# Patient Record
Sex: FEMALE | Race: WHITE | Employment: FULL TIME | URBAN - METROPOLITAN AREA
[De-identification: names, ages, dates, MRNs, and addresses within clinical notes are randomized per-mention and may not be internally consistent; named-entity substitution may affect disease eponyms.]

---

## 2019-01-16 PROBLEM — E11.21 TYPE 2 DIABETES WITH NEPHROPATHY (HCC): Status: ACTIVE | Noted: 2019-01-16

## 2019-01-23 PROBLEM — D69.6 THROMBOCYTOPENIA (HCC): Status: ACTIVE | Noted: 2019-01-23

## 2019-02-27 ENCOUNTER — APPOINTMENT (OUTPATIENT)
Dept: GENERAL RADIOLOGY | Age: 64
DRG: 812 | End: 2019-02-27
Attending: INTERNAL MEDICINE
Payer: COMMERCIAL

## 2019-02-27 ENCOUNTER — HOSPITAL ENCOUNTER (INPATIENT)
Age: 64
LOS: 2 days | Discharge: HOME | DRG: 812 | End: 2019-03-04
Attending: EMERGENCY MEDICINE | Admitting: INTERNAL MEDICINE
Payer: COMMERCIAL

## 2019-02-27 ENCOUNTER — APPOINTMENT (OUTPATIENT)
Dept: CT IMAGING | Age: 64
DRG: 812 | End: 2019-02-27
Attending: INTERNAL MEDICINE
Payer: COMMERCIAL

## 2019-02-27 DIAGNOSIS — E11.21 TYPE 2 DIABETES WITH NEPHROPATHY (HCC): ICD-10-CM

## 2019-02-27 DIAGNOSIS — R63.4 WEIGHT LOSS: ICD-10-CM

## 2019-02-27 DIAGNOSIS — D64.9 ANEMIA, UNSPECIFIED TYPE: Primary | ICD-10-CM

## 2019-02-27 LAB
ALBUMIN SERPL-MCNC: 3.2 G/DL (ref 3.2–4.6)
ALBUMIN/GLOB SERPL: 0.8 {RATIO} (ref 1.2–3.5)
ALP SERPL-CCNC: 246 U/L (ref 50–136)
ALT SERPL-CCNC: 41 U/L (ref 12–65)
ANION GAP SERPL CALC-SCNC: 9 MMOL/L (ref 7–16)
AST SERPL-CCNC: 45 U/L (ref 15–37)
BACTERIA URNS QL MICRO: ABNORMAL /HPF
BASOPHILS # BLD: 0 K/UL (ref 0–0.2)
BASOPHILS NFR BLD: 1 % (ref 0–2)
BILIRUB SERPL-MCNC: 1.2 MG/DL (ref 0.2–1.1)
BUN SERPL-MCNC: 23 MG/DL (ref 8–23)
CALCIUM SERPL-MCNC: 9.5 MG/DL (ref 8.3–10.4)
CASTS URNS QL MICRO: ABNORMAL /LPF
CHLORIDE SERPL-SCNC: 102 MMOL/L (ref 98–107)
CO2 SERPL-SCNC: 25 MMOL/L (ref 21–32)
CREAT SERPL-MCNC: 0.86 MG/DL (ref 0.6–1)
DIFFERENTIAL METHOD BLD: ABNORMAL
EOSINOPHIL # BLD: 0.2 K/UL (ref 0–0.8)
EOSINOPHIL NFR BLD: 5 % (ref 0.5–7.8)
EPI CELLS #/AREA URNS HPF: ABNORMAL /HPF
ERYTHROCYTE [DISTWIDTH] IN BLOOD BY AUTOMATED COUNT: 16.5 % (ref 11.9–14.6)
FERRITIN SERPL-MCNC: 9 NG/ML (ref 8–388)
FOLATE SERPL-MCNC: 19.5 NG/ML (ref 3.1–17.5)
GLOBULIN SER CALC-MCNC: 4.2 G/DL (ref 2.3–3.5)
GLUCOSE BLD STRIP.AUTO-MCNC: 156 MG/DL (ref 65–100)
GLUCOSE BLD STRIP.AUTO-MCNC: 249 MG/DL (ref 65–100)
GLUCOSE SERPL-MCNC: 167 MG/DL (ref 65–100)
HCT VFR BLD AUTO: 25.9 % (ref 35.8–46.3)
HGB BLD-MCNC: 7.9 G/DL (ref 11.7–15.4)
IMM GRANULOCYTES # BLD AUTO: 0 K/UL (ref 0–0.5)
IMM GRANULOCYTES NFR BLD AUTO: 0 % (ref 0–5)
INR PPP: 1.2
IRON SERPL-MCNC: 39 UG/DL (ref 35–150)
LIPASE SERPL-CCNC: 187 U/L (ref 73–393)
LYMPHOCYTES # BLD: 1.2 K/UL (ref 0.5–4.6)
LYMPHOCYTES NFR BLD: 26 % (ref 13–44)
MCH RBC QN AUTO: 22.1 PG (ref 26.1–32.9)
MCHC RBC AUTO-ENTMCNC: 30.5 G/DL (ref 31.4–35)
MCV RBC AUTO: 72.3 FL (ref 79.6–97.8)
MONOCYTES # BLD: 0.4 K/UL (ref 0.1–1.3)
MONOCYTES NFR BLD: 8 % (ref 4–12)
NEUTS SEG # BLD: 2.7 K/UL (ref 1.7–8.2)
NEUTS SEG NFR BLD: 59 % (ref 43–78)
NRBC # BLD: 0 K/UL (ref 0–0.2)
PLATELET # BLD AUTO: 121 K/UL (ref 150–450)
PMV BLD AUTO: 9.8 FL (ref 9.4–12.3)
POTASSIUM SERPL-SCNC: 4.2 MMOL/L (ref 3.5–5.1)
PROT SERPL-MCNC: 7.4 G/DL (ref 6.3–8.2)
PROTHROMBIN TIME: 14.6 SEC (ref 11.7–14.5)
RBC # BLD AUTO: 3.58 M/UL (ref 4.05–5.2)
RBC #/AREA URNS HPF: ABNORMAL /HPF
SODIUM SERPL-SCNC: 136 MMOL/L (ref 136–145)
TRANSFERRIN SERPL-MCNC: 316 MG/DL (ref 202–364)
TSH SERPL DL<=0.005 MIU/L-ACNC: 1.95 UIU/ML (ref 0.36–3.74)
VIT B12 SERPL-MCNC: 1314 PG/ML (ref 193–986)
WBC # BLD AUTO: 4.6 K/UL (ref 4.3–11.1)
WBC URNS QL MICRO: ABNORMAL /HPF

## 2019-02-27 PROCEDURE — 82728 ASSAY OF FERRITIN: CPT

## 2019-02-27 PROCEDURE — 81003 URINALYSIS AUTO W/O SCOPE: CPT | Performed by: EMERGENCY MEDICINE

## 2019-02-27 PROCEDURE — 84466 ASSAY OF TRANSFERRIN: CPT

## 2019-02-27 PROCEDURE — 84443 ASSAY THYROID STIM HORMONE: CPT

## 2019-02-27 PROCEDURE — 83690 ASSAY OF LIPASE: CPT

## 2019-02-27 PROCEDURE — 93005 ELECTROCARDIOGRAM TRACING: CPT | Performed by: EMERGENCY MEDICINE

## 2019-02-27 PROCEDURE — 87086 URINE CULTURE/COLONY COUNT: CPT

## 2019-02-27 PROCEDURE — 74011636320 HC RX REV CODE- 636/320: Performed by: INTERNAL MEDICINE

## 2019-02-27 PROCEDURE — 74177 CT ABD & PELVIS W/CONTRAST: CPT

## 2019-02-27 PROCEDURE — 85025 COMPLETE CBC W/AUTO DIFF WBC: CPT

## 2019-02-27 PROCEDURE — 83540 ASSAY OF IRON: CPT

## 2019-02-27 PROCEDURE — 74011250636 HC RX REV CODE- 250/636: Performed by: INTERNAL MEDICINE

## 2019-02-27 PROCEDURE — 85610 PROTHROMBIN TIME: CPT

## 2019-02-27 PROCEDURE — 86923 COMPATIBILITY TEST ELECTRIC: CPT

## 2019-02-27 PROCEDURE — 93005 ELECTROCARDIOGRAM TRACING: CPT | Performed by: INTERNAL MEDICINE

## 2019-02-27 PROCEDURE — 99218 HC RM OBSERVATION: CPT

## 2019-02-27 PROCEDURE — 82962 GLUCOSE BLOOD TEST: CPT

## 2019-02-27 PROCEDURE — 96361 HYDRATE IV INFUSION ADD-ON: CPT | Performed by: EMERGENCY MEDICINE

## 2019-02-27 PROCEDURE — 96360 HYDRATION IV INFUSION INIT: CPT | Performed by: EMERGENCY MEDICINE

## 2019-02-27 PROCEDURE — 81015 MICROSCOPIC EXAM OF URINE: CPT

## 2019-02-27 PROCEDURE — 74011000258 HC RX REV CODE- 258: Performed by: EMERGENCY MEDICINE

## 2019-02-27 PROCEDURE — 99285 EMERGENCY DEPT VISIT HI MDM: CPT | Performed by: EMERGENCY MEDICINE

## 2019-02-27 PROCEDURE — 36415 COLL VENOUS BLD VENIPUNCTURE: CPT

## 2019-02-27 PROCEDURE — 86900 BLOOD TYPING SEROLOGIC ABO: CPT

## 2019-02-27 PROCEDURE — 71045 X-RAY EXAM CHEST 1 VIEW: CPT

## 2019-02-27 PROCEDURE — 74011636320 HC RX REV CODE- 636/320: Performed by: EMERGENCY MEDICINE

## 2019-02-27 PROCEDURE — 80053 COMPREHEN METABOLIC PANEL: CPT

## 2019-02-27 PROCEDURE — 82746 ASSAY OF FOLIC ACID SERUM: CPT

## 2019-02-27 PROCEDURE — 82607 VITAMIN B-12: CPT

## 2019-02-27 PROCEDURE — 74011250637 HC RX REV CODE- 250/637: Performed by: INTERNAL MEDICINE

## 2019-02-27 RX ORDER — SODIUM CHLORIDE 9 MG/ML
100 INJECTION, SOLUTION INTRAVENOUS CONTINUOUS
Status: DISCONTINUED | OUTPATIENT
Start: 2019-02-27 | End: 2019-03-03

## 2019-02-27 RX ORDER — INSULIN LISPRO 100 [IU]/ML
0-10 INJECTION, SOLUTION INTRAVENOUS; SUBCUTANEOUS
Status: DISCONTINUED | OUTPATIENT
Start: 2019-02-27 | End: 2019-03-04 | Stop reason: HOSPADM

## 2019-02-27 RX ORDER — ONDANSETRON 2 MG/ML
4 INJECTION INTRAMUSCULAR; INTRAVENOUS
Status: DISCONTINUED | OUTPATIENT
Start: 2019-02-27 | End: 2019-03-04 | Stop reason: HOSPADM

## 2019-02-27 RX ORDER — SERTRALINE HYDROCHLORIDE 50 MG/1
50 TABLET, FILM COATED ORAL DAILY
Status: DISCONTINUED | OUTPATIENT
Start: 2019-02-28 | End: 2019-03-04 | Stop reason: HOSPADM

## 2019-02-27 RX ORDER — ACETAMINOPHEN 325 MG/1
650 TABLET ORAL
Status: DISCONTINUED | OUTPATIENT
Start: 2019-02-27 | End: 2019-03-04 | Stop reason: HOSPADM

## 2019-02-27 RX ORDER — GABAPENTIN 300 MG/1
300 CAPSULE ORAL
Status: DISCONTINUED | OUTPATIENT
Start: 2019-02-27 | End: 2019-03-04 | Stop reason: HOSPADM

## 2019-02-27 RX ORDER — SODIUM CHLORIDE 0.9 % (FLUSH) 0.9 %
10 SYRINGE (ML) INJECTION
Status: COMPLETED | OUTPATIENT
Start: 2019-02-27 | End: 2019-02-27

## 2019-02-27 RX ORDER — TRAMADOL HYDROCHLORIDE 50 MG/1
50 TABLET ORAL
Status: DISCONTINUED | OUTPATIENT
Start: 2019-02-27 | End: 2019-03-04 | Stop reason: HOSPADM

## 2019-02-27 RX ORDER — ATORVASTATIN CALCIUM 10 MG/1
10 TABLET, FILM COATED ORAL DAILY
Status: DISCONTINUED | OUTPATIENT
Start: 2019-02-28 | End: 2019-03-04 | Stop reason: HOSPADM

## 2019-02-27 RX ADMIN — GABAPENTIN 300 MG: 300 CAPSULE ORAL at 22:12

## 2019-02-27 RX ADMIN — SODIUM CHLORIDE 100 ML/HR: 900 INJECTION, SOLUTION INTRAVENOUS at 18:09

## 2019-02-27 RX ADMIN — IOPAMIDOL 100 ML: 755 INJECTION, SOLUTION INTRAVENOUS at 19:30

## 2019-02-27 RX ADMIN — DIATRIZOATE MEGLUMINE AND DIATRIZOATE SODIUM 15 ML: 660; 100 LIQUID ORAL; RECTAL at 18:07

## 2019-02-27 RX ADMIN — Medication 10 ML: at 19:30

## 2019-02-27 RX ADMIN — SODIUM CHLORIDE 100 ML: 900 INJECTION, SOLUTION INTRAVENOUS at 19:30

## 2019-02-27 NOTE — ED TRIAGE NOTES
PT TO ED WITH C/O LOW HGB  - PT REPORTS THAT SHE WAS TOLD HER HGB WAS 7 - PT REPORTS THAT THEY ARE UNSURE WHERE SHE IS THE LOSING THE BLOOD FROM AND WAS TOLD THAT SHE NEEDED TO BE ADMITTED BUT WAS NOT ABLE TO TAKE DIRECT ADMITS - PT PALE

## 2019-02-27 NOTE — ED PROVIDER NOTES
Presents with complaint of needing admission for dropping her hemoglobin. Patient states causes unknown and she went to her primary care doctor and had a negative rectal exam.  She denies any black stools or blood in her stools. She denies any hematuria. She states her doctor wanted her directly admitted but there are no beds. She reports fatigueand difficulty walking because of this fatigue but denies any chest pain or shortness of breath. She denies any abdominal pain nausea or vomiting. The history is provided by the patient. Abnormal Lab Results This is a new problem. The current episode started more than 1 week ago. The problem occurs constantly. The problem has not changed since onset. Pertinent negatives include no chest pain, no abdominal pain and no shortness of breath. She has tried nothing for the symptoms. Past Medical History:  
Diagnosis Date  Diabetes ([facility] [address]  Hypertension History reviewed. No pertinent surgical history. Family History:  
Problem Relation Age of Onset  Hypertension Mother [address] [facility] Other Mother PVD  Diabetes Mother  Parkinson's Disease Mother  Diabetes Father Social History Socioeconomic History  Marital status:  Spouse name: Not on file  Number of children: Not on file  Years of education: Not on file  Highest education level: Not on file Social Needs  Financial resource strain: Not on file  Food insecurity - worry: Not on file  Food insecurity - inability: Not on file  Transportation needs - medical: Not on file  Transportation needs - non-medical: Not on file Occupational History  Not on file Tobacco Use  Smoking status: Never Smoker  Smokeless tobacco: Never Used Substance and Sexual Activity  Alcohol use: No  
 Drug use: Not on file  Sexual activity: Not on file Other Topics Concern  Not on file Social History Narrative  Not on file ALLERGIES: Pcn [penicillins] Review of Systems Constitutional: Negative for chills and fever. Respiratory: Negative for shortness of breath. Cardiovascular: Negative for chest pain. Gastrointestinal: Negative for abdominal pain. All other systems reviewed and are negative. Vitals:  
 02/27/19 1325 02/27/19 1502 02/27/19 1523 BP: 110/69 108/56 Pulse: 88 87 Resp: 20 13 Temp: 98.6 °F (37 °C) SpO2: 98%  98% Weight: 75.3 kg (166 lb) Height: 5' 7\" (1.702 m) Physical Exam  
Constitutional: She is oriented to person, place, and time. She appears well-developed and well-nourished. No distress. HENT:  
Head: Normocephalic and atraumatic. Eyes: Conjunctivae are normal. Right eye exhibits no discharge. Left eye exhibits no discharge. Neck: Normal range of motion. Neck supple. Cardiovascular: Normal rate and regular rhythm. Murmur heard. Pulmonary/Chest: Effort normal and breath sounds normal. No respiratory distress. Abdominal: Soft. She exhibits no distension. There is no tenderness. Musculoskeletal: Normal range of motion. She exhibits no edema. Neurological: She is alert and oriented to person, place, and time. No cranial nerve deficit. Skin: Skin is warm and dry. Capillary refill takes less than 2 seconds. She is not diaphoretic. Psychiatric: She has a normal mood and affect. Her behavior is normal.  
Nursing note and vitals reviewed. MDM Number of Diagnoses or Management Options Anemia, unspecified type:  
Weight loss:  
Diagnosis management comments: D/w hospitalist  Admission for workup of weight loss versus GI bleed per the PCP request as I reviewed in the note. Patient has had some recent weight loss and there is concern for malignancy with her anemia and low platelet count. Her rectal exam was negative for blood in her PCPs office. Patient does not meet criteria for transfusion at this point. Amount and/or Complexity of Data Reviewed Clinical lab tests: ordered and reviewed Discuss the patient with other providers: yes Independent visualization of images, tracings, or specimens: yes (Normal sinus rhythm no ST elevation normal QRS) Risk of Complications, Morbidity, and/or Mortality Presenting problems: high Diagnostic procedures: moderate Management options: high Patient Progress Patient progress: improved Procedures

## 2019-02-28 LAB
ALBUMIN SERPL-MCNC: 2.7 G/DL (ref 3.2–4.6)
ALBUMIN/GLOB SERPL: 0.8 {RATIO} (ref 1.2–3.5)
ALP SERPL-CCNC: 202 U/L (ref 50–136)
ALT SERPL-CCNC: 33 U/L (ref 12–65)
ANION GAP SERPL CALC-SCNC: 6 MMOL/L (ref 7–16)
AST SERPL-CCNC: 38 U/L (ref 15–37)
ATRIAL RATE: 86 BPM
BILIRUB SERPL-MCNC: 0.6 MG/DL (ref 0.2–1.1)
BUN SERPL-MCNC: 21 MG/DL (ref 8–23)
CALCIUM SERPL-MCNC: 8.7 MG/DL (ref 8.3–10.4)
CALCULATED P AXIS, ECG09: 65 DEGREES
CALCULATED R AXIS, ECG10: 40 DEGREES
CALCULATED T AXIS, ECG11: 61 DEGREES
CHLORIDE SERPL-SCNC: 106 MMOL/L (ref 98–107)
CO2 SERPL-SCNC: 25 MMOL/L (ref 21–32)
CREAT SERPL-MCNC: 0.78 MG/DL (ref 0.6–1)
DIAGNOSIS, 93000: NORMAL
ERYTHROCYTE [DISTWIDTH] IN BLOOD BY AUTOMATED COUNT: 16.8 % (ref 11.9–14.6)
GLOBULIN SER CALC-MCNC: 3.6 G/DL (ref 2.3–3.5)
GLUCOSE BLD STRIP.AUTO-MCNC: 232 MG/DL (ref 65–100)
GLUCOSE BLD STRIP.AUTO-MCNC: 239 MG/DL (ref 65–100)
GLUCOSE BLD STRIP.AUTO-MCNC: 249 MG/DL (ref 65–100)
GLUCOSE BLD STRIP.AUTO-MCNC: 282 MG/DL (ref 65–100)
GLUCOSE SERPL-MCNC: 224 MG/DL (ref 65–100)
HCT VFR BLD AUTO: 21.9 % (ref 35.8–46.3)
HCT VFR BLD AUTO: 22.2 % (ref 35.8–46.3)
HCT VFR BLD AUTO: 24.1 % (ref 35.8–46.3)
HEMOCCULT STL QL: NEGATIVE
HGB BLD-MCNC: 6.7 G/DL (ref 11.7–15.4)
HGB BLD-MCNC: 7 G/DL (ref 11.7–15.4)
HGB BLD-MCNC: 7.4 G/DL (ref 11.7–15.4)
MAGNESIUM SERPL-MCNC: 1.5 MG/DL (ref 1.8–2.4)
MCH RBC QN AUTO: 22.7 PG (ref 26.1–32.9)
MCHC RBC AUTO-ENTMCNC: 31.5 G/DL (ref 31.4–35)
MCV RBC AUTO: 72 FL (ref 79.6–97.8)
NRBC # BLD: 0 K/UL (ref 0–0.2)
P-R INTERVAL, ECG05: 160 MS
PLATELET # BLD AUTO: 75 K/UL (ref 150–450)
PMV BLD AUTO: 10 FL (ref 9.4–12.3)
POTASSIUM SERPL-SCNC: 4 MMOL/L (ref 3.5–5.1)
PROT SERPL-MCNC: 6.3 G/DL (ref 6.3–8.2)
Q-T INTERVAL, ECG07: 372 MS
QRS DURATION, ECG06: 80 MS
QTC CALCULATION (BEZET), ECG08: 445 MS
RBC # BLD AUTO: 3.04 M/UL (ref 4.05–5.2)
SODIUM SERPL-SCNC: 137 MMOL/L (ref 136–145)
VENTRICULAR RATE, ECG03: 86 BPM
WBC # BLD AUTO: 2.2 K/UL (ref 4.3–11.1)

## 2019-02-28 PROCEDURE — 36430 TRANSFUSION BLD/BLD COMPNT: CPT

## 2019-02-28 PROCEDURE — C9113 INJ PANTOPRAZOLE SODIUM, VIA: HCPCS | Performed by: INTERNAL MEDICINE

## 2019-02-28 PROCEDURE — 82272 OCCULT BLD FECES 1-3 TESTS: CPT

## 2019-02-28 PROCEDURE — 85018 HEMOGLOBIN: CPT

## 2019-02-28 PROCEDURE — 83516 IMMUNOASSAY NONANTIBODY: CPT

## 2019-02-28 PROCEDURE — 74011636637 HC RX REV CODE- 636/637: Performed by: FAMILY MEDICINE

## 2019-02-28 PROCEDURE — 77030020263 HC SOL INJ SOD CL0.9% LFCR 1000ML

## 2019-02-28 PROCEDURE — 97165 OT EVAL LOW COMPLEX 30 MIN: CPT

## 2019-02-28 PROCEDURE — 77030039270 HC TU BLD FLTR CARD -A

## 2019-02-28 PROCEDURE — 30233N1 TRANSFUSION OF NONAUTOLOGOUS RED BLOOD CELLS INTO PERIPHERAL VEIN, PERCUTANEOUS APPROACH: ICD-10-PCS | Performed by: INTERNAL MEDICINE

## 2019-02-28 PROCEDURE — 74011250637 HC RX REV CODE- 250/637: Performed by: INTERNAL MEDICINE

## 2019-02-28 PROCEDURE — 80074 ACUTE HEPATITIS PANEL: CPT

## 2019-02-28 PROCEDURE — 74011250636 HC RX REV CODE- 250/636: Performed by: FAMILY MEDICINE

## 2019-02-28 PROCEDURE — 36415 COLL VENOUS BLD VENIPUNCTURE: CPT

## 2019-02-28 PROCEDURE — 74011636637 HC RX REV CODE- 636/637: Performed by: INTERNAL MEDICINE

## 2019-02-28 PROCEDURE — 80053 COMPREHEN METABOLIC PANEL: CPT

## 2019-02-28 PROCEDURE — 99218 HC RM OBSERVATION: CPT

## 2019-02-28 PROCEDURE — 74011250636 HC RX REV CODE- 250/636: Performed by: INTERNAL MEDICINE

## 2019-02-28 PROCEDURE — 85027 COMPLETE CBC AUTOMATED: CPT

## 2019-02-28 PROCEDURE — 74011250637 HC RX REV CODE- 250/637: Performed by: PHYSICIAN ASSISTANT

## 2019-02-28 PROCEDURE — 83735 ASSAY OF MAGNESIUM: CPT

## 2019-02-28 PROCEDURE — 74011000250 HC RX REV CODE- 250: Performed by: INTERNAL MEDICINE

## 2019-02-28 PROCEDURE — 82962 GLUCOSE BLOOD TEST: CPT

## 2019-02-28 PROCEDURE — P9016 RBC LEUKOCYTES REDUCED: HCPCS

## 2019-02-28 RX ORDER — SODIUM CHLORIDE 9 MG/ML
250 INJECTION, SOLUTION INTRAVENOUS AS NEEDED
Status: DISCONTINUED | OUTPATIENT
Start: 2019-02-28 | End: 2019-03-04 | Stop reason: HOSPADM

## 2019-02-28 RX ORDER — MAGNESIUM SULFATE HEPTAHYDRATE 40 MG/ML
2 INJECTION, SOLUTION INTRAVENOUS ONCE
Status: COMPLETED | OUTPATIENT
Start: 2019-02-28 | End: 2019-02-28

## 2019-02-28 RX ORDER — DEXTROSE 40 %
15 GEL (GRAM) ORAL AS NEEDED
Status: DISCONTINUED | OUTPATIENT
Start: 2019-02-28 | End: 2019-03-04 | Stop reason: HOSPADM

## 2019-02-28 RX ORDER — POLYETHYLENE GLYCOL 3350 17 G/17G
238 POWDER, FOR SOLUTION ORAL ONCE
Status: COMPLETED | OUTPATIENT
Start: 2019-03-01 | End: 2019-03-01

## 2019-02-28 RX ORDER — BISACODYL 5 MG
10 TABLET, DELAYED RELEASE (ENTERIC COATED) ORAL
Status: COMPLETED | OUTPATIENT
Start: 2019-02-28 | End: 2019-02-28

## 2019-02-28 RX ORDER — BISACODYL 5 MG
10 TABLET, DELAYED RELEASE (ENTERIC COATED) ORAL ONCE
Status: COMPLETED | OUTPATIENT
Start: 2019-03-01 | End: 2019-03-01

## 2019-02-28 RX ORDER — DEXTROSE 50 % IN WATER (D50W) INTRAVENOUS SYRINGE
25-50 AS NEEDED
Status: DISCONTINUED | OUTPATIENT
Start: 2019-02-28 | End: 2019-03-04 | Stop reason: HOSPADM

## 2019-02-28 RX ORDER — DIPHENHYDRAMINE HCL 25 MG
25 CAPSULE ORAL
Status: DISCONTINUED | OUTPATIENT
Start: 2019-02-28 | End: 2019-03-01

## 2019-02-28 RX ORDER — POLYETHYLENE GLYCOL 3350 17 G/17G
238 POWDER, FOR SOLUTION ORAL ONCE
Status: COMPLETED | OUTPATIENT
Start: 2019-02-28 | End: 2019-02-28

## 2019-02-28 RX ORDER — INSULIN GLARGINE 100 [IU]/ML
50 INJECTION, SOLUTION SUBCUTANEOUS DAILY
Status: DISCONTINUED | OUTPATIENT
Start: 2019-02-28 | End: 2019-03-01

## 2019-02-28 RX ADMIN — SERTRALINE HYDROCHLORIDE 50 MG: 50 TABLET ORAL at 11:23

## 2019-02-28 RX ADMIN — LEVOTHYROXINE SODIUM 125 MCG: 75 TABLET ORAL at 05:03

## 2019-02-28 RX ADMIN — INSULIN LISPRO 4 UNITS: 100 INJECTION, SOLUTION INTRAVENOUS; SUBCUTANEOUS at 18:23

## 2019-02-28 RX ADMIN — GABAPENTIN 300 MG: 300 CAPSULE ORAL at 21:54

## 2019-02-28 RX ADMIN — ATORVASTATIN CALCIUM 10 MG: 10 TABLET, FILM COATED ORAL at 11:23

## 2019-02-28 RX ADMIN — INSULIN LISPRO 4 UNITS: 100 INJECTION, SOLUTION INTRAVENOUS; SUBCUTANEOUS at 11:24

## 2019-02-28 RX ADMIN — MAGNESIUM SULFATE HEPTAHYDRATE 2 G: 40 INJECTION, SOLUTION INTRAVENOUS at 19:06

## 2019-02-28 RX ADMIN — INSULIN GLARGINE 50 UNITS: 100 INJECTION, SOLUTION SUBCUTANEOUS at 18:23

## 2019-02-28 RX ADMIN — POLYETHYLENE GLYCOL 3350 238 G: 17 POWDER, FOR SOLUTION ORAL at 18:24

## 2019-02-28 RX ADMIN — SODIUM CHLORIDE 40 MG: 9 INJECTION, SOLUTION INTRAMUSCULAR; INTRAVENOUS; SUBCUTANEOUS at 09:17

## 2019-02-28 RX ADMIN — BISACODYL 10 MG: 5 TABLET, COATED ORAL at 18:23

## 2019-02-28 NOTE — PROGRESS NOTES
Progress Note Patient: Tutu Burgess MRN: [id number]  SSN: [id number] YOB: 1955  Age: 66 y.o. Sex: female Admit Date: 2/27/2019 LOS: 0 days Subjective: F/U microcytic anemia, weight loss. Hx significant for DM type 2, diabetic neuropathy, depression, hypothyroidism, chronic anemia and thrombocytopenia. Patient newly diagnosed with cirrhosis. Patient admitted for worsening anemia of unknown origin. Glucose levels elevated. Hg 7 this AM. Platelets worsening at 75. Magnesium 1.5. Current Facility-Administered Medications Medication Dose Route Frequency  dextrose 40% (GLUTOSE) oral gel 1 Tube  15 g Oral PRN  
 glucagon (GLUCAGEN) injection 1 mg  1 mg IntraMUSCular PRN  
 dextrose (D50W) injection syrg 12.5-25 g  25-50 mL IntraVENous PRN  
 0.9% sodium chloride infusion 250 mL  250 mL IntraVENous PRN  
 diphenhydrAMINE (BENADRYL) capsule 25 mg  25 mg Oral Q6H PRN  
 [START ON 3/1/2019] bisacodyl (DULCOLAX) tablet 10 mg  10 mg Oral ONCE  polyethylene glycol (MIRALAX) powder 238 g  238 g Oral ONCE  
 bisacodyl (DULCOLAX) tablet 10 mg  10 mg Oral NOW  
 [START ON 3/1/2019] polyethylene glycol (MIRALAX) powder 238 g  238 g Oral ONCE  
 0.9% sodium chloride infusion  100 mL/hr IntraVENous CONTINUOUS  
 atorvastatin (LIPITOR) tablet 10 mg  10 mg Oral DAILY  gabapentin (NEURONTIN) capsule 300 mg  300 mg Oral QHS  levothyroxine (SYNTHROID) tablet 125 mcg  125 mcg Oral ACB  sertraline (ZOLOFT) tablet 50 mg  50 mg Oral DAILY  insulin lispro (HUMALOG) injection 0-10 Units  0-10 Units SubCUTAneous TIDAC  pantoprazole (PROTONIX) 40 mg in sodium chloride 0.9% 10 mL injection  40 mg IntraVENous DAILY  ondansetron (ZOFRAN) injection 4 mg  4 mg IntraVENous Q6H PRN  
 traMADol (ULTRAM) tablet 50 mg  50 mg Oral Q6H PRN  
 acetaminophen (TYLENOL) tablet 650 mg  650 mg Oral Q6H PRN Objective:  
 
Vitals: 02/28/19 0744 02/28/19 1142 02/28/19 1513 02/28/19 1528 BP: 115/50 135/55 102/56 98/51 Pulse: 78 80 80 78 Resp: 16 16 16 16 Temp: 98.4 °F (36.9 °C) 98.4 °F (36.9 °C) 98.4 °F (36.9 °C) 98 °F (36.7 °C) SpO2: 100% 100% 98% 100% Weight:      
Height:      
  
  
Intake and Output: 
Current Shift: No intake/output data recorded. Last three shifts: 02/26 1901 - 02/28 0700 In: 100 [I.V.:100] Out: - Physical Exam:  
General:  Alert, cooperative, no distress, appears stated age. Eyes:  Conjunctivae/corneas clear. PERRL, EOMs intact. Fundi benign Ears:  Normal TMs and external ear canals both ears. Nose: Nares normal. Septum midline. Mucosa normal. No drainage or sinus tenderness. Mouth/Throat: Lips, mucosa, and tongue normal. Teeth and gums normal.  
Neck: Supple, symmetrical, trachea midline, no adenopathy, thyroid: no enlargment/tenderness/nodules, no carotid bruit and no JVD. Back:   Symmetric, no curvature. ROM normal. No CVA tenderness. Lungs:   Clear to auscultation bilaterally. Heart:  Regular rate and rhythm, S1, S2 normal, no murmur, click, rub or gallop. Abdomen:   Soft, non-tender. Bowel sounds normal. No masses,  No organomegaly. Extremities: Extremities normal, atraumatic, no cyanosis or edema. Pulses: 2+ and symmetric all extremities. Skin: Skin color, texture, turgor normal. No rashes or lesions Lymph nodes: Cervical, supraclavicular, and axillary nodes normal.  
Neurologic: CNII-XII intact. Normal strength, sensation and reflexes throughout. Lab/Data Review: 
 
Recent Results (from the past 24 hour(s)) GLUCOSE, POC Collection Time: 02/27/19 10:07 PM  
Result Value Ref Range Glucose (POC) 249 (H) 65 - 100 mg/dL PROTHROMBIN TIME + INR Collection Time: 02/27/19 10:59 PM  
Result Value Ref Range Prothrombin time 14.6 (H) 11.7 - 14.5 sec INR 1.2    
CBC W/O DIFF Collection Time: 02/28/19  5:37 AM  
Result Value Ref Range WBC 2.2 (L) 4.3 - 11.1 K/uL  
 RBC 3.04 (L) 4.05 - 5.2 M/uL HGB 7.0 (L) 11.7 - 15.4 g/dL HCT 21.9 (L) 35.8 - 46.3 % MCV 72.0 (L) 79.6 - 97.8 FL  
 MCH 22.7 (L) 26.1 - 32.9 PG  
 MCHC 31.5 31.4 - 35.0 g/dL  
 RDW 16.8 (H) 11.9 - 14.6 % PLATELET 75 (L) 604 - 450 K/uL MPV 10.0 9.4 - 12.3 FL ABSOLUTE NRBC 0.00 0.0 - 0.2 K/uL METABOLIC PANEL, COMPREHENSIVE Collection Time: 02/28/19  5:37 AM  
Result Value Ref Range Sodium 137 136 - 145 mmol/L Potassium 4.0 3.5 - 5.1 mmol/L Chloride 106 98 - 107 mmol/L  
 CO2 25 21 - 32 mmol/L Anion gap 6 (L) 7 - 16 mmol/L Glucose 224 (H) 65 - 100 mg/dL BUN 21 8 - 23 MG/DL Creatinine 0.78 0.6 - 1.0 MG/DL  
 GFR est AA >60 >60 ml/min/1.73m2 GFR est non-AA >60 >60 ml/min/1.73m2 Calcium 8.7 8.3 - 10.4 MG/DL Bilirubin, total 0.6 0.2 - 1.1 MG/DL  
 ALT (SGPT) 33 12 - 65 U/L  
 AST (SGOT) 38 (H) 15 - 37 U/L Alk. phosphatase 202 (H) 50 - 136 U/L Protein, total 6.3 6.3 - 8.2 g/dL Albumin 2.7 (L) 3.2 - 4.6 g/dL Globulin 3.6 (H) 2.3 - 3.5 g/dL A-G Ratio 0.8 (L) 1.2 - 3.5 MAGNESIUM Collection Time: 02/28/19  5:37 AM  
Result Value Ref Range Magnesium 1.5 (L) 1.8 - 2.4 mg/dL GLUCOSE, POC Collection Time: 02/28/19  7:41 AM  
Result Value Ref Range Glucose (POC) 239 (H) 65 - 100 mg/dL GLUCOSE, POC Collection Time: 02/28/19 11:13 AM  
Result Value Ref Range Glucose (POC) 232 (H) 65 - 100 mg/dL HGB & HCT Collection Time: 02/28/19  1:22 PM  
Result Value Ref Range HGB 6.7 (LL) 11.7 - 15.4 g/dL HCT 22.2 (L) 35.8 - 46.3 % Assessment/ Plan: Active Problems: Hyperlipidemia (1/12/2016) Acquired hypothyroidism (1/12/2016) Type 2 diabetes with nephropathy ([facility] [address] (1/16/2019) Thrombocytopenia ([facility] [address] (1/23/2019) Severe anemia (2/27/2019) Worsening anemia - Give 1 unit PRBC today.  CT abdomen pelvis showed no findings suggestive of malignancy. Cirrhosis and splenomegaly seen. Give more units if indicated. H&H q 6 hours. Weight loss - As above. Plan for EGD/Colonoscopy 3/2/19. Cirrhosis - Likely from fatty liver disease. Hepatitis panel, KRISTEN, ceruloplasmin, alpha 1 antitrypsin. GI working up for etiology. DM- SS. A1C 14 1/16/19. Patient states she takes Lantus 70 units at home and has not been getting. Will order Lantus 50 units and see how glucose levels react to regimen since eating differently here. Supplement magesium DVT prophylaxis - SCDs Signed By: Nury Hannon DO February 28, 2019

## 2019-02-28 NOTE — H&P
[address] [place] [address] 
HISTORY AND PHYSICAL Name:  Naseem Razo 
MR#:  [id number] :  1955 ACCOUNT #:  [de-identified] ADMIT DATE:  2019 TIME OF ADMISSION:  5:30 p.m. CHIEF COMPLAINT:  Generalized weakness. HISTORY OF PRESENT ILLNESS:  This is a 72-year-old female patient who has a past medical history of diabetes type 2, diabetic neuropathy, depression, chronic anemia who came into the emergency room with a chief complaint of worsening anemia and generalized weakness. According to the patient for the last 2to 3 months, she has been having progressive unintended weight loss and she also has suffered progressive weakness, lack of energy and poor stamina. She is a diabetic and for a while, she stopped using her regular medications. Apparently, she was depressed. Her hemoglobin A1c was checked on 2019 an it was up to 14. She has seen her primary care doctor and she has recently resumed her insulin. The patient was found to have worsening anemia with a hemoglobin of 8.6 on 2019 down to 7.9 today. She denies melena, hematemesis, bright red blood per rectum, vaginal bleeding, hemoptysis, large bruises, trauma or any other problems. She has reported gum disease with frequent minor bleeding but apart from that, she denies any other evidence of external hemorrhage. She had a rectal exam done by her primary care doctor which was reported negative for occult blood in the stool. Today, she was sent into the emergency room by her primary care doctor to be worked up for anemia to rule out malignancy. When she arrived into the emergency room, her vital signs were blood pressure of 110/69, heart rate of 88, respiratory rate of 20, O2 saturation of 98% on room air. She was awake and alert. Her lungs were clear to auscultation. Her heart had a regular rate and rhythm with no murmurs. Her abdomen was soft and nontender and she had no leg edema. Her initial blood workup reported a white blood cell count of 4.6, hemoglobin of 7.9, platelet count of 696. Her kidney function was within normal limits. Her bilirubin was slightly elevated to 1.2. A urinalysis was done and it was not suggestive of active urinary tract infection with a low white blood cell count of 3 to 5; however, she had 1+ bacteria. The patient was presented to the Hospitalist Team to be admitted. REVIEW OF SYSTEMS:  Review of 14 systems was performed and it was negative except for the findings that are reported in the HPI. PAST MEDICAL HISTORY: 
1.  Diabetes type 2. 
2.  Hypothyroidism. 3.  Thrombocytopenia. 4.  Chronic anemia. 5.  History of hypertension. PAST SURGICAL HISTORY:  None. SOCIAL HISTORY:  Denies smoking, alcohol use, or illicit drug use. FAMILY HISTORY:  Positive for diabetes, hypertension, Parkinson's disease. ALLERGIES:  PENICILLIN. PHYSICAL EXAMINATION: 
VITAL SIGNS:  Blood pressure 108/56, heart rate 87, respiratory rate of 13, O2 saturation of 95%. HEENT:  Eyes:  Right eye PERRLA, left eye with deformed pupil. Ears, nose, mouth and throat:  The patient has gum disease which is moderate to severe. NECK:  No evidence of lymphadenopathies. CHEST:  Clear breath sounds bilaterally. CARDIOVASCULAR:  Regular rate and rhythm with no murmurs. No bilateral leg edema. GASTROINTESTINAL:  Abdomen is soft and nontender with positive bowel sounds. :  Unremarkable. MUSCULOSKELETAL:  No evidence of trauma. SKIN:  No evidence of active skin lesions. NEUROLOGIC:  The patient is alert and oriented with no areas of focal weakness. PSYCHIATRIC:  Normal mood. HEMATOLOGIC/LYMPHATIC/IMMUNOLOGIC:  No evidence of active bleeding. No abnormal lymphadenopathies. LABORATORY AND IMAGE RESULTS:  White blood cell count 4.6, hemoglobin 7.9, platelet count 438.   Urinalysis white blood cell count 3 to 5, bacteria 1+.  Sodium 136, potassium 4.2, anion gap 9, glucose 167, BUN 23, creatinine 0.86, calcium 9.5. Albumin 3.2. ALT 41, AST 45, lipase 187. Urine culture from 01/16/2019 positive for E. coli. Chest x-ray seen and analyzed by me, no evidence of lung infiltrates, no cardiomegaly. EKG pending to be done. ASSESSMENT:  This is a 62-year-old female patient who came into the emergency room complaining of severe fatigue and progressive anemia. She will be placed under observation and her initial length of stay is calculated to be less than two midnights. PLAN: 
1. Progressive anemia of unclear etiology. The patient denies any evidence of external bleeding. She has reported unintended weight loss and she was found to have progressive anemia. An entire anemia workup has been ordered. Her clinical presentation is somehow suggestive of early cirrhosis too. She has a risk factor for this due to her uncontrolled diabetes. On top of that, she has thrombocytopenia, chronic anemia, slightly elevated bilirubin and the fatigue which is very typical of chronic liver disease. Her INR is going to be checked. I have ordered a CT scan of the abdomen and pelvis with the intention of ruling out intraabdominal malignancy and to assess her liver morphology. The patient had a rectal exam done by her primary care doctor which was negative. If her abdominal CT scan is not showing any possible etiology for her anemia, or if her Hgb level keeps dropping we will consider consulting GI in the morning. The patient would be kept n.p.o. at midnight. 1 unit of packed RBCs has been ordered in the emergency room. We will recheck her hemoglobin level in the morning and she might need one more unit of blood to increase her hemoglobin as close as possible to 10 2. Possible urinary tract infection. Her urinalysis is showing 1+ bacteria but the finding is not necessarily suggestive of UTI.   She had a urine culture showing  E. coli in 01/2019 and apparently, this was treated. A new urine culture has been ordered. 3.  Diabetes type 2. Uncontrolled. The patient has been using long-acting insulin 70 units per day and short acting insulin on sliding scale. Her blood sugar is low in the emergency room. For now, I will order only Humalog sliding scale and the long-acting insulin will be added as necessary. 4.  History of hypertension. The patient has a borderline low blood pressure in the emergency room. No blood pressure medication will be used at this time and we will monitor her vital signs. 5.  History of hypothyroidism. Uncontrolled hypothyroidism could also explain her fatigue, anemia, and generalized symptoms. Her TSH level will be checked. 6.  Depression. Continue using Zoloft 50 mg per day. 7.  DVT prophylaxis. Bilateral compression devices. MD LEONELA [id number] 
D:  02/27/2019 18:06 
T:  02/27/2019 21:35 
JOB #:  [id number] CC:

## 2019-02-28 NOTE — CONSULTS
[facility] Consult Note       Consulting GI Physician: Dr. Nikki Doan    Referring Provider:  Dr. Lupe Arrington Date:  2/28/2019    Admit Date:  2/27/2019    Chief Complaint:  New diagnosis of cirrhosis/ persistent anemia    Subjective:     History of Present Illness:  Patient is a 66 y.o. female with PMH of diabetes type 2, diabetic neuropathy, depression, hypothyroidism, chronic anemia, thrombocytopenia being seen in GI consultation at the request of Dr. Promise Nelson for new diagnosis of Cirrhosis, microcytic anemia. She presented to [facility] ER 2/27 with c/o generalized weakness, worsening anemia after being seen by her PCP earlier that day and was found to have Hgb 7.5, decreased from Hgb 8.6 on 1/16/19. Iron studies show iron 39, ferritin 9, Transferrin 316. Vit B12 1314, Folate 19.5.  BUN/Cr WNL. INR ok at 1.2. No overt GI bleeding and reportedly with neg hemoccult stool study with PCP. She does have gum disease and has had some mild gum bleeding as well as mild nose bleeds. She recalls hx anemia in her teens likely related to menstruation though then recalls being told she had chronic anemia with Hgb around 10-11. Prior to this year last Hgb on file is Hgb 11.0 from 2/2018. No prior EGD or Colonoscopy. She has been noted to have elevated LFTs. Admission labs revealed Tbili 0.8, Alk phos 237, AST 42, ALT 31. She recalls being told that her liver enzymes had been elevated in the past and that she likely had fatty liver. She has had thrombocytopenia with platelets as low as 75. She had a CT abd/pelvis that reported hepatic cirrhosis and splenomegaly (spleen 16.8cm). She denies ETOH. She denies hx tattoos/piercings. 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 She denies abdominal pain, N/V, reflux/heartburn, dyshagia. She has normal BMs. Her appetite has been decreased (she blames this on depressed mood) and since 2016 her weight has dropped from about 230lbs to 160lbs.   This was initially intentional in hopes to get her blood sugars down however she then continued to lose without increased effort. She was uninsured for a period of time and during this time did not see a physician regularly. She is a diabetic and for a while, she stopped using her regular medications. Her HgbA1C 1/2019 was elevated at 14.0. She has seen her primary care doctor and she has recently resumed her insulin.  She was taking Ibuprofen 200mg 4-6x daily though stopped doing so given concern for decreasing anemia. She will take an ASA every once in a while. No regular blood thinners. No tobacco or ETOH use.          PMH:  Past Medical History:   Diagnosis Date    Diabetes ([facility] [address]     Hypertension      HLD  Diabetic neuropathy  Depression      PSH:  History reviewed. No pertinent surgical history. Allergies: Allergies   Allergen Reactions    Pcn [Penicillins] Hives and Rash       Home Medications:  Prior to Admission medications    Medication Sig Start Date End Date Taking? Authorizing Provider   atorvastatin (LIPITOR) 10 mg tablet Take 1 Tab by mouth daily. 1/23/19  Yes Rupal Whitten MD   metFORMIN ER (GLUCOPHAGE XR) 500 mg tablet Take 1 Tab by mouth daily (with dinner). 2/27/19   Rupal Whitten MD   gabapentin (NEURONTIN) 300 mg capsule Take 1 Cap by mouth nightly. 2/27/19   Rupal Whitten MD   sertraline (ZOLOFT) 50 mg tablet Take 1 Tab by mouth daily. 2/27/19   Rupal Whitten MD   insulin aspart U-100 (NOVOLOG) 100 unit/mL inpn Inject 5-10 units SQ with meals and prn per sliding scale. 1/25/19   Rupal Whitten MD   hydroCHLOROthiazide (HYDRODIURIL) 25 mg tablet Take 1 Tab by mouth daily. 1/23/19   Rupal Whitten MD   insulin glargine (BASAGLAR KWIKPEN U-100 INSULIN) 100 unit/mL (3 mL) inpn Inject 40 units sq every day.  Titrate up by 2 units every 2 days until glucose 120-150. 1/23/19   Rupal Whitten MD   Insulin Needles, Disposable, (ARIE PEN NEEDLE) 32 gauge x 5/32\" ndle For use with insulin pens   E11.65 Uncontrolled type 2 DM 1/23/19   Georgette Caceres MD   lisinopril (PRINIVIL, ZESTRIL) 20 mg tablet Take 1 Tab by mouth daily. 1/17/19   Georgette Caceres MD   levothyroxine (SYNTHROID) 125 mcg tablet Take 1 Tab by mouth Daily (before breakfast). 1/17/19   Georgette Caceres MD   Blood-Glucose Meter monitoring kit Test FSBS 3x daily before meals. E11.65 1/16/19   Georgette Caceres MD   lancets misc Test FSBS 3x daily before meals. E11.65 1/16/19   Georgette Caceres MD   glucose blood VI test strips (BLOOD GLUCOSE TEST) strip Test FSBS 3x daily before meals. E11.65 1/16/19   Georgette Caceres MD   Insulin Needles, Disposable, 31 gauge x 5/16\" ndle by SubCUTAneous route daily.  E11.65 1/12/16   Georgette Caceres MD       Hospital Medications:  Current Facility-Administered Medications   Medication Dose Route Frequency    dextrose 40% (GLUTOSE) oral gel 1 Tube  15 g Oral PRN    glucagon (GLUCAGEN) injection 1 mg  1 mg IntraMUSCular PRN    dextrose (D50W) injection syrg 12.5-25 g  25-50 mL IntraVENous PRN    0.9% sodium chloride infusion  100 mL/hr IntraVENous CONTINUOUS    atorvastatin (LIPITOR) tablet 10 mg  10 mg Oral DAILY    gabapentin (NEURONTIN) capsule 300 mg  300 mg Oral QHS    levothyroxine (SYNTHROID) tablet 125 mcg  125 mcg Oral ACB    sertraline (ZOLOFT) tablet 50 mg  50 mg Oral DAILY    insulin lispro (HUMALOG) injection 0-10 Units  0-10 Units SubCUTAneous TIDAC    pantoprazole (PROTONIX) 40 mg in sodium chloride 0.9% 10 mL injection  40 mg IntraVENous DAILY    ondansetron (ZOFRAN) injection 4 mg  4 mg IntraVENous Q6H PRN    traMADol (ULTRAM) tablet 50 mg  50 mg Oral Q6H PRN    acetaminophen (TYLENOL) tablet 650 mg  650 mg Oral Q6H PRN       Social History:  Social History     Tobacco Use    Smoking status: Never Smoker    Smokeless tobacco: Never Used   Substance Use Topics    Alcohol use: No     No reported history of IV drug use, tattoos/piercings. No ETOH. 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 Family History:  Family History   Problem Relation Age of Onset    Hypertension Mother     Other Mother         PVD    Diabetes Mother     Parkinson's Disease Mother     Diabetes Father        Review of Systems:  A detailed 10 system ROS is obtained, with pertinent positives as listed above. All others are negative. Diet:  NPO    Objective:     Physical Exam:  Vitals:  Visit Vitals  /50 (BP 1 Location: Right arm, BP Patient Position: Lying left side; At rest)   Pulse 78   Temp 98.4 °F (36.9 °C)   Resp 16   Ht 5' 7\" (1.702 m)   Wt 75.3 kg (166 lb)   SpO2 100%   Breastfeeding? Yes   BMI 26.00 kg/m²     Gen:  Alert, cooperative, no acute distress  Skin:  Face reveal no rashes. HEENT: Sclerae anicteric. Cardiovascular: Regular rate and rhythm. +Murmur. Respiratory:  Comfortable breathing with no accessory muscle use. Clear breath sounds anteriorly with no wheezes, rales, or rhonchi. GI:  Abdomen nondistended, soft, and Nontender. Normal active bowel sounds. Rectal:  Deferred  Musculoskeletal:  No pitting edema of the lower legs. Neurological:  Gross memory appears intact. Patient is alert and oriented. Psychiatric:  Mood appears appropriate with judgement intact.     Laboratory:    Recent Labs     02/28/19  0537 02/27/19  2259 02/27/19  1328 02/27/19  1128   WBC 2.2*  --  4.6 3.9   HGB 7.0*  --  7.9* 7.5*   HCT 21.9*  --  25.9* 23.9*   PLT 75*  --  121* 105*   MCV 72.0*  --  72.3* 71*     --  136 135   K 4.0  --  4.2 4.5     --  102 99   CO2 25  --  25 21   BUN 21  --  23 22   CREA 0.78  --  0.86 0.84   CA 8.7  --  9.5 9.2   MG 1.5*  --   --   --    *  --  167* 179*   *  --  246* 237*   SGOT 38*  --  45* 42*   ALT 33  --  41 31   TBILI 0.6  --  1.2* 0.8   ALB 2.7*  --  3.2 3.3*   TP 6.3  --  7.4 6.6   LPSE  --   --  187  --    PTP  --  14.6*  --   --    INR  --  1.2  --   --       CT ABDOMEN AND PELVIS WITH CONTRAST 2/27/19  HISTORY: Anemia, weight loss and generalized weakness. COMPARISON: None. TECHNIQUE: 5 mm axial scans from above the diaphragms to the pubic symphysis  following oral and 100 cc intravenous contrast without acute complication. Intravenous contrast was given to increase the sensitivity to acute  inflammation. Radiation dose reduction techniques were used for this study. Our CT scanners use one or more of the following: Automated exposure control,  adjustment of the mA and or kV according to patient size, iterative  reconstruction. FINDINGS:   Abdomen: The lung bases are clear. The spleen is clearly enlarged, 16.8 cm  (normal usually less than 14). The liver appears homogeneous and has a  macronodular contour suggesting cirrhosis. No focal liver lesion. No calcified  gallstones. The biliary tree is not dilated. The pancreas is unremarkable. No  free fluid, acute inflammatory changes or adenopathy. Bowel loops are not  dilated. The kidneys enhance uniformly. No radiopaque renal calculi. No  hydronephrosis. The adrenal glands are normal size. Aorta is normal caliber.     Pelvis: No free fluid or acute inflammatory changes. The urinary bladder  unremarkable. No gross bony lesions. IMPRESSION:  Hepatic cirrhosis and splenomegaly. Assessment:     Active Problems:    Hyperlipidemia (1/12/2016)      Acquired hypothyroidism (1/12/2016)      Type 2 diabetes with nephropathy ([facility] [address] (1/16/2019)      Thrombocytopenia ([address] (1/23/2019)      Severe anemia (2/27/2019)       66 y.o. female with PMH of diabetes type 2, diabetic neuropathy, depression, hypothyroidism, chronic anemia, thrombocytopenia being seen in GI consultation at the request of Dr. Milan Wright for new diagnosis of Cirrhosis, microcytic anemia. She presented to [facility] ER 2/27 with c/o generalized weakness, worsening anemia with Hgb 7.5 on PCP eval earlier that day, decreased from Hgb 8.6 on 1/16/19.   Iron studies show iron 39, ferritin 9, Transferrin 316. Vit B12 1314, Folate 19.5.  BUN/Cr WNL. INR ok at 1.2. No overt GI bleeding and reportedly with neg hemoccult stool study with PCP. She does have gum disease and has had some mild gum bleeding as well as mild nose bleeds. She recalls hx anemia in her teens likely related to menstruation though then recalls being told she had chronic anemia with Hgb around 10-11. Prior to this year last Hgb on file is Hgb 11.0 from 2/2018. No prior EGD or Colonoscopy. She has been noted to have elevated LFTs. Admission labs revealed Tbili 0.8, Alk phos 237, AST 42, ALT 31. She recalls being told that her liver enzymes had been elevated in the past and that she likely had fatty liver. She has had thrombocytopenia with platelets as low as 75. She had a CT abd/pelvis that reported hepatic cirrhosis and splenomegaly (spleen 16.8cm). She denies ETOH. She denies hx tattoos/piercings. 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 Additionally c/o unintentional weight loss, recent NSAID use. Plan:   .  -Discussed findings of Cirrhosis with patient. I suspect this is most likely explained by Fatty liver in setting of DM II, HLD, obesity. No ETOH history. Will obtain basic liver work up to r/o other potential contributing etiologies to include at least viral hepatitis, autoimmune disease, A1AT, Ceruloplasmin.    -Agree with plan for transfusion 1-2u pRBCs. Follow trend of H/H and consider additional transfusion pRBC if needed.  -Continue daily PPI along with supportive care  -Based upon new diagnosis of Cirrhosis though also worsening microcytic anemia, anorexia, weight loss recommend further evaluation through EGD and Colonoscopy to evaluate for PUD, varices, polyps, malignancy. Will start clear liquid diet with plan for 2day colon bowel prep and EGD/Colon for Saturday 3/2/19. Further recommendations will be based upon pt clinical course.     ATTENDING NOTE:  I have seen and examined the patient along with my NP/PA. The assessment and plan above is my own. Liver diagnostic studies sent; likely PATHAK  Microcytic anemia - no prior colo; plan transfusion, 2 day prep for constipation and EGD/[place] on Saturday morning.   MD Ced Arriola PA-C  [facility]

## 2019-02-28 NOTE — PROGRESS NOTES
TRANSFER - IN REPORT: 
 
Verbal report received from Dejah Garcia RN(name) on [facility]  being received from ED(unit) for routine progression of care Report consisted of patients Situation, Background, Assessment and  
Recommendations(SBAR). Information from the following report(s) SBAR was reviewed with the receiving nurse. Opportunity for questions and clarification was provided. Assessment completed upon patients arrival to unit and care assumed.

## 2019-02-28 NOTE — ROUTINE PROCESS
TRANSFER - OUT REPORT: 
 
Verbal report given to Gabe Brambila RN on [facility]  being transferred to [facility] for routine progression of care Report consisted of patients Situation, Background, Assessment and  
Recommendations(SBAR). Information from the following report(s) SBAR, Kardex, ED Summary, MAR, Accordion and Recent Results was reviewed with the receiving nurse. Lines:  
Peripheral IV 02/27/19 Left Antecubital (Active) Site Assessment Clean, dry, & intact 2/27/2019  1:28 PM  
Phlebitis Assessment 0 2/27/2019  1:28 PM  
Infiltration Assessment 0 2/27/2019  1:28 PM  
Dressing Status Clean, dry, & intact 2/27/2019  1:28 PM  
  
 
Opportunity for questions and clarification was provided. Patient transported with: 
 [employer]

## 2019-02-28 NOTE — PROGRESS NOTES
Betha Lute Betha Lute Betha Lute Betha Lute Betha Lute END OF SHIFT NOTE: 
 
INTAKE/OUTPUT 
02/27 0701 - 02/28 0700 In: 100 [I.V.:100] Out: -  
Voiding: YES Catheter: NO 
Color: clear Drain: DIET 
npo Flatus: Patient does have flatus present. Stool:  0 occurrences. Characteristics: 
  
 
Ambulating Yes Emesis: 0 occurrences. Characteristics: VITAL SIGNS Patient Vitals for the past 12 hrs: 
 Temp Pulse Resp BP SpO2  
02/27/19 2320 98.7 °F (37.1 °C) 82 20 127/71 97 % 02/27/19 2203 98.9 °F (37.2 °C) 84 20 131/73 97 % 02/27/19 2103 99 °F (37.2 °C) 91 20 128/54 96 % 02/27/19 2022  90 16 134/85 97 % 02/27/19 1944  85  123/59 100 % 02/27/19 1917  82 17  100 % 02/27/19 1807  86 18  100 % Pain Assessment Pain Intensity 1: 0 (02/28/19 0134) Patient Stated Pain Goal: 0 Cherylene Freeze

## 2019-02-28 NOTE — ACP (ADVANCE CARE PLANNING)
Received a consult from nursing staff indicating that pt would like information about the [address] Reviewed pt's chart and conferred with RN to confirm that pt is capable of making health care decisions. Pt wanted a copy of the form to review. Provided some basic information about completing the form. 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 Pt will contact chaplains to assist with witnessing HCPOA and chaplains to follow-up, as needed.     Karly Tomas MDiv, [address] [place] [address]

## 2019-02-28 NOTE — ED NOTES
Bedside report received from Terrence Negron for continuation of patient care upon shift change. Patient care transferred at this time.

## 2019-02-28 NOTE — PROGRESS NOTES
02/27/19 2228 Dual Skin Pressure Injury Assessment Dual Skin Pressure Injury Assessment WDL Second Care Provider (Based on [address] [place] [address]) Brendan Clay RN  
Primary Nurse Cecille Hunt and Dwayne Liang RN performed a dual skin assessment on this patient No impairment noted Shane score is 22

## 2019-02-28 NOTE — PROGRESS NOTES
Received a consult from nursing staff indicating that pt would like information about the [address] Reviewed pt's chart and conferred with RN to confirm that pt is capable of making health care decisions. Pt wanted a copy of the form to review. Provided some basic information about completing the form. 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 Listened, as patient shared her emotional/spiritual about being sick. Pt is  and stated that she has not time off; she is concerned about her finances. Pt stated that her employer is very understanding. Pt receives support from friends. Her brother and mother live in [facility], but her mother is in a nursing home. Pt stated that her [place] gris is a source of comfort, and she wanted a Bible since she does not have her's at the hospital.  (Provided a Pervis Ng for pt in addition to the Bible that remains in pt's room.) Prayer offered at bedside in affirmation of pt's [id number] [facility]. Pt will contact chaplains to assist with witnessing HCPOA and chaplains to follow-up, as needed. Lonny Fenrandez MDiv, [address]

## 2019-02-28 NOTE — PROGRESS NOTES
OCCUPATIONAL THERAPY: Initial Assessment and Discharge 2/28/2019OBSERVATION:   
Payor: Adriana Magallanes / Plan: [place] [employer] [facility] / Product Type: PPO /  
  
NAME/AGE/GENDER: Mi Eastman is a 66 y.o. female PRIMARY DIAGNOSIS:  Severe anemia [D64.9] <principal problem not specified> <principal problem not specified> 
 
  
ICD-10: Treatment Diagnosis:  
 · Generalized Muscle Weakness (M62.81) Precautions/Allergies: 
   Pcn [penicillins] ASSESSMENT:  
Ms. Dennise Lane presents for anemia. Upon arrival, pt supine in bed and agreeable to OT evaluation. Pt is alert and oriented x 4. Pt reports living in a one-story home with 4 steps to enter. Pt reports independence with ADLs, IADLs, driving, and functional mobility. 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 Pt denies any use of DME or any hx of falls. Today, pt complete supine to sit transfer to edge of bed with supervision. Pt's static and dynamic sitting balance is intact. Pt's BUE AROM and strength are WFL. Pt notes her biggest concerns to be occasional balance/unsteadiness. Pt returned back to supine in bed with needs met and call light within reach. At this time, pt is functioning at baseline for ADLs; will defer to PT for functional mobility. Pt to be discharged from OT services. This section established at most recent assessment PROBLEM LIST (Impairments causing functional limitations): 1. Decreased Balance INTERVENTIONS PLANNED: (Benefits and precautions of occupational therapy have been discussed with the patient.) 1. none TREATMENT PLAN: Frequency/Duration:Rehabilitation Potential For Stated Goals:   
 
RECOMMENDED REHABILITATION/EQUIPMENT: (at time of discharge pending progress): Due to the probability of continued deficits (see above) this patient will not likely need continued skilled occupational therapy after discharge. Equipment:  
? None at this time OCCUPATIONAL PROFILE AND HISTORY:  
 History of Present Injury/Illness (Reason for Referral): 
See H&P Past Medical History/Comorbidities: Ms. Mona Herrera  has a past medical history of Diabetes ([address] and Hypertension. Ms. Mona Herrera  has no past surgical history on file. Social History/Living Environment:  
Home Environment: Private residence # Steps to Enter: 3 One/Two Story Residence: One story Living Alone: Yes Support Systems: Family member(s) Patient Expects to be Discharged to[de-identified] Private residence Current DME Used/Available at Home: None Prior Level of Function/Work/Activity: 
Independent with ADLs, IADLs, driving, and functional mobility. Also works as RN. Dominant Side:  
      RIGHT Personal Factors:   
      Sex:  female Age:  66 y.o. Other factors that influence how disability is experienced by the patient:  Multiple co-morbidities Number of Personal Factors/Comorbidities that affect the Plan of Care: Brief history (0):  LOW COMPLEXITY ASSESSMENT OF OCCUPATIONAL PERFORMANCE[de-identified]  
Activities of Daily Living:  
Basic ADLs (From Assessment) Complex ADLs (From Assessment) Feeding: Independent Oral Facial Hygiene/Grooming: Independent Bathing: Minimum assistance Upper Body Dressing: Independent Lower Body Dressing: Independent Toileting: Independent Instrumental ADL Meal Preparation: Minimum assistance Homemaking: Minimum assistance Grooming/Bathing/Dressing Activities of Daily Living Cognitive Retraining Safety/Judgement: Fall prevention Bed/Mat Mobility Rolling: Supervision Supine to Sit: Supervision Sit to Supine: Supervision Sit to Stand: Stand-by assistance Scooting: Supervision Most Recent Physical Functioning:  
Gross Assessment: 
AROM: Generally decreased, functional 
Strength: Generally decreased, functional 
Coordination: Generally decreased, functional 
         
  
Posture: 
  
Balance: 
Sitting: Intact Standing: Impaired Standing - Static: Good Standing - Dynamic : Fair Bed Mobility: 
Rolling: Supervision Supine to Sit: Supervision Sit to Supine: Supervision Scooting: Supervision Wheelchair Mobility: 
  
Transfers: 
Sit to Stand: Stand-by assistance Stand to Sit: Stand-by assistance Patient Vitals for the past 6 hrs: 
 BP BP Patient Position SpO2 Pulse 19 1142 135/55 At rest;Lying left side 100 % 80  
19 1513 102/56 At rest 98 % 80  
19 1528 98/51 Sitting 100 % 78 Mental Status Neurologic State: Alert Orientation Level: Oriented X4 Cognition: Appropriate decision making Perception: Appears intact Perseveration: No perseveration noted Safety/Judgement: Fall prevention Physical Skills Involved: 
1. Balance Cognitive Skills Affected (resulting in the inability to perform in a timely and safe manner): 1. none Psychosocial Skills Affected: 1. Habits/Routines Number of elements that affect the Plan of Care: 1-3:  LOW COMPLEXITY CLINICAL DECISION MAKIN[address] [place] [id number] AM-PAC 6 Clicks Daily Activity Inpatient Short Form How much help from another person does the patient currently need. .. Total A Lot A Little None 1. Putting on and taking off regular lower body clothing? [] 1   [] 2   [] 3   [x] 4  
2. Bathing (including washing, rinsing, drying)? [] 1   [] 2   [x] 3   [] 4  
3. Toileting, which includes using toilet, bedpan or urinal?   [] 1   [] 2   [] 3   [x] 4  
4. Putting on and taking off regular upper body clothing? [] 1   [] 2   [] 3   [x] 4  
5. Taking care of personal grooming such as brushing teeth? [] 1   [] 2   [] 3   [x] 4  
6. Eating meals? [] 1   [] 2   [] 3   [] 4  
© , Trustees of [address] [place] [address], under license to [employer]. All rights reserved Score:  Initial: 23 Most Recent: X (Date: -- ) Interpretation of Tool:  Represents activities that are increasingly more difficult (i.e. Bed mobility, Transfers, Gait). Medical Necessity:    
· Reason for Services/Other Comments: · Use of outcome tool(s) and clinical judgement create a POC that gives a: LOW COMPLEXITY  
 
 
 
TREATMENT:  
(In addition to Assessment/Re-Assessment sessions the following treatments were rendered) Pre-treatment Symptoms/Complaints:   
Pain: Initial:  
Pain Intensity 1: 0 /10 Post Session:  same Assessment/Reassessment only, no treatment provided today Braces/Orthotics/Lines/Etc:  
· O2 Device: Room air Treatment/Session Assessment:   
· Response to Treatment:  Tolerated well with no issues noted. · Interdisciplinary Collaboration:  
o Occupational Therapist 
o Registered Nurse · After treatment position/precautions:  
o Supine in bed 
o Bed in low position 
o Call light within reach · Compliance with Program/Exercises:  
· Recommendations/Intent for next treatment session: Total Treatment Duration: OT Patient Time In/Time Out Time In: 8883 Time Out: 1166 Morena Leigh OT

## 2019-02-28 NOTE — PROGRESS NOTES
Interdisciplinary Rounds completed 02/28/19. Nursing, Case Management, Physician and PT present. Plan of care reviewed and updated. Cobb/egd this weekend

## 2019-03-01 ENCOUNTER — ANESTHESIA EVENT (OUTPATIENT)
Dept: ENDOSCOPY | Age: 64
DRG: 812 | End: 2019-03-01
Payer: COMMERCIAL

## 2019-03-01 PROBLEM — E83.42 HYPOMAGNESEMIA: Status: ACTIVE | Noted: 2019-03-01

## 2019-03-01 LAB
ACTIN IGG SERPL-ACNC: 8 UNITS (ref 0–19)
ERYTHROCYTE [DISTWIDTH] IN BLOOD BY AUTOMATED COUNT: 16.8 % (ref 11.9–14.6)
GLUCOSE BLD STRIP.AUTO-MCNC: 100 MG/DL (ref 65–100)
GLUCOSE BLD STRIP.AUTO-MCNC: 108 MG/DL (ref 65–100)
GLUCOSE BLD STRIP.AUTO-MCNC: 142 MG/DL (ref 65–100)
GLUCOSE BLD STRIP.AUTO-MCNC: 161 MG/DL (ref 65–100)
HAV IGM SERPL QL IA: NEGATIVE
HBV CORE IGM SERPL QL IA: NEGATIVE
HBV SURFACE AG SERPL QL IA: NEGATIVE
HCT VFR BLD AUTO: 23.6 % (ref 35.8–46.3)
HCT VFR BLD AUTO: 23.9 % (ref 35.8–46.3)
HCV AB S/CO SERPL IA: <0.1 S/CO RATIO (ref 0–0.9)
HGB BLD-MCNC: 7.3 G/DL (ref 11.7–15.4)
HGB BLD-MCNC: 7.4 G/DL (ref 11.7–15.4)
MAGNESIUM SERPL-MCNC: 1.7 MG/DL (ref 1.8–2.4)
MCH RBC QN AUTO: 23.3 PG (ref 26.1–32.9)
MCHC RBC AUTO-ENTMCNC: 31.4 G/DL (ref 31.4–35)
MCV RBC AUTO: 74.2 FL (ref 79.6–97.8)
MITOCHONDRIA M2 IGG SER-ACNC: <20 UNITS (ref 0–20)
NRBC # BLD: 0 K/UL (ref 0–0.2)
PLATELET # BLD AUTO: 73 K/UL (ref 150–450)
PMV BLD AUTO: 10.6 FL (ref 9.4–12.3)
RBC # BLD AUTO: 3.18 M/UL (ref 4.05–5.2)
WBC # BLD AUTO: 2.2 K/UL (ref 4.3–11.1)

## 2019-03-01 PROCEDURE — 74011000250 HC RX REV CODE- 250: Performed by: INTERNAL MEDICINE

## 2019-03-01 PROCEDURE — 82103 ALPHA-1-ANTITRYPSIN TOTAL: CPT

## 2019-03-01 PROCEDURE — 36430 TRANSFUSION BLD/BLD COMPNT: CPT

## 2019-03-01 PROCEDURE — 82390 ASSAY OF CERULOPLASMIN: CPT

## 2019-03-01 PROCEDURE — 74011250637 HC RX REV CODE- 250/637: Performed by: PHYSICIAN ASSISTANT

## 2019-03-01 PROCEDURE — 99218 HC RM OBSERVATION: CPT

## 2019-03-01 PROCEDURE — 74011250636 HC RX REV CODE- 250/636: Performed by: FAMILY MEDICINE

## 2019-03-01 PROCEDURE — 74011636637 HC RX REV CODE- 636/637: Performed by: FAMILY MEDICINE

## 2019-03-01 PROCEDURE — 74011250637 HC RX REV CODE- 250/637: Performed by: FAMILY MEDICINE

## 2019-03-01 PROCEDURE — 86038 ANTINUCLEAR ANTIBODIES: CPT

## 2019-03-01 PROCEDURE — 74011636637 HC RX REV CODE- 636/637: Performed by: INTERNAL MEDICINE

## 2019-03-01 PROCEDURE — 77030020263 HC SOL INJ SOD CL0.9% LFCR 1000ML

## 2019-03-01 PROCEDURE — 97110 THERAPEUTIC EXERCISES: CPT

## 2019-03-01 PROCEDURE — 36415 COLL VENOUS BLD VENIPUNCTURE: CPT

## 2019-03-01 PROCEDURE — C9113 INJ PANTOPRAZOLE SODIUM, VIA: HCPCS | Performed by: INTERNAL MEDICINE

## 2019-03-01 PROCEDURE — P9016 RBC LEUKOCYTES REDUCED: HCPCS

## 2019-03-01 PROCEDURE — 85027 COMPLETE CBC AUTOMATED: CPT

## 2019-03-01 PROCEDURE — 83735 ASSAY OF MAGNESIUM: CPT

## 2019-03-01 PROCEDURE — 85018 HEMOGLOBIN: CPT

## 2019-03-01 PROCEDURE — 74011250637 HC RX REV CODE- 250/637: Performed by: INTERNAL MEDICINE

## 2019-03-01 PROCEDURE — 74011250636 HC RX REV CODE- 250/636: Performed by: INTERNAL MEDICINE

## 2019-03-01 PROCEDURE — 82962 GLUCOSE BLOOD TEST: CPT

## 2019-03-01 PROCEDURE — 97161 PT EVAL LOW COMPLEX 20 MIN: CPT

## 2019-03-01 RX ORDER — SODIUM CHLORIDE 9 MG/ML
250 INJECTION, SOLUTION INTRAVENOUS AS NEEDED
Status: DISCONTINUED | OUTPATIENT
Start: 2019-03-01 | End: 2019-03-04 | Stop reason: HOSPADM

## 2019-03-01 RX ORDER — SODIUM CHLORIDE 0.9 % (FLUSH) 0.9 %
5-40 SYRINGE (ML) INJECTION AS NEEDED
Status: CANCELLED | OUTPATIENT
Start: 2019-03-01

## 2019-03-01 RX ORDER — SODIUM CHLORIDE, SODIUM LACTATE, POTASSIUM CHLORIDE, CALCIUM CHLORIDE 600; 310; 30; 20 MG/100ML; MG/100ML; MG/100ML; MG/100ML
100 INJECTION, SOLUTION INTRAVENOUS CONTINUOUS
Status: CANCELLED | OUTPATIENT
Start: 2019-03-01

## 2019-03-01 RX ORDER — SODIUM CHLORIDE 0.9 % (FLUSH) 0.9 %
5-40 SYRINGE (ML) INJECTION EVERY 8 HOURS
Status: CANCELLED | OUTPATIENT
Start: 2019-03-01

## 2019-03-01 RX ORDER — INSULIN GLARGINE 100 [IU]/ML
45 INJECTION, SOLUTION SUBCUTANEOUS DAILY
Status: DISCONTINUED | OUTPATIENT
Start: 2019-03-02 | End: 2019-03-03

## 2019-03-01 RX ORDER — MAGNESIUM SULFATE HEPTAHYDRATE 40 MG/ML
2 INJECTION, SOLUTION INTRAVENOUS ONCE
Status: COMPLETED | OUTPATIENT
Start: 2019-03-01 | End: 2019-03-01

## 2019-03-01 RX ORDER — LISINOPRIL 5 MG/1
2.5 TABLET ORAL DAILY
Status: DISCONTINUED | OUTPATIENT
Start: 2019-03-01 | End: 2019-03-04 | Stop reason: HOSPADM

## 2019-03-01 RX ORDER — HYDROXYZINE HYDROCHLORIDE 10 MG/1
10 TABLET, FILM COATED ORAL
Status: DISCONTINUED | OUTPATIENT
Start: 2019-03-01 | End: 2019-03-04 | Stop reason: HOSPADM

## 2019-03-01 RX ADMIN — POLYETHYLENE GLYCOL 3350 238 G: 17 POWDER, FOR SOLUTION ORAL at 16:51

## 2019-03-01 RX ADMIN — SERTRALINE HYDROCHLORIDE 50 MG: 50 TABLET ORAL at 09:13

## 2019-03-01 RX ADMIN — SODIUM CHLORIDE 40 MG: 9 INJECTION, SOLUTION INTRAMUSCULAR; INTRAVENOUS; SUBCUTANEOUS at 11:18

## 2019-03-01 RX ADMIN — GABAPENTIN 300 MG: 300 CAPSULE ORAL at 21:25

## 2019-03-01 RX ADMIN — BISACODYL 10 MG: 5 TABLET, COATED ORAL at 16:52

## 2019-03-01 RX ADMIN — ATORVASTATIN CALCIUM 10 MG: 10 TABLET, FILM COATED ORAL at 09:13

## 2019-03-01 RX ADMIN — ONDANSETRON 4 MG: 2 INJECTION INTRAMUSCULAR; INTRAVENOUS at 11:50

## 2019-03-01 RX ADMIN — INSULIN GLARGINE 50 UNITS: 100 INJECTION, SOLUTION SUBCUTANEOUS at 09:14

## 2019-03-01 RX ADMIN — MAGNESIUM SULFATE HEPTAHYDRATE 2 G: 40 INJECTION, SOLUTION INTRAVENOUS at 16:56

## 2019-03-01 RX ADMIN — LISINOPRIL 2.5 MG: 5 TABLET ORAL at 16:52

## 2019-03-01 RX ADMIN — LEVOTHYROXINE SODIUM 125 MCG: 75 TABLET ORAL at 05:21

## 2019-03-01 RX ADMIN — INSULIN LISPRO 2 UNITS: 100 INJECTION, SOLUTION INTRAVENOUS; SUBCUTANEOUS at 12:04

## 2019-03-01 NOTE — PROGRESS NOTES
Problem: Nutrition Deficit Goal: *Optimize nutritional status Nutrition Reason for assessment: Referral received from nursing admission Malnutrition Screening Tool for recently lost >33# without trying Assessment:  
Diet order(s): 2-27: GI soft, 2-28: NPO, then clear liquid, 3-2: NPOFood/Nutrition Patient History:  PMH DM, diabetic neuropathy, depression, hypothyroidism, chronic anemia and thrombocytopenia. Patient admitted for worsening anemia of unknown origin, weight loss, and new diagnosis of cirrhosis, likely PATHAK. Plan for EGD and colonoscopy tomorrow. Pt reports she has struggled her whole life to lose weight with little success. She had the best success with [employer]ystem in [id number]. She was weighed 250# in 2016. In 2017 her coworkers started to comment that she was loosing weight but she did not notice a change in clothes fit until the summer of 2017. She has continued to lose weight up until the last 1 month she says he seems to have started to level off in the 160's. She has lost interest in eating and says she especially noticed it in the last 2-3 months as she wasn't even wanting to eat her favorite junk foods of Bugles and Darrel's cups. She lives alone and says she doesn't cook much and thus either skips breakfast or drinks a [employer]ystem protien shake, eats a full meal prepared at work and then a bowl of cereal or oatmeal for supper. Her nephew stays with her a couple days a week and she or he will usually cook a box meal on the nights he is there. Since she has been on just clear liquid here, she is hungry for some solid food and thinks she would be able to eat well. She is receptive to Ensure high protein once her diet is progressed post procedure. Anthropometrics:Height: 5' 7\" (170.2 cm),  Weight: 75.3 kg (166 lb)-unspecified source, Body mass index is 26 kg/m². BMI class of overweight.   
Weight hx per EMR ( Based on connect care functionality, RD cannot know if these weight are actual versus stated): WT / BMI WEIGHT  
2/27/2019 166 lb 6.4 oz  
1/23/2019 172 lb  
1/16/2019 164 lb 3.2 oz  
4/29/2016 245 lb 9.6 oz  
RD does not have a tool to assess for weight loss >1 year. Weight has been relatively stable for the past 1 month Macronutrient needs: EER:  9030-4538 kcal /day (20-25 kcal/kg listed BW) EPR:  61-74 grams protein/day (1-1.2 grams/kg IBW) Intake/Comparative Standards: Clear liquid diet does not meet kcal or protein needs Nutrition Diagnosis: Inadequate oral intake r/t decreased ability to consume sufficient oral intake as evidenced by decreased intake PTA and wt loss as above, current NPO/ CLQ status does not meet kcal or protein needs Intervention: 
Meals and snacks: Await diet advancement as GI status allows post procedure. Nutrition Supplement Therapy: Ensure High protien at all meals once diet progressed to at least full liquids. Discharge nutrition plan: Too soon to determine. Dolores Mcknight, [address], [address] [place] [address], [address] [place] [address]

## 2019-03-01 NOTE — PROGRESS NOTES
Hourly rounding completed. 6 loose BMs after given miralax and dulcolax. Patient is resting in bed, bed is locked and in lowest position, call light is within reach.  Will continue to monitor and give report to day shift RN

## 2019-03-01 NOTE — PHYSICIAN ADVISORY
Letter of Determination: Upgrade from Observation to Inpatient Status This patient was originally hospitalized as Outpatient Status with Observation Services on 2/27/2019 for anemia. This patient now meets for Inpatient Admission based on medical necessity. The patient's stay was medically necessary based on uncontrolled diabetes mellitus type 2, with hemoglobin A1c of 14, with cirrhosis, and anemia with hemoglobin to 6.7 mg/dl. It is our recommendation that this patient's hospitalization status should be upgraded from OBSERVATION to INPATIENT status.  
  
The final decision regarding the patient's hospitalization status depends on the attending physician's judgement. Nicola López MD, NARINDER, Physician Advisor [id number].

## 2019-03-01 NOTE — PROGRESS NOTES
GI DAILY PROGRESS NOTE Admit Date:  2/27/2019 Today's Date:  3/1/2019 CC:  New diagnosis of cirrhosis/ persistent anemia Subjective:  
 
Patient had 6 loose BMs after initial dose of MiraLax and Dulcolax 2/28. She received 1u pRBC 2/28 for Hgb 6.7 with improvement to Hgb 7.4. Had some abdominal cramping with prep and nausea. No vomiting. No overt GI bleeding. Medications:  
Current Facility-Administered Medications Medication Dose Route Frequency  dextrose 40% (GLUTOSE) oral gel 1 Tube  15 g Oral PRN  
 glucagon (GLUCAGEN) injection 1 mg  1 mg IntraMUSCular PRN  
 dextrose (D50W) injection syrg 12.5-25 g  25-50 mL IntraVENous PRN  
 0.9% sodium chloride infusion 250 mL  250 mL IntraVENous PRN  
 diphenhydrAMINE (BENADRYL) capsule 25 mg  25 mg Oral Q6H PRN  
 bisacodyl (DULCOLAX) tablet 10 mg  10 mg Oral ONCE  polyethylene glycol (MIRALAX) powder 238 g  238 g Oral ONCE  
 insulin glargine (LANTUS) injection 50 Units  50 Units SubCUTAneous DAILY  0.9% sodium chloride infusion  100 mL/hr IntraVENous CONTINUOUS  
 atorvastatin (LIPITOR) tablet 10 mg  10 mg Oral DAILY  gabapentin (NEURONTIN) capsule 300 mg  300 mg Oral QHS  levothyroxine (SYNTHROID) tablet 125 mcg  125 mcg Oral ACB  sertraline (ZOLOFT) tablet 50 mg  50 mg Oral DAILY  insulin lispro (HUMALOG) injection 0-10 Units  0-10 Units SubCUTAneous TIDAC  pantoprazole (PROTONIX) 40 mg in sodium chloride 0.9% 10 mL injection  40 mg IntraVENous DAILY  ondansetron (ZOFRAN) injection 4 mg  4 mg IntraVENous Q6H PRN  
 traMADol (ULTRAM) tablet 50 mg  50 mg Oral Q6H PRN  
 acetaminophen (TYLENOL) tablet 650 mg  650 mg Oral Q6H PRN Review of Systems: ROS was obtained, with pertinent positives as listed above. No chest pain or SOB. Diet:  Clear liquid Objective:  
Vitals: 
Visit Vitals /53 (BP 1 Location: Right arm, BP Patient Position: At rest) Pulse 76 Temp 98.5 °F (36.9 °C) Resp 16  
 Ht 5' 7\" (1.702 m) Wt 75.3 kg (166 lb) SpO2 98% Breastfeeding? Yes  
BMI 26.00 kg/m² Intake/Output: 
No intake/output data recorded. 02/27 1901 - 03/01 0700 In: 100 [I.V.:100] Out: - Exam: 
General appearance: alert, cooperative, no distress Lungs: clear to auscultation bilaterally anteriorly Heart: regular rate and rhythm Abdomen: soft, non-tender. Bowel sounds normal. No masses, no organomegaly Extremities: extremities normal, atraumatic, no cyanosis or edema Neuro:  alert and oriented Data Review (Labs):   
Recent Labs 03/01/19 
0503 03/01/19 
0000 02/28/19 
2049 02/28/19 
1322 02/28/19 
0537 02/27/19 
2259 02/27/19 
1328 02/27/19 
1128 WBC 2.2*  --   --   --  2.2*  --  4.6 3.9 HGB 7.4* 7.3* 7.4* 6.7* 7.0*  --  7.9* 7.5* HCT 23.6* 23.9* 24.1* 22.2* 21.9*  --  25.9* 23.9*  
PLT 73*  --   --   --  75*  --  121* 105* MCV 74.2*  --   --   --  72.0*  --  72.3* 71* NA  --   --   --   --  137  --  136 135 K  --   --   --   --  4.0  --  4.2 4.5  
CL  --   --   --   --  106  --  102 99 CO2  --   --   --   --  25  --  25 21 BUN  --   --   --   --  21  --  23 22 CREA  --   --   --   --  0.78  --  0.86 0.84 CA  --   --   --   --  8.7  --  9.5 9.2 MG 1.7*  --   --   --  1.5*  --   --   --   
GLU  --   --   --   --  224*  --  167* 179* AP  --   --   --   --  202*  --  246* 237* SGOT  --   --   --   --  38*  --  45* 42* ALT  --   --   --   --  33  --  41 31 TBILI  --   --   --   --  0.6  --  1.2* 0.8 ALB  --   --   --   --  2.7*  --  3.2 3.3* TP  --   --   --   --  6.3  --  7.4 6.6 LPSE  --   --   --   --   --   --  187  --   
PTP  --   --   --   --   --  14.6*  --   --   
INR  --   --   --   --   --  1.2  --   --   
 
CT ABDOMEN AND PELVIS WITH CONTRAST 2/27/19 HISTORY: Anemia, weight loss and generalized weakness. COMPARISON: None. TECHNIQUE: 5 mm axial scans from above the diaphragms to the pubic symphysis following oral and 100 cc intravenous contrast without acute complication. Intravenous contrast was given to increase the sensitivity to acute 
inflammation.  Radiation dose reduction techniques were used for this study. Our CT scanners use one or more of the following: Automated exposure control, 
adjustment of the mA and or kV according to patient size, iterative 
reconstruction. FINDINGS:  
Abdomen: The lung bases are clear. The spleen is clearly enlarged, 16.8 cm 
(normal usually less than 14). The liver appears homogeneous and has a 
macronodular contour suggesting cirrhosis. No focal liver lesion. No calcified 
gallstones. The biliary tree is not dilated. The pancreas is unremarkable. No 
free fluid, acute inflammatory changes or adenopathy. Bowel loops are not 
dilated. The kidneys enhance uniformly. No radiopaque renal calculi. No 
hydronephrosis. The adrenal glands are normal size. Aorta is normal caliber. 
  
Pelvis: No free fluid or acute inflammatory changes. The urinary bladder 
unremarkable. No gross bony lesions.  
  
IMPRESSION:  Hepatic cirrhosis and splenomegaly. Assessment:  
 
Active Problems: Hyperlipidemia (1/12/2016) Acquired hypothyroidism (1/12/2016) Type 2 diabetes with nephropathy ([facility] (1/16/2019) Thrombocytopenia ([facility] (1/23/2019) Severe anemia (2/27/2019) 
 
 
  
 66 y.o. female with PMH of diabetes type 2, diabetic neuropathy, depression, hypothyroidism, chronic anemia, thrombocytopenia being seen in GI consultation at the request of Dr. Ashley Caal for new diagnosis of Cirrhosis, microcytic anemia. She presented to [facility] ER 2/27 with c/o generalized weakness, worsening anemia with Hgb 7.5 on PCP eval earlier that day, decreased from Hgb 8.6 on 1/16/19. Iron studies show iron 39, ferritin 9, Transferrin 316. Vit B12 1314, Folate 19.5.  BUN/Cr WNL. INR ok at 1.2.   No overt GI bleeding and reportedly with neg hemoccult stool study with PCP. She does have gum disease and has had some mild gum bleeding as well as mild nose bleeds. She recalls hx anemia in her teens likely related to menstruation though then recalls being told she had chronic anemia with Hgb around 10-11. Prior to this year last Hgb on file is Hgb 11.0 from 2/2018. No prior EGD or Colonoscopy. She has been noted to have elevated LFTs. Admission labs revealed Tbili 0.8, Alk phos 237, AST 42, ALT 31. She recalls being told that her liver enzymes had been elevated in the past and that she likely had fatty liver. She has had thrombocytopenia with platelets as low as 75. She had a CT abd/pelvis that reported hepatic cirrhosis and splenomegaly (spleen 16.8cm). She denies ETOH. She denies hx tattoos/piercings. 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 Additionally c/o unintentional weight loss, recent NSAID use.  
  
Plan:  
 
-Note response of Hgb to 7.4 from 6.7 after transfusion 1u pRBC. Will order transfusion of 1 additional unit pRBC. Continue to follow trend of H/H and consider additional transfusion pRBC if needed. 
-She is receiving second day of two day bowel prep today with MiraLax and Dulcolax. NPO after midnight for EGD and Colonoscopy tomorrow 3/2/19 with Dr. Jyoti Sandoval for microcytic anemia, anorexia, weight loss, new diagnosis of Cirrhosis to evaluate for PUD, varices, polyps, malignancy. -New diagnosis Cirrhosis- likely fatty liver/PATHAK in setting of DM II, HLD, obesity. No ETOH history. Hepatitis panel neg. Further liver diagnostic studies ordered and results pending (Autoimmune studies, A1AT, Ceruloplasmin). -Continue daily PPI along with supportive care Further recommendations will be based upon pt clinical course. Ronald Blankenship PA-C [facility]

## 2019-03-01 NOTE — PROGRESS NOTES
GI DAILY PROGRESS NOTE Admit Date:  2/27/2019 Today's Date:  3/1/2019 CC:  JENNY and cirrhosis Subjective:  
 
Patient had unit of blood and hg only 7.4. No active GIB. Results with prep yest. 
 
Medications:  
Current Facility-Administered Medications Medication Dose Route Frequency  dextrose 40% (GLUTOSE) oral gel 1 Tube  15 g Oral PRN  
 glucagon (GLUCAGEN) injection 1 mg  1 mg IntraMUSCular PRN  
 dextrose (D50W) injection syrg 12.5-25 g  25-50 mL IntraVENous PRN  
 0.9% sodium chloride infusion 250 mL  250 mL IntraVENous PRN  
 diphenhydrAMINE (BENADRYL) capsule 25 mg  25 mg Oral Q6H PRN  
 bisacodyl (DULCOLAX) tablet 10 mg  10 mg Oral ONCE  polyethylene glycol (MIRALAX) powder 238 g  238 g Oral ONCE  
 insulin glargine (LANTUS) injection 50 Units  50 Units SubCUTAneous DAILY  0.9% sodium chloride infusion  100 mL/hr IntraVENous CONTINUOUS  
 atorvastatin (LIPITOR) tablet 10 mg  10 mg Oral DAILY  gabapentin (NEURONTIN) capsule 300 mg  300 mg Oral QHS  levothyroxine (SYNTHROID) tablet 125 mcg  125 mcg Oral ACB  sertraline (ZOLOFT) tablet 50 mg  50 mg Oral DAILY  insulin lispro (HUMALOG) injection 0-10 Units  0-10 Units SubCUTAneous TIDAC  pantoprazole (PROTONIX) 40 mg in sodium chloride 0.9% 10 mL injection  40 mg IntraVENous DAILY  ondansetron (ZOFRAN) injection 4 mg  4 mg IntraVENous Q6H PRN  
 traMADol (ULTRAM) tablet 50 mg  50 mg Oral Q6H PRN  
 acetaminophen (TYLENOL) tablet 650 mg  650 mg Oral Q6H PRN Review of Systems: A review of system was obtained, with pertinent positives as listed above. All others are negative. Objective:  
Vitals: 
Visit Vitals /53 (BP 1 Location: Right arm, BP Patient Position: At rest) Pulse 76 Temp 98.5 °F (36.9 °C) Resp 16 Ht 5' 7\" (1.702 m) Wt 75.3 kg (166 lb) SpO2 98% Breastfeeding? Yes  
BMI 26.00 kg/m² Intake/Output: 
03/01 0701 - 03/01 1900 In: 6566 [I.V.:3259] Out: - 02/27 1901 - 03/01 0700 In: 100 [I.V.:100] Out: - Exam: 
General appearance: alert, cooperative, no distress Lungs: clear to auscultation bilaterally anteriorly Heart: regular rate and rhythm Abdomen: soft, non-tender. Bowel sounds normal. No masses,  no organomegaly Extremities: extremities normal, atraumatic, no cyanosis or edema Neuro:  Alert and oriented without obvious neurological deficits. Data Review (Labs):   
Recent Labs 03/01/19 
0503 03/01/19 
0000 02/28/19 
2049 02/28/19 
1322 02/28/19 
0537 02/27/19 
2259 02/27/19 
1328 02/27/19 
1128 WBC 2.2*  --   --   --  2.2*  --  4.6 3.9 HGB 7.4* 7.3* 7.4* 6.7* 7.0*  --  7.9* 7.5* HCT 23.6* 23.9* 24.1* 22.2* 21.9*  --  25.9* 23.9*  
PLT 73*  --   --   --  75*  --  121* 105* MCV 74.2*  --   --   --  72.0*  --  72.3* 71* NA  --   --   --   --  137  --  136 135 K  --   --   --   --  4.0  --  4.2 4.5  
CL  --   --   --   --  106  --  102 99 CO2  --   --   --   --  25  --  25 21 BUN  --   --   --   --  21  --  23 22 CREA  --   --   --   --  0.78  --  0.86 0.84 CA  --   --   --   --  8.7  --  9.5 9.2 GLU  --   --   --   --  224*  --  167* 179* AP  --   --   --   --  202*  --  246* 237* SGOT  --   --   --   --  38*  --  45* 42* ALT  --   --   --   --  33  --  41 31 TBILI  --   --   --   --  0.6  --  1.2* 0.8 LPSE  --   --   --   --   --   --  187  --   
PTP  --   --   --   --   --  14.6*  --   --   
INR  --   --   --   --   --  1.2  --   --   
 
 
Assessment:  
 
Active Problems: Hyperlipidemia (1/12/2016) Acquired hypothyroidism (1/12/2016) Type 2 diabetes with nephropathy ([facility] (1/16/2019) Thrombocytopenia ([facility] (1/23/2019) Severe anemia (2/27/2019) Cirrhosis Plan: W/u for cirrhosis etio ongoing, likely PATHAK JENNY w/u with egd colo tomorrow Another unit of blood today.  
Harriett Rangel MD

## 2019-03-01 NOTE — PROGRESS NOTES
Progress Note Patient: Julian Villatoro MRN: [id number]  SSN: [id number] YOB: 1955  Age: 66 y.o. Sex: female Admit Date: 2/27/2019 LOS: 0 days Subjective: F/U microcytic anemia, weight loss. Hx significant for DM type 2, diabetic neuropathy, depression, hypothyroidism, chronic anemia and thrombocytopenia. Patient newly diagnosed with cirrhosis. Patient admitted for worsening anemia of unknown origin, weight loss, and new diagnosis of cirrhosis. Glucose levels better controlled this AM.  
Hg 7.4 after 1 unit PRBC yesterday. Platelets worsening at 73. Magnesium 1.7. No chest pain or SOB. Feeling better. No obvious signs of bleeding. Admits to being anxious. Current Facility-Administered Medications Medication Dose Route Frequency  0.9% sodium chloride infusion 250 mL  250 mL IntraVENous PRN  
 [START ON 3/2/2019] insulin glargine (LANTUS) injection 45 Units  45 Units SubCUTAneous DAILY  magnesium sulfate 2 g/50 ml IVPB (premix or compounded)  2 g IntraVENous ONCE  hydrOXYzine HCl (ATARAX) tablet 10 mg  10 mg Oral TID PRN  
 dextrose 40% (GLUTOSE) oral gel 1 Tube  15 g Oral PRN  
 glucagon (GLUCAGEN) injection 1 mg  1 mg IntraMUSCular PRN  
 dextrose (D50W) injection syrg 12.5-25 g  25-50 mL IntraVENous PRN  
 0.9% sodium chloride infusion 250 mL  250 mL IntraVENous PRN  
 diphenhydrAMINE (BENADRYL) capsule 25 mg  25 mg Oral Q6H PRN  
 bisacodyl (DULCOLAX) tablet 10 mg  10 mg Oral ONCE  polyethylene glycol (MIRALAX) powder 238 g  238 g Oral ONCE  
 0.9% sodium chloride infusion  100 mL/hr IntraVENous CONTINUOUS  
 atorvastatin (LIPITOR) tablet 10 mg  10 mg Oral DAILY  gabapentin (NEURONTIN) capsule 300 mg  300 mg Oral QHS  levothyroxine (SYNTHROID) tablet 125 mcg  125 mcg Oral ACB  sertraline (ZOLOFT) tablet 50 mg  50 mg Oral DAILY  insulin lispro (HUMALOG) injection 0-10 Units  0-10 Units SubCUTAneous TIDAC  
  pantoprazole (PROTONIX) 40 mg in sodium chloride 0.9% 10 mL injection  40 mg IntraVENous DAILY  ondansetron (ZOFRAN) injection 4 mg  4 mg IntraVENous Q6H PRN  
 traMADol (ULTRAM) tablet 50 mg  50 mg Oral Q6H PRN  
 acetaminophen (TYLENOL) tablet 650 mg  650 mg Oral Q6H PRN Objective:  
 
Vitals:  
 03/01/19 0410 03/01/19 0715 03/01/19 1120 03/01/19 1135 BP: 117/62 104/53 125/65 124/63 Pulse: 75 76 76 78 Resp: 16 16 16 16 Temp: 99.2 °F (37.3 °C) 98.5 °F (36.9 °C) 98.9 °F (37.2 °C) 98.3 °F (36.8 °C) SpO2: 97% 98% 100% 100% Weight:      
Height:      
  
  
Intake and Output: 
Current Shift: 03/01 0701 - 03/01 1900 In: 6778 [P.O.:300; I.V.:3259] Out: - Last three shifts: 02/27 1901 - 03/01 0700 In: 100 [I.V.:100] Out: - Physical Exam:  
General:  Alert, cooperative, no distress, appears stated age. Eyes:  Conjunctivae/corneas clear. Ears:  Normal TMs and external ear canals both ears. Nose: Nares normal. Septum midline. Mouth/Throat: Lips, mucosa, and tongue normal. Teeth and gums normal.  
Neck:  no JVD. Back:   deferred Lungs:   Clear to auscultation bilaterally. Heart:  Regular rate and rhythm, S1, S2 normal  
Abdomen:   Soft, non-tender. Bowel sounds normal. No masses,  No organomegaly. Extremities: Extremities normal, atraumatic, no cyanosis or edema. Pulses: 2+ and symmetric all extremities. Skin: Pale Lymph nodes: Cervical, supraclavicular, and axillary nodes normal.  
Neurologic: CNII-XII intact. .  
 
 
Lab/Data Review: 
 
Recent Results (from the past 24 hour(s)) HGB & HCT Collection Time: 02/28/19  1:22 PM  
Result Value Ref Range HGB 6.7 (LL) 11.7 - 15.4 g/dL HCT 22.2 (L) 35.8 - 46.3 % HEPATITIS PANEL, ACUTE Collection Time: 02/28/19  1:22 PM  
Result Value Ref Range Hepatitis A Ab, IgM NEGATIVE  NEGATIVE Hep B surface Ag screen NEGATIVE  NEGATIVE  Hep B Core Ab, IgM NEGATIVE  NEGATIVE    
 Hep C Virus Ab <0.1 0.0 - 0.9 s/co ratio OCCULT BLOOD, STOOL Collection Time: 02/28/19  2:27 PM  
Result Value Ref Range Occult blood, stool NEGATIVE  NEG    
GLUCOSE, POC Collection Time: 02/28/19  4:33 PM  
Result Value Ref Range Glucose (POC) 249 (H) 65 - 100 mg/dL HGB & HCT Collection Time: 02/28/19  8:49 PM  
Result Value Ref Range HGB 7.4 (L) 11.7 - 15.4 g/dL HCT 24.1 (L) 35.8 - 46.3 % GLUCOSE, POC Collection Time: 02/28/19  9:00 PM  
Result Value Ref Range Glucose (POC) 282 (H) 65 - 100 mg/dL HGB & HCT Collection Time: 03/01/19 12:00 AM  
Result Value Ref Range HGB 7.3 (L) 11.7 - 15.4 g/dL HCT 23.9 (L) 35.8 - 46.3 % CBC W/O DIFF Collection Time: 03/01/19  5:03 AM  
Result Value Ref Range WBC 2.2 (L) 4.3 - 11.1 K/uL  
 RBC 3.18 (L) 4.05 - 5.2 M/uL HGB 7.4 (L) 11.7 - 15.4 g/dL HCT 23.6 (L) 35.8 - 46.3 % MCV 74.2 (L) 79.6 - 97.8 FL  
 MCH 23.3 (L) 26.1 - 32.9 PG  
 MCHC 31.4 31.4 - 35.0 g/dL  
 RDW 16.8 (H) 11.9 - 14.6 % PLATELET 73 (L) 008 - 450 K/uL MPV 10.6 9.4 - 12.3 FL ABSOLUTE NRBC 0.00 0.0 - 0.2 K/uL MAGNESIUM Collection Time: 03/01/19  5:03 AM  
Result Value Ref Range Magnesium 1.7 (L) 1.8 - 2.4 mg/dL GLUCOSE, POC Collection Time: 03/01/19  7:14 AM  
Result Value Ref Range Glucose (POC) 100 65 - 100 mg/dL GLUCOSE, POC Collection Time: 03/01/19 11:20 AM  
Result Value Ref Range Glucose (POC) 161 (H) 65 - 100 mg/dL Assessment/ Plan: Active Problems: Hyperlipidemia (1/12/2016) Acquired hypothyroidism (1/12/2016) Type 2 diabetes with nephropathy ([facility] (1/16/2019) [facility] (1/23/2019) Severe anemia (2/27/2019) Hypomagnesemia (3/1/2019) Worsening anemia, Microcytic anemia- Give 1 unit PRBC today. S/p 1 unit PRBC on 2/28. CT abdomen pelvis showed no findings suggestive of malignancy. Cirrhosis and splenomegaly seen. Give more units if indicated. H&H q 12 hours. Iron 39, transferrin 316, ferritin 9. Anemia could be secondary to chronic inflammation. Weight loss - As above. Plan for EGD/Colonoscopy 3/2/19 to see if any findings suggestive of malignancy. No malignancy noted on CT.  TSH normal.  
 
Cirrhosis - Likely from fatty liver disease. Hepatitis panel negative, KRISTEN, ceruloplasmin, alpha 1 antitrypsin pending. GI working up for etiology. Smooth muscle antibody pending. Mitochondrial antibody pending. DM- SS. A1C 14 1/16/19. Patient states she takes Lantus 70 units at home. Will order Lantus 45 units and see how glucose levels react to regimen since eating differently here. Add small dose ACE Supplement magesium Patient admits to increased anxiety. Denies benzos at home. Will order Hydroxyzine PRN. DVT prophylaxis - SCDs Signed By: Wanda Nieves DO March 1, 2019

## 2019-03-01 NOTE — PROGRESS NOTES
Interdisciplinary Rounds completed 03/01/19. Nursing, Case Management, Physician and PT present. Plan of care reviewed and updated.  
 
To have EGD/ Zebulon in am.

## 2019-03-01 NOTE — PROGRESS NOTES
Problem: Mobility Impaired (Adult and Pediatric) Goal: *Acute Goals and Plan of Care (Insert Text) LTG: 
(1.)Ms. Kauffman will move from supine to sit and sit to supine, scoot up and down and roll side to side INDEPENDENTLY with bed flat within 7 treatment day(s). (2.)Ms. Kauffman will transfer from bed to chair and chair to bed INDEPENDENTLY within 7 treatment day(s). (3.)Ms. Kauffman will ambulate INDEPENDENTLY for 600+ feet within 7 treatment day(s). (4.)Ms. Kauffman will ascend and descend 4 steps with modified independence using handrails within 7 days. ________________________________________________________________________________________________ PHYSICAL THERAPY: Initial Assessment and AM 3/1/2019OBSERVATION: PT Visit Days : 1 Payor: TYRESE BURLESON / Plan: [place] [employer] [place] / Product Type: PPO /   
  
NAME/AGE/GENDER: Kristopher Fong is a 66 y.o. female PRIMARY DIAGNOSIS: Severe anemia [D64.9] <principal problem not specified> <principal problem not specified> 
 
  
ICD-10: Treatment Diagnosis:  
 · Generalized Muscle Weakness (M62.81) · Other abnormalities of gait and mobility (R26.89) Precaution/Allergies: 
Pcn [penicillins] ASSESSMENT:  
Ms. Lula Damon is a very pleasant, independent 66year old female admitted with severe anemia and undergoing workup to identify cause. She lives alone and works, drives. No DME use. Presents sitting up in chair without complaints and is agreeable to therapy assessment. Transfers to standing independently. Ambulates 450 ft in hallway with SBA-supervision, no DME, no assist. Slow gait pace without major deficits noted. Returned to room and up to chair, reports feeling more fatigued than normal after walking this distance. Took a short break then performed below LE exercises with good participation and verbal/visual cues. Positioned comfortably with needs in reach.  Delmy Kauffman appear to be functioning only slightly below baseline with strength, balance, and activity tolerance and will benefit from continued therapy during hospital stay to maximize safety/independence for discharge as pt lives alone and is typically completely independent. No DC needs identified. This section established at most recent assessment PROBLEM LIST (Impairments causing functional limitations): 1. Decreased Strength 2. Decreased Ambulation Ability/Technique 3. Decreased Balance 4. Decreased Activity Tolerance INTERVENTIONS PLANNED: (Benefits and precautions of physical therapy have been discussed with the patient.) 1. Balance Exercise 2. Bed Mobility 3. Gait Training 4. Home Exercise Program (HEP) 5. Therapeutic Activites 6. Therapeutic Exercise/Strengthening TREATMENT PLAN: Frequency/Duration: 3 times a week for duration of hospital stay Rehabilitation Potential For Stated Goals: Excellent RECOMMENDED REHABILITATION/EQUIPMENT: (at time of discharge pending progress): Due to the probability of continued deficits (see above) this patient will not likely need continued skilled physical therapy after discharge. Equipment:  
? None at this time HISTORY:  
History of Present Injury/Illness (Reason for Referral): 
Per H&P, \"This is a 72-year-old female patient who has a past medical history of diabetes type 2, diabetic neuropathy, depression, chronic anemia who came into the emergency room with a chief complaint of worsening anemia and generalized weakness. 
  
According to the patient for the last 2to 3 months, she has been having progressive unintended weight loss and she also has suffered progressive weakness, lack of energy and poor stamina. She is a diabetic and for a while, she stopped using her regular medications. Apparently, she was depressed. Her hemoglobin A1c was checked on 01/16/2019 an it was up to 14.   She has seen her primary care doctor and she has recently resumed her insulin. The patient was found to have worsening anemia with a hemoglobin of 8.6 on 01/16/2019 down to 7.9 today. She denies melena, hematemesis, bright red blood per rectum, vaginal bleeding, hemoptysis, large bruises, trauma or any other problems. She has reported gum disease with frequent minor bleeding but apart from that, she denies any other evidence of external hemorrhage. She had a rectal exam done by her primary care doctor which was reported negative for occult blood in the stool. Today, she was sent into the emergency room by her primary care doctor to be worked up for anemia to rule out malignancy. 
  
When she arrived into the emergency room, her vital signs were blood pressure of 110/69, heart rate of 88, respiratory rate of 20, O2 saturation of 98% on room air. She was awake and alert. Her lungs were clear to auscultation. Her heart had a regular rate and rhythm with no murmurs. Her abdomen was soft and nontender and she had no leg edema. 
  
Her initial blood workup reported a white blood cell count of 4.6, hemoglobin of 7.9, platelet count of 517. Her kidney function was within normal limits. Her bilirubin was slightly elevated to 1.2. A urinalysis was done and it was not suggestive of active urinary tract infection with a low white blood cell count of 3 to 5; however, she had 1+ bacteria. The patient was presented to the Hospitalist Team to be admitted. \" 
 
Past Medical History/Comorbidities: Ms. Mona Herrera  has a past medical history of Diabetes ([address] and Hypertension. Ms. Mona Herrera  has no past surgical history on file. Social History/Living Environment:  
Home Environment: Private residence # Steps to Enter: 4 Rails to Enter: Yes Hand Rails : Bilateral 
Wheelchair Ramp: No 
One/Two Story Residence: One story Living Alone: Yes Support Systems: Family member(s), Friends \ neighbors Patient Expects to be Discharged to[de-identified] Private residence Current DME Used/Available at Home: None Prior Level of Function/Work/Activity: 
Lives alone. Independent, works, drives. No DME. Progressive weakness and fatigue. Number of Personal Factors/Comorbidities that affect the Plan of Care: 1-2: MODERATE COMPLEXITY EXAMINATION:  
Most Recent Physical Functioning:  
Gross Assessment: 
AROM: Within functional limits Strength: Generally decreased, functional 
Coordination: Within functional limits Sensation: Impaired(feet d/t neuropathy) Posture: 
Posture (WDL): Exceptions to [facility] Posture Assessment: Forward head, Rounded shoulders Balance: 
Sitting: Intact Standing: Impaired Standing - Static: Good Standing - Dynamic : Fair(+ to good) Bed Mobility: 
  
Wheelchair Mobility: 
  
Transfers: 
Sit to Stand: Independent Stand to Sit: Independent Bed to Chair: Independent Gait: 
  
Base of Support: Narrowed Speed/Tamela: Pace decreased (<100 feet/min) Step Length: Left shortened;Right shortened Gait Abnormalities: Trunk sway increased Distance (ft): 450 Feet (ft) Assistive Device: (none) Ambulation - Level of Assistance: Stand-by assistance;Supervision Interventions: Verbal cues; Safety awareness training Body Structures Involved: 1. Muscles Body Functions Affected: 1. Hematological 
2. Movement Related Activities and Participation Affected: 1. General Tasks and Demands 2. Mobility 3. Domestic Life 4. Community, Social and [place] Number of elements that affect the Plan of Care: 4+: HIGH COMPLEXITY CLINICAL PRESENTATION:  
Presentation: Stable and uncomplicated: LOW COMPLEXITY CLINICAL DECISION MAKING:  
[employer] 6 Clicks Basic Mobility Inpatient Short Form How much difficulty does the patient currently have. .. Unable A Lot A Little None 1. Turning over in bed (including adjusting bedclothes, sheets and blankets)? [] 1   [] 2   [] 3   [x] 4  
2.   Sitting down on and standing up from a chair with arms ( e.g., wheelchair, bedside commode, etc.)   [] 1   [] 2   [] 3   [x] 4  
3. Moving from lying on back to sitting on the side of the bed? [] 1   [] 2   [] 3   [x] 4 How much help from another person does the patient currently need. .. Total A Lot A Little None 4. Moving to and from a bed to a chair (including a wheelchair)? [] 1   [] 2   [] 3   [x] 4  
5. Need to walk in hospital room? [] 1   [] 2   [x] 3   [] 4  
6. Climbing 3-5 steps with a railing? [] 1   [] 2   [x] 3   [] 4  
© 2007, Trustees of [address] [place] [address] [address] [id number], under license to [email or web address]. All rights reserved Score:  Initial: 22 Most Recent: X (Date: -- ) Interpretation of Tool:  Represents activities that are increasingly more difficult (i.e. Bed mobility, Transfers, Gait). Medical Necessity:    
· Patient demonstrates excellent rehab potential due to higher previous functional level. Reason for Services/Other Comments: 
· Patient continues to demonstrate capacity to improve strength, mobility, balance, activity tolerance which will increase independence and increase safety. Use of outcome tool(s) and clinical judgement create a POC that gives a: Clear prediction of patient's progress: LOW COMPLEXITY  
  
 
 
 
TREATMENT:  
(In addition to Assessment/Re-Assessment sessions the following treatments were rendered) Pre-treatment Symptoms/Complaints:  \"I feel so depressed\" Pain: Initial:  
Pain Intensity 1: 0  Post Session:  0/10 Therapeutic Exercise: (9 Minutes):  Exercises per grid below to improve mobility, strength and balance. Required minimal visual and verbal cues to promote proper body mechanics and exercise form. Progressed range and repetitions as indicated. Date: 
3/1/19 Date: 
 Date: Activity/Exercise Parameters Parameters Parameters LAQ 20x AB Seated marching 20x AB Ankle pumps 20x AB Seated hip aBd 15x AB    
     
     
     
A=active, B=bilateral 
 
 
Braces/Orthotics/Lines/Etc:  
· IV 
 · O2 Device: Room air Treatment/Session Assessment:   
· Response to Treatment:  Pt performs mobility with SBA-supervision · Interdisciplinary Collaboration:  
o Physical Therapist 
o Registered Nurse · After treatment position/precautions:  
o Up in chair 
o Bed/Chair-wheels locked 
o Bed in low position 
o Call light within reach · Compliance with Program/Exercises: Compliant all of the time · Recommendations/Intent for next treatment session: \"Next visit will focus on advancements to more challenging activities and reduction in assistance provided\". Total Treatment Duration: PT Patient Time In/Time Out Time In: 0900 Time Out: 5370 Anna Rutherford DPT

## 2019-03-01 NOTE — PROGRESS NOTES
Pt eager to go home. Pt has not had BM since this morning. Stool this morning watery and yellow in color. No new complaints from pt. Hourly rounds completed this shift.

## 2019-03-02 ENCOUNTER — ANESTHESIA (OUTPATIENT)
Dept: ENDOSCOPY | Age: 64
DRG: 812 | End: 2019-03-02
Payer: COMMERCIAL

## 2019-03-02 LAB
A1AT SERPL-MCNC: 141 MG/DL (ref 90–200)
ABO + RH BLD: NORMAL
ANA SER QL: NEGATIVE
ANION GAP SERPL CALC-SCNC: 7 MMOL/L (ref 7–16)
BACTERIA SPEC CULT: NORMAL
BACTERIA SPEC CULT: NORMAL
BLD PROD TYP BPU: NORMAL
BLD PROD TYP BPU: NORMAL
BLOOD GROUP ANTIBODIES SERPL: NORMAL
BPU ID: NORMAL
BPU ID: NORMAL
BUN SERPL-MCNC: 10 MG/DL (ref 8–23)
CALCIUM SERPL-MCNC: 7.7 MG/DL (ref 8.3–10.4)
CERULOPLASMIN SERPL-MCNC: 30.6 MG/DL (ref 19–39)
CHLORIDE SERPL-SCNC: 112 MMOL/L (ref 98–107)
CO2 SERPL-SCNC: 22 MMOL/L (ref 21–32)
CREAT SERPL-MCNC: 0.54 MG/DL (ref 0.6–1)
CROSSMATCH RESULT,%XM: NORMAL
CROSSMATCH RESULT,%XM: NORMAL
ERYTHROCYTE [DISTWIDTH] IN BLOOD BY AUTOMATED COUNT: 16.7 % (ref 11.9–14.6)
GLUCOSE BLD STRIP.AUTO-MCNC: 101 MG/DL (ref 65–100)
GLUCOSE BLD STRIP.AUTO-MCNC: 151 MG/DL (ref 65–100)
GLUCOSE BLD STRIP.AUTO-MCNC: 194 MG/DL (ref 65–100)
GLUCOSE BLD STRIP.AUTO-MCNC: 243 MG/DL (ref 65–100)
GLUCOSE BLD STRIP.AUTO-MCNC: 78 MG/DL (ref 65–100)
GLUCOSE SERPL-MCNC: 70 MG/DL (ref 65–100)
HCT VFR BLD AUTO: 27.2 % (ref 35.8–46.3)
HCT VFR BLD AUTO: 27.6 % (ref 35.8–46.3)
HCT VFR BLD AUTO: 31.1 % (ref 35.8–46.3)
HGB BLD-MCNC: 8.5 G/DL (ref 11.7–15.4)
HGB BLD-MCNC: 8.6 G/DL (ref 11.7–15.4)
HGB BLD-MCNC: 9.2 G/DL (ref 11.7–15.4)
MAGNESIUM SERPL-MCNC: 1.8 MG/DL (ref 1.8–2.4)
MCH RBC QN AUTO: 23.7 PG (ref 26.1–32.9)
MCHC RBC AUTO-ENTMCNC: 31.3 G/DL (ref 31.4–35)
MCV RBC AUTO: 76 FL (ref 79.6–97.8)
NRBC # BLD: 0 K/UL (ref 0–0.2)
PLATELET # BLD AUTO: 70 K/UL (ref 150–450)
PMV BLD AUTO: 10.4 FL (ref 9.4–12.3)
POTASSIUM SERPL-SCNC: 3.4 MMOL/L (ref 3.5–5.1)
RBC # BLD AUTO: 3.58 M/UL (ref 4.05–5.2)
SERVICE CMNT-IMP: NORMAL
SODIUM SERPL-SCNC: 141 MMOL/L (ref 136–145)
SPECIMEN EXP DATE BLD: NORMAL
STATUS OF UNIT,%ST: NORMAL
STATUS OF UNIT,%ST: NORMAL
UNIT DIVISION, %UDIV: 0
UNIT DIVISION, %UDIV: 0
WBC # BLD AUTO: 2.4 K/UL (ref 4.3–11.1)

## 2019-03-02 PROCEDURE — 74011000250 HC RX REV CODE- 250: Performed by: INTERNAL MEDICINE

## 2019-03-02 PROCEDURE — 65270000029 HC RM PRIVATE

## 2019-03-02 PROCEDURE — 80048 BASIC METABOLIC PNL TOTAL CA: CPT

## 2019-03-02 PROCEDURE — 74011636637 HC RX REV CODE- 636/637: Performed by: INTERNAL MEDICINE

## 2019-03-02 PROCEDURE — 74011250637 HC RX REV CODE- 250/637: Performed by: FAMILY MEDICINE

## 2019-03-02 PROCEDURE — 82962 GLUCOSE BLOOD TEST: CPT

## 2019-03-02 PROCEDURE — 74011250637 HC RX REV CODE- 250/637: Performed by: INTERNAL MEDICINE

## 2019-03-02 PROCEDURE — 74011636637 HC RX REV CODE- 636/637: Performed by: FAMILY MEDICINE

## 2019-03-02 PROCEDURE — 83735 ASSAY OF MAGNESIUM: CPT

## 2019-03-02 PROCEDURE — 76040000026: Performed by: INTERNAL MEDICINE

## 2019-03-02 PROCEDURE — C9113 INJ PANTOPRAZOLE SODIUM, VIA: HCPCS | Performed by: INTERNAL MEDICINE

## 2019-03-02 PROCEDURE — 0DJ08ZZ INSPECTION OF UPPER INTESTINAL TRACT, VIA NATURAL OR ARTIFICIAL OPENING ENDOSCOPIC: ICD-10-PCS | Performed by: INTERNAL MEDICINE

## 2019-03-02 PROCEDURE — 77030020263 HC SOL INJ SOD CL0.9% LFCR 1000ML

## 2019-03-02 PROCEDURE — 85018 HEMOGLOBIN: CPT

## 2019-03-02 PROCEDURE — 77030022875 HC PRB AR PLSM COAG ERBE -C: Performed by: INTERNAL MEDICINE

## 2019-03-02 PROCEDURE — 76060000032 HC ANESTHESIA 0.5 TO 1 HR: Performed by: INTERNAL MEDICINE

## 2019-03-02 PROCEDURE — 74011000250 HC RX REV CODE- 250

## 2019-03-02 PROCEDURE — 74011250636 HC RX REV CODE- 250/636: Performed by: INTERNAL MEDICINE

## 2019-03-02 PROCEDURE — 74011250636 HC RX REV CODE- 250/636: Performed by: ANESTHESIOLOGY

## 2019-03-02 PROCEDURE — 0DJD8ZZ INSPECTION OF LOWER INTESTINAL TRACT, VIA NATURAL OR ARTIFICIAL OPENING ENDOSCOPIC: ICD-10-PCS | Performed by: INTERNAL MEDICINE

## 2019-03-02 PROCEDURE — 74011250636 HC RX REV CODE- 250/636

## 2019-03-02 PROCEDURE — 85027 COMPLETE CBC AUTOMATED: CPT

## 2019-03-02 PROCEDURE — 99218 HC RM OBSERVATION: CPT

## 2019-03-02 PROCEDURE — 36415 COLL VENOUS BLD VENIPUNCTURE: CPT

## 2019-03-02 RX ORDER — POTASSIUM CHLORIDE 1.5 G/1.77G
40 POWDER, FOR SOLUTION ORAL
Status: COMPLETED | OUTPATIENT
Start: 2019-03-02 | End: 2019-03-02

## 2019-03-02 RX ORDER — PROPOFOL 10 MG/ML
INJECTION, EMULSION INTRAVENOUS
Status: DISCONTINUED | OUTPATIENT
Start: 2019-03-02 | End: 2019-03-02 | Stop reason: HOSPADM

## 2019-03-02 RX ORDER — PROPOFOL 10 MG/ML
INJECTION, EMULSION INTRAVENOUS AS NEEDED
Status: DISCONTINUED | OUTPATIENT
Start: 2019-03-02 | End: 2019-03-02 | Stop reason: HOSPADM

## 2019-03-02 RX ORDER — LIDOCAINE HYDROCHLORIDE 20 MG/ML
INJECTION, SOLUTION EPIDURAL; INFILTRATION; INTRACAUDAL; PERINEURAL AS NEEDED
Status: DISCONTINUED | OUTPATIENT
Start: 2019-03-02 | End: 2019-03-02 | Stop reason: HOSPADM

## 2019-03-02 RX ORDER — SODIUM CHLORIDE, SODIUM LACTATE, POTASSIUM CHLORIDE, CALCIUM CHLORIDE 600; 310; 30; 20 MG/100ML; MG/100ML; MG/100ML; MG/100ML
100 INJECTION, SOLUTION INTRAVENOUS CONTINUOUS
Status: DISCONTINUED | OUTPATIENT
Start: 2019-03-02 | End: 2019-03-03

## 2019-03-02 RX ORDER — EPHEDRINE SULFATE 50 MG/ML
INJECTION, SOLUTION INTRAVENOUS AS NEEDED
Status: DISCONTINUED | OUTPATIENT
Start: 2019-03-02 | End: 2019-03-02 | Stop reason: HOSPADM

## 2019-03-02 RX ADMIN — LIDOCAINE HYDROCHLORIDE 40 MG: 20 INJECTION, SOLUTION EPIDURAL; INFILTRATION; INTRACAUDAL; PERINEURAL at 09:59

## 2019-03-02 RX ADMIN — GABAPENTIN 300 MG: 300 CAPSULE ORAL at 21:03

## 2019-03-02 RX ADMIN — EPHEDRINE SULFATE 5 MG: 50 INJECTION, SOLUTION INTRAVENOUS at 10:09

## 2019-03-02 RX ADMIN — SODIUM CHLORIDE, SODIUM LACTATE, POTASSIUM CHLORIDE, AND CALCIUM CHLORIDE 100 ML/HR: 600; 310; 30; 20 INJECTION, SOLUTION INTRAVENOUS at 09:53

## 2019-03-02 RX ADMIN — LISINOPRIL 2.5 MG: 5 TABLET ORAL at 08:43

## 2019-03-02 RX ADMIN — SODIUM CHLORIDE 40 MG: 9 INJECTION, SOLUTION INTRAMUSCULAR; INTRAVENOUS; SUBCUTANEOUS at 08:42

## 2019-03-02 RX ADMIN — PROPOFOL 140 MCG/KG/MIN: 10 INJECTION, EMULSION INTRAVENOUS at 10:03

## 2019-03-02 RX ADMIN — SODIUM CHLORIDE 100 ML/HR: 900 INJECTION, SOLUTION INTRAVENOUS at 08:50

## 2019-03-02 RX ADMIN — LEVOTHYROXINE SODIUM 125 MCG: 75 TABLET ORAL at 05:42

## 2019-03-02 RX ADMIN — Medication: at 21:03

## 2019-03-02 RX ADMIN — PROPOFOL 10 MG: 10 INJECTION, EMULSION INTRAVENOUS at 10:03

## 2019-03-02 RX ADMIN — PROPOFOL 40 MG: 10 INJECTION, EMULSION INTRAVENOUS at 09:59

## 2019-03-02 RX ADMIN — POTASSIUM CHLORIDE 40 MEQ: 1.5 POWDER, FOR SOLUTION ORAL at 16:03

## 2019-03-02 RX ADMIN — INSULIN LISPRO 4 UNITS: 100 INJECTION, SOLUTION INTRAVENOUS; SUBCUTANEOUS at 16:32

## 2019-03-02 RX ADMIN — SERTRALINE HYDROCHLORIDE 50 MG: 50 TABLET ORAL at 08:43

## 2019-03-02 RX ADMIN — INSULIN GLARGINE 45 UNITS: 100 INJECTION, SOLUTION SUBCUTANEOUS at 08:51

## 2019-03-02 RX ADMIN — DEXTROSE MONOHYDRATE 25 G: 25 INJECTION, SOLUTION INTRAVENOUS at 07:03

## 2019-03-02 RX ADMIN — PROPOFOL 10 MG: 10 INJECTION, EMULSION INTRAVENOUS at 10:01

## 2019-03-02 RX ADMIN — POTASSIUM CHLORIDE 40 MEQ: 1.5 POWDER, FOR SOLUTION ORAL at 19:46

## 2019-03-02 NOTE — PROGRESS NOTES
Problem: Falls - Risk of 
Goal: *Absence of Falls Document Love Browne Fall Risk and appropriate interventions in the flowsheet. Outcome: Progressing Towards Goal 
Fall Risk Interventions: 
  
 
  
 
Medication Interventions: Evaluate medications/consider consulting pharmacy, Patient to call before getting OOB, Teach patient to arise slowly History of Falls Interventions: Consult care management for discharge planning, Door open when patient unattended, Evaluate medications/consider consulting pharmacy

## 2019-03-02 NOTE — PROGRESS NOTES
Progress Note Patient: Jethro Sharpe MRN: [id number]  SSN: [id number] YOB: 1955  Age: 66 y.o. Sex: female Admit Date: 2/27/2019 LOS: 0 days Subjective: F/U microcytic anemia, weight loss. Hx significant for DM type 2, diabetic neuropathy, depression, hypothyroidism, chronic anemia and thrombocytopenia. Patient newly diagnosed with cirrhosis. Patient admitted for worsening anemia of unknown origin, weight loss, and new diagnosis of cirrhosis. Glucose levels low as 78 this AM but was asymptomatic from level. Hg 9.2 after 1 unit PRBC yesterday. Platelets worsening at 70. Magnesium 1.8. K 3.4. No chest pain or SOB. Feeling better. No obvious signs of bleeding. Current Facility-Administered Medications Medication Dose Route Frequency  lactated Ringers infusion  100 mL/hr IntraVENous CONTINUOUS  
 0.9% sodium chloride infusion 250 mL  250 mL IntraVENous PRN  
 insulin glargine (LANTUS) injection 45 Units  45 Units SubCUTAneous DAILY  hydrOXYzine HCl (ATARAX) tablet 10 mg  10 mg Oral TID PRN  
 lisinopril (PRINIVIL, ZESTRIL) tablet 2.5 mg  2.5 mg Oral DAILY  dextrose 40% (GLUTOSE) oral gel 1 Tube  15 g Oral PRN  
 glucagon (GLUCAGEN) injection 1 mg  1 mg IntraMUSCular PRN  
 dextrose (D50W) injection syrg 12.5-25 g  25-50 mL IntraVENous PRN  
 0.9% sodium chloride infusion 250 mL  250 mL IntraVENous PRN  
 0.9% sodium chloride infusion  100 mL/hr IntraVENous CONTINUOUS  
 atorvastatin (LIPITOR) tablet 10 mg  10 mg Oral DAILY  gabapentin (NEURONTIN) capsule 300 mg  300 mg Oral QHS  levothyroxine (SYNTHROID) tablet 125 mcg  125 mcg Oral ACB  sertraline (ZOLOFT) tablet 50 mg  50 mg Oral DAILY  insulin lispro (HUMALOG) injection 0-10 Units  0-10 Units SubCUTAneous TIDAC  pantoprazole (PROTONIX) 40 mg in sodium chloride 0.9% 10 mL injection  40 mg IntraVENous DAILY  ondansetron (ZOFRAN) injection 4 mg  4 mg IntraVENous Q6H PRN  
 traMADol (ULTRAM) tablet 50 mg  50 mg Oral Q6H PRN  
 acetaminophen (TYLENOL) tablet 650 mg  650 mg Oral Q6H PRN Objective:  
 
Vitals:  
 03/02/19 1058 03/02/19 1102 03/02/19 1121 03/02/19 1506 BP: 132/63 134/61 144/66 124/67 Pulse: 75 75 75 70 Resp: 14 16 15 15 Temp:   97.3 °F (36.3 °C) 99 °F (37.2 °C) SpO2: 100% 100% 99% 100% Weight:      
Height:      
  
  
Intake and Output: 
Current Shift: 03/02 0701 - 03/02 1900 In: 2929 [I.V.:2929] Out: 0 Last three shifts: 02/28 1901 - 03/02 0700 In: 2753 [P.O.:300; I.V.:3259] Out: - Physical Exam:  
General:  Alert, cooperative, no distress, appears stated age. Eyes:  Conjunctivae/corneas clear. Ears:  Normal TMs and external ear canals both ears. Nose: Nares normal. Septum midline. Mouth/Throat: Lips, mucosa, and tongue normal.  
Neck:  no JVD. Back:   deferred Lungs:   Clear to auscultation bilaterally. Heart:  Regular rate and rhythm, S1, S2 normal  
Abdomen:   Soft, non-tender. Bowel sounds normal. No masses,  No organomegaly. Extremities: Extremities normal, atraumatic, no cyanosis or edema. Pulses: 2+ and symmetric all extremities. Skin: Pale Lymph nodes: Cervical, supraclavicular, and axillary nodes normal.  
Neurologic: CNII-XII intact. .  
 
 
Lab/Data Review: 
 
Recent Results (from the past 24 hour(s)) GLUCOSE, POC Collection Time: 03/01/19  4:16 PM  
Result Value Ref Range Glucose (POC) 108 (H) 65 - 100 mg/dL GLUCOSE, POC Collection Time: 03/01/19  9:12 PM  
Result Value Ref Range Glucose (POC) 142 (H) 65 - 100 mg/dL HGB & HCT Collection Time: 03/02/19 12:13 AM  
Result Value Ref Range HGB 8.6 (L) 11.7 - 15.4 g/dL HCT 27.6 (L) 35.8 - 46.3 % CBC W/O DIFF Collection Time: 03/02/19  5:19 AM  
Result Value Ref Range WBC 2.4 (L) 4.3 - 11.1 K/uL  
 RBC 3.58 (L) 4.05 - 5.2 M/uL HGB 8.5 (L) 11.7 - 15.4 g/dL HCT 27.2 (L) 35.8 - 46.3 % MCV 76.0 (L) 79.6 - 97.8 FL  
 MCH 23.7 (L) 26.1 - 32.9 PG  
 MCHC 31.3 (L) 31.4 - 35.0 g/dL  
 RDW 16.7 (H) 11.9 - 14.6 % PLATELET 70 (L) 497 - 450 K/uL MPV 10.4 9.4 - 12.3 FL ABSOLUTE NRBC 0.00 0.0 - 0.2 K/uL METABOLIC PANEL, BASIC Collection Time: 03/02/19  5:19 AM  
Result Value Ref Range Sodium 141 136 - 145 mmol/L Potassium 3.4 (L) 3.5 - 5.1 mmol/L Chloride 112 (H) 98 - 107 mmol/L  
 CO2 22 21 - 32 mmol/L Anion gap 7 7 - 16 mmol/L Glucose 70 65 - 100 mg/dL BUN 10 8 - 23 MG/DL Creatinine 0.54 (L) 0.6 - 1.0 MG/DL  
 GFR est AA >60 >60 ml/min/1.73m2 GFR est non-AA >60 >60 ml/min/1.73m2 Calcium 7.7 (L) 8.3 - 10.4 MG/DL MAGNESIUM Collection Time: 03/02/19  5:19 AM  
Result Value Ref Range Magnesium 1.8 1.8 - 2.4 mg/dL GLUCOSE, POC Collection Time: 03/02/19  6:41 AM  
Result Value Ref Range Glucose (POC) 78 65 - 100 mg/dL GLUCOSE, POC Collection Time: 03/02/19  7:34 AM  
Result Value Ref Range Glucose (POC) 151 (H) 65 - 100 mg/dL GLUCOSE, POC Collection Time: 03/02/19 11:23 AM  
Result Value Ref Range Glucose (POC) 101 (H) 65 - 100 mg/dL HGB & HCT Collection Time: 03/02/19  1:28 PM  
Result Value Ref Range HGB 9.2 (L) 11.7 - 15.4 g/dL HCT 31.1 (L) 35.8 - 46.3 % Assessment/ Plan: Active Problems: Hyperlipidemia (1/12/2016) Acquired hypothyroidism (1/12/2016) Type 2 diabetes with nephropathy ([facility] (1/16/2019) Thrombocytopenia ([facility] (1/23/2019) Severe anemia (2/27/2019) Hypomagnesemia (3/1/2019) Worsening anemia, Microcytic anemia- S/p 1 unit PRBC on 2/28 and 3/1. CT abdomen pelvis showed no findings suggestive of malignancy. Cirrhosis and splenomegaly seen. Give more units if indicated. Iron 39, transferrin 316, ferritin 9. Anemia could be secondary to chronic inflammation. EGD and colonoscopy showed AVM during colonoscopy. Weight loss -   TSH normal. AVM found during colonoscopy. May benefit from pill camera if GI thinks would benefit Cirrhosis - Likely from fatty liver disease. Hepatitis panel negative, KRISTEN, ceruloplasmin, alpha 1 antitrypsin pending. GI working up for etiology. Smooth muscle antibody pending. Mitochondrial antibody pending. DM- SS. A1C 14 1/16/19. Patient states she takes Lantus 70 units at home. Will continue Lantus 45 units and see how glucose levels react to regimen since eating differently here. Small dose ACE Supplement K 
 
DVT prophylaxis - SCDs Signed By: Tomeka Hsu DO March 2, 2019

## 2019-03-02 NOTE — PROGRESS NOTES
TRANSFER - IN REPORT: 
 
Verbal report received from Shahram(name) on Delmy REECE Love  being received from 615(unit) for ordered procedure Report consisted of patients Situation, Background, Assessment and  
Recommendations(SBAR). Information from the following report(s) SBAR was reviewed with the receiving nurse. Opportunity for questions and clarification was provided. Assessment completed upon patients arrival to unit and care assumed.

## 2019-03-02 NOTE — PROGRESS NOTES
Problem: Falls - Risk of 
Goal: *Absence of Falls Document Love Browne Fall Risk and appropriate interventions in the flowsheet. Outcome: Progressing Towards Goal 
Fall Risk Interventions: 
  
 
  
 
Medication Interventions: Patient to call before getting OOB, Teach patient to arise slowly History of Falls Interventions: Door open when patient unattended, Investigate reason for fall

## 2019-03-02 NOTE — PROGRESS NOTES
TRANSFER - OUT REPORT: 
 
Verbal report given to Shahram(name) on Delmy Kauffman  being transferred to [facility](unit) for routine post - op Report consisted of patients Situation, Background, Assessment and  
Recommendations(SBAR). Information from the following report(s) SBAR was reviewed with the receiving nurse. Lines:  
Peripheral IV 03/01/19 Anterior;Distal;Right Forearm (Active) Site Assessment Clean, dry, & intact 3/2/2019  2:57 AM  
Phlebitis Assessment 0 3/2/2019  2:57 AM  
Infiltration Assessment 0 3/2/2019  2:57 AM  
Dressing Status Clean, dry, & intact 3/2/2019  2:57 AM  
Dressing Type Tape;Transparent 3/2/2019  2:57 AM  
Hub Color/Line Status Infusing 3/2/2019  2:57 AM  
  
 
Opportunity for questions and clarification was provided. Patient transported with: 
 Registered Nurse

## 2019-03-02 NOTE — PROGRESS NOTES
GI DAILY PROGRESS NOTE Admit Date:  2/27/2019 Today's Date:  3/2/2019 CC:  New diagnosis of cirrhosis/ persistent anemia Subjective:  
 
Patient completed 2nd day of MiraLax prep with DulcoLax. Reports liquid yellow stool. No BRBPR/melena. Hgb is stable after total of 2u pRBCs (1u pRBC 2/28 & 1u pRBC 3/1). Platelets remain low at 70 from 75 yesterday. She denies abdominal pain, N/V. She is hoping to be able to eat solid food after her procedures. Medications:  
Current Facility-Administered Medications Medication Dose Route Frequency  0.9% sodium chloride infusion 250 mL  250 mL IntraVENous PRN  
 insulin glargine (LANTUS) injection 45 Units  45 Units SubCUTAneous DAILY  hydrOXYzine HCl (ATARAX) tablet 10 mg  10 mg Oral TID PRN  
 lisinopril (PRINIVIL, ZESTRIL) tablet 2.5 mg  2.5 mg Oral DAILY  dextrose 40% (GLUTOSE) oral gel 1 Tube  15 g Oral PRN  
 glucagon (GLUCAGEN) injection 1 mg  1 mg IntraMUSCular PRN  
 dextrose (D50W) injection syrg 12.5-25 g  25-50 mL IntraVENous PRN  
 0.9% sodium chloride infusion 250 mL  250 mL IntraVENous PRN  
 0.9% sodium chloride infusion  100 mL/hr IntraVENous CONTINUOUS  
 atorvastatin (LIPITOR) tablet 10 mg  10 mg Oral DAILY  gabapentin (NEURONTIN) capsule 300 mg  300 mg Oral QHS  levothyroxine (SYNTHROID) tablet 125 mcg  125 mcg Oral ACB  sertraline (ZOLOFT) tablet 50 mg  50 mg Oral DAILY  insulin lispro (HUMALOG) injection 0-10 Units  0-10 Units SubCUTAneous TIDAC  pantoprazole (PROTONIX) 40 mg in sodium chloride 0.9% 10 mL injection  40 mg IntraVENous DAILY  ondansetron (ZOFRAN) injection 4 mg  4 mg IntraVENous Q6H PRN  
 traMADol (ULTRAM) tablet 50 mg  50 mg Oral Q6H PRN  
 acetaminophen (TYLENOL) tablet 650 mg  650 mg Oral Q6H PRN Review of Systems: ROS was obtained, with pertinent positives as listed above. No chest pain or SOB. Diet:  NPO Objective:  
Vitals: 
Visit Vitals /51 (BP 1 Location: Right arm, BP Patient Position: Sitting) Pulse 70 Temp 98.3 °F (36.8 °C) Resp 18 Ht 5' 7\" (1.702 m) Wt 75.3 kg (166 lb) SpO2 99% Breastfeeding? Yes  
BMI 26.00 kg/m² Intake/Output: 
No intake/output data recorded. 02/28 1901 - 03/02 0700 In: 6036 [P.O.:300; I.V.:3259] Out: - Exam: 
General appearance: alert, cooperative, no distress. Sitting up in chair. Lungs: clear to auscultation bilaterally anteriorly Heart: regular rate and rhythm Abdomen: soft, non-tender. Bowel sounds normal. No masses, no organomegaly Extremities: extremities normal, atraumatic, no cyanosis or edema Neuro:  alert and oriented Data Review (Labs):   
Recent Labs 03/02/19 
2706 03/02/19 
0013 03/01/19 
0503 03/01/19 
0000 02/28/19 
2049 02/28/19 
1322 02/28/19 
0537 02/27/19 
2259 02/27/19 
1328 02/27/19 
1128 WBC 2.4*  --  2.2*  --   --   --  2.2*  --  4.6 3.9 HGB 8.5* 8.6* 7.4* 7.3* 7.4* 6.7* 7.0*  --  7.9* 7.5* HCT 27.2* 27.6* 23.6* 23.9* 24.1* 22.2* 21.9*  --  25.9* 23.9*  
PLT 70*  --  73*  --   --   --  75*  --  121* 105* MCV 76.0*  --  74.2*  --   --   --  72.0*  --  72.3* 71*   --   --   --   --   --  137  --  136 135  
K 3.4*  --   --   --   --   --  4.0  --  4.2 4.5  
*  --   --   --   --   --  106  --  102 99 CO2 22  --   --   --   --   --  25  --  25 21 BUN 10  --   --   --   --   --  21  --  23 22 CREA 0.54*  --   --   --   --   --  0.78  --  0.86 0.84 CA 7.7*  --   --   --   --   --  8.7  --  9.5 9.2 MG 1.8  --  1.7*  --   --   --  1.5*  --   --   --   
GLU 70  --   --   --   --   --  224*  --  167* 179* AP  --   --   --   --   --   --  202*  --  246* 237* SGOT  --   --   --   --   --   --  38*  --  45* 42* ALT  --   --   --   --   --   --  33  --  41 31 TBILI  --   --   --   --   --   --  0.6  --  1.2* 0.8 ALB  --   --   --   --   --   --  2.7*  --  3.2 3.3* TP  --   --   --   --   --   --  6.3  --  7.4 6.6 LPSE  --   --   --   --   --   --   --   --  187  --   
PTP  --   --   --   --   --   --   --  14.6*  --   --   
INR  --   --   --   --   --   --   --  1.2  --   --   
 
CT ABDOMEN AND PELVIS WITH CONTRAST 2/27/19 HISTORY: Anemia, weight loss and generalized weakness. COMPARISON: None. TECHNIQUE: 5 mm axial scans from above the diaphragms to the pubic symphysis 
following oral and 100 cc intravenous contrast without acute complication. Intravenous contrast was given to increase the sensitivity to acute 
inflammation.  Radiation dose reduction techniques were used for this study. Our CT scanners use one or more of the following: Automated exposure control, 
adjustment of the mA and or kV according to patient size, iterative 
reconstruction. FINDINGS:  
Abdomen: The lung bases are clear. The spleen is clearly enlarged, 16.8 cm 
(normal usually less than 14). The liver appears homogeneous and has a 
macronodular contour suggesting cirrhosis. No focal liver lesion. No calcified 
gallstones. The biliary tree is not dilated. The pancreas is unremarkable. No 
free fluid, acute inflammatory changes or adenopathy. Bowel loops are not 
dilated. The kidneys enhance uniformly. No radiopaque renal calculi. No 
hydronephrosis. The adrenal glands are normal size. Aorta is normal caliber. 
  
Pelvis: No free fluid or acute inflammatory changes. The urinary bladder 
unremarkable. No gross bony lesions.  
  
IMPRESSION:  Hepatic cirrhosis and splenomegaly. Assessment:  
 
Active Problems: Hyperlipidemia (1/12/2016) Acquired hypothyroidism (1/12/2016) Type 2 diabetes with nephropathy ([facility] [address] (1/16/2019) Thrombocytopenia ([address] (1/23/2019) Severe anemia (2/27/2019)   Hypomagnesemia (3/1/2019) 
 
 
  
 66 y.o. female with PMH of diabetes type 2, diabetic neuropathy, depression, hypothyroidism, chronic anemia, thrombocytopenia being seen in GI consultation at the request of Dr. Kristi Roland for new diagnosis of Cirrhosis, microcytic anemia. She presented to [facility] ER 2/27 with c/o generalized weakness, worsening anemia with Hgb 7.5 on PCP eval earlier that day, decreased from Hgb 8.6 on 1/16/19. Iron studies show iron 39, ferritin 9, Transferrin 316. Vit B12 1314, Folate 19.5.  BUN/Cr WNL. INR ok at 1.2. No overt GI bleeding and reportedly with neg hemoccult stool study with PCP. She does have gum disease and has had some mild gum bleeding as well as mild nose bleeds. She recalls hx anemia in her teens likely related to menstruation though then recalls being told she had chronic anemia with Hgb around 10-11. Prior to this year last Hgb on file is Hgb 11.0 from 2/2018. No prior EGD or Colonoscopy. She has been noted to have elevated LFTs. Admission labs revealed Tbili 0.8, Alk phos 237, AST 42, ALT 31. She recalls being told that her liver enzymes had been elevated in the past and that she likely had fatty liver. She has had thrombocytopenia with platelets as low as 70. She had a CT abd/pelvis that reported hepatic cirrhosis and splenomegaly (spleen 16.8cm). She denies ETOH. She denies hx tattoos/piercings. 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 Additionally c/o unintentional weight loss, recent NSAID use.  
  
Plan:  
 
-Hgb stable after total transfusion of 2u pRBC since admission. Continue to follow trend of H/H and consider additional transfusion pRBC if needed. 
-She will remain NPO today for EGD and Colonoscopy this morning with Dr. Caty Anne for microcytic anemia, anorexia, weight loss, new diagnosis of Cirrhosis to evaluate for PUD, varices, polyps, malignancy. -New diagnosis Cirrhosis- likely fatty liver/PATHAK in setting of DM II, HLD, obesity. No ETOH history. Hepatitis panel neg. Further liver diagnostic studies ordered and results pending (Autoimmune studies, A1AT, Ceruloplasmin). -Continue daily PPI along with supportive care Further recommendations will be based upon findings of EGD/Nemacolin and pt clinical course. ATTENDING NOTE:  I have seen and examined the patient along with my NP/PA. The assessment and plan above is my own. EGD normal and colon remarkable for AVM's. Will watch after APC coag for signs of ongoing blood loss and consider capsule endo if so. Recommend outpt f/u Fe replacement and monitor hg outpt with primary MD 
F/u with us for cirrhosis - currently compensated. MD Aline Freedman, PA-C [facility]

## 2019-03-02 NOTE — PROCEDURES
Esophagogastroduodenoscopy (EGD) Procedure Note    Procedure: EGD    Pre-operative Diagnosis: JENNY    Cirrhosis    Post-operative Diagnosis: Normal EGD    Recommendations:  See inpt notes. Follow up:   [place] to follow. Anesthesia/Sedation:  MAC, (see separate report). Procedure Details:  Informed consent was obtained for the procedure, including sedation. Risks of perforation, hemorrhage, adverse drug reaction and aspiration were discussed. The patient was placed in the left lateral decubitus position. Based on the pre-procedure assessment, including review of the patient's medical history, medications, allergies, and review of systems, she had been deemed to be an appropriate candidate for anesthesia. The patient was monitored continuously with ECG tracing, pulse oximetry, blood pressure monitoring, and direct observations. Please refer to the anesthesia record for details and dosages of medications. The esophagus was intubated with direct visualization. The esophagus, stomach and duodenum were traversed to the full extent of the endoscope. Retroflexed views of the cardia and fundus were performed. The stomach was fully insufflated and deflated. Findings:   OROPHARYNX: The oropharynx was briefly viewed on entry and withdrawal with very brief evaluation of the cords, arytenoids and pyriform sinuses. No abnormalities found. ESOPHAGUS:  The esophagus was normal with an intact squamocolumnar z-line without erosions or evidence of Layton's intestinal metaplasia. The mucosa was normal.  There were no strictures, rings or noticeable narrowing of the lumen. The endoscope was easily passed into the gastric pouch and there was no apparent hiatal hernia. STOMACH: The gastric pouch inflated and deflated normally. The mucosal surface and gastric rugae were normal without erosions, ulcerations, raised lesions, vascular ectasias or other anomalies.   The pylorus was patent to passage of the endoscope without difficulty. DUODENUM:  The endoscope was easily passed into the third section of the duodenum. The villous mucosa was normal without blunting or scalloped folds. There were no mucosal erosions, ulcerations, raised lesions or vascular ectasias. EBL: 0 cc's. Pathology speciment:  None. Complications: No apparent complications and the patient tolerated sedation and the procedure well. Attending Attestation: I performed the procedure.     Bob Mai MD

## 2019-03-02 NOTE — PROGRESS NOTES
PRE-PROCEDURE ASSESSMENT: 
Chief complaint/HPI and PMH:  Please refer consultation note and progress notes. LUNGS:  Clear and equal. 
COR:  RRR without murmurs heard. NEURO:  A and Oriented fully. I have thoroughly explained the preparation, procedure and sedation process to the patient as well as the risks and alternatives. They will sign informed consent prior to the procedure.    
 
 
Karthik Wright MD

## 2019-03-02 NOTE — ANESTHESIA PREPROCEDURE EVALUATION
Anesthetic History No history of anesthetic complications Review of Systems / Medical History Patient summary reviewed and pertinent labs reviewed Pulmonary Within defined limits Neuro/Psych Within defined limits Cardiovascular Hypertension Exercise tolerance: >4 METS 
  
GI/Hepatic/Renal 
  
 
 
 
Liver disease Comments: Newly diagnosed cirrhosis Endo/Other Diabetes: type 2 Hypothyroidism Other Findings Physical Exam 
 
Airway Mallampati: II 
TM Distance: 4 - 6 cm Neck ROM: normal range of motion Mouth opening: Normal 
 
 Cardiovascular Rhythm: regular Rate: normal 
 
Murmur: Grade 4, Mitral area Dental 
 
 
  
Pulmonary Breath sounds clear to auscultation Abdominal 
 
 
 
 Other Findings Anesthetic Plan ASA: 3 Anesthesia type: total IV anesthesia Induction: Intravenous Anesthetic plan and risks discussed with: Patient Known murmur (h/o rheumatic fever). Asymptomatic

## 2019-03-02 NOTE — PROCEDURES
Colonoscopy Procedure Note    Procedure: Colonoscopy    Pre-operative Diagnosis: Screen for CRC, (initial colo)   JENNY     Post-operative Diagnosis: Colonic AVM's - APC ablation transverse colon. Recommendations:  See inpt notes. Surveillance interval: 3 years with 2 day prep    Anesthesia/Sedation:  MAC,, (see separate report). Procedure Details:  Informed consent was obtained for the procedure, including anesthesia. Risks of perforation, hemorrhage, adverse drug reaction and aspiration were discussed. The patient was placed in the left lateral decubitus position. Based on the pre-procedure assessment, including review of the patient's medical history, medications, allergies, and review of systems, she had been deemed to be an appropriate candidate for this procedure. A rectal examination was performed. The colonoscope was inserted into the rectum and advanced under direct vision to the terminal ileum. The quality of the colonic preparation was fair but with copious washing was mostly adequate. A careful inspection was made as the colonoscope was withdrawn; findings and interventions are described below. Findings:   TERMINAL ILEUM: The terminal ileum was entered and appeared normal with a normal villous mucosa. COLON:  The colonic mucosa from the cecum to the rectum was carefully examined. The mucosa appeared normal with normal vascularity except for a few AVM's in the mid transverse. There was no diverticulosis, inflammatory changes, mucosal polyps, raised lesions or abnormal pigmentation. ANUS/RECTUM: Anal exam reveals no masses or hemorrhoids, sphincter tone is normal. Rectal exam reveals no masses or hemorrhoids. Direct and retroflexed views of the rectum were normal except for 1+ IH's. EBL: 0cc's. PATH:  None. Complications: None; patient tolerated the procedure well. Attending Attestation: I performed the procedure.     Kira Padron MD

## 2019-03-02 NOTE — PROGRESS NOTES
Shift Summary: 
 
Patient rested well overnight. The patient is alert and oriented x3. The patient has active bowel sounds and is voiding well. Patient had 4 watery yellowish BMs overnight. VSS. Patient up with assist. No needs stated at this time. Patient resting peacefully in bed. Bed in low position. All personal items within reach. Will continue to monitor and give bedside shift report to oncoming day shift nurse.

## 2019-03-02 NOTE — ANESTHESIA POSTPROCEDURE EVALUATION
Procedure(s): ESOPHAGOGASTRODUODENOSCOPY (EGD) COLONOSCOPY ROOM 615 ENDOSCOPIC ARGON PLASMA COAGULATION. Anesthesia Post Evaluation Multimodal analgesia: multimodal analgesia not used between 6 hours prior to anesthesia start to PACU discharge Patient location during evaluation: PACU Patient participation: complete - patient participated Level of consciousness: awake and alert Pain management: adequate Airway patency: patent Anesthetic complications: no 
Cardiovascular status: acceptable and hemodynamically stable Respiratory status: acceptable Hydration status: acceptable Visit Vitals /83 Pulse 77 Temp 36.5 °C (97.7 °F) Resp 16 Ht 5' 7\" (1.702 m) Wt 75.3 kg (166 lb) SpO2 100% Breastfeeding? Yes  
BMI 26.00 kg/m²

## 2019-03-02 NOTE — PROGRESS NOTES
TRANSFER - IN REPORT: 
 
Verbal report received from [address], RN(name) on Delmy Kauffman  being received from GI Lab(unit) for routine progression of care Report consisted of patients Situation, Background, Assessment and  
Recommendations(SBAR). Information from the following report(s) SBAR was reviewed with the receiving nurse. Opportunity for questions and clarification was provided. [address], RN states patient's EGD  Normal and states \"cauterized AVM of colon during colonoscopy\" Assessment completed upon patients arrival to unit and care assumed.

## 2019-03-02 NOTE — PROGRESS NOTES
TRANSFER - OUT REPORT: 
 
Verbal report given to ANI Rivera(name) on Delmy Kauffman  being transferred to GI Lab(unit) for routine progression of care Report consisted of patients Situation, Background, Assessment and  
Recommendations(SBAR). Information from the following report(s) SBAR was reviewed with the receiving nurse. Lines:  
Peripheral IV 03/01/19 Anterior;Distal;Right Forearm (Active) Site Assessment Clean, dry, & intact 3/2/2019  2:57 AM  
Phlebitis Assessment 0 3/2/2019  2:57 AM  
Infiltration Assessment 0 3/2/2019  2:57 AM  
Dressing Status Clean, dry, & intact 3/2/2019  2:57 AM  
Dressing Type Tape;Transparent 3/2/2019  2:57 AM  
Hub Color/Line Status Infusing 3/2/2019  2:57 AM  
  
 
Opportunity for questions and clarification was provided. Patient transported with: 
 [facility]

## 2019-03-03 LAB
ANION GAP SERPL CALC-SCNC: 7 MMOL/L (ref 7–16)
BUN SERPL-MCNC: 8 MG/DL (ref 8–23)
CALCIUM SERPL-MCNC: 7.3 MG/DL (ref 8.3–10.4)
CHLORIDE SERPL-SCNC: 113 MMOL/L (ref 98–107)
CO2 SERPL-SCNC: 21 MMOL/L (ref 21–32)
CREAT SERPL-MCNC: 0.58 MG/DL (ref 0.6–1)
GLUCOSE BLD STRIP.AUTO-MCNC: 136 MG/DL (ref 65–100)
GLUCOSE BLD STRIP.AUTO-MCNC: 192 MG/DL (ref 65–100)
GLUCOSE BLD STRIP.AUTO-MCNC: 198 MG/DL (ref 65–100)
GLUCOSE BLD STRIP.AUTO-MCNC: 202 MG/DL (ref 65–100)
GLUCOSE BLD STRIP.AUTO-MCNC: 68 MG/DL (ref 65–100)
GLUCOSE SERPL-MCNC: 66 MG/DL (ref 65–100)
HCT VFR BLD AUTO: 27.5 % (ref 35.8–46.3)
HGB BLD-MCNC: 8.4 G/DL (ref 11.7–15.4)
POTASSIUM SERPL-SCNC: 4.3 MMOL/L (ref 3.5–5.1)
SODIUM SERPL-SCNC: 141 MMOL/L (ref 136–145)

## 2019-03-03 PROCEDURE — 74011250637 HC RX REV CODE- 250/637: Performed by: FAMILY MEDICINE

## 2019-03-03 PROCEDURE — 74011636637 HC RX REV CODE- 636/637: Performed by: FAMILY MEDICINE

## 2019-03-03 PROCEDURE — 74011250636 HC RX REV CODE- 250/636: Performed by: INTERNAL MEDICINE

## 2019-03-03 PROCEDURE — 74011636637 HC RX REV CODE- 636/637: Performed by: INTERNAL MEDICINE

## 2019-03-03 PROCEDURE — 85018 HEMOGLOBIN: CPT

## 2019-03-03 PROCEDURE — 80048 BASIC METABOLIC PNL TOTAL CA: CPT

## 2019-03-03 PROCEDURE — 36415 COLL VENOUS BLD VENIPUNCTURE: CPT

## 2019-03-03 PROCEDURE — 82962 GLUCOSE BLOOD TEST: CPT

## 2019-03-03 PROCEDURE — 74011250637 HC RX REV CODE- 250/637: Performed by: INTERNAL MEDICINE

## 2019-03-03 PROCEDURE — C9113 INJ PANTOPRAZOLE SODIUM, VIA: HCPCS | Performed by: INTERNAL MEDICINE

## 2019-03-03 PROCEDURE — 65270000029 HC RM PRIVATE

## 2019-03-03 PROCEDURE — 74011000250 HC RX REV CODE- 250: Performed by: INTERNAL MEDICINE

## 2019-03-03 RX ORDER — INSULIN GLARGINE 100 [IU]/ML
40 INJECTION, SOLUTION SUBCUTANEOUS DAILY
Status: DISCONTINUED | OUTPATIENT
Start: 2019-03-04 | End: 2019-03-04 | Stop reason: HOSPADM

## 2019-03-03 RX ADMIN — SERTRALINE HYDROCHLORIDE 50 MG: 50 TABLET ORAL at 08:22

## 2019-03-03 RX ADMIN — INSULIN LISPRO 4 UNITS: 100 INJECTION, SOLUTION INTRAVENOUS; SUBCUTANEOUS at 12:13

## 2019-03-03 RX ADMIN — INSULIN LISPRO 2 UNITS: 100 INJECTION, SOLUTION INTRAVENOUS; SUBCUTANEOUS at 22:23

## 2019-03-03 RX ADMIN — LEVOTHYROXINE SODIUM 125 MCG: 75 TABLET ORAL at 05:31

## 2019-03-03 RX ADMIN — INSULIN LISPRO 2 UNITS: 100 INJECTION, SOLUTION INTRAVENOUS; SUBCUTANEOUS at 16:47

## 2019-03-03 RX ADMIN — LISINOPRIL 2.5 MG: 5 TABLET ORAL at 08:22

## 2019-03-03 RX ADMIN — ATORVASTATIN CALCIUM 10 MG: 10 TABLET, FILM COATED ORAL at 08:22

## 2019-03-03 RX ADMIN — SODIUM CHLORIDE 40 MG: 9 INJECTION, SOLUTION INTRAMUSCULAR; INTRAVENOUS; SUBCUTANEOUS at 08:22

## 2019-03-03 RX ADMIN — GABAPENTIN 300 MG: 300 CAPSULE ORAL at 22:23

## 2019-03-03 RX ADMIN — INSULIN GLARGINE 45 UNITS: 100 INJECTION, SOLUTION SUBCUTANEOUS at 08:21

## 2019-03-03 NOTE — PROGRESS NOTES
Problem: Falls - Risk of 
Goal: *Absence of Falls Document Orvel Buffy Fall Risk and appropriate interventions in the flowsheet. Outcome: Progressing Towards Goal 
Fall Risk Interventions: 
  
 
  
 
Medication Interventions: Evaluate medications/consider consulting pharmacy, Patient to call before getting OOB, Teach patient to arise slowly History of Falls Interventions: Consult care management for discharge planning, Door open when patient unattended, Evaluate medications/consider consulting pharmacy

## 2019-03-03 NOTE — PROGRESS NOTES
Progress Note Patient: Varun Gillis MRN: [id number]  SSN: [id number] YOB: 1955  Age: 66 y.o. Sex: female Admit Date: 2/27/2019 LOS: 1 day Subjective: F/U microcytic anemia, weight loss. Hx significant for DM type 2, diabetic neuropathy, depression, hypothyroidism, chronic anemia and thrombocytopenia. Patient newly diagnosed with cirrhosis. Patient admitted for worsening anemia of unknown origin, weight loss, and new diagnosis of cirrhosis. Hg 8.4 from 9.2. Glucose levels low as 68 this AM and was symptomatic from level. Became weak and diaphoretic. No chest pain or SOB. Feeling better since eating this AM. No obvious signs of bleeding. Current Facility-Administered Medications Medication Dose Route Frequency  lactated Ringers infusion  100 mL/hr IntraVENous CONTINUOUS  
 lip protectant (BLISTEX) ointment   Topical PRN  
 0.9% sodium chloride infusion 250 mL  250 mL IntraVENous PRN  
 insulin glargine (LANTUS) injection 45 Units  45 Units SubCUTAneous DAILY  hydrOXYzine HCl (ATARAX) tablet 10 mg  10 mg Oral TID PRN  
 lisinopril (PRINIVIL, ZESTRIL) tablet 2.5 mg  2.5 mg Oral DAILY  dextrose 40% (GLUTOSE) oral gel 1 Tube  15 g Oral PRN  
 glucagon (GLUCAGEN) injection 1 mg  1 mg IntraMUSCular PRN  
 dextrose (D50W) injection syrg 12.5-25 g  25-50 mL IntraVENous PRN  
 0.9% sodium chloride infusion 250 mL  250 mL IntraVENous PRN  
 0.9% sodium chloride infusion  100 mL/hr IntraVENous CONTINUOUS  
 atorvastatin (LIPITOR) tablet 10 mg  10 mg Oral DAILY  gabapentin (NEURONTIN) capsule 300 mg  300 mg Oral QHS  levothyroxine (SYNTHROID) tablet 125 mcg  125 mcg Oral ACB  sertraline (ZOLOFT) tablet 50 mg  50 mg Oral DAILY  insulin lispro (HUMALOG) injection 0-10 Units  0-10 Units SubCUTAneous TIDAC  pantoprazole (PROTONIX) 40 mg in sodium chloride 0.9% 10 mL injection  40 mg IntraVENous DAILY  ondansetron (ZOFRAN) injection 4 mg  4 mg IntraVENous Q6H PRN  
 traMADol (ULTRAM) tablet 50 mg  50 mg Oral Q6H PRN  
 acetaminophen (TYLENOL) tablet 650 mg  650 mg Oral Q6H PRN Objective:  
 
Vitals:  
 03/02/19 1914 03/03/19 0327 03/03/19 0805 03/03/19 1054 BP: 150/70 103/56 131/54 125/60 Pulse: 73 76 81 77 Resp: 16 16 15 17 Temp: 97.7 °F (36.5 °C) 98.1 °F (36.7 °C) 98 °F (36.7 °C) 97.9 °F (36.6 °C) SpO2: 99% 97% 98% 99% Weight:  79.6 kg (175 lb 6.4 oz) Height:      
  
  
Intake and Output: 
Current Shift: 03/03 0701 - 03/03 1900 In: 360 [P.O.:360] Out: - Last three shifts: 03/01 1901 - 03/03 0700 In: 2929 [I.V.:2929] Out: 0 Physical Exam:  
General:  Alert, cooperative, no distress, appears stated age. Eyes:  Conjunctivae/corneas clear. Ears:  Normal TMs and external ear canals both ears. Nose: Nares normal. Septum midline. Mouth/Throat: Lips, mucosa, and tongue normal.  
Neck:  no JVD. Back:   deferred Lungs:   Clear to auscultation bilaterally. Heart:  Regular rate and rhythm, S1, S2 normal  
Abdomen:   Soft, non-tender. Bowel sounds normal. No masses,  No organomegaly. Extremities: Extremities normal, atraumatic, no cyanosis or edema. Pulses: 2+ and symmetric all extremities. Skin: Pale Lymph nodes: Cervical, supraclavicular, and axillary nodes normal.  
Neurologic: CNII-XII intact. .  
 
 
Lab/Data Review: 
 
Recent Results (from the past 24 hour(s)) HGB & HCT Collection Time: 03/02/19  1:28 PM  
Result Value Ref Range HGB 9.2 (L) 11.7 - 15.4 g/dL HCT 31.1 (L) 35.8 - 46.3 % GLUCOSE, POC Collection Time: 03/02/19  4:04 PM  
Result Value Ref Range Glucose (POC) 243 (H) 65 - 100 mg/dL GLUCOSE, POC Collection Time: 03/02/19  8:15 PM  
Result Value Ref Range Glucose (POC) 194 (H) 65 - 100 mg/dL HGB & HCT Collection Time: 03/03/19  5:44 AM  
Result Value Ref Range HGB 8.4 (L) 11.7 - 15.4 g/dL HCT 27.5 (L) 35.8 - 46.3 % METABOLIC PANEL, BASIC Collection Time: 03/03/19  5:44 AM  
Result Value Ref Range Sodium 141 136 - 145 mmol/L Potassium 4.3 3.5 - 5.1 mmol/L Chloride 113 (H) 98 - 107 mmol/L  
 CO2 21 21 - 32 mmol/L Anion gap 7 7 - 16 mmol/L Glucose 66 65 - 100 mg/dL BUN 8 8 - 23 MG/DL Creatinine 0.58 (L) 0.6 - 1.0 MG/DL  
 GFR est AA >60 >60 ml/min/1.73m2 GFR est non-AA >60 >60 ml/min/1.73m2 Calcium 7.3 (L) 8.3 - 10.4 MG/DL  
GLUCOSE, POC Collection Time: 03/03/19  8:00 AM  
Result Value Ref Range Glucose (POC) 68 65 - 100 mg/dL GLUCOSE, POC Collection Time: 03/03/19  8:29 AM  
Result Value Ref Range Glucose (POC) 136 (H) 65 - 100 mg/dL GLUCOSE, POC Collection Time: 03/03/19 10:52 AM  
Result Value Ref Range Glucose (POC) 202 (H) 65 - 100 mg/dL Assessment/ Plan: Active Problems: Hyperlipidemia (1/12/2016) Acquired hypothyroidism (1/12/2016) Type 2 diabetes with nephropathy ([facility] [address] (1/16/2019) Thrombocytopenia ([facility] [address] (1/23/2019) Severe anemia (2/27/2019) Hypomagnesemia (3/1/2019) Worsening anemia, Microcytic anemia- S/p 1 unit PRBC on 2/28 and 1 unit PRBC 3/1. CT abdomen pelvis showed no findings suggestive of malignancy. Cirrhosis and splenomegaly seen. Iron 39, transferrin 316, ferritin 9. Anemia could be secondary to chronic inflammation. EGD and colonoscopy showed AVM during colonoscopy. Weight loss -   TSH normal. AVM at transverse colon found during colonoscopy. Area of AVMs were ablated. May benefit from pill camera if GI thinks would benefit. GI has signed off so will need outpatient follow up. Cirrhosis - Likely from fatty liver disease. Hepatitis panel negative, KRISTEN, ceruloplasmin, alpha 1 antitrypsin negative. Smooth muscle antibody pending and Mitochondrial antibody negative. DM- SS. A1C 14 1/16/19. Patient states she takes Lantus 70 units at home. Was on Lantus 45 units but became symptomatic with glucose levels currently. Will reduce Lantus to 40 units qHS. Small dose ACE If glucose levels stable, Hg stable, plan for dc tomorrow. DVT prophylaxis - SCDs Signed By: Karen Christopher DO March 3, 2019

## 2019-03-03 NOTE — PROGRESS NOTES
Shift Summary: 
 
Patient rested well overnight. The patient is alert and oriented x3. The patient has active bowel sounds and is voiding well. VSS. Patient up with assist. No needs stated at this time. Patient resting peacefully in bed. Bed in low position. All personal items within reach. Will continue to monitor and give bedside shift report to oncoming day shift nurse.

## 2019-03-04 VITALS
SYSTOLIC BLOOD PRESSURE: 123 MMHG | HEART RATE: 79 BPM | DIASTOLIC BLOOD PRESSURE: 66 MMHG | OXYGEN SATURATION: 95 % | HEIGHT: 67 IN | TEMPERATURE: 98.1 F | RESPIRATION RATE: 16 BRPM | WEIGHT: 175.4 LBS | BODY MASS INDEX: 27.53 KG/M2

## 2019-03-04 PROBLEM — Q27.30 AVM (ARTERIOVENOUS MALFORMATION): Status: ACTIVE | Noted: 2019-03-04

## 2019-03-04 LAB
ERYTHROCYTE [DISTWIDTH] IN BLOOD BY AUTOMATED COUNT: 17.1 % (ref 11.9–14.6)
GLUCOSE BLD STRIP.AUTO-MCNC: 182 MG/DL (ref 65–100)
GLUCOSE BLD STRIP.AUTO-MCNC: 74 MG/DL (ref 65–100)
HCT VFR BLD AUTO: 25.4 % (ref 35.8–46.3)
HGB BLD-MCNC: 8 G/DL (ref 11.7–15.4)
MCH RBC QN AUTO: 23.6 PG (ref 26.1–32.9)
MCHC RBC AUTO-ENTMCNC: 31.5 G/DL (ref 31.4–35)
MCV RBC AUTO: 74.9 FL (ref 79.6–97.8)
NRBC # BLD: 0 K/UL (ref 0–0.2)
PLATELET # BLD AUTO: 70 K/UL (ref 150–450)
PMV BLD AUTO: 9.8 FL (ref 9.4–12.3)
RBC # BLD AUTO: 3.39 M/UL (ref 4.05–5.2)
WBC # BLD AUTO: 2.5 K/UL (ref 4.3–11.1)

## 2019-03-04 PROCEDURE — 74011000250 HC RX REV CODE- 250: Performed by: INTERNAL MEDICINE

## 2019-03-04 PROCEDURE — 82962 GLUCOSE BLOOD TEST: CPT

## 2019-03-04 PROCEDURE — 74011250637 HC RX REV CODE- 250/637: Performed by: INTERNAL MEDICINE

## 2019-03-04 PROCEDURE — C9113 INJ PANTOPRAZOLE SODIUM, VIA: HCPCS | Performed by: INTERNAL MEDICINE

## 2019-03-04 PROCEDURE — 85027 COMPLETE CBC AUTOMATED: CPT

## 2019-03-04 PROCEDURE — 74011636637 HC RX REV CODE- 636/637: Performed by: FAMILY MEDICINE

## 2019-03-04 PROCEDURE — 74011250637 HC RX REV CODE- 250/637: Performed by: FAMILY MEDICINE

## 2019-03-04 PROCEDURE — 74011636637 HC RX REV CODE- 636/637: Performed by: INTERNAL MEDICINE

## 2019-03-04 PROCEDURE — 36415 COLL VENOUS BLD VENIPUNCTURE: CPT

## 2019-03-04 PROCEDURE — 74011250636 HC RX REV CODE- 250/636: Performed by: INTERNAL MEDICINE

## 2019-03-04 RX ORDER — LISINOPRIL 2.5 MG/1
2.5 TABLET ORAL DAILY
Qty: 30 TAB | Refills: 0 | Status: SHIPPED | OUTPATIENT
Start: 2019-03-04 | End: 2019-05-02

## 2019-03-04 RX ORDER — PANTOPRAZOLE SODIUM 40 MG/1
40 TABLET, DELAYED RELEASE ORAL DAILY
Qty: 30 TAB | Refills: 0 | Status: SHIPPED | OUTPATIENT
Start: 2019-03-04 | End: 2019-05-07 | Stop reason: SDUPTHER

## 2019-03-04 RX ORDER — INSULIN GLARGINE 100 [IU]/ML
40 INJECTION, SOLUTION SUBCUTANEOUS
Qty: 1 VIAL | Refills: 0 | Status: SHIPPED | OUTPATIENT
Start: 2019-03-04 | End: 2019-05-23

## 2019-03-04 RX ORDER — INSULIN ASPART 100 [IU]/ML
INJECTION, SOLUTION INTRAVENOUS; SUBCUTANEOUS
Qty: 1 PEN | Refills: 0 | Status: SHIPPED | OUTPATIENT
Start: 2019-03-04 | End: 2019-05-23

## 2019-03-04 RX ADMIN — SODIUM CHLORIDE 40 MG: 9 INJECTION, SOLUTION INTRAMUSCULAR; INTRAVENOUS; SUBCUTANEOUS at 10:24

## 2019-03-04 RX ADMIN — LISINOPRIL 2.5 MG: 5 TABLET ORAL at 10:24

## 2019-03-04 RX ADMIN — INSULIN LISPRO 2 UNITS: 100 INJECTION, SOLUTION INTRAVENOUS; SUBCUTANEOUS at 12:00

## 2019-03-04 RX ADMIN — ATORVASTATIN CALCIUM 10 MG: 10 TABLET, FILM COATED ORAL at 10:24

## 2019-03-04 RX ADMIN — INSULIN GLARGINE 40 UNITS: 100 INJECTION, SOLUTION SUBCUTANEOUS at 10:23

## 2019-03-04 RX ADMIN — SERTRALINE HYDROCHLORIDE 50 MG: 50 TABLET ORAL at 10:24

## 2019-03-04 RX ADMIN — LEVOTHYROXINE SODIUM 125 MCG: 75 TABLET ORAL at 05:39

## 2019-03-04 NOTE — DISCHARGE INSTRUCTIONS
Patient Education        DISCHARGE SUMMARY from Nurse    PATIENT INSTRUCTIONS:    After general anesthesia or intravenous sedation, for 24 hours or while taking prescription Narcotics:  · Limit your activities  · Do not drive and operate hazardous machinery  · Do not make important personal or business decisions  · Do  not drink alcoholic beverages  · If you have not urinated within 8 hours after discharge, please contact your surgeon on call. Report the following to your surgeon:  · Excessive pain, swelling, redness or odor of or around the surgical area  · Temperature over 100.5  · Nausea and vomiting lasting longer than 4 hours or if unable to take medications  · Any signs of decreased circulation or nerve impairment to extremity: change in color, persistent  numbness, tingling, coldness or increase pain  · Any questions    What to do at Home:  Recommended activity: Activity as tolerated, monitor your blood sugar. If you experience any of the following symptoms fever, new or unrelieved pain, persistent nausea or vomiting, worrisome symptoms, or uncontrolled  Blood sugar., please follow up with PCP. *  Please give a list of your current medications to your Primary Care Provider. *  Please update this list whenever your medications are discontinued, doses are      changed, or new medications (including over-the-counter products) are added. *  Please carry medication information at all times in case of emergency situations. These are general instructions for a healthy lifestyle:    No smoking/ No tobacco products/ Avoid exposure to second hand smoke  Surgeon General's Warning:  Quitting smoking now greatly reduces serious risk to your health.     Obesity, smoking, and sedentary lifestyle greatly increases your risk for illness    A healthy diet, regular physical exercise & weight monitoring are important for maintaining a healthy lifestyle    You may be retaining fluid if you have a history of heart failure or if you experience any of the following symptoms:  Weight gain of 3 pounds or more overnight or 5 pounds in a week, increased swelling in our hands or feet or shortness of breath while lying flat in bed. Please call your doctor as soon as you notice any of these symptoms; do not wait until your next office visit. Recognize signs and symptoms of STROKE:    F-face looks uneven    A-arms unable to move or move unevenly    S-speech slurred or non-existent    T-time-call 911 as soon as signs and symptoms begin-DO NOT go       Back to bed or wait to see if you get better-TIME IS BRAIN. Warning Signs of HEART ATTACK     Call 911 if you have these symptoms:   Chest discomfort. Most heart attacks involve discomfort in the center of the chest that lasts more than a few minutes, or that goes away and comes back. It can feel like uncomfortable pressure, squeezing, fullness, or pain.  Discomfort in other areas of the upper body. Symptoms can include pain or discomfort in one or both arms, the back, neck, jaw, or stomach.  Shortness of breath with or without chest discomfort.  Other signs may include breaking out in a cold sweat, nausea, or lightheadedness. Don't wait more than five minutes to call 911 - MINUTES MATTER! Fast action can save your life. Calling 911 is almost always the fastest way to get lifesaving treatment. Emergency Medical Services staff can begin treatment when they arrive -- up to an hour sooner than if someone gets to the hospital by car. The discharge information has been reviewed with the patient. The patient verbalized understanding. Discharge medications reviewed with the patient and appropriate educational materials and side effects teaching were provided.   ___________________________________________________________________________________________________________________________________  Anemia: Care Instructions  Your Care Instructions    Anemia is a low level of red blood cells, which carry oxygen throughout your body. Many things can cause anemia. Lack of iron is one of the most common causes. Your body needs iron to make hemoglobin, a substance in red blood cells that carries oxygen from the lungs to your body's cells. Without enough iron, the body produces fewer and smaller red blood cells. As a result, your body's cells do not get enough oxygen, and you feel tired and weak. And you may have trouble concentrating. Bleeding is the most common cause of a lack of iron. You may have heavy menstrual bleeding or bleeding caused by conditions such as ulcers, hemorrhoids, or cancer. Regular use of aspirin or other anti-inflammatory medicines (such as ibuprofen) also can cause bleeding in some people. A lack of iron in your diet also can cause anemia, especially at times when the body needs more iron, such as during pregnancy, infancy, and the teen years. Your doctor may have prescribed iron pills. It may take several months of treatment for your iron levels to return to normal. Your doctor also may suggest that you eat foods that are rich in iron, such as meat and beans. There are many other causes of anemia. It is not always due to a lack of iron. Finding the specific cause of your anemia will help your doctor find the right treatment for you. Follow-up care is a key part of your treatment and safety. Be sure to make and go to all appointments, and call your doctor if you are having problems. It's also a good idea to know your test results and keep a list of the medicines you take. How can you care for yourself at home? · Take your medicines exactly as prescribed. Call your doctor if you think you are having a problem with your medicine. · If your doctor recommends iron pills, take them as directed:  ? Try to take the pills on an empty stomach about 1 hour before or 2 hours after meals. But you may need to take iron with food to avoid an upset stomach.   ? Do not take antacids or drink milk or caffeine drinks (such as coffee, tea, or cola) at the same time or within 2 hours of the time that you take your iron. They can make it hard for your body to absorb the iron. ? Vitamin C (from food or supplements) helps your body absorb iron. Try taking iron pills with a glass of orange juice or some other food that is high in vitamin C, such as citrus fruits. ? Iron pills may cause stomach problems, such as heartburn, nausea, diarrhea, constipation, and cramps. Be sure to drink plenty of fluids, and include fruits, vegetables, and fiber in your diet each day. Iron pills often make your bowel movements dark or green. ? If you forget to take an iron pill, do not take a double dose of iron the next time you take a pill. ? Keep iron pills out of the reach of small children. An overdose of iron can be very dangerous. · Follow your doctor's advice about eating iron-rich foods. These include red meat, shellfish, poultry, eggs, beans, raisins, whole-grain bread, and leafy green vegetables. · Steam vegetables to help them keep their iron content. When should you call for help? Call 911 anytime you think you may need emergency care. For example, call if:    · You have symptoms of a heart attack. These may include:  ? Chest pain or pressure, or a strange feeling in the chest.  ? Sweating. ? Shortness of breath. ? Nausea or vomiting. ? Pain, pressure, or a strange feeling in the back, neck, jaw, or upper belly or in one or both shoulders or arms. ? Lightheadedness or sudden weakness. ? A fast or irregular heartbeat. After you call 911, the  may tell you to chew 1 adult-strength or 2 to 4 low-dose aspirin. Wait for an ambulance.  Do not try to drive yourself.     · You passed out (lost consciousness).    Call your doctor now or seek immediate medical care if:    · You have new or increased shortness of breath.     · You are dizzy or lightheaded, or you feel like you may faint.     · Your fatigue and weakness continue or get worse.     · You have any abnormal bleeding, such as:  ? Nosebleeds. ? Vaginal bleeding that is different (heavier, more frequent, at a different time of the month) than what you are used to.  ? Bloody or black stools, or rectal bleeding. ? Bloody or pink urine.    Watch closely for changes in your health, and be sure to contact your doctor if:    · You do not get better as expected. Where can you learn more? Go to http://joel-luisa.info/. Enter R301 in the search box to learn more about \"Anemia: Care Instructions. \"  Current as of: May 6, 2018  Content Version: 11.9  © 0132-2060 [employer]. Care instructions adapted under license by [employer] (which disclaims liability or warranty for this information). If you have questions about a medical condition or this instruction, always ask your healthcare professional. [place] [address] any warranty or liability for your use of this information. Patient Education        Cirrhosis: Care Instructions  Your Care Instructions    Cirrhosis occurs when healthy tissue in your liver gets scarred. This keeps the liver from working well. It usually happens after a liver has been inflamed for years. Cirrhosis is most often caused by alcohol abuse or hepatitis infection. But there are other causes too. These include medicines and too much fat in the liver. Conditions passed down in families and other disorders can also cause it. In some cases, no cause can be found. Treatment can't completely fix liver damage. But you may be able to slow or prevent more damage if you don't drink alcohol or use drugs that harm your liver. Follow-up care is a key part of your treatment and safety. Be sure to make and go to all appointments, and call your doctor if you are having problems. It's also a good idea to know your test results and keep a list of the medicines you take.   How can you care for yourself at home? · Do not drink any alcohol. It can harm your liver. Talk to your doctor if you need help to stop drinking. · Be safe with medicines. Take your medicines exactly as prescribed. Call your doctor if you think you are having a problem with your medicine. · Talk to your doctor before you take any other medicines. These include over-the-counter medicines and herbal products. · Be careful taking acetaminophen (Tylenol), ibuprofen (Advil, Motrin), or naproxen (Aleve). These can sometimes cause more liver damage. Talk with your doctor if you're not sure which medicines are safe. · If your cirrhosis causes extra fluid to build up in your body, try not to eat a lot of salt. Use less salt when you cook and at the table. Don't eat fast foods or snack foods with a lot of salt. Extra fluid in your belly, legs, and chest can cause serious problems. · Work with your doctor or a dietitian to be sure you eat the right amount of carbohydrate, protein, fat, and sodium (salt). It's very important to choose the best foods for the health of your liver. · If your doctor recommends it, limit how much fluid you drink. · If your doctor recommends it, get more exercise. Walking is a good choice. Bit by bit, increase the amount you walk every day. Try for at least 30 minutes on most days of the week. You also may want to swim, bike, or do other activities. When should you call for help? Call 911 anytime you think you may need emergency care. For example, call if:    · You have trouble breathing.     · You vomit blood or what looks like coffee grounds.    Call your doctor now or seek immediate medical care if:    · You feel very sleepy or confused.     · You have new belly pain, or your pain gets worse.     · You have a fever.     · There is a new or increasing yellow tint to your skin or the whites of your eyes.     · You have any abnormal bleeding, such as:  ? Nosebleeds.   ? Vaginal bleeding that is different (heavier, more frequent, at a different time of the month) than what you are used to.  ? Bloody or black stools, or rectal bleeding. ? Bloody or pink urine.    Watch closely for changes in your health, and be sure to contact your doctor if:    · You have any problems.     · Your belly is getting bigger.     · You are gaining weight. Where can you learn more? Go to http://joel-luisa.info/. Enter M412 in the search box to learn more about \"Cirrhosis: Care Instructions. \"  Current as of: March 27, 2018  Content Version: 11.9  © 6940-8813 [employer]. Care instructions adapted under license by [employer] (which disclaims liability or warranty for this information). If you have questions about a medical condition or this instruction, always ask your healthcare professional. [address] any warranty or liability for your use of this information.

## 2019-03-04 NOTE — PROGRESS NOTES
Discharge instructions and prescriptions provided and explained to patient, patient voiced understanding. Medication side effect sheet reviewed with pt. No home meds or valuables to return. Opportunity for questions provided Margarita Diaz pt waiting on ride who will be here after lunch. Instructed to call once ready to leave the floor.

## 2019-03-04 NOTE — DISCHARGE SUMMARY
Hospitalist Discharge Summary     Patient ID:  Patience Taylor  [id number]  66 y.o.  1955  Admit date: 2/27/2019  2:33 PM  Discharge date and time: 3/4/2019  Attending: Alice Love,*  PCP:  Florecita Carr MD  Treatment Team: Attending Provider: Alice Love MD; Primary Nurse: Arlette Bonds; Utilization Review: Cyndee Bradford RN    Principal Diagnosis Severe anemia   Principal Problem:    Severe anemia (2/27/2019)    Active Problems:    Hyperlipidemia (1/12/2016)      Acquired hypothyroidism (1/12/2016)      Type 2 diabetes with nephropathy ([facility] [address] (1/16/2019)      Thrombocytopenia ([facility] [address] (1/23/2019)      Hypomagnesemia (3/1/2019)      AVM (arteriovenous malformation) (3/4/2019)     Hospital Course:Hx significant for DM type 2, diabetic neuropathy, depression, hypothyroidism, chronic anemia and thrombocytopenia. Patient admitted for weight loss, anemia, and newly diagnosed cirrhosis on CT with contrast on this admission. No evidence of malignancy on CT abdomen pelvis. Hg on arrival was 7.5 and decreased to 6.7. S/p 1 unit PRBC on 2/28 and 1 unit PRBC 3/1. On day of discharge Hg 8 with no obvious signs of bleeding. Iron 39, transferrin 316, ferritin 9. Anemia could be secondary to chronic inflammation. GI consulted who ordered Hepatitis panel that was negative, KRISTEN, ceruloplasmin, alpha 1 antitrypsin negative. Smooth muscle antibody pending and Mitochondrial antibody negative. EGD and colonoscopy on 3/2/19 showed benign findings on EGD and AVM malformations at the transverse colon s/p ablation during colonoscopy. No obvious signs bleeding throughout stay. No BM since colonoscopy but passing flatus. TSH normal. Patient will have close follow up with PCP and GI. Will need repeat H&H in two days. Tolerating GI soft diet. Patient did have episode of hypoglycemia during stay so Lantus was decreased to 40 units in the AM. Continue SS.  Keep a log of her glucose levels and speak with PCP if need for further insulin. BP controlled with small dose ACE alone. If obvious signs of bleeding, SOB, chest pain, or AMS, please come back to the ED. Please refer to the admission H&P for details of presentation. In summary, the patient is stable for discharge. Significant Diagnostic Studies:       Labs: Results:       Chemistry Recent Labs     03/03/19  0544 03/02/19  0519   GLU 66 70    141   K 4.3 3.4*   * 112*   CO2 21 22   BUN 8 10   CREA 0.58* 0.54*   CA 7.3* 7.7*   AGAP 7 7      CBC w/Diff Recent Labs     03/04/19  0615 03/03/19  0544 03/02/19  1328 03/02/19  0519   WBC 2.5*  --   --  2.4*   RBC 3.39*  --   --  3.58*   HGB 8.0* 8.4* 9.2* 8.5*   HCT 25.4* 27.5* 31.1* 27.2*   PLT 70*  --   --  70*      Cardiac Enzymes No results for input(s): CPK, CKND1, CAMMIE in the last 72 hours. No lab exists for component: CKRMB, TROIP   Coagulation No results for input(s): PTP, INR, APTT in the last 72 hours. No lab exists for component: INREXT, INREXT    Lipid Panel Lab Results   Component Value Date/Time    Cholesterol, total 158 02/27/2019 11:28 AM    HDL Cholesterol 49 02/27/2019 11:28 AM    LDL, calculated 96 02/27/2019 11:28 AM    VLDL, calculated 13 02/27/2019 11:28 AM    Triglyceride 65 02/27/2019 11:28 AM      BNP No results for input(s): BNPP in the last 72 hours. Liver Enzymes No results for input(s): TP, ALB, TBIL, AP, SGOT, GPT in the last 72 hours. No lab exists for component: DBIL   Thyroid Studies Lab Results   Component Value Date/Time    TSH 1.950 02/27/2019 01:28 PM            Discharge Exam:  Visit Vitals  /66 (BP 1 Location: Left arm, BP Patient Position: At rest)   Pulse 79   Temp 98.1 °F (36.7 °C)   Resp 16   Ht 5' 7\" (1.702 m)   Wt 79.6 kg (175 lb 6.4 oz)   SpO2 95%   Breastfeeding? Yes   BMI 27.47 kg/m²     General appearance: alert, cooperative, no distress, appears stated age.  Pale   Lungs: clear to auscultation bilaterally  Heart: regular rate and rhythm, S1, S2 normal, no murmur, click, rub or gallop  Abdomen: soft, non-tender. Bowel sounds normal. No masses,  no organomegaly  Extremities: no cyanosis or edema  Neurologic: Grossly normal    Disposition: Home   Discharge Condition: stable  Patient Instructions: as above   Current Discharge Medication List      START taking these medications    Details   insulin glargine (LANTUS) 100 unit/mL injection 40 Units by SubCUTAneous route every morning. Qty: 1 Vial, Refills: 0      pantoprazole (PROTONIX) 40 mg tablet Take 1 Tab by mouth daily. Qty: 30 Tab, Refills: 0         CONTINUE these medications which have CHANGED    Details   lisinopril (PRINIVIL, ZESTRIL) 2.5 mg tablet Take 1 Tab by mouth daily. Qty: 30 Tab, Refills: 0      insulin aspart U-100 (NOVOLOG) 100 unit/mL inpn Less than 150 =   0 units           150 -199 =   2 units  200 -249 =   4 units  250 -299 =   6 units  300 -349 =   8 units  Before meals and at night  Qty: 1 Pen, Refills: 0    Associated Diagnoses: Type 2 diabetes with nephropathy ([facility] [address]         CONTINUE these medications which have NOT CHANGED    Details   atorvastatin (LIPITOR) 10 mg tablet Take 1 Tab by mouth daily. Qty: 30 Tab, Refills: 6    Associated Diagnoses: Hyperlipidemia, unspecified hyperlipidemia type      gabapentin (NEURONTIN) 300 mg capsule Take 1 Cap by mouth nightly. Qty: 90 Cap, Refills: 3    Associated Diagnoses: Neuropathy      sertraline (ZOLOFT) 50 mg tablet Take 1 Tab by mouth daily. Qty: 90 Tab, Refills: 3    Associated Diagnoses: Depression, unspecified depression type      Insulin Needles, Disposable, (ARIE PEN NEEDLE) 32 gauge x 5/32\" ndle For use with insulin pens   E11.65 Uncontrolled type 2 DM  Qty: 100 Pen Needle, Refills: 11    Associated Diagnoses: Uncontrolled type 2 diabetes mellitus with hyperglycemia (HCC)      levothyroxine (SYNTHROID) 125 mcg tablet Take 1 Tab by mouth Daily (before breakfast).   Qty: 90 Tab, Refills: 1 Associated Diagnoses: Acquired hypothyroidism      Blood-Glucose Meter monitoring kit Test FSBS 3x daily before meals. E11.65  Qty: 1 Kit, Refills: 0    Associated Diagnoses: Uncontrolled type 2 diabetes mellitus with diabetic neuropathy (HCC)      lancets misc Test FSBS 3x daily before meals. E11.65  Qty: 100 Each, Refills: 12    Associated Diagnoses: Uncontrolled type 2 diabetes mellitus with diabetic neuropathy (HCC)      glucose blood VI test strips (BLOOD GLUCOSE TEST) strip Test FSBS 3x daily before meals. E11.65  Qty: 100 Strip, Refills: 12    Associated Diagnoses: Uncontrolled type 2 diabetes mellitus with diabetic neuropathy (HCC)      Insulin Needles, Disposable, 31 gauge x 5/16\" ndle by SubCUTAneous route daily. E11.65  Qty: 1 Package, Refills: 11    Associated Diagnoses: Uncontrolled type II diabetes mellitus ([facility] [address]         STOP taking these medications       metFORMIN ER (GLUCOPHAGE XR) 500 mg tablet Comments:   Reason for Stopping:         hydroCHLOROthiazide (HYDRODIURIL) 25 mg tablet Comments:   Reason for Stopping:         insulin glargine (BASAGLAR KWIKPEN U-100 INSULIN) 100 unit/mL (3 mL) inpn Comments:   Reason for Stopping:               Activity: Up and dominique   Diet: GI soft and titrate as can tolerate. Wound Care: None     Follow-up PCP in one week. GI in one week.    ·     Time spent to discharge patient 35 minutes  Signed:  Manpreet Decker DO  3/4/2019  10:19 AM

## 2019-03-04 NOTE — PROGRESS NOTES
Pt to DC today. .  No needs identified. Care Management Interventions PCP Verified by CM: Yes Mode of Transport at Discharge: Other (see comment) Transition of Care Consult (CM Consult): Discharge Planning Discharge Durable Medical Equipment: No 
Physical Therapy Consult: Yes Occupational Therapy Consult: Yes Current Support Network: Own Home Confirm Follow Up Transport: Family Plan discussed with Pt/Family/Caregiver: Yes Freedom of Choice Offered: Yes Discharge Location Discharge Placement: Home

## 2019-03-04 NOTE — PROGRESS NOTES
Hourly rounds complete this shift, no new complaints at this time, No BM this evening bed in low, locked position, call light and bedside table within reach,  all needs met. Will continue to monitor Report to day shift nurse. END OF SHIFT NOTE: 
 
INTAKE/OUTPUT 
03/03 0701 - 03/04 0700 In: 360 [P.O.:360] Out: 1140 [Urine:840] Voiding: YES Catheter: NO 
Color: clear Drain: DIET Diabetic Flatus: Patient does have flatus present. Stool:  0 occurrences. Characteristics: 
Stool Assessment Stool Appearance: Watery Ambulating Yes Emesis: 0 occurrences. Characteristics: VITAL SIGNS Patient Vitals for the past 12 hrs: 
 Temp Pulse Resp BP SpO2  
03/04/19 0423 98.3 °F (36.8 °C) 76 16 111/61 96 % 03/03/19 2312 98.1 °F (36.7 °C) 81 16 128/69 99 % 03/03/19 1854 98.2 °F (36.8 °C) 77 16 134/73 99 % Pain Assessment Pain Intensity 1: 0 (03/03/19 0644) Patient Stated Pain Goal: 0 Mirna Mcconnell RN

## 2019-03-04 NOTE — PROGRESS NOTES
[address] March 4, 2019 RE: Tutu Burgess To Whom It May Concern, This is to certify that Tutu Burgess was hospitalized from February 27, 2019 - March 3, 2019 and she may return to work on Monday March 11, 2019. Please feel free to contact my office if you have any questions or concerns. Thank you for your assistance in this matter. Sincerely, Chantal Lantigua RN

## 2019-03-04 NOTE — PROGRESS NOTES
Pt d/c'd home and picked up by family. Hourly rounds completed. Peripheral I.V removed with no bleeding. Pt denies needs at this time. All needs met at this time.

## 2019-03-07 PROBLEM — E11.65 UNCONTROLLED TYPE 2 DIABETES MELLITUS WITH HYPERGLYCEMIA (HCC): Status: ACTIVE | Noted: 2019-03-07

## 2019-03-07 PROBLEM — K55.20 AVM (ARTERIOVENOUS MALFORMATION) OF COLON: Status: ACTIVE | Noted: 2019-03-07

## 2019-03-07 PROBLEM — K74.60 CIRRHOSIS OF LIVER WITHOUT ASCITES (HCC): Status: ACTIVE | Noted: 2019-03-07

## 2019-04-24 ENCOUNTER — HOSPITAL ENCOUNTER (INPATIENT)
Age: 64
LOS: 8 days | Discharge: HOME HEALTH | DRG: 872 | End: 2019-05-02
Attending: EMERGENCY MEDICINE | Admitting: FAMILY MEDICINE
Payer: COMMERCIAL

## 2019-04-24 ENCOUNTER — APPOINTMENT (OUTPATIENT)
Dept: GENERAL RADIOLOGY | Age: 64
DRG: 872 | End: 2019-04-24
Attending: EMERGENCY MEDICINE
Payer: COMMERCIAL

## 2019-04-24 DIAGNOSIS — L03.119 CELLULITIS OF LOWER EXTREMITY, UNSPECIFIED LATERALITY: ICD-10-CM

## 2019-04-24 DIAGNOSIS — E11.628 DIABETIC FOOT INFECTION (HCC): ICD-10-CM

## 2019-04-24 DIAGNOSIS — L08.9 DIABETIC FOOT INFECTION (HCC): ICD-10-CM

## 2019-04-24 DIAGNOSIS — A41.9 SEVERE SEPSIS (HCC): Primary | ICD-10-CM

## 2019-04-24 DIAGNOSIS — R65.20 SEVERE SEPSIS (HCC): Primary | ICD-10-CM

## 2019-04-24 DIAGNOSIS — L03.115 CELLULITIS OF RIGHT FOOT: ICD-10-CM

## 2019-04-24 PROBLEM — E11.65 UNCONTROLLED TYPE 2 DIABETES MELLITUS WITH HYPERGLYCEMIA (HCC): Status: RESOLVED | Noted: 2019-03-07 | Resolved: 2019-04-24

## 2019-04-24 PROBLEM — Q27.30 AVM (ARTERIOVENOUS MALFORMATION): Status: RESOLVED | Noted: 2019-03-04 | Resolved: 2019-04-24

## 2019-04-24 PROBLEM — E11.40 TYPE 2 DIABETES MELLITUS WITH DIABETIC NEUROPATHY (HCC): Status: ACTIVE | Noted: 2019-04-24

## 2019-04-24 PROBLEM — D50.9 MICROCYTIC ANEMIA: Status: ACTIVE | Noted: 2019-02-27

## 2019-04-24 PROBLEM — L03.90 CELLULITIS: Status: ACTIVE | Noted: 2019-04-24

## 2019-04-24 PROBLEM — E83.42 HYPOMAGNESEMIA: Status: RESOLVED | Noted: 2019-03-01 | Resolved: 2019-04-24

## 2019-04-24 PROBLEM — D69.6 THROMBOCYTOPENIA (HCC): Status: RESOLVED | Noted: 2019-01-23 | Resolved: 2019-04-24

## 2019-04-24 PROBLEM — D64.9 SEVERE ANEMIA: Status: RESOLVED | Noted: 2019-02-27 | Resolved: 2019-04-24

## 2019-04-24 LAB
ALBUMIN SERPL-MCNC: 2.5 G/DL (ref 3.2–4.6)
ALBUMIN/GLOB SERPL: 0.7 {RATIO} (ref 1.2–3.5)
ALP SERPL-CCNC: 233 U/L (ref 50–136)
ALT SERPL-CCNC: 36 U/L (ref 12–65)
ANION GAP SERPL CALC-SCNC: 11 MMOL/L (ref 7–16)
AST SERPL-CCNC: 37 U/L (ref 15–37)
BASOPHILS # BLD: 0 K/UL (ref 0–0.2)
BASOPHILS NFR BLD: 0 % (ref 0–2)
BILIRUB SERPL-MCNC: 1.6 MG/DL (ref 0.2–1.1)
BUN SERPL-MCNC: 37 MG/DL (ref 8–23)
CALCIUM SERPL-MCNC: 7.8 MG/DL (ref 8.3–10.4)
CHLORIDE SERPL-SCNC: 104 MMOL/L (ref 98–107)
CO2 SERPL-SCNC: 22 MMOL/L (ref 21–32)
CREAT SERPL-MCNC: 1.05 MG/DL (ref 0.6–1)
DIFFERENTIAL METHOD BLD: ABNORMAL
EOSINOPHIL # BLD: 0 K/UL (ref 0–0.8)
EOSINOPHIL NFR BLD: 0 % (ref 0.5–7.8)
ERYTHROCYTE [DISTWIDTH] IN BLOOD BY AUTOMATED COUNT: 18.5 % (ref 11.9–14.6)
GLOBULIN SER CALC-MCNC: 3.8 G/DL (ref 2.3–3.5)
GLUCOSE SERPL-MCNC: 63 MG/DL (ref 65–100)
HCT VFR BLD AUTO: 24.7 % (ref 35.8–46.3)
HGB BLD-MCNC: 8.2 G/DL (ref 11.7–15.4)
IMM GRANULOCYTES # BLD AUTO: 0.4 K/UL (ref 0–0.5)
IMM GRANULOCYTES NFR BLD AUTO: 5 % (ref 0–5)
LACTATE BLD-SCNC: 1.32 MMOL/L (ref 0.5–1.9)
LYMPHOCYTES # BLD: 0.3 K/UL (ref 0.5–4.6)
LYMPHOCYTES NFR BLD: 4 % (ref 13–44)
MCH RBC QN AUTO: 26 PG (ref 26.1–32.9)
MCHC RBC AUTO-ENTMCNC: 33.2 G/DL (ref 31.4–35)
MCV RBC AUTO: 78.4 FL (ref 79.6–97.8)
MONOCYTES # BLD: 0.8 K/UL (ref 0.1–1.3)
MONOCYTES NFR BLD: 10 % (ref 4–12)
NEUTS SEG # BLD: 6.9 K/UL (ref 1.7–8.2)
NEUTS SEG NFR BLD: 81 % (ref 43–78)
NRBC # BLD: 0 K/UL (ref 0–0.2)
PLATELET # BLD AUTO: 70 K/UL (ref 150–450)
PLATELET COMMENTS,PCOM: ABNORMAL
PMV BLD AUTO: 10.4 FL (ref 9.4–12.3)
POTASSIUM SERPL-SCNC: 3.2 MMOL/L (ref 3.5–5.1)
PROCALCITONIN SERPL-MCNC: 10.6 NG/ML
PROT SERPL-MCNC: 6.3 G/DL (ref 6.3–8.2)
RBC # BLD AUTO: 3.15 M/UL (ref 4.05–5.2)
RBC MORPH BLD: ABNORMAL
RBC MORPH BLD: ABNORMAL
SODIUM SERPL-SCNC: 137 MMOL/L (ref 136–145)
WBC # BLD AUTO: 8.4 K/UL (ref 4.3–11.1)
WBC MORPH BLD: ABNORMAL

## 2019-04-24 PROCEDURE — 80053 COMPREHEN METABOLIC PANEL: CPT

## 2019-04-24 PROCEDURE — 87040 BLOOD CULTURE FOR BACTERIA: CPT

## 2019-04-24 PROCEDURE — 99285 EMERGENCY DEPT VISIT HI MDM: CPT | Performed by: EMERGENCY MEDICINE

## 2019-04-24 PROCEDURE — 65270000029 HC RM PRIVATE

## 2019-04-24 PROCEDURE — 85025 COMPLETE CBC W/AUTO DIFF WBC: CPT

## 2019-04-24 PROCEDURE — 74011250636 HC RX REV CODE- 250/636: Performed by: EMERGENCY MEDICINE

## 2019-04-24 PROCEDURE — 84145 PROCALCITONIN (PCT): CPT

## 2019-04-24 PROCEDURE — 93005 ELECTROCARDIOGRAM TRACING: CPT | Performed by: EMERGENCY MEDICINE

## 2019-04-24 PROCEDURE — 83605 ASSAY OF LACTIC ACID: CPT

## 2019-04-24 PROCEDURE — 73630 X-RAY EXAM OF FOOT: CPT

## 2019-04-24 PROCEDURE — 74011250637 HC RX REV CODE- 250/637: Performed by: EMERGENCY MEDICINE

## 2019-04-24 PROCEDURE — 74011250636 HC RX REV CODE- 250/636: Performed by: FAMILY MEDICINE

## 2019-04-24 PROCEDURE — 74011250637 HC RX REV CODE- 250/637: Performed by: FAMILY MEDICINE

## 2019-04-24 RX ORDER — ENOXAPARIN SODIUM 100 MG/ML
40 INJECTION SUBCUTANEOUS EVERY 24 HOURS
Status: DISCONTINUED | OUTPATIENT
Start: 2019-04-24 | End: 2019-04-24 | Stop reason: SDUPTHER

## 2019-04-24 RX ORDER — HEPARIN SODIUM 5000 [USP'U]/ML
5000 INJECTION, SOLUTION INTRAVENOUS; SUBCUTANEOUS EVERY 8 HOURS
Status: DISCONTINUED | OUTPATIENT
Start: 2019-04-25 | End: 2019-04-26

## 2019-04-24 RX ORDER — SODIUM CHLORIDE 0.9 % (FLUSH) 0.9 %
5-40 SYRINGE (ML) INJECTION AS NEEDED
Status: DISCONTINUED | OUTPATIENT
Start: 2019-04-24 | End: 2019-05-02 | Stop reason: HOSPADM

## 2019-04-24 RX ORDER — ATORVASTATIN CALCIUM 10 MG/1
10 TABLET, FILM COATED ORAL DAILY
Status: DISCONTINUED | OUTPATIENT
Start: 2019-04-25 | End: 2019-05-02 | Stop reason: HOSPADM

## 2019-04-24 RX ORDER — GABAPENTIN 300 MG/1
300 CAPSULE ORAL
Status: DISCONTINUED | OUTPATIENT
Start: 2019-04-24 | End: 2019-05-02 | Stop reason: HOSPADM

## 2019-04-24 RX ORDER — SODIUM CHLORIDE 0.9 % (FLUSH) 0.9 %
5-10 SYRINGE (ML) INJECTION AS NEEDED
Status: DISCONTINUED | OUTPATIENT
Start: 2019-04-24 | End: 2019-05-02 | Stop reason: HOSPADM

## 2019-04-24 RX ORDER — INSULIN GLARGINE 100 [IU]/ML
40 INJECTION, SOLUTION SUBCUTANEOUS
Status: DISCONTINUED | OUTPATIENT
Start: 2019-04-25 | End: 2019-05-02 | Stop reason: HOSPADM

## 2019-04-24 RX ORDER — ONDANSETRON 2 MG/ML
4 INJECTION INTRAMUSCULAR; INTRAVENOUS
Status: DISCONTINUED | OUTPATIENT
Start: 2019-04-24 | End: 2019-05-02 | Stop reason: HOSPADM

## 2019-04-24 RX ORDER — PANTOPRAZOLE SODIUM 40 MG/1
40 TABLET, DELAYED RELEASE ORAL DAILY
Status: DISCONTINUED | OUTPATIENT
Start: 2019-04-25 | End: 2019-05-02 | Stop reason: HOSPADM

## 2019-04-24 RX ORDER — VANCOMYCIN/0.9 % SOD CHLORIDE 1.5G/250ML
1500 PLASTIC BAG, INJECTION (ML) INTRAVENOUS ONCE
Status: COMPLETED | OUTPATIENT
Start: 2019-04-24 | End: 2019-04-24

## 2019-04-24 RX ORDER — SODIUM CHLORIDE 9 MG/ML
125 INJECTION, SOLUTION INTRAVENOUS CONTINUOUS
Status: DISCONTINUED | OUTPATIENT
Start: 2019-04-24 | End: 2019-04-30

## 2019-04-24 RX ORDER — LISINOPRIL 5 MG/1
2.5 TABLET ORAL DAILY
Status: DISCONTINUED | OUTPATIENT
Start: 2019-04-25 | End: 2019-04-25

## 2019-04-24 RX ORDER — SODIUM CHLORIDE 9 MG/ML
200 INJECTION, SOLUTION INTRAVENOUS CONTINUOUS
Status: DISCONTINUED | OUTPATIENT
Start: 2019-04-24 | End: 2019-04-25

## 2019-04-24 RX ORDER — SERTRALINE HYDROCHLORIDE 50 MG/1
50 TABLET, FILM COATED ORAL DAILY
Status: DISCONTINUED | OUTPATIENT
Start: 2019-04-25 | End: 2019-05-02 | Stop reason: HOSPADM

## 2019-04-24 RX ORDER — ACETAMINOPHEN 325 MG/1
650 TABLET ORAL
Status: DISCONTINUED | OUTPATIENT
Start: 2019-04-24 | End: 2019-05-02 | Stop reason: HOSPADM

## 2019-04-24 RX ORDER — SODIUM CHLORIDE 0.9 % (FLUSH) 0.9 %
5-40 SYRINGE (ML) INJECTION EVERY 8 HOURS
Status: DISCONTINUED | OUTPATIENT
Start: 2019-04-24 | End: 2019-05-02 | Stop reason: HOSPADM

## 2019-04-24 RX ORDER — VANCOMYCIN/0.9 % SOD CHLORIDE 1.5G/250ML
1500 PLASTIC BAG, INJECTION (ML) INTRAVENOUS
Status: DISCONTINUED | OUTPATIENT
Start: 2019-04-25 | End: 2019-04-25

## 2019-04-24 RX ORDER — METRONIDAZOLE 500 MG/100ML
500 INJECTION, SOLUTION INTRAVENOUS EVERY 8 HOURS
Status: DISCONTINUED | OUTPATIENT
Start: 2019-04-24 | End: 2019-04-25

## 2019-04-24 RX ORDER — ACETAMINOPHEN 500 MG
1000 TABLET ORAL
Status: COMPLETED | OUTPATIENT
Start: 2019-04-24 | End: 2019-04-24

## 2019-04-24 RX ORDER — ADHESIVE BANDAGE
30 BANDAGE TOPICAL DAILY PRN
Status: DISCONTINUED | OUTPATIENT
Start: 2019-04-24 | End: 2019-05-02 | Stop reason: HOSPADM

## 2019-04-24 RX ORDER — TRAMADOL HYDROCHLORIDE 50 MG/1
100 TABLET ORAL
Status: DISCONTINUED | OUTPATIENT
Start: 2019-04-24 | End: 2019-04-25

## 2019-04-24 RX ADMIN — ACETAMINOPHEN 1000 MG: 500 TABLET, FILM COATED ORAL at 18:31

## 2019-04-24 RX ADMIN — VANCOMYCIN HYDROCHLORIDE 1500 MG: 10 INJECTION, POWDER, LYOPHILIZED, FOR SOLUTION INTRAVENOUS at 21:21

## 2019-04-24 RX ADMIN — TRAMADOL HYDROCHLORIDE 100 MG: 50 TABLET, FILM COATED ORAL at 21:43

## 2019-04-24 RX ADMIN — GABAPENTIN 300 MG: 300 CAPSULE ORAL at 23:35

## 2019-04-24 RX ADMIN — SODIUM CHLORIDE 200 ML/HR: 900 INJECTION, SOLUTION INTRAVENOUS at 20:11

## 2019-04-24 RX ADMIN — SODIUM CHLORIDE 125 ML/HR: 900 INJECTION, SOLUTION INTRAVENOUS at 22:57

## 2019-04-24 RX ADMIN — Medication 10 ML: at 22:58

## 2019-04-24 RX ADMIN — METRONIDAZOLE 500 MG: 500 INJECTION, SOLUTION INTRAVENOUS at 20:07

## 2019-04-24 NOTE — ED PROVIDER NOTES
60-year-old female hasn't since then diabetes. Also history of anemia as well. She is had some pain on the dorsum of the right foot with some swelling over the last 2 days. She's had fever since last night. Saw her doctor today who noted fever and a blood pressure of 80 systolic and she was sent here by ambulance. [place] to have infection or foot. Fever chills. She denies any cough or dysuria. No abdominal pain or vomiting. She has had some diarrhea. Was recently hospitalized for anemia. Does not note any injuries. The history is provided by the patient. Foot Pain This is a new problem. The current episode started 2 days ago. The problem occurs constantly. The problem has been gradually worsening. The pain is present in the right foot. The pain is moderate. Associated symptoms include limited range of motion. Pertinent negatives include no numbness, no back pain and no neck pain. She has tried nothing for the symptoms. There has been no history of extremity trauma. Past Medical History:  
Diagnosis Date  Diabetes ([facility] [address]  Hypertension Past Surgical History:  
Procedure Laterality Date  COLONOSCOPY N/A 3/2/2019 COLONOSCOPY ROOM 615 performed by Anika Vicente MD at [facility] ENDOSCOPY Family History:  
Problem Relation Age of Onset  Hypertension Mother Torres Other Mother PVD  Diabetes Mother  Parkinson's Disease Mother  Diabetes Father Social History Socioeconomic History  Marital status:  Spouse name: Not on file  Number of children: Not on file  Years of education: Not on file  Highest education level: Not on file Occupational History  Not on file Social Needs  Financial resource strain: Not on file  Food insecurity:  
  Worry: Not on file Inability: Not on file  Transportation needs:  
  Medical: Not on file Non-medical: Not on file Tobacco Use  Smoking status: Never Smoker  Smokeless tobacco: Never Used Substance and Sexual Activity  Alcohol use: No  
 Drug use: Not on file  Sexual activity: Not on file Lifestyle  Physical activity:  
  Days per week: Not on file Minutes per session: Not on file  Stress: Not on file Relationships  Social connections:  
  Talks on phone: Not on file Gets together: Not on file Attends Alevism service: Not on file Active member of club or organization: Not on file Attends meetings of clubs or organizations: Not on file Relationship status: Not on file  Intimate partner violence:  
  Fear of current or ex partner: Not on file Emotionally abused: Not on file Physically abused: Not on file Forced sexual activity: Not on file Other Topics Concern  Not on file Social History Narrative  Not on file ALLERGIES: Pcn [penicillins] Review of Systems Constitutional: Positive for chills and fever. HENT: Negative for ear pain. Respiratory: Negative for cough and shortness of breath. Cardiovascular: Negative for chest pain and palpitations. Gastrointestinal: Positive for diarrhea. Negative for abdominal pain, nausea and vomiting. Genitourinary: Negative for dysuria and flank pain. Musculoskeletal: Negative for back pain and neck pain. Skin: Positive for color change and rash. Neurological: Positive for headaches. Negative for numbness. All other systems reviewed and are negative. Vitals:  
 04/24/19 1820 BP: 113/51 Pulse: (!) 102 Resp: 18 Temp: (!) 102.6 °F (39.2 °C) SpO2: 98% Physical Exam  
Constitutional: She is oriented to person, place, and time. She appears well-developed and well-nourished. No distress. HENT:  
Head: Normocephalic and atraumatic. Right Ear: External ear normal.  
Left Ear: External ear normal.  
Mouth/Throat: Oropharynx is clear and moist. No oropharyngeal exudate. Eyes: Pupils are equal, round, and reactive to light. Conjunctivae and EOM are normal.  
Neck: Normal range of motion. Neck supple. Cardiovascular: Normal rate, regular rhythm and intact distal pulses. No murmur heard. Pulmonary/Chest: Breath sounds normal. No respiratory distress. Abdominal: Soft. Bowel sounds are normal. She exhibits no mass. There is no tenderness. There is no rebound and no guarding. No hernia. Neurological: She is alert and oriented to person, place, and time. Gait normal.  
Nl speech Skin: Skin is warm and dry. Psychiatric: She has a normal mood and affect. Her speech is normal.  
Nursing note and vitals reviewed. MDM Number of Diagnoses or Management Options Diagnosis management comments: Diabetic foot with suspicion of sepsis. Cultures x-ray IV antibiotics and admission. Amount and/or Complexity of Data Reviewed Clinical lab tests: ordered and reviewed Tests in the radiology section of CPT®: ordered and reviewed Review and summarize past medical records: yes (Recent admission for anemia. 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 Patient had transfusion.) Discuss the patient with other providers: yes Risk of Complications, Morbidity, and/or Mortality Presenting problems: moderate Diagnostic procedures: minimal 
Management options: low Patient Progress Patient progress: stable Procedures Results Include: 
 
Recent Results (from the past 24 hour(s)) AMB POC HEMOGLOBIN (HGB) Collection Time: 04/24/19  4:11 PM  
Result Value Ref Range Hemoglobin (POC) 8.6 AMB POC GLUCOSE, QUANTITATIVE, BLOOD Collection Time: 04/24/19  4:35 PM  
Result Value Ref Range Glucose  mg/dL CBC WITH AUTOMATED DIFF Collection Time: 04/24/19  6:29 PM  
Result Value Ref Range WBC 8.4 4.3 - 11.1 K/uL  
 RBC 3.15 (L) 4.05 - 5.2 M/uL HGB 8.2 (L) 11.7 - 15.4 g/dL HCT 24.7 (L) 35.8 - 46.3 %  MCV 78.4 (L) 79.6 - 97.8 FL  
 MCH 26.0 (L) 26.1 - 32.9 PG  
 MCHC 33.2 31.4 - 35.0 g/dL  
 RDW 18.5 (H) 11.9 - 14.6 % PLATELET 70 (L) 843 - 450 K/uL MPV 10.4 9.4 - 12.3 FL ABSOLUTE NRBC 0.00 0.0 - 0.2 K/uL DF PENDING   
POC LACTIC ACID Collection Time: 04/24/19  6:32 PM  
Result Value Ref Range Lactic Acid (POC) 1.32 0.5 - 1.9 mmol/L Xr Foot Rt Min 3 V Result Date: 4/24/2019 Right foot series HISTORY: Pain and swelling along dorsum of foot x2 days. Redness. 3 views of the right foot were obtained. No prior studies are available for comparison. FINDINGS: There is diffuse soft tissue swelling along the dorsum of the foot. There is a moderate plantar calcaneal spur. There is no evidence of acute fracture or dislocation. There is no bony destruction. IMPRESSION: 1. Soft tissue swelling. 2. Calcaneal spur. Patient received fluids and blood pressures more toward her norm. Antibiotics ordered. We'll discuss with hospice regarding admission.

## 2019-04-24 NOTE — ED TRIAGE NOTES
Pt arrives per EMS from [facility] for complaints of wound to left foot x 2 days. Foot swollen on top and medial side. Temp 102.3 and . Given 1000ml NS. First set of cultures drawn. Given 1gm IM Rocephin at MD office.

## 2019-04-25 PROBLEM — A41.9 SEPSIS (HCC): Status: ACTIVE | Noted: 2019-04-25

## 2019-04-25 LAB
ANION GAP SERPL CALC-SCNC: 9 MMOL/L (ref 7–16)
APPEARANCE UR: ABNORMAL
ATRIAL RATE: 84 BPM
BACTERIA URNS QL MICRO: ABNORMAL /HPF
BASOPHILS # BLD: 0 K/UL (ref 0–0.2)
BASOPHILS NFR BLD: 0 % (ref 0–2)
BILIRUB UR QL: ABNORMAL
BUN SERPL-MCNC: 38 MG/DL (ref 8–23)
CALCIUM SERPL-MCNC: 7.6 MG/DL (ref 8.3–10.4)
CALCULATED P AXIS, ECG09: 70 DEGREES
CALCULATED R AXIS, ECG10: 36 DEGREES
CALCULATED T AXIS, ECG11: 70 DEGREES
CASTS URNS QL MICRO: ABNORMAL /LPF
CHLORIDE SERPL-SCNC: 107 MMOL/L (ref 98–107)
CO2 SERPL-SCNC: 21 MMOL/L (ref 21–32)
COLOR UR: ABNORMAL
CREAT SERPL-MCNC: 1 MG/DL (ref 0.6–1)
DIAGNOSIS, 93000: NORMAL
DIFFERENTIAL METHOD BLD: ABNORMAL
EOSINOPHIL # BLD: 0 K/UL (ref 0–0.8)
EOSINOPHIL NFR BLD: 1 % (ref 0.5–7.8)
EPI CELLS #/AREA URNS HPF: ABNORMAL /HPF
ERYTHROCYTE [DISTWIDTH] IN BLOOD BY AUTOMATED COUNT: 18.5 % (ref 11.9–14.6)
GLUCOSE BLD STRIP.AUTO-MCNC: 137 MG/DL (ref 65–100)
GLUCOSE BLD STRIP.AUTO-MCNC: 137 MG/DL (ref 65–100)
GLUCOSE BLD STRIP.AUTO-MCNC: 158 MG/DL (ref 65–100)
GLUCOSE BLD STRIP.AUTO-MCNC: 212 MG/DL (ref 65–100)
GLUCOSE SERPL-MCNC: 134 MG/DL (ref 65–100)
GLUCOSE UR STRIP.AUTO-MCNC: NEGATIVE MG/DL
HCT VFR BLD AUTO: 23.8 % (ref 35.8–46.3)
HGB BLD-MCNC: 7.8 G/DL (ref 11.7–15.4)
HGB UR QL STRIP: NEGATIVE
IMM GRANULOCYTES # BLD AUTO: 0.2 K/UL (ref 0–0.5)
IMM GRANULOCYTES NFR BLD AUTO: 3 % (ref 0–5)
KETONES UR QL STRIP.AUTO: NEGATIVE MG/DL
LEUKOCYTE ESTERASE UR QL STRIP.AUTO: ABNORMAL
LYMPHOCYTES # BLD: 0.4 K/UL (ref 0.5–4.6)
LYMPHOCYTES NFR BLD: 7 % (ref 13–44)
MAGNESIUM SERPL-MCNC: 1.6 MG/DL (ref 1.8–2.4)
MCH RBC QN AUTO: 26.2 PG (ref 26.1–32.9)
MCHC RBC AUTO-ENTMCNC: 32.8 G/DL (ref 31.4–35)
MCV RBC AUTO: 79.9 FL (ref 79.6–97.8)
MONOCYTES # BLD: 0.8 K/UL (ref 0.1–1.3)
MONOCYTES NFR BLD: 13 % (ref 4–12)
NEUTS SEG # BLD: 4.9 K/UL (ref 1.7–8.2)
NEUTS SEG NFR BLD: 77 % (ref 43–78)
NITRITE UR QL STRIP.AUTO: NEGATIVE
NRBC # BLD: 0 K/UL (ref 0–0.2)
P-R INTERVAL, ECG05: 158 MS
PH UR STRIP: 5.5 [PH] (ref 5–9)
PLATELET # BLD AUTO: 70 K/UL (ref 150–450)
PMV BLD AUTO: 11 FL (ref 9.4–12.3)
POTASSIUM SERPL-SCNC: 3 MMOL/L (ref 3.5–5.1)
PROT UR STRIP-MCNC: 30 MG/DL
Q-T INTERVAL, ECG07: 384 MS
QRS DURATION, ECG06: 86 MS
QTC CALCULATION (BEZET), ECG08: 453 MS
RBC # BLD AUTO: 2.98 M/UL (ref 4.05–5.2)
RBC #/AREA URNS HPF: ABNORMAL /HPF
SODIUM SERPL-SCNC: 137 MMOL/L (ref 136–145)
SP GR UR REFRACTOMETRY: 1.02 (ref 1–1.02)
UROBILINOGEN UR QL STRIP.AUTO: 0.2 EU/DL (ref 0.2–1)
VENTRICULAR RATE, ECG03: 84 BPM
WBC # BLD AUTO: 6.4 K/UL (ref 4.3–11.1)
WBC URNS QL MICRO: >100 /HPF

## 2019-04-25 PROCEDURE — 74011250636 HC RX REV CODE- 250/636: Performed by: FAMILY MEDICINE

## 2019-04-25 PROCEDURE — 83735 ASSAY OF MAGNESIUM: CPT

## 2019-04-25 PROCEDURE — 65270000029 HC RM PRIVATE

## 2019-04-25 PROCEDURE — 81001 URINALYSIS AUTO W/SCOPE: CPT

## 2019-04-25 PROCEDURE — 74011636637 HC RX REV CODE- 636/637: Performed by: FAMILY MEDICINE

## 2019-04-25 PROCEDURE — 80048 BASIC METABOLIC PNL TOTAL CA: CPT

## 2019-04-25 PROCEDURE — 87086 URINE CULTURE/COLONY COUNT: CPT

## 2019-04-25 PROCEDURE — 74011250637 HC RX REV CODE- 250/637: Performed by: FAMILY MEDICINE

## 2019-04-25 PROCEDURE — 74011250636 HC RX REV CODE- 250/636: Performed by: EMERGENCY MEDICINE

## 2019-04-25 PROCEDURE — 82962 GLUCOSE BLOOD TEST: CPT

## 2019-04-25 PROCEDURE — 77030020263 HC SOL INJ SOD CL0.9% LFCR 1000ML

## 2019-04-25 PROCEDURE — 85025 COMPLETE CBC W/AUTO DIFF WBC: CPT

## 2019-04-25 PROCEDURE — 36415 COLL VENOUS BLD VENIPUNCTURE: CPT

## 2019-04-25 RX ORDER — TRAMADOL HYDROCHLORIDE 50 MG/1
50 TABLET ORAL
Status: DISCONTINUED | OUTPATIENT
Start: 2019-04-25 | End: 2019-04-29

## 2019-04-25 RX ORDER — POTASSIUM CHLORIDE 20 MEQ/1
40 TABLET, EXTENDED RELEASE ORAL ONCE
Status: COMPLETED | OUTPATIENT
Start: 2019-04-25 | End: 2019-04-25

## 2019-04-25 RX ORDER — METRONIDAZOLE 500 MG/100ML
500 INJECTION, SOLUTION INTRAVENOUS EVERY 12 HOURS
Status: DISCONTINUED | OUTPATIENT
Start: 2019-04-25 | End: 2019-04-26

## 2019-04-25 RX ORDER — MAGNESIUM SULFATE HEPTAHYDRATE 40 MG/ML
2 INJECTION, SOLUTION INTRAVENOUS ONCE
Status: COMPLETED | OUTPATIENT
Start: 2019-04-25 | End: 2019-04-25

## 2019-04-25 RX ORDER — VANCOMYCIN/0.9 % SOD CHLORIDE 1.5G/250ML
1500 PLASTIC BAG, INJECTION (ML) INTRAVENOUS EVERY 24 HOURS
Status: DISCONTINUED | OUTPATIENT
Start: 2019-04-25 | End: 2019-04-26

## 2019-04-25 RX ADMIN — Medication 10 ML: at 16:47

## 2019-04-25 RX ADMIN — ATORVASTATIN CALCIUM 10 MG: 10 TABLET, FILM COATED ORAL at 07:58

## 2019-04-25 RX ADMIN — TRAMADOL HYDROCHLORIDE 100 MG: 50 TABLET, FILM COATED ORAL at 04:58

## 2019-04-25 RX ADMIN — Medication 10 ML: at 05:18

## 2019-04-25 RX ADMIN — MAGNESIUM SULFATE HEPTAHYDRATE 2 G: 40 INJECTION, SOLUTION INTRAVENOUS at 11:48

## 2019-04-25 RX ADMIN — SODIUM CHLORIDE 1000 ML: 900 INJECTION, SOLUTION INTRAVENOUS at 06:00

## 2019-04-25 RX ADMIN — HUMAN INSULIN 6 UNITS: 100 INJECTION, SOLUTION SUBCUTANEOUS at 11:51

## 2019-04-25 RX ADMIN — HEPARIN SODIUM 5000 UNITS: 5000 INJECTION INTRAVENOUS; SUBCUTANEOUS at 16:46

## 2019-04-25 RX ADMIN — SODIUM CHLORIDE 125 ML/HR: 900 INJECTION, SOLUTION INTRAVENOUS at 21:37

## 2019-04-25 RX ADMIN — VANCOMYCIN HYDROCHLORIDE 1500 MG: 10 INJECTION, POWDER, LYOPHILIZED, FOR SOLUTION INTRAVENOUS at 21:38

## 2019-04-25 RX ADMIN — ONDANSETRON 4 MG: 2 INJECTION INTRAMUSCULAR; INTRAVENOUS at 04:06

## 2019-04-25 RX ADMIN — METRONIDAZOLE 500 MG: 500 INJECTION, SOLUTION INTRAVENOUS at 03:45

## 2019-04-25 RX ADMIN — SODIUM CHLORIDE 500 ML: 900 INJECTION, SOLUTION INTRAVENOUS at 09:15

## 2019-04-25 RX ADMIN — TRAMADOL HYDROCHLORIDE 50 MG: 50 TABLET, FILM COATED ORAL at 16:57

## 2019-04-25 RX ADMIN — GABAPENTIN 300 MG: 300 CAPSULE ORAL at 21:44

## 2019-04-25 RX ADMIN — LEVOTHYROXINE SODIUM 125 MCG: 75 TABLET ORAL at 05:18

## 2019-04-25 RX ADMIN — ONDANSETRON 4 MG: 2 INJECTION INTRAMUSCULAR; INTRAVENOUS at 11:45

## 2019-04-25 RX ADMIN — Medication: at 17:34

## 2019-04-25 RX ADMIN — SERTRALINE HYDROCHLORIDE 50 MG: 50 TABLET ORAL at 07:57

## 2019-04-25 RX ADMIN — METRONIDAZOLE 500 MG: 500 INJECTION, SOLUTION INTRAVENOUS at 16:43

## 2019-04-25 RX ADMIN — Medication 10 ML: at 22:23

## 2019-04-25 RX ADMIN — HUMAN INSULIN 3 UNITS: 100 INJECTION, SOLUTION SUBCUTANEOUS at 22:00

## 2019-04-25 RX ADMIN — PANTOPRAZOLE SODIUM 40 MG: 40 TABLET, DELAYED RELEASE ORAL at 07:58

## 2019-04-25 RX ADMIN — POTASSIUM CHLORIDE 40 MEQ: 20 TABLET, EXTENDED RELEASE ORAL at 11:47

## 2019-04-25 RX ADMIN — INSULIN GLARGINE 40 UNITS: 100 INJECTION, SOLUTION SUBCUTANEOUS at 08:02

## 2019-04-25 NOTE — PROGRESS NOTES
Hospitalist Progress Note 2019 Admit Date: 2019  6:20 PM  
NAME: Gifty Arellano :  1955 MRN:  [id number] Attending: Jesse Arzola MD 
PCP:  Sandeep Connelly MD 
 
SUBJECTIVE:  
Patient is a 67yo F with hx DM, recent diagnosis cirrhosis, anemia with ablation of AVM on colonoscopy who presented with redness, swelling pain on dorsum of R foot. Admitted for diabetic foot infection. 4/15: foot feeling much better, still with significant redness. BP has been low, not very responsive to fluid resuscitation so far. Not feeling dizzy or faint. No further fevers. No n/v, good appetite. Review of Systems negative with exception of pertinent positives noted above PHYSICAL EXAM  
 
Visit Vitals BP (!) 87/51 (BP 1 Location: Left arm, BP Patient Position: At rest) Pulse 81 Temp 98 °F (36.7 °C) Resp 18 Ht 5' 7\" (1.702 m) Wt 78 kg (172 lb) SpO2 93% BMI 26.94 kg/m² Temp (24hrs), Av.9 °F (37.7 °C), Min:98 °F (36.7 °C), Max:102.6 °F (39.2 °C) Oxygen Therapy O2 Sat (%): 93 % (19 0705) Pulse via Oximetry: 81 beats per minute (19 2141) O2 Device: Room air (19 2135) Intake/Output Summary (Last 24 hours) at 2019 8202 Last data filed at 2019 0400 Gross per 24 hour Intake  Output 300 ml Net -300 ml General: No acute distress Lungs:  CTA Bilaterally. Heart:  Regular rate and rhythm,  +murmur Abdomen: Soft, Non distended, Non tender, Positive bowel sounds Extremities: No cyanosis, clubbing or edema Neurologic:  No focal deficits Skin:   ecchymosis and erythema over dorsum R foot, no swelling, no open wound XR FOOT RT MIN 3 V Final Result IMPRESSION:  
1. Soft tissue swelling. 2. Calcaneal spur. ASSESSMENT Active Hospital Problems Diagnosis Date Noted  Cellulitis 2019  Type 2 diabetes mellitus with diabetic neuropathy ([facility] [address] 2019  Cirrhosis of liver without ascites ([address] 03/07/2019  Microcytic anemia 02/27/2019  Type 2 diabetes with nephropathy ([address] 01/16/2019  Acquired hypothyroidism 01/12/2016  Essential hypertension 01/12/2016  Hyperlipidemia 01/12/2016  Uncontrolled type II diabetes mellitus ([address] 01/12/2016 Plan: 
 
Sepsis, 2/2 cellulitis: 
Continue IV vanc/flagyl, local infectious signs improving. Still hypotensive, but tachycardia resolved. Not symptomatic from pressure. May have a baseline lower BP related to newly diagnosed liver disease. Continue fluids. BCx pending PCN allergy, but received rocephin prior to ER without reaction. Active Problems: 
  Microcytic anemia (2/27/2019) Stable, follow CBC 
  
  Essential hypertension (1/12/2016) BP now low, holding home lisinopril. 
  
  Uncontrolled type II diabetes mellitus ([address] (1/12/2016) Stable. Continue home meds. 
  
  Type 2 diabetes with nephropathy ([address] (1/16/2019) Stable. Continue home meds. 
  
  Cirrhosis of liver without ascites ([address] (3/7/2019) Stable. 
  
  Type 2 diabetes mellitus with diabetic neuropathy ([address] (4/24/2019) Stable. Continue home meds. 
  
  Hyperlipidemia (1/12/2016) Stable. Continue home meds. 
  
  Acquired hypothyroidism (1/12/2016) Stable. Continue home meds. DVT Prophylaxis: HSQ Dispo: continue inpatient management, home when clinically improving Signed By: Liza Graham MD   
 April 25, 2019

## 2019-04-25 NOTE — PROGRESS NOTES
04/24/19 2238 Dual Skin Pressure Injury Assessment Dual Skin Pressure Injury Assessment X Second Care Provider (Based on [address] [place] [address]) [facility] Foot Right 
(Cellulitis. Foot is red and purple in color and hot) Gluteal Cleft (Left thigh small scabbed wound) Dual skin assessment completed with Vinnie, Pt has small scabbed wound to left thigh and a right foot wound that is red and purple in color and is hot to touch. Patient denies any pain at this time. Oriented to the room and the use of the call light with verbal and demonstrated feedback.

## 2019-04-25 NOTE — PROGRESS NOTES
Pt has no new complaints. Pt reports foot looks better. Pt able to eat some dinner after zofran administered. Call light within reach. Hourly rounds completed this shift.

## 2019-04-25 NOTE — PROGRESS NOTES
Problem: Falls - Risk of 
Goal: *Absence of Falls Description Document Nalini High Fall Risk and appropriate interventions in the flowsheet. 4/25/2019 1726 by Danita Clark Outcome: Progressing Towards Goal 
4/25/2019 1726 by Danita Clark Outcome: Progressing Towards Goal 
  
Problem: Patient Education: Go to Patient Education Activity Goal: Patient/Family Education 4/25/2019 1726 by Danita Clark Outcome: Progressing Towards Goal 
4/25/2019 1726 by Danita Clark Outcome: Progressing Towards Goal

## 2019-04-25 NOTE — PROGRESS NOTES
Hourly rounds done. Pt c/o pain & nausea, medicated per MAR. Denies vomiting. UA obtained, neema/hazy. Hypotensive 88/42, 1 L bolus. Total of 3 L since admission d/t hypotension. Heparin held this shift, platelet 70. All needs met at this time.

## 2019-04-25 NOTE — PROGRESS NOTES
TRANSFER - IN REPORT: 
 
Verbal report received from Amara RN(name) on Gopal Flores  being received from ER(unit) for routine progression of care Report consisted of patients Situation, Background, Assessment and  
Recommendations(SBAR). Information from the following report(s) SBAR was reviewed with the receiving nurse. Opportunity for questions and clarification was provided. Assessment completed upon patients arrival to unit and care assumed.

## 2019-04-25 NOTE — ED NOTES
Per Dr. Anya Cohen, the NS bag that is running at 200ml/hr is to hang to gravity for a bolus. Then continue the fluids at 200ml/hr as ordered

## 2019-04-25 NOTE — H&P
HOSPITALIST INITIAL HISTORY AND PHYSICAL 
NAME:  Juju Nova Age:  60 y.o. 
:   1955 MRN:   [id number] PCP: Edwardo Wallace MD 
Consulting MD: Treatment Team: Attending Provider: Michelle Jones MD; Primary Nurse: Rahul Dominguez RN 
 
CHIEF COMPLAINT: R foot pain HISTORY OF PRESENT ILLNESS:  
Juju Nova is a 60 y.o. female with a past medical history of DM type II, HTN who presents to the ER with complaint of progressively worsening R dorsal foot pain, redness, and swelling for hte past 2 days. She reports that the swelling started first about 3 days ago, then yesterday she noticed what appeared to be a dark purple bug bite on the dorsum of her foot. Since then, the area has become much larger, extending down nearly to her toes and almost to her ankle. She reports a fever and chills earlier today. Reports that PO tylenol helped some with the pain but that it is still present. Denies any nausea, vomiting. REVIEW OF SYSTEMS: Comprehensive ROS performed and negative except as stated in HPI. Past Medical History:  
Diagnosis Date  Diabetes ([address]  Hypertension Past Surgical History:  
Procedure Laterality Date  COLONOSCOPY N/A 3/2/2019 COLONOSCOPY ROOM 615 performed by Sugar Dickerson MD at [facility] ENDOSCOPY Prior to Admission Medications Prescriptions Last Dose Informant Patient Reported? Taking? Blood-Glucose Meter monitoring kit   No No  
Sig: Test FSBS 3x daily before meals. E11.65 Insulin Needles, Disposable, (ARIE PEN NEEDLE) 32 gauge x 5/32\" ndle   No No  
Sig: For use with insulin pens   E11.65 Uncontrolled type 2 DM Insulin Needles, Disposable, 31 gauge x 5/16\" ndle   No No  
Sig: by SubCUTAneous route daily. E11.65  
atorvastatin (LIPITOR) 10 mg tablet   No No  
Sig: Take 1 Tab by mouth daily. Patient taking differently: Take 20 mg by mouth daily. gabapentin (NEURONTIN) 300 mg capsule   No No  
Sig: Take 1 Cap by mouth nightly. glucose blood VI test strips (BLOOD GLUCOSE TEST) strip   No No  
Sig: Test FSBS 3x daily before meals. E11.65  
hydroCHLOROthiazide (HYDRODIURIL) 25 mg tablet   Yes No  
Sig: as needed. insulin aspart U-100 (NOVOLOG) 100 unit/mL inpn   No No  
Sig: Less than 150 =   0 units          
150 -199 =   2 units 200 -249 =   4 units 250 -299 =   6 units 300 -349 =   8 units Before meals and at night  
insulin glargine (LANTUS) 100 unit/mL injection   No No  
Si Units by SubCUTAneous route every morning. Patient taking differently: 50 Units by SubCUTAneous route every morning. lancets misc   No No  
Sig: Test FSBS 3x daily before meals. E11.65  
levothyroxine (SYNTHROID) 125 mcg tablet   No No  
Sig: Take 1 Tab by mouth Daily (before breakfast). lisinopril (PRINIVIL, ZESTRIL) 2.5 mg tablet   No No  
Sig: Take 1 Tab by mouth daily. Patient taking differently: Take 20 mg by mouth daily. pantoprazole (PROTONIX) 40 mg tablet   No No  
Sig: Take 1 Tab by mouth daily. sertraline (ZOLOFT) 50 mg tablet   No No  
Sig: Take 1 Tab by mouth daily. Facility-Administered Medications Last Administration Doses Remaining  
cefTRIAXone (ROCEPHIN) injection 1 g None recorded 1 Allergies Allergen Reactions  Pcn [Penicillins] Hives and Rash FAMILY HISTORY: Reviewed. Negative except Family History Problem Relation Age of Onset  Hypertension Mother Embo.Graven Other Mother PVD  Diabetes Mother  Parkinson's Disease Mother  Diabetes Father Social History Tobacco Use  Smoking status: Never Smoker  Smokeless tobacco: Never Used Substance Use Topics  Alcohol use: No  
 
 
 
Objective:  
 
Visit Vitals /51 (BP 1 Location: Left arm, BP Patient Position: At rest) Pulse (!) 102 Temp (!) 102.6 °F (39.2 °C) Resp 18 SpO2 98% Temp (24hrs), Av.4 °F (38.6 °C), Min:99.8 °F (37.7 °C), Max:102.6 °F (39.2 °C) Oxygen Therapy O2 Sat (%): 98 % (04/24/19 1820) O2 Device: Room air (04/24/19 1820) Physical Exam: 
General:    The patient is a pleasant female in no acute distress. Head:   Normocephalic/atraumatic. Eyes:  No palpebral pallor or scleral icterus. ENT:  External auricular and nasal exam within normal limits. Mucous membranes are moist. 
Neck:  Supple, non-tender, no JVD. Lungs:   Clear to auscultation bilaterally without wheezes or crackles. No respiratory distress or accessory muscle use. Heart:   Regular rate and rhythm, without murmurs, rubs, or gallops. Abdomen:   Soft, non-tender, non-distended with normoactive bowel sounds. Genitourinary: No tenderness over the bladder or bilateral CVAs. Extremities: Without clubbing, cyanosis, or edema. Skin:     Ecchymotic region over dorsal R foot measuring approx 7 cm in diameter with surrounding erythema. TTP throughout but not beyond erythematous border. No open wounds Pulses: Radial and dorsalis pedis pulses present 2+ bilaterally. Capillary refill <2s. Neurologic: CN II-XII grossly intact and symmetrical.  
  Moving all four extremities well with no focal deficits. Psychiatric: Pleasant demeanor, appropriate affect. Alert and oriented x 3 Data Review:  
Recent Results (from the past 24 hour(s)) AMB POC HEMOGLOBIN (HGB) Collection Time: 04/24/19  4:11 PM  
Result Value Ref Range Hemoglobin (POC) 8.6 AMB POC GLUCOSE, QUANTITATIVE, BLOOD Collection Time: 04/24/19  4:35 PM  
Result Value Ref Range Glucose  mg/dL CBC WITH AUTOMATED DIFF Collection Time: 04/24/19  6:29 PM  
Result Value Ref Range WBC 8.4 4.3 - 11.1 K/uL  
 RBC 3.15 (L) 4.05 - 5.2 M/uL HGB 8.2 (L) 11.7 - 15.4 g/dL HCT 24.7 (L) 35.8 - 46.3 % MCV 78.4 (L) 79.6 - 97.8 FL  
 MCH 26.0 (L) 26.1 - 32.9 PG  
 MCHC 33.2 31.4 - 35.0 g/dL  
 RDW 18.5 (H) 11.9 - 14.6 % PLATELET 70 (L) 428 - 450 K/uL  MPV 10.4 9.4 - 12.3 FL  
 ABSOLUTE NRBC 0.00 0.0 - 0.2 K/uL NEUTROPHILS 81 (H) 43 - 78 % LYMPHOCYTES 4 (L) 13 - 44 % MONOCYTES 10 4.0 - 12.0 % EOSINOPHILS 0 (L) 0.5 - 7.8 % BASOPHILS 0 0.0 - 2.0 % IMMATURE GRANULOCYTES 5 0.0 - 5.0 %  
 ABS. NEUTROPHILS 6.9 1.7 - 8.2 K/UL  
 ABS. LYMPHOCYTES 0.3 (L) 0.5 - 4.6 K/UL  
 ABS. MONOCYTES 0.8 0.1 - 1.3 K/UL  
 ABS. EOSINOPHILS 0.0 0.0 - 0.8 K/UL  
 ABS. BASOPHILS 0.0 0.0 - 0.2 K/UL  
 ABS. IMM. GRANS. 0.4 0.0 - 0.5 K/UL  
 RBC COMMENTS SLIGHT 
ANISOCYTOSIS 
    
 RBC COMMENTS SLIGHT 
HYPOCHROMIA 
    
 WBC COMMENTS Result Confirmed By Smear PLATELET COMMENTS MARKED    
 DF AUTOMATED METABOLIC PANEL, COMPREHENSIVE Collection Time: 04/24/19  6:29 PM  
Result Value Ref Range Sodium 137 136 - 145 mmol/L Potassium 3.2 (L) 3.5 - 5.1 mmol/L Chloride 104 98 - 107 mmol/L  
 CO2 22 21 - 32 mmol/L Anion gap 11 7 - 16 mmol/L Glucose 63 (L) 65 - 100 mg/dL BUN 37 (H) 8 - 23 MG/DL Creatinine 1.05 (H) 0.6 - 1.0 MG/DL  
 GFR est AA >60 >60 ml/min/1.73m2 GFR est non-AA 56 (L) >60 ml/min/1.73m2 Calcium 7.8 (L) 8.3 - 10.4 MG/DL Bilirubin, total 1.6 (H) 0.2 - 1.1 MG/DL  
 ALT (SGPT) 36 12 - 65 U/L  
 AST (SGOT) 37 15 - 37 U/L Alk. phosphatase 233 (H) 50 - 136 U/L Protein, total 6.3 6.3 - 8.2 g/dL Albumin 2.5 (L) 3.2 - 4.6 g/dL Globulin 3.8 (H) 2.3 - 3.5 g/dL A-G Ratio 0.7 (L) 1.2 - 3.5 POC LACTIC ACID Collection Time: 04/24/19  6:32 PM  
Result Value Ref Range Lactic Acid (POC) 1.32 0.5 - 1.9 mmol/L Imaging /Procedures /Studies: 
Xr Foot Rt Min 3 V Result Date: 4/24/2019 IMPRESSION: 1. Soft tissue swelling. 2. Calcaneal spur. Assessment and Plan:  
 
Principal Problem: 
  Cellulitis (4/24/2019) IV Vanc. Blood cultures pending. Active Problems: 
  Microcytic anemia (2/27/2019) Stable, follow CBC Essential hypertension (1/12/2016) Stable. Continue home meds. Uncontrolled type II diabetes mellitus ([facility] [address] (1/12/2016) Stable. Continue home meds. Type 2 diabetes with nephropathy ([facility] [address] (1/16/2019) Stable. Continue home meds. Cirrhosis of liver without ascites ([facility] [address] (3/7/2019) Stable. Type 2 diabetes mellitus with diabetic neuropathy ([facility] [address] (4/24/2019) Stable. Continue home meds. Hyperlipidemia (1/12/2016) Stable. Continue home meds. Acquired hypothyroidism (1/12/2016) Stable. Continue home meds. DVT Prophylaxis: Lovenox Code Status: FULL CODE Disposition: Admit to med/surg for evaluation and treatment as per above. Anticipated discharge: 2-3 days Signed By: Juan Manuel Zheng MD   
 April 24, 2019

## 2019-04-25 NOTE — PROGRESS NOTES
Problem: Falls - Risk of 
Goal: *Absence of Falls Description Document Yeceniarosario Popw Fall Risk and appropriate interventions in the flowsheet.  
Outcome: Progressing Towards Goal

## 2019-04-25 NOTE — ED NOTES
TRANSFER - OUT REPORT: 
 
Verbal report given to Shahram(name) on Delmy Kauffman  being transferred to [facility](unit) for routine progression of care Report consisted of patients Situation, Background, Assessment and  
Recommendations(SBAR). Information from the following report(s) SBAR was reviewed with the receiving nurse. Lines:  
Peripheral IV 04/24/19 Right Forearm (Active) Site Assessment Clean, dry, & intact 4/24/2019  9:36 PM  
Phlebitis Assessment 0 4/24/2019  9:36 PM  
Infiltration Assessment 0 4/24/2019  9:36 PM  
Dressing Status Clean, dry, & intact 4/24/2019  9:36 PM  
   
Peripheral IV 04/24/19 Right Arm (Active) Site Assessment Clean, dry, & intact 4/24/2019  9:36 PM  
Phlebitis Assessment 0 4/24/2019  9:36 PM  
Infiltration Assessment 0 4/24/2019  9:36 PM  
Dressing Status Clean, dry, & intact 4/24/2019  9:36 PM  
  
 
Opportunity for questions and clarification was provided. Patient transported with: 
 [employer]

## 2019-04-25 NOTE — PROGRESS NOTES
Initial visit by  to convey care and concern and encourage patient that  services are available if desired. No needs were voiced during the visit. Provided 's business card for future reference. Chaplains remain available for support. Marisela Marshall MDiv Board Certified [address] [place] [address]

## 2019-04-25 NOTE — PROGRESS NOTES
Pt requests 50 mg dose of tramadol instead of 100mg. Pt reports nausea this morning and is concerned that painmedication may be a factor. Hospitalist made aware. Tramadol dose adjusted to 50mg PO.

## 2019-04-26 LAB
ANION GAP SERPL CALC-SCNC: 9 MMOL/L (ref 7–16)
BASOPHILS # BLD: 0 K/UL (ref 0–0.2)
BASOPHILS NFR BLD: 0 % (ref 0–2)
BUN SERPL-MCNC: 31 MG/DL (ref 8–23)
CALCIUM SERPL-MCNC: 7.2 MG/DL (ref 8.3–10.4)
CHLORIDE SERPL-SCNC: 108 MMOL/L (ref 98–107)
CO2 SERPL-SCNC: 19 MMOL/L (ref 21–32)
CREAT SERPL-MCNC: 0.81 MG/DL (ref 0.6–1)
DIFFERENTIAL METHOD BLD: ABNORMAL
EOSINOPHIL # BLD: 0.1 K/UL (ref 0–0.8)
EOSINOPHIL NFR BLD: 1 % (ref 0.5–7.8)
ERYTHROCYTE [DISTWIDTH] IN BLOOD BY AUTOMATED COUNT: 18.3 % (ref 11.9–14.6)
GLUCOSE BLD STRIP.AUTO-MCNC: 150 MG/DL (ref 65–100)
GLUCOSE BLD STRIP.AUTO-MCNC: 173 MG/DL (ref 65–100)
GLUCOSE BLD STRIP.AUTO-MCNC: 188 MG/DL (ref 65–100)
GLUCOSE BLD STRIP.AUTO-MCNC: 81 MG/DL (ref 65–100)
GLUCOSE SERPL-MCNC: 89 MG/DL (ref 65–100)
HCT VFR BLD AUTO: 22 % (ref 35.8–46.3)
HGB BLD-MCNC: 7.2 G/DL (ref 11.7–15.4)
IMM GRANULOCYTES # BLD AUTO: 0.1 K/UL (ref 0–0.5)
IMM GRANULOCYTES NFR BLD AUTO: 1 % (ref 0–5)
INR PPP: 1.5
LYMPHOCYTES # BLD: 0.6 K/UL (ref 0.5–4.6)
LYMPHOCYTES NFR BLD: 9 % (ref 13–44)
MCH RBC QN AUTO: 26.1 PG (ref 26.1–32.9)
MCHC RBC AUTO-ENTMCNC: 32.7 G/DL (ref 31.4–35)
MCV RBC AUTO: 79.7 FL (ref 79.6–97.8)
MONOCYTES # BLD: 0.7 K/UL (ref 0.1–1.3)
MONOCYTES NFR BLD: 11 % (ref 4–12)
NEUTS SEG # BLD: 5 K/UL (ref 1.7–8.2)
NEUTS SEG NFR BLD: 78 % (ref 43–78)
NRBC # BLD: 0 K/UL (ref 0–0.2)
PLATELET # BLD AUTO: 73 K/UL (ref 150–450)
PMV BLD AUTO: 10.5 FL (ref 9.4–12.3)
POTASSIUM SERPL-SCNC: 3.2 MMOL/L (ref 3.5–5.1)
PROTHROMBIN TIME: 17.5 SEC (ref 11.7–14.5)
RBC # BLD AUTO: 2.76 M/UL (ref 4.05–5.2)
SODIUM SERPL-SCNC: 136 MMOL/L (ref 136–145)
WBC # BLD AUTO: 6.4 K/UL (ref 4.3–11.1)

## 2019-04-26 PROCEDURE — 74011250637 HC RX REV CODE- 250/637: Performed by: FAMILY MEDICINE

## 2019-04-26 PROCEDURE — 36415 COLL VENOUS BLD VENIPUNCTURE: CPT

## 2019-04-26 PROCEDURE — 65270000029 HC RM PRIVATE

## 2019-04-26 PROCEDURE — 85025 COMPLETE CBC W/AUTO DIFF WBC: CPT

## 2019-04-26 PROCEDURE — 80048 BASIC METABOLIC PNL TOTAL CA: CPT

## 2019-04-26 PROCEDURE — 74011250636 HC RX REV CODE- 250/636: Performed by: FAMILY MEDICINE

## 2019-04-26 PROCEDURE — 85610 PROTHROMBIN TIME: CPT

## 2019-04-26 PROCEDURE — 82962 GLUCOSE BLOOD TEST: CPT

## 2019-04-26 PROCEDURE — 77030020263 HC SOL INJ SOD CL0.9% LFCR 1000ML

## 2019-04-26 PROCEDURE — 74011636637 HC RX REV CODE- 636/637: Performed by: FAMILY MEDICINE

## 2019-04-26 PROCEDURE — 74011250637 HC RX REV CODE- 250/637: Performed by: INTERNAL MEDICINE

## 2019-04-26 RX ORDER — POTASSIUM CHLORIDE 20 MEQ/1
40 TABLET, EXTENDED RELEASE ORAL
Status: COMPLETED | OUTPATIENT
Start: 2019-04-26 | End: 2019-04-26

## 2019-04-26 RX ORDER — CEPHALEXIN 500 MG/1
500 CAPSULE ORAL EVERY 6 HOURS
Status: COMPLETED | OUTPATIENT
Start: 2019-04-26 | End: 2019-05-01

## 2019-04-26 RX ORDER — IBUPROFEN 600 MG/1
600 TABLET ORAL
Status: DISCONTINUED | OUTPATIENT
Start: 2019-04-26 | End: 2019-05-02 | Stop reason: HOSPADM

## 2019-04-26 RX ORDER — DOXYCYCLINE 100 MG/1
100 CAPSULE ORAL EVERY 12 HOURS
Status: COMPLETED | OUTPATIENT
Start: 2019-04-26 | End: 2019-04-30

## 2019-04-26 RX ADMIN — SODIUM CHLORIDE 125 ML/HR: 900 INJECTION, SOLUTION INTRAVENOUS at 23:50

## 2019-04-26 RX ADMIN — HUMAN INSULIN 3 UNITS: 100 INJECTION, SOLUTION SUBCUTANEOUS at 17:16

## 2019-04-26 RX ADMIN — IBUPROFEN 600 MG: 600 TABLET ORAL at 17:26

## 2019-04-26 RX ADMIN — DOXYCYCLINE HYCLATE 100 MG: 100 CAPSULE ORAL at 21:44

## 2019-04-26 RX ADMIN — Medication 10 ML: at 16:49

## 2019-04-26 RX ADMIN — TRAMADOL HYDROCHLORIDE 50 MG: 50 TABLET, FILM COATED ORAL at 04:14

## 2019-04-26 RX ADMIN — PANTOPRAZOLE SODIUM 40 MG: 40 TABLET, DELAYED RELEASE ORAL at 08:32

## 2019-04-26 RX ADMIN — IBUPROFEN 600 MG: 600 TABLET ORAL at 08:37

## 2019-04-26 RX ADMIN — INSULIN GLARGINE 40 UNITS: 100 INJECTION, SOLUTION SUBCUTANEOUS at 08:33

## 2019-04-26 RX ADMIN — DOXYCYCLINE HYCLATE 100 MG: 100 CAPSULE ORAL at 08:37

## 2019-04-26 RX ADMIN — LEVOTHYROXINE SODIUM 125 MCG: 75 TABLET ORAL at 06:14

## 2019-04-26 RX ADMIN — Medication 10 ML: at 21:46

## 2019-04-26 RX ADMIN — CEPHALEXIN 500 MG: 500 CAPSULE ORAL at 11:49

## 2019-04-26 RX ADMIN — SERTRALINE HYDROCHLORIDE 50 MG: 50 TABLET ORAL at 08:32

## 2019-04-26 RX ADMIN — TRAMADOL HYDROCHLORIDE 50 MG: 50 TABLET, FILM COATED ORAL at 11:49

## 2019-04-26 RX ADMIN — TRAMADOL HYDROCHLORIDE 50 MG: 50 TABLET, FILM COATED ORAL at 17:17

## 2019-04-26 RX ADMIN — HUMAN INSULIN 3 UNITS: 100 INJECTION, SOLUTION SUBCUTANEOUS at 11:52

## 2019-04-26 RX ADMIN — GABAPENTIN 300 MG: 300 CAPSULE ORAL at 21:44

## 2019-04-26 RX ADMIN — CEPHALEXIN 500 MG: 500 CAPSULE ORAL at 17:16

## 2019-04-26 RX ADMIN — Medication 10 ML: at 06:14

## 2019-04-26 RX ADMIN — METRONIDAZOLE 500 MG: 500 INJECTION, SOLUTION INTRAVENOUS at 04:10

## 2019-04-26 RX ADMIN — CEPHALEXIN 500 MG: 500 CAPSULE ORAL at 23:39

## 2019-04-26 RX ADMIN — ATORVASTATIN CALCIUM 10 MG: 10 TABLET, FILM COATED ORAL at 08:32

## 2019-04-26 RX ADMIN — IBUPROFEN 600 MG: 600 TABLET ORAL at 23:39

## 2019-04-26 RX ADMIN — POTASSIUM CHLORIDE 40 MEQ: 20 TABLET, EXTENDED RELEASE ORAL at 08:32

## 2019-04-26 RX ADMIN — HUMAN INSULIN 3 UNITS: 100 INJECTION, SOLUTION SUBCUTANEOUS at 22:00

## 2019-04-26 NOTE — PROGRESS NOTES
Hospitalist Progress Note Admit Date:  2019  6:20 PM  
Name:  Catrina Carrera Age:  60 y.o. 
:  1955 MRN:  [id number] PCP:  Jose Blanton MD 
Treatment Team: Attending Provider: Gaby Sargent MD; Care Manager: Ellen Jefferson RN; Charge Nurse: Elena Donovan RN 
 
HPI/Subjective:  
Patient is a 67yo F with hx DM, recent diagnosis cirrhosis, anemia with ablation of AVM on colonoscopy who presented with redness, swelling pain on dorsum of R foot. Admitted for diabetic foot infection. : Up to edge of bed eating breakfast. Appetite good, no N/V/D. Says redness and swelling of the right foot are both improved. Still having some pain on ambulation. Would like to see her BP higher. Objective:  
 
Patient Vitals for the past 24 hrs: 
 Temp Pulse Resp BP SpO2  
19 0652 99.3 °F (37.4 °C) 82 16 95/48 92 % 19 0538 98.9 °F (37.2 °C) 83 18 92/51 91 % 19 2309 99.2 °F (37.3 °C) 98 18 93/49 92 % 19 2000 99 °F (37.2 °C) 88 18 103/52 94 % 19 1539 98.6 °F (37 °C) 86 18 93/52 93 % 19 1148 98.8 °F (37.1 °C) 86 18 106/53 95 % Oxygen Therapy O2 Sat (%): 92 % (19 0652) Pulse via Oximetry: 81 beats per minute (191) O2 Device: Room air (19) Intake/Output Summary (Last 24 hours) at 2019 1640 Last data filed at 2019 6725 Gross per 24 hour Intake 2490 ml Output 600 ml Net 1890 ml *Note that automatically entered I/Os may not be accurate; dependent on patient compliance with collection and accurate  by techs. General:    Well nourished. Alert. CV:   RRR. No murmur, rub, or gallop. Lungs:   CTAB. No wheezing, rhonchi, or rales. Abdomen:   Soft, nontender, nondistended. Extremities: Warm and dry. Erythema to dorsum of right foot that extends some medially and laterally but does not involve posterior aspect of the foot/ankle/calf. Dorsal blister right foot. Skin:     No rashes or jaundice. Neuro:  No gross focal deficits Data Review: 
I have reviewed all labs, meds, and studies from the last 24 hours: 
 
Recent Results (from the past 24 hour(s)) GLUCOSE, POC Collection Time: 04/25/19 11:12 AM  
Result Value Ref Range Glucose (POC) 212 (H) 65 - 100 mg/dL GLUCOSE, POC Collection Time: 04/25/19  4:19 PM  
Result Value Ref Range Glucose (POC) 137 (H) 65 - 100 mg/dL GLUCOSE, POC Collection Time: 04/25/19  8:33 PM  
Result Value Ref Range Glucose (POC) 158 (H) 65 - 100 mg/dL METABOLIC PANEL, BASIC Collection Time: 04/26/19  3:50 AM  
Result Value Ref Range Sodium 136 136 - 145 mmol/L Potassium 3.2 (L) 3.5 - 5.1 mmol/L Chloride 108 (H) 98 - 107 mmol/L  
 CO2 19 (L) 21 - 32 mmol/L Anion gap 9 7 - 16 mmol/L Glucose 89 65 - 100 mg/dL BUN 31 (H) 8 - 23 MG/DL Creatinine 0.81 0.6 - 1.0 MG/DL  
 GFR est AA >60 >60 ml/min/1.73m2 GFR est non-AA >60 >60 ml/min/1.73m2 Calcium 7.2 (L) 8.3 - 10.4 MG/DL  
CBC WITH AUTOMATED DIFF Collection Time: 04/26/19  3:50 AM  
Result Value Ref Range WBC 6.4 4.3 - 11.1 K/uL  
 RBC 2.76 (L) 4.05 - 5.2 M/uL HGB 7.2 (L) 11.7 - 15.4 g/dL HCT 22.0 (L) 35.8 - 46.3 % MCV 79.7 79.6 - 97.8 FL  
 MCH 26.1 26.1 - 32.9 PG  
 MCHC 32.7 31.4 - 35.0 g/dL  
 RDW 18.3 (H) 11.9 - 14.6 % PLATELET 73 (L) 361 - 450 K/uL MPV 10.5 9.4 - 12.3 FL ABSOLUTE NRBC 0.00 0.0 - 0.2 K/uL  
 DF AUTOMATED NEUTROPHILS 78 43 - 78 % LYMPHOCYTES 9 (L) 13 - 44 % MONOCYTES 11 4.0 - 12.0 % EOSINOPHILS 1 0.5 - 7.8 % BASOPHILS 0 0.0 - 2.0 % IMMATURE GRANULOCYTES 1 0.0 - 5.0 %  
 ABS. NEUTROPHILS 5.0 1.7 - 8.2 K/UL  
 ABS. LYMPHOCYTES 0.6 0.5 - 4.6 K/UL  
 ABS. MONOCYTES 0.7 0.1 - 1.3 K/UL  
 ABS. EOSINOPHILS 0.1 0.0 - 0.8 K/UL  
 ABS. BASOPHILS 0.0 0.0 - 0.2 K/UL  
 ABS. IMM. GRANS. 0.1 0.0 - 0.5 K/UL PROTHROMBIN TIME + INR Collection Time: 04/26/19  3:50 AM  
Result Value Ref Range Prothrombin time 17.5 (H) 11.7 - 14.5 sec INR 1.5 GLUCOSE, POC Collection Time: 04/26/19  6:51 AM  
Result Value Ref Range Glucose (POC) 81 65 - 100 mg/dL All Micro Results Procedure Component Value Units Date/Time CULTURE, BLOOD [[id number]] Collected:  04/24/19 1839 Order Status:  Completed Specimen:  Blood Updated:  04/26/19 6821 Special Requests: LEFT ANTECUBITAL Culture result: NO GROWTH 2 DAYS     
 CULTURE, BLOOD [[id number]] Collected:  04/24/19 1706 Order Status:  Completed Specimen:  Blood Updated:  04/26/19 5061 Special Requests: --     
  LEFT 
FOREARM Culture result: NO GROWTH 2 DAYS     
 CULTURE, URINE [[id number]] Collected:  04/25/19 0356 Order Status:  Completed Specimen:  Urine from Clean catch Updated:  04/25/19 1102 No results found for this visit on 04/24/19. Current Meds: 
Current Facility-Administered Medications Medication Dose Route Frequency  vancomycin (VANCOCIN) 1500 mg in  ml infusion  1,500 mg IntraVENous Q24H  
 metroNIDAZOLE (FLAGYL) IVPB premix 500 mg  500 mg IntraVENous Q12H  lip protectant (BLISTEX) ointment   Topical PRN  
 traMADol (ULTRAM) tablet 50 mg  50 mg Oral Q6H PRN  
 sodium chloride (NS) flush 5-10 mL  5-10 mL IntraVENous PRN  
 atorvastatin (LIPITOR) tablet 10 mg  10 mg Oral DAILY  insulin regular (NOVOLIN R, HUMULIN R) injection   SubCUTAneous AC&HS  
 gabapentin (NEURONTIN) capsule 300 mg  300 mg Oral QHS  insulin glargine (LANTUS) injection 40 Units  40 Units SubCUTAneous 7am  
 levothyroxine (SYNTHROID) tablet 125 mcg  125 mcg Oral ACB  pantoprazole (PROTONIX) tablet 40 mg  40 mg Oral DAILY  sertraline (ZOLOFT) tablet 50 mg  50 mg Oral DAILY  sodium chloride (NS) flush 5-40 mL  5-40 mL IntraVENous Q8H  
 sodium chloride (NS) flush 5-40 mL  5-40 mL IntraVENous PRN  
 acetaminophen (TYLENOL) tablet 650 mg  650 mg Oral Q4H PRN  
  ondansetron (ZOFRAN) injection 4 mg  4 mg IntraVENous Q4H PRN  
 magnesium hydroxide (MILK OF MAGNESIA) 400 mg/5 mL oral suspension 30 mL  30 mL Oral DAILY PRN  
 0.9% sodium chloride infusion  125 mL/hr IntraVENous CONTINUOUS  
 heparin (porcine) injection 5,000 Units  5,000 Units SubCUTAneous Q8H Other Studies (last 24 hours): No results found. Assessment and Plan:  
 
Hospital Problems as of 4/26/2019 Date Reviewed: 4/24/2019 Codes Class Noted - Resolved POA Sepsis ([facility].) ICD-10-CM: A41.9 ICD-9-CM: 038.9, 995.91  4/25/2019 - Present Yes Type 2 diabetes mellitus with diabetic neuropathy (HCC) ICD-10-CM: E11.40 ICD-9-CM: 250.60, 357.2  4/24/2019 - Present Yes * (Principal) Cellulitis ICD-10-CM: L03.90 ICD-9-CM: 682.9  4/24/2019 - Present Yes Cirrhosis of liver without ascites (HCC) ICD-10-CM: K74.60 ICD-9-CM: 571.5  3/7/2019 - Present Yes Microcytic anemia ICD-10-CM: D50.9 ICD-9-CM: 280.9  2/27/2019 - Present Yes Type 2 diabetes with nephropathy ([facility].) ICD-10-CM: E11.21 
ICD-9-CM: 250.40, 583.81  1/16/2019 - Present Yes Hyperlipidemia ICD-10-CM: E78.5 ICD-9-CM: 272.4  1/12/2016 - Present Yes Essential hypertension ICD-10-CM: I10 
ICD-9-CM: 401.9  1/12/2016 - Present Yes Acquired hypothyroidism ICD-10-CM: E03.9 ICD-9-CM: 244.9  1/12/2016 - Present Yes Uncontrolled type II diabetes mellitus ([facility].) ICD-10-CM: E11.65 ICD-9-CM: 250.02  1/12/2016 - Present Yes Plan: # Right foot cellulitis - X-rays neg for fracture or bone destruction - No foot wounds or trauma. Erythema/swelling improving. 
 - Change to doxy/keflex. Elevate RLE as much as able. # HypoK - replaced # Microcytic anemia - Hb 7; no s/s bleeding. DC heparin and encourage ambulation 
 - recently admitted with colonic AVMs # Hypothyroid - Synthroid # DM2 
 - Basal/bolus # HLD 
 - statin # Cirrhosis DC planning/Dispo: Home 1-2d. Diet:  DIET DIABETIC CONSISTENT CARB 
DVT ppx:  Ambulation.  
 
Signed: 
Mendoza Mckeon MD

## 2019-04-26 NOTE — PROGRESS NOTES
Problem: Falls - Risk of 
Goal: *Absence of Falls Description Document Radha Manzano Fall Risk and appropriate interventions in the flowsheet. Outcome: Progressing Towards Goal 
  
Problem: Patient Education: Go to Patient Education Activity Goal: Patient/Family Education Outcome: Progressing Towards Goal

## 2019-04-26 NOTE — PROGRESS NOTES
Hourly rounds completed on patient. All needs are met. Complaint of R foot pain of 6/10 relived by PRN tramadol per MAR. Bed locked in and in low position. Call light within reach. Will report to oncoming nurse at bedside.

## 2019-04-26 NOTE — PROGRESS NOTES
Problem: Falls - Risk of 
Goal: *Absence of Falls Description Document Opal Salcedo Fall Risk and appropriate interventions in the flowsheet. Outcome: Progressing Towards Goal 
  
Problem: Patient Education: Go to Patient Education Activity Goal: Patient/Family Education Outcome: Progressing Towards Goal

## 2019-04-27 ENCOUNTER — APPOINTMENT (OUTPATIENT)
Dept: ULTRASOUND IMAGING | Age: 64
DRG: 872 | End: 2019-04-27
Attending: INTERNAL MEDICINE
Payer: COMMERCIAL

## 2019-04-27 LAB
ANION GAP SERPL CALC-SCNC: 11 MMOL/L (ref 7–16)
BACTERIA SPEC CULT: NORMAL
BASOPHILS # BLD: 0 K/UL (ref 0–0.2)
BASOPHILS NFR BLD: 0 % (ref 0–2)
BUN SERPL-MCNC: 34 MG/DL (ref 8–23)
CALCIUM SERPL-MCNC: 6.1 MG/DL (ref 8.3–10.4)
CHLORIDE SERPL-SCNC: 109 MMOL/L (ref 98–107)
CO2 SERPL-SCNC: 17 MMOL/L (ref 21–32)
CREAT SERPL-MCNC: 0.97 MG/DL (ref 0.6–1)
DIFFERENTIAL METHOD BLD: ABNORMAL
EOSINOPHIL # BLD: 0.1 K/UL (ref 0–0.8)
EOSINOPHIL NFR BLD: 2 % (ref 0.5–7.8)
ERYTHROCYTE [DISTWIDTH] IN BLOOD BY AUTOMATED COUNT: 18.5 % (ref 11.9–14.6)
GLUCOSE BLD STRIP.AUTO-MCNC: 104 MG/DL (ref 65–100)
GLUCOSE BLD STRIP.AUTO-MCNC: 116 MG/DL (ref 65–100)
GLUCOSE BLD STRIP.AUTO-MCNC: 136 MG/DL (ref 65–100)
GLUCOSE BLD STRIP.AUTO-MCNC: 154 MG/DL (ref 65–100)
GLUCOSE SERPL-MCNC: 93 MG/DL (ref 65–100)
HCT VFR BLD AUTO: 22.8 % (ref 35.8–46.3)
HGB BLD-MCNC: 7.3 G/DL (ref 11.7–15.4)
IMM GRANULOCYTES # BLD AUTO: 0.1 K/UL (ref 0–0.5)
IMM GRANULOCYTES NFR BLD AUTO: 2 % (ref 0–5)
LYMPHOCYTES # BLD: 0.4 K/UL (ref 0.5–4.6)
LYMPHOCYTES NFR BLD: 8 % (ref 13–44)
MCH RBC QN AUTO: 25.5 PG (ref 26.1–32.9)
MCHC RBC AUTO-ENTMCNC: 32 G/DL (ref 31.4–35)
MCV RBC AUTO: 79.7 FL (ref 79.6–97.8)
MONOCYTES # BLD: 0.7 K/UL (ref 0.1–1.3)
MONOCYTES NFR BLD: 12 % (ref 4–12)
NEUTS SEG # BLD: 4.2 K/UL (ref 1.7–8.2)
NEUTS SEG NFR BLD: 76 % (ref 43–78)
NRBC # BLD: 0 K/UL (ref 0–0.2)
PLATELET # BLD AUTO: 85 K/UL (ref 150–450)
PMV BLD AUTO: 10.4 FL (ref 9.4–12.3)
POTASSIUM SERPL-SCNC: 3.4 MMOL/L (ref 3.5–5.1)
RBC # BLD AUTO: 2.86 M/UL (ref 4.05–5.2)
SERVICE CMNT-IMP: NORMAL
SODIUM SERPL-SCNC: 137 MMOL/L (ref 136–145)
WBC # BLD AUTO: 5.5 K/UL (ref 4.3–11.1)

## 2019-04-27 PROCEDURE — 93971 EXTREMITY STUDY: CPT

## 2019-04-27 PROCEDURE — 74011636637 HC RX REV CODE- 636/637: Performed by: FAMILY MEDICINE

## 2019-04-27 PROCEDURE — 74011250636 HC RX REV CODE- 250/636: Performed by: FAMILY MEDICINE

## 2019-04-27 PROCEDURE — 65270000029 HC RM PRIVATE

## 2019-04-27 PROCEDURE — 36415 COLL VENOUS BLD VENIPUNCTURE: CPT

## 2019-04-27 PROCEDURE — 82962 GLUCOSE BLOOD TEST: CPT

## 2019-04-27 PROCEDURE — 74011250637 HC RX REV CODE- 250/637: Performed by: INTERNAL MEDICINE

## 2019-04-27 PROCEDURE — 80048 BASIC METABOLIC PNL TOTAL CA: CPT

## 2019-04-27 PROCEDURE — 74011250637 HC RX REV CODE- 250/637: Performed by: FAMILY MEDICINE

## 2019-04-27 PROCEDURE — 85025 COMPLETE CBC W/AUTO DIFF WBC: CPT

## 2019-04-27 RX ORDER — SAME BUTANEDISULFONATE/BETAINE 400-600 MG
500 POWDER IN PACKET (EA) ORAL 2 TIMES DAILY
Status: DISCONTINUED | OUTPATIENT
Start: 2019-04-27 | End: 2019-05-02 | Stop reason: HOSPADM

## 2019-04-27 RX ORDER — POTASSIUM CHLORIDE 20 MEQ/1
40 TABLET, EXTENDED RELEASE ORAL
Status: COMPLETED | OUTPATIENT
Start: 2019-04-27 | End: 2019-04-27

## 2019-04-27 RX ADMIN — SERTRALINE HYDROCHLORIDE 50 MG: 50 TABLET ORAL at 08:24

## 2019-04-27 RX ADMIN — Medication 10 ML: at 05:31

## 2019-04-27 RX ADMIN — ATORVASTATIN CALCIUM 10 MG: 10 TABLET, FILM COATED ORAL at 08:24

## 2019-04-27 RX ADMIN — CEPHALEXIN 500 MG: 500 CAPSULE ORAL at 05:30

## 2019-04-27 RX ADMIN — DOXYCYCLINE HYCLATE 100 MG: 100 CAPSULE ORAL at 21:54

## 2019-04-27 RX ADMIN — IBUPROFEN 600 MG: 600 TABLET ORAL at 11:28

## 2019-04-27 RX ADMIN — IBUPROFEN 600 MG: 600 TABLET ORAL at 17:36

## 2019-04-27 RX ADMIN — CEPHALEXIN 500 MG: 500 CAPSULE ORAL at 17:20

## 2019-04-27 RX ADMIN — CEPHALEXIN 500 MG: 500 CAPSULE ORAL at 23:52

## 2019-04-27 RX ADMIN — Medication 10 ML: at 22:14

## 2019-04-27 RX ADMIN — IBUPROFEN 600 MG: 600 TABLET ORAL at 05:29

## 2019-04-27 RX ADMIN — SODIUM CHLORIDE 125 ML/HR: 900 INJECTION, SOLUTION INTRAVENOUS at 08:24

## 2019-04-27 RX ADMIN — POTASSIUM CHLORIDE 40 MEQ: 20 TABLET, EXTENDED RELEASE ORAL at 08:58

## 2019-04-27 RX ADMIN — HUMAN INSULIN 3 UNITS: 100 INJECTION, SOLUTION SUBCUTANEOUS at 11:28

## 2019-04-27 RX ADMIN — Medication 500 MG: at 17:21

## 2019-04-27 RX ADMIN — Medication 500 MG: at 08:58

## 2019-04-27 RX ADMIN — PANTOPRAZOLE SODIUM 40 MG: 40 TABLET, DELAYED RELEASE ORAL at 08:24

## 2019-04-27 RX ADMIN — DOXYCYCLINE HYCLATE 100 MG: 100 CAPSULE ORAL at 08:25

## 2019-04-27 RX ADMIN — Medication 10 ML: at 13:18

## 2019-04-27 RX ADMIN — GABAPENTIN 300 MG: 300 CAPSULE ORAL at 21:54

## 2019-04-27 RX ADMIN — INSULIN GLARGINE 40 UNITS: 100 INJECTION, SOLUTION SUBCUTANEOUS at 07:40

## 2019-04-27 RX ADMIN — LEVOTHYROXINE SODIUM 125 MCG: 75 TABLET ORAL at 05:29

## 2019-04-27 RX ADMIN — CEPHALEXIN 500 MG: 500 CAPSULE ORAL at 11:27

## 2019-04-27 NOTE — PROGRESS NOTES
Hospitalist Progress Note Admit Date:  2019  6:20 PM  
Name:  Chelly Harris Age:  60 y.o. 
:  1955 MRN:  [id number] PCP:  Naveen Gutierrez MD 
Treatment Team: Attending Provider: Winston Tran MD; Care Manager: Yesenia Newberry, RN; Physical Therapist: Olga Tamez PT 
 
HPI/Subjective:  
Patient is a 65yo F with hx DM, recent diagnosis cirrhosis, anemia with ablation of AVM on colonoscopy who presented with redness, swelling pain on dorsum of R foot.  Admitted for diabetic foot infection. 
  
: Foot redness a little better, swelling about the same. Says she's been trying to keep foot elevated. Afebrile, WBCs normal. Ambulating a little on that foot. Had some mild diarrhea with keflex last night. ROS otherwise negative. Objective:  
 
Patient Vitals for the past 24 hrs: 
 Temp Pulse Resp BP SpO2  
19 0730 97.5 °F (36.4 °C) 74 16 91/51 97 % 19 0505 97.7 °F (36.5 °C) 76 18 (!) 89/53 96 % 19 2349 97.6 °F (36.4 °C) 76 20 94/52 96 % 19 1902 97.5 °F (36.4 °C) 79 18 96/57 97 % 19 1550 98.3 °F (36.8 °C) 77 16 92/49 100 % 19 1508 98 °F (36.7 °C) 73 16 (!) 86/50 95 % 19 1150 98.4 °F (36.9 °C) 81 16 93/42 98 % Oxygen Therapy O2 Sat (%): 97 % (19 0730) Pulse via Oximetry: 81 beats per minute (19 2141) O2 Device: Room air (19 2339) Intake/Output Summary (Last 24 hours) at 2019 0831 Last data filed at 2019 7643 Gross per 24 hour Intake 4544 ml Output  Net 4544 ml *Note that automatically entered I/Os may not be accurate; dependent on patient compliance with collection and accurate  by [employer]. General:    Well nourished. Alert. CV:   RRR. No murmur, rub, or gallop. Lungs:   CTAB. No wheezing, rhonchi, or rales. Abdomen:   Soft, nontender, nondistended. Extremities: Warm and dry. No cyanosis or edema. Skin:     No rashes or jaundice. Blister to dorsum of right foot; erythema that extends medially and laterally but does not go posterior or involve the toes. Neuro:  No gross focal deficits Data Review: 
I have reviewed all labs, meds, and studies from the last 24 hours: 
 
Recent Results (from the past 24 hour(s)) GLUCOSE, POC Collection Time: 04/26/19 11:48 AM  
Result Value Ref Range Glucose (POC) 150 (H) 65 - 100 mg/dL GLUCOSE, POC Collection Time: 04/26/19  3:45 PM  
Result Value Ref Range Glucose (POC) 173 (H) 65 - 100 mg/dL GLUCOSE, POC Collection Time: 04/26/19  8:39 PM  
Result Value Ref Range Glucose (POC) 188 (H) 65 - 100 mg/dL CBC WITH AUTOMATED DIFF Collection Time: 04/27/19  6:04 AM  
Result Value Ref Range WBC 5.5 4.3 - 11.1 K/uL  
 RBC 2.86 (L) 4.05 - 5.2 M/uL HGB 7.3 (L) 11.7 - 15.4 g/dL HCT 22.8 (L) 35.8 - 46.3 % MCV 79.7 79.6 - 97.8 FL  
 MCH 25.5 (L) 26.1 - 32.9 PG  
 MCHC 32.0 31.4 - 35.0 g/dL  
 RDW 18.5 (H) 11.9 - 14.6 % PLATELET 85 (L) 661 - 450 K/uL MPV 10.4 9.4 - 12.3 FL ABSOLUTE NRBC 0.00 0.0 - 0.2 K/uL  
 DF AUTOMATED NEUTROPHILS 76 43 - 78 % LYMPHOCYTES 8 (L) 13 - 44 % MONOCYTES 12 4.0 - 12.0 % EOSINOPHILS 2 0.5 - 7.8 % BASOPHILS 0 0.0 - 2.0 % IMMATURE GRANULOCYTES 2 0.0 - 5.0 %  
 ABS. NEUTROPHILS 4.2 1.7 - 8.2 K/UL  
 ABS. LYMPHOCYTES 0.4 (L) 0.5 - 4.6 K/UL  
 ABS. MONOCYTES 0.7 0.1 - 1.3 K/UL  
 ABS. EOSINOPHILS 0.1 0.0 - 0.8 K/UL  
 ABS. BASOPHILS 0.0 0.0 - 0.2 K/UL  
 ABS. IMM. GRANS. 0.1 0.0 - 0.5 K/UL METABOLIC PANEL, BASIC Collection Time: 04/27/19  6:04 AM  
Result Value Ref Range Sodium 137 136 - 145 mmol/L Potassium 3.4 (L) 3.5 - 5.1 mmol/L Chloride 109 (H) 98 - 107 mmol/L  
 CO2 17 (L) 21 - 32 mmol/L Anion gap 11 7 - 16 mmol/L Glucose 93 65 - 100 mg/dL BUN 34 (H) 8 - 23 MG/DL Creatinine 0.97 0.6 - 1.0 MG/DL  
 GFR est AA >60 >60 ml/min/1.73m2 GFR est non-AA >60 >60 ml/min/1.73m2 Calcium 6.1 (L) 8.3 - 10.4 MG/DL  
GLUCOSE, POC Collection Time: 04/27/19  7:29 AM  
Result Value Ref Range Glucose (POC) 104 (H) 65 - 100 mg/dL All Micro Results Procedure Component Value Units Date/Time Klever Tate [[id number]] Collected:  04/25/19 0356 Order Status:  Completed Specimen:  Urine from Clean catch Updated:  04/27/19 0745 Special Requests: NO SPECIAL REQUESTS Culture result:    
  10,000 to 50,000 COLONIES/mL MIXED SKIN VINCE ISOLATED  
     
 CULTURE, BLOOD [[id number]] Collected:  04/24/19 1839 Order Status:  Completed Specimen:  Blood Updated:  04/27/19 7000 Special Requests: LEFT ANTECUBITAL Culture result: NO GROWTH 3 DAYS     
 CULTURE, BLOOD [[id number]] Collected:  04/24/19 1706 Order Status:  Completed Specimen:  Blood Updated:  04/27/19 3265 Special Requests: --     
  LEFT 
FOREARM Culture result: NO GROWTH 3 DAYS No results found for this visit on 04/24/19. Current Meds: 
Current Facility-Administered Medications Medication Dose Route Frequency  Saccharomyces boulardii (FLORASTOR) capsule 500 mg  500 mg Oral BID  doxycycline (VIBRAMYCIN) capsule 100 mg  100 mg Oral Q12H  cephALEXin (KEFLEX) capsule 500 mg  500 mg Oral Q6H  
 ibuprofen (MOTRIN) tablet 600 mg  600 mg Oral Q6H PRN  
 lip protectant (BLISTEX) ointment   Topical PRN  
 traMADol (ULTRAM) tablet 50 mg  50 mg Oral Q6H PRN  
 sodium chloride (NS) flush 5-10 mL  5-10 mL IntraVENous PRN  
 atorvastatin (LIPITOR) tablet 10 mg  10 mg Oral DAILY  insulin regular (NOVOLIN R, HUMULIN R) injection   SubCUTAneous AC&HS  
 gabapentin (NEURONTIN) capsule 300 mg  300 mg Oral QHS  insulin glargine (LANTUS) injection 40 Units  40 Units SubCUTAneous 7am  
 levothyroxine (SYNTHROID) tablet 125 mcg  125 mcg Oral ACB  pantoprazole (PROTONIX) tablet 40 mg  40 mg Oral DAILY  sertraline (ZOLOFT) tablet 50 mg  50 mg Oral DAILY  sodium chloride (NS) flush 5-40 mL  5-40 mL IntraVENous Q8H  
 sodium chloride (NS) flush 5-40 mL  5-40 mL IntraVENous PRN  
 acetaminophen (TYLENOL) tablet 650 mg  650 mg Oral Q4H PRN  
 ondansetron (ZOFRAN) injection 4 mg  4 mg IntraVENous Q4H PRN  
 magnesium hydroxide (MILK OF MAGNESIA) 400 mg/5 mL oral suspension 30 mL  30 mL Oral DAILY PRN  
 0.9% sodium chloride infusion  125 mL/hr IntraVENous CONTINUOUS Other Studies (last 24 hours): No results found. Assessment and Plan:  
 
Hospital Problems as of 4/27/2019 Date Reviewed: 4/24/2019 Codes Class Noted - Resolved POA Sepsis ([facility] 75.) ICD-10-CM: A41.9 ICD-9-CM: 038.9, 995.91  4/25/2019 - Present Yes Type 2 diabetes mellitus with diabetic neuropathy (HCC) ICD-10-CM: E11.40 ICD-9-CM: 250.60, 357.2  4/24/2019 - Present Yes * (Principal) Cellulitis ICD-10-CM: L03.90 ICD-9-CM: 682.9  4/24/2019 - Present Yes Cirrhosis of liver without ascites (HCC) ICD-10-CM: K74.60 ICD-9-CM: 571.5  3/7/2019 - Present Yes Microcytic anemia ICD-10-CM: D50.9 ICD-9-CM: 280.9  2/27/2019 - Present Yes Type 2 diabetes with nephropathy ([facility].) ICD-10-CM: E11.21 
ICD-9-CM: 250.40, 583.81  1/16/2019 - Present Yes Hyperlipidemia ICD-10-CM: E78.5 ICD-9-CM: 272.4  1/12/2016 - Present Yes Essential hypertension ICD-10-CM: I10 
ICD-9-CM: 401.9  1/12/2016 - Present Yes Acquired hypothyroidism ICD-10-CM: E03.9 ICD-9-CM: 244.9  1/12/2016 - Present Yes Uncontrolled type II diabetes mellitus ([facility].) ICD-10-CM: E11.65 ICD-9-CM: 250.02  1/12/2016 - Present Yes Plan: # Right foot cellulitis - X-rays neg for fracture or bone destruction - No foot wounds or trauma 
            - Change to doxy/keflex 4/26, add probiotic. Elevate RLE. 
 - RLE US to r/o DVT 
  
# HypoK - replaced 
  
# Microcytic anemia - Hb 7; no s/s bleeding. DC heparin and encourage ambulation 
            - recently admitted with colonic AVMs  
 - hb stable 
  
# Hypothyroid - Synthroid 
  
# DM2 
            - Basal/bolus 
  
# HLD 
            - statin 
  
# Cirrhosis DC planning/Dispo:  Home tomorrow hopefully. Diet:  DIET DIABETIC CONSISTENT CARB 
DVT ppx: heparin dc with anemia Signed: 
Cong Thornton MD

## 2019-04-27 NOTE — PROGRESS NOTES
Problem: Falls - Risk of 
Goal: *Absence of Falls Description Document Jumana Moore Fall Risk and appropriate interventions in the flowsheet. Outcome: Progressing Towards Goal 
  
Problem: Patient Education: Go to Patient Education Activity Goal: Patient/Family Education Outcome: Progressing Towards Goal

## 2019-04-27 NOTE — PROGRESS NOTES
Hourly rounds completed on patient. All needs are met. Complaint of pain in R foot relieved by PRN ibuprofen per MAR. Bed locked in and in low position. Call light within reach. Will report to oncoming nurse at bedside.

## 2019-04-27 NOTE — PROGRESS NOTES
PT note Spoke with ANI Tracy - pt prefers to wait until tomorrow. Pt is exhausted and in a good deal of pain. Pt is getting up and down in the room and wants to work tomorrow.  
Олег Seymour, PT

## 2019-04-28 LAB
ANION GAP SERPL CALC-SCNC: 8 MMOL/L (ref 7–16)
BASOPHILS # BLD: 0 K/UL (ref 0–0.2)
BASOPHILS NFR BLD: 0 % (ref 0–2)
BUN SERPL-MCNC: 35 MG/DL (ref 8–23)
CALCIUM SERPL-MCNC: 6.6 MG/DL (ref 8.3–10.4)
CHLORIDE SERPL-SCNC: 110 MMOL/L (ref 98–107)
CO2 SERPL-SCNC: 18 MMOL/L (ref 21–32)
CREAT SERPL-MCNC: 1.15 MG/DL (ref 0.6–1)
DIFFERENTIAL METHOD BLD: ABNORMAL
EOSINOPHIL # BLD: 0.2 K/UL (ref 0–0.8)
EOSINOPHIL NFR BLD: 2 % (ref 0.5–7.8)
ERYTHROCYTE [DISTWIDTH] IN BLOOD BY AUTOMATED COUNT: 18.7 % (ref 11.9–14.6)
GLUCOSE BLD STRIP.AUTO-MCNC: 103 MG/DL (ref 65–100)
GLUCOSE BLD STRIP.AUTO-MCNC: 104 MG/DL (ref 65–100)
GLUCOSE BLD STRIP.AUTO-MCNC: 146 MG/DL (ref 65–100)
GLUCOSE BLD STRIP.AUTO-MCNC: 63 MG/DL (ref 65–100)
GLUCOSE SERPL-MCNC: 58 MG/DL (ref 65–100)
HCT VFR BLD AUTO: 23.6 % (ref 35.8–46.3)
HGB BLD-MCNC: 7.7 G/DL (ref 11.7–15.4)
IMM GRANULOCYTES # BLD AUTO: 0.1 K/UL (ref 0–0.5)
IMM GRANULOCYTES NFR BLD AUTO: 2 % (ref 0–5)
LYMPHOCYTES # BLD: 0.6 K/UL (ref 0.5–4.6)
LYMPHOCYTES NFR BLD: 8 % (ref 13–44)
MCH RBC QN AUTO: 25.9 PG (ref 26.1–32.9)
MCHC RBC AUTO-ENTMCNC: 32.6 G/DL (ref 31.4–35)
MCV RBC AUTO: 79.5 FL (ref 79.6–97.8)
MONOCYTES # BLD: 1.1 K/UL (ref 0.1–1.3)
MONOCYTES NFR BLD: 16 % (ref 4–12)
NEUTS SEG # BLD: 5.2 K/UL (ref 1.7–8.2)
NEUTS SEG NFR BLD: 72 % (ref 43–78)
NRBC # BLD: 0 K/UL (ref 0–0.2)
PLATELET # BLD AUTO: 105 K/UL (ref 150–450)
PMV BLD AUTO: 10.5 FL (ref 9.4–12.3)
POTASSIUM SERPL-SCNC: 4 MMOL/L (ref 3.5–5.1)
RBC # BLD AUTO: 2.97 M/UL (ref 4.05–5.2)
SODIUM SERPL-SCNC: 136 MMOL/L (ref 136–145)
WBC # BLD AUTO: 7.2 K/UL (ref 4.3–11.1)

## 2019-04-28 PROCEDURE — 74011000302 HC RX REV CODE- 302: Performed by: INTERNAL MEDICINE

## 2019-04-28 PROCEDURE — 74011250636 HC RX REV CODE- 250/636: Performed by: FAMILY MEDICINE

## 2019-04-28 PROCEDURE — 36415 COLL VENOUS BLD VENIPUNCTURE: CPT

## 2019-04-28 PROCEDURE — 80048 BASIC METABOLIC PNL TOTAL CA: CPT

## 2019-04-28 PROCEDURE — 85025 COMPLETE CBC W/AUTO DIFF WBC: CPT

## 2019-04-28 PROCEDURE — 74011250637 HC RX REV CODE- 250/637: Performed by: FAMILY MEDICINE

## 2019-04-28 PROCEDURE — 65270000029 HC RM PRIVATE

## 2019-04-28 PROCEDURE — 97110 THERAPEUTIC EXERCISES: CPT

## 2019-04-28 PROCEDURE — 77030020263 HC SOL INJ SOD CL0.9% LFCR 1000ML

## 2019-04-28 PROCEDURE — 74011250637 HC RX REV CODE- 250/637: Performed by: INTERNAL MEDICINE

## 2019-04-28 PROCEDURE — 82962 GLUCOSE BLOOD TEST: CPT

## 2019-04-28 PROCEDURE — 74011636637 HC RX REV CODE- 636/637: Performed by: FAMILY MEDICINE

## 2019-04-28 PROCEDURE — 86580 TB INTRADERMAL TEST: CPT | Performed by: INTERNAL MEDICINE

## 2019-04-28 PROCEDURE — 97163 PT EVAL HIGH COMPLEX 45 MIN: CPT

## 2019-04-28 PROCEDURE — 97530 THERAPEUTIC ACTIVITIES: CPT

## 2019-04-28 RX ADMIN — SODIUM CHLORIDE 125 ML/HR: 900 INJECTION, SOLUTION INTRAVENOUS at 01:14

## 2019-04-28 RX ADMIN — GABAPENTIN 300 MG: 300 CAPSULE ORAL at 21:12

## 2019-04-28 RX ADMIN — SERTRALINE HYDROCHLORIDE 50 MG: 50 TABLET ORAL at 08:50

## 2019-04-28 RX ADMIN — INSULIN GLARGINE 40 UNITS: 100 INJECTION, SOLUTION SUBCUTANEOUS at 07:59

## 2019-04-28 RX ADMIN — PANTOPRAZOLE SODIUM 40 MG: 40 TABLET, DELAYED RELEASE ORAL at 08:49

## 2019-04-28 RX ADMIN — SODIUM CHLORIDE 125 ML/HR: 900 INJECTION, SOLUTION INTRAVENOUS at 15:14

## 2019-04-28 RX ADMIN — Medication 500 MG: at 17:29

## 2019-04-28 RX ADMIN — CEPHALEXIN 500 MG: 500 CAPSULE ORAL at 17:29

## 2019-04-28 RX ADMIN — CEPHALEXIN 500 MG: 500 CAPSULE ORAL at 23:33

## 2019-04-28 RX ADMIN — Medication 10 ML: at 06:05

## 2019-04-28 RX ADMIN — DOXYCYCLINE HYCLATE 100 MG: 100 CAPSULE ORAL at 08:49

## 2019-04-28 RX ADMIN — DOXYCYCLINE HYCLATE 100 MG: 100 CAPSULE ORAL at 21:12

## 2019-04-28 RX ADMIN — LEVOTHYROXINE SODIUM 125 MCG: 75 TABLET ORAL at 06:05

## 2019-04-28 RX ADMIN — ONDANSETRON 4 MG: 2 INJECTION INTRAMUSCULAR; INTRAVENOUS at 08:49

## 2019-04-28 RX ADMIN — Medication 10 ML: at 21:09

## 2019-04-28 RX ADMIN — Medication 500 MG: at 08:49

## 2019-04-28 RX ADMIN — TUBERCULIN PURIFIED PROTEIN DERIVATIVE 5 UNITS: 5 INJECTION, SOLUTION INTRADERMAL at 14:59

## 2019-04-28 RX ADMIN — CEPHALEXIN 500 MG: 500 CAPSULE ORAL at 11:01

## 2019-04-28 RX ADMIN — IBUPROFEN 600 MG: 600 TABLET ORAL at 17:29

## 2019-04-28 RX ADMIN — TRAMADOL HYDROCHLORIDE 50 MG: 50 TABLET, FILM COATED ORAL at 11:00

## 2019-04-28 RX ADMIN — CEPHALEXIN 500 MG: 500 CAPSULE ORAL at 06:05

## 2019-04-28 RX ADMIN — ATORVASTATIN CALCIUM 10 MG: 10 TABLET, FILM COATED ORAL at 08:50

## 2019-04-28 RX ADMIN — Medication 10 ML: at 14:37

## 2019-04-28 NOTE — PROGRESS NOTES
Problem: Falls - Risk of 
Goal: *Absence of Falls Description Document Leonel Dean Fall Risk and appropriate interventions in the flowsheet.  
Outcome: Progressing Towards Goal

## 2019-04-28 NOTE — PROGRESS NOTES
Attempted to follow up with patient Staff caring for her at this time Will try later Thomasina Saint, staff Radha nicholson [id number], [id number] [facility] [place]  /   [email or web address]

## 2019-04-28 NOTE — PROGRESS NOTES
Hospitalist Progress Note Admit Date:  2019  6:20 PM  
Name:  Sadia Smart Age:  60 y.o. 
:  1955 MRN:  [id number] PCP:  Brodie Shankar MD 
Treatment Team: Attending Provider: Claudell Carbine, MD; Care Manager: Selena Lane, RN; Physical Therapist: Aguilar Watson, PT 
 
HPI/Subjective:  
Patient is a 65yo F with hx DM, recent diagnosis cirrhosis, anemia with ablation of AVM on colonoscopy who presented with redness, swelling pain on dorsum of R foot.  Admitted for diabetic foot infection. : Blister on rt foot popped overnight, dressing applied. RLE still swollen. Felt sluggish yesterday so she did not work with PT. She reports feeling much better this morning. ROS neg otherwise. Objective:  
 
Patient Vitals for the past 24 hrs: 
 Temp Pulse Resp BP SpO2  
19 0811 97.9 °F (36.6 °C) 86 16 100/45 97 % 19 0439    92/44   
19 0438    92/50   
19 0323 97.8 °F (36.6 °C) 85 16 (!) 83/59 97 % 19 0009 97.7 °F (36.5 °C) 77 16 93/59 97 % 19 2002  76 16 90/46 97 % 19 1501 97.8 °F (36.6 °C) 75 16 92/52 96 % 19 1156 97.4 °F (36.3 °C) 75 16 99/52 98 % Oxygen Therapy O2 Sat (%): 97 % (19 0811) Pulse via Oximetry: 81 beats per minute (19 2141) O2 Device: Room air (19 0215) Intake/Output Summary (Last 24 hours) at 2019 0930 Last data filed at 2019 2700 Gross per 24 hour Intake  Output 1050 ml Net -1050 ml *Note that automatically entered I/Os may not be accurate; dependent on patient compliance with collection and accurate  by techs. General:    Well nourished. Alert. CV:   RRR. No murmur, rub, or gallop. Lungs:   CTAB. No wheezing, rhonchi, or rales. Abdomen:   Soft, nontender, nondistended. Extremities: Warm and dry. No cyanosis or edema.   
Skin:     Erythema to dorsal rt foot, blister has popped; 2+ pitting edema RLE to shin; two smaller blisters developing medial right foot near the arch. Neuro:  No gross focal deficits Data Review: 
I have reviewed all labs, meds, and studies from the last 24 hours: 
 
Recent Results (from the past 24 hour(s)) GLUCOSE, POC Collection Time: 04/27/19 11:12 AM  
Result Value Ref Range Glucose (POC) 154 (H) 65 - 100 mg/dL GLUCOSE, POC Collection Time: 04/27/19  4:27 PM  
Result Value Ref Range Glucose (POC) 136 (H) 65 - 100 mg/dL GLUCOSE, POC Collection Time: 04/27/19  8:07 PM  
Result Value Ref Range Glucose (POC) 116 (H) 65 - 100 mg/dL CBC WITH AUTOMATED DIFF Collection Time: 04/28/19  3:40 AM  
Result Value Ref Range WBC 7.2 4.3 - 11.1 K/uL  
 RBC 2.97 (L) 4.05 - 5.2 M/uL HGB 7.7 (L) 11.7 - 15.4 g/dL HCT 23.6 (L) 35.8 - 46.3 % MCV 79.5 (L) 79.6 - 97.8 FL  
 MCH 25.9 (L) 26.1 - 32.9 PG  
 MCHC 32.6 31.4 - 35.0 g/dL  
 RDW 18.7 (H) 11.9 - 14.6 % PLATELET 302 (L) 817 - 450 K/uL MPV 10.5 9.4 - 12.3 FL ABSOLUTE NRBC 0.00 0.0 - 0.2 K/uL  
 DF AUTOMATED NEUTROPHILS 72 43 - 78 % LYMPHOCYTES 8 (L) 13 - 44 % MONOCYTES 16 (H) 4.0 - 12.0 % EOSINOPHILS 2 0.5 - 7.8 % BASOPHILS 0 0.0 - 2.0 % IMMATURE GRANULOCYTES 2 0.0 - 5.0 %  
 ABS. NEUTROPHILS 5.2 1.7 - 8.2 K/UL  
 ABS. LYMPHOCYTES 0.6 0.5 - 4.6 K/UL  
 ABS. MONOCYTES 1.1 0.1 - 1.3 K/UL  
 ABS. EOSINOPHILS 0.2 0.0 - 0.8 K/UL  
 ABS. BASOPHILS 0.0 0.0 - 0.2 K/UL  
 ABS. IMM. GRANS. 0.1 0.0 - 0.5 K/UL METABOLIC PANEL, BASIC Collection Time: 04/28/19  3:40 AM  
Result Value Ref Range Sodium 136 136 - 145 mmol/L Potassium 4.0 3.5 - 5.1 mmol/L Chloride 110 (H) 98 - 107 mmol/L  
 CO2 18 (L) 21 - 32 mmol/L Anion gap 8 7 - 16 mmol/L Glucose 58 (L) 65 - 100 mg/dL BUN 35 (H) 8 - 23 MG/DL Creatinine 1.15 (H) 0.6 - 1.0 MG/DL  
 GFR est AA >60 >60 ml/min/1.73m2 GFR est non-AA 51 (L) >60 ml/min/1.73m2  Calcium 6.6 (L) 8.3 - 10.4 MG/DL  
GLUCOSE, POC  
 Collection Time: 04/28/19  7:49 AM  
Result Value Ref Range Glucose (POC) 63 (L) 65 - 100 mg/dL All Micro Results Procedure Component Value Units Date/Time CULTURE, BLOOD [[id number]] Collected:  04/24/19 1706 Order Status:  Completed Specimen:  Blood Updated:  04/28/19 6693 Special Requests: --     
  LEFT 
FOREARM Culture result: NO GROWTH 4 DAYS     
 CULTURE, BLOOD [[id number]] Collected:  04/24/19 1839 Order Status:  Completed Specimen:  Blood Updated:  04/28/19 4205 Special Requests: LEFT ANTECUBITAL Culture result: NO GROWTH 4 DAYS     
 CULTURE, URINE [[id number]] Collected:  04/25/19 0356 Order Status:  Completed Specimen:  Urine from Clean catch Updated:  04/27/19 0745 Special Requests: NO SPECIAL REQUESTS Culture result:    
  10,000 to 50,000 COLONIES/mL MIXED SKIN VINCE ISOLATED No results found for this visit on 04/24/19. Current Meds: 
Current Facility-Administered Medications Medication Dose Route Frequency  Saccharomyces boulardii (FLORASTOR) capsule 500 mg  500 mg Oral BID  doxycycline (VIBRAMYCIN) capsule 100 mg  100 mg Oral Q12H  cephALEXin (KEFLEX) capsule 500 mg  500 mg Oral Q6H  
 ibuprofen (MOTRIN) tablet 600 mg  600 mg Oral Q6H PRN  
 lip protectant (BLISTEX) ointment   Topical PRN  
 traMADol (ULTRAM) tablet 50 mg  50 mg Oral Q6H PRN  
 sodium chloride (NS) flush 5-10 mL  5-10 mL IntraVENous PRN  
 atorvastatin (LIPITOR) tablet 10 mg  10 mg Oral DAILY  insulin regular (NOVOLIN R, HUMULIN R) injection   SubCUTAneous AC&HS  
 gabapentin (NEURONTIN) capsule 300 mg  300 mg Oral QHS  insulin glargine (LANTUS) injection 40 Units  40 Units SubCUTAneous 7am  
 levothyroxine (SYNTHROID) tablet 125 mcg  125 mcg Oral ACB  pantoprazole (PROTONIX) tablet 40 mg  40 mg Oral DAILY  sertraline (ZOLOFT) tablet 50 mg  50 mg Oral DAILY  sodium chloride (NS) flush 5-40 mL  5-40 mL IntraVENous Q8H  
  sodium chloride (NS) flush 5-40 mL  5-40 mL IntraVENous PRN  
 acetaminophen (TYLENOL) tablet 650 mg  650 mg Oral Q4H PRN  
 ondansetron (ZOFRAN) injection 4 mg  4 mg IntraVENous Q4H PRN  
 magnesium hydroxide (MILK OF MAGNESIA) 400 mg/5 mL oral suspension 30 mL  30 mL Oral DAILY PRN  
 0.9% sodium chloride infusion  125 mL/hr IntraVENous CONTINUOUS Other Studies (last 24 hours): 
Duplex Lower Ext Venous Right Result Date: 4/27/2019 RIGHT LOWER EXTREMITY DEEP VENOUS SONOGRAPHY: CLINICAL HISTORY: Leg pain and swelling. COMPARISON: Abdominopelvic CT of February 27, 2019. FINDINGS: Multiple images from real time ultrasound evaluation of the deep venous system of the right leg demonstrate normal venous flow in the posterior tibial, popliteal, and superficial and common femoral veins. Normal compressibility was demonstrated. Flow was also documented in the proximal saphenous and deep femoral veins. No intraluminal echogenic material was seen to suggest the presence of nonobstructive thrombus. Multiple borderline-enlarged right inguinal lymph nodes are again noted, the largest measuring 2.2 x 1.0 x 2.0 cm. The appearance is not definitely changed from CT in February. IMPRESSION: 1.  NO ULTRASOUND EVIDENCE OF DEEP VENOUS THROMBOSIS IN THE RIGHT LEG. 2.  PROMINENT RIGHT INGUINAL LYMPH NODES WITHOUT SIGNIFICANT CHANGE FROM FEBRUARY 27. Assessment and Plan:  
 
Hospital Problems as of 4/28/2019 Date Reviewed: 4/24/2019 Codes Class Noted - Resolved PO[facility] ICD-10-CM: A41.9 ICD-9-CM: 038.9, 995.91  4/25/2019 - Present Yes Type 2 diabetes mellitus with diabetic neuropathy (HCC) ICD-10-CM: E11.40 ICD-9-CM: 250.60, 357.2  4/24/2019 - Present Yes * (Principal) Cellulitis ICD-10-CM: L03.90 ICD-9-CM: 682.9  4/24/2019 - Present Yes Cirrhosis of liver without ascites (HCC) ICD-10-CM: K74.60 ICD-9-CM: 571.5  3/7/2019 - Present Yes Microcytic anemia ICD-10-CM: D50.9 ICD-9-CM: 280.9  2/27/2019 - Present Yes Type 2 diabetes with nephropathy ([facility] ICD-10-CM: E11.21 
ICD-9-CM: 250.40, 583.81  1/16/2019 - Present Yes Hyperlipidemia ICD-10-CM: E78.5 ICD-9-CM: 272.4  1/12/2016 - Present Yes Essential hypertension ICD-10-CM: I10 
ICD-9-CM: 401.9  1/12/2016 - Present Yes Acquired hypothyroidism ICD-10-CM: E03.9 ICD-9-CM: 244.9  1/12/2016 - Present Yes Uncontrolled type II diabetes mellitus ([facility] ICD-10-CM: E11.65 ICD-9-CM: 250.02  1/12/2016 - Present Yes Plan: # Right foot cellulitis             - X-rays neg for fracture or bone destruction             - Change to doxy/keflex 4/26, add probiotic. Elevate RLE. 
            - US w/o DVT 
 - Ambulate w PT today 
  
# HypoK             - resolved 
  
# Microcytic anemia             - Hb stable. DC heparin and encourage ambulation 
            - recently admitted with colonic AVMs  
  
# Hypothyroid 
            - Synthroid 
  
# DM2 
            - Basal/bolus 
  
# HLD 
            - statin 
  
# Cirrhosis/thrombocytopenia 
 - stable DC planning/Dispo:  Home with , hopefully tomorrow. Diet:  DIET DIABETIC CONSISTENT CARB 
DVT ppx: Ambulation Signed: 
Cong Thornton MD

## 2019-04-28 NOTE — PROGRESS NOTES
Pt blister on her R-foot opened, was dressed with Vaseline gauze and wrapped in Kerlix. Hourly rounds completed on patient. All needs are met. No complaints at this time. Bed locked in and in low position. Call light within reach. Will report to oncoming nurse at bedside.

## 2019-04-28 NOTE — PROGRESS NOTES
Problem: Falls - Risk of 
Goal: *Absence of Falls Description Document Nicole Blanco Fall Risk and appropriate interventions in the flowsheet. Outcome: Progressing Towards Goal 
  
Problem: Patient Education: Go to Patient Education Activity Goal: Patient/Family Education Outcome: Progressing Towards Goal

## 2019-04-28 NOTE — PROGRESS NOTES
PHYSICAL THERAPY: Initial Assessment 4/28/2019 INPATIENT:   
Payor: BLUE CROSS / Plan: [place] [facility] / Product Type: PPO /   
  
NAME/AGE/GENDER: Chelly Harris is a 60 y.o. female PRIMARY DIAGNOSIS: Cellulitis [L03.90] Cellulitis ICD-10: Treatment Diagnosis:  
 Generalized Muscle Weakness (M62.81) Other lack of cordination (R27.8) Difficulty in walking, Not elsewhere classified (R26.2) Dizziness and Giddiness (R42) Localized edema (R60.1) 
pain Precaution/Allergies: 
Pcn [penicillins] ASSESSMENT:  
 
Ms. Lisa Go presents with painful right foot 2* to her diagnosis. Pt has multiple medical problems. Pt is very weak from this hospitalization as well as what appears to be severe neuropathy in both legs. She can barely lift her legs through full range. She was dizzy upon standing and walking. She had difficulty getting to standing and to lift her leg to don socks. Pt has a nephew and brother and sister in law in the area, but state that they cannot offer her assistance. Pt really would benefit from some STR or inpatient Rehab to get back to full strength, but she does not think her insurance covers it. The very least would be HHPT but as of today she is not strong enough or safe enough to go home. Pt needs a SW and OT consult. Pt needs a rolling walker with a seat (rollator). 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 Pt will benefit from physical therapy for strengthening and mobility training. This section established at most recent assessment PROBLEM LIST (Impairments causing functional limitations): 
Decreased Strength Decreased ADL/Functional Activities Decreased Transfer Abilities Decreased Ambulation Ability/Technique Decreased Balance Increased Pain Decreased Activity Tolerance Increased Fatigue Decreased Flexibility/Joint Mobility Edema/Girth Decreased Skin Integrity/Hygeine Decreased [place] with Home Exercise Program 
 INTERVENTIONS PLANNED: (Benefits and precautions of physical therapy have been discussed with the patient.) Balance Exercise Gait Training Home Exercise Program (HEP) Range of Motion (ROM) Therapeutic Activites Therapeutic Exercise/Strengthening Transfer Training TREATMENT PLAN: Frequency/Duration: 3 times a week for duration of hospital stay Rehabilitation Potential For Stated Goals: Good REHAB RECOMMENDATIONS (at time of discharge pending progress):   
Placement: It is my opinion, based on this patient's performance to date, that Ms. Kauffman may benefit from intensive therapy at an [address] [place] [address] after discharge due to a probable need for multiple therapy disciplines and potential to make ongoing and sustainable functional improvement that is of practical value. Billy Rainey Equipment:  
Walkers, Type: rolling walker with a seat Hygiene Aides, Type: Raised Toilet Seat and Tub Seat/Chair  
    
3 HISTORY:  
History of Present Injury/Illness (Reason for Referral): 
Shy Melvin is a 60 y.o. female with a past medical history of DM type II, HTN who presents to the ER with complaint of progressively worsening R dorsal foot pain, redness, and swelling for hte past 2 days. She reports that the swelling started first about 3 days ago, then yesterday she noticed what appeared to be a dark purple bug bite on the dorsum of her foot. Since then, the area has become much larger, extending down nearly to her toes and almost to her ankle. She reports a fever and chills earlier today. Reports that PO tylenol helped some with the pain but that it is still present. Denies any nausea, vomiting. Past Medical History/Comorbidities: Ms. aKin Quiroz  has a past medical history of Diabetes ([facility] and Hypertension. Ms. Erlinda Romero  has a past surgical history that includes colonoscopy (N/A, 3/2/2019). Social History/Living Environment:  
Home Environment: Private residence # Steps to Enter: (5) Rails to Enter: Yes One/Two Story Residence: One story Living Alone: Yes Support Systems: Family member(s) Patient Expects to be Discharged to[de-identified] Unknown Current DME Used/Available at Home: Commode, bedside, Tub transfer bench, Walker, rollator Prior Level of Function/Work/Activity: 
Full time RN - coordinator [employer] Number of Personal Factors/Comorbidities that affect the Plan of Care: 3+: HIGH COMPLEXITY EXAMINATION:  
Most Recent Physical Functioning:  
Gross Assessment: 
  
         
RUE Strength R Shoulder Flexion: 4 
R Elbow Flexion: 4+ 
LUE Strength L Shoulder Flexion: 4+ L Elbow Flexion: 4+ 
RLE Strength 
R Hip Flexion: 3- 
R Knee Extension: 3- 
R Ankle Dorsiflexion: 3- 
R Ankle Eversion: 3- 
R Ankle Inversion: 3- 
LLE Strength L Hip Flexion: 3 L Ankle Dorsiflexion: 3+ 
L Ankle Eversion: 3 L Ankle Inversion: 3 Posture: 
  
Balance: 
  Bed Mobility: 
Scooting: Other (comment)(with difficulty) Wheelchair Mobility: 
  
Transfers: 
Sit to Stand: Additional time; Moderate assistance Stand to Sit: Independent Bed to Chair: Stand-by assistance Gait:  Pt ambulated with rolling walker with WBAT on the right foot. Pt was dizzy and had some large sways of balance. Pt states she has been getting up in her room for short distances without any assistive device but pt should off load that foot and the walker will help with the pain. Body Structures Involved: 
Nerves Bones Joints Muscles Ligaments Body Functions Affected: 
Sensory/Pain Neuromusculoskeletal 
Movement Related Skin Related Activities and Participation Affected: 
Learning and Applying Knowledge General Tasks and Demands Mobility Self Care Domestic Life Interpersonal Interactions and Relationships Community, Social and [place] Number of elements that affect the Plan of Care: 4+: HIGH COMPLEXITY CLINICAL PRESENTATION:  
Presentation: Evolving clinical presentation with unstable and unpredictable characteristics: HIGH COMPLEXITY CLINICAL DECISION MAKING:  
[employer] AM-PAC? ?6 Clicks? Basic Mobility Inpatient Short Form How much difficulty does the patient currently have. .. Unable A Lot A Little None 1. Turning over in bed (including adjusting bedclothes, sheets and blankets)? ? 1   ? 2   ? 3   ? 4  
2. Sitting down on and standing up from a chair with arms ( e.g., wheelchair, bedside commode, etc.)   ? 1   ? 2   ? 3   ? 4  
3. Moving from lying on back to sitting on the side of the bed?   ? 1   ? 2   ? 3   ? 4 How much help from another person does the patient currently need. .. Total A Lot A Little None 4. Moving to and from a bed to a chair (including a wheelchair)? ? 1   ? 2   ? 3   ? 4  
5. Need to walk in hospital room? ? 1   ? 2   ? 3   ? 4  
6. Climbing 3-5 steps with a railing? ? 1   ? 2   ? 3   ? 4  
© 2007, Trustees of [facility] [employer], under license to [employer]. All rights reserved Score:  Initial: 17 Most Recent: X (Date: -- ) Interpretation of Tool:  Represents activities that are increasingly more difficult (i.e. Bed mobility, Transfers, Gait). Medical Necessity:    
Skilled intervention continues to be required due to debility and weakness. Reason for Services/Other Comments: 
Patient continues to require skilled intervention due to medical complications and patient unable to attend/participate in therapy as expected Haile Pérez Use of outcome tool(s) and clinical judgement create a POC that gives a: Difficult prediction of patient's progress: HIGH COMPLEXITY  
  
 
 
 
TREATMENT:  
(In addition to Assessment/Re-Assessment sessions the following treatments were rendered) Pre-treatment Symptoms/Complaints:  pain, fatigue, dizziness Pain: Initial: 7/10;  declined pain meds until after treatment Post Session:  same. Therapeutic Activity: (    15 minutes): Therapeutic activities including Bed transfers, Chair transfers, Toilet transfers and Ambulation on level ground to improve mobility, strength and balance. Required moderate   to to perform safely . Therapeutic Exercise: ( 15 minutes):  Exercises per grid below to improve strength and coordination. Required minimal visual and verbal cues to promote proper body breathing techniques. Progressed repetitions as indicated. Date: 
4/28/19 Date: 
 Date: 
  
ACTIVITY/EXERCISE AM PM AM PM AM PM  
GROUP THERAPY  ?  ?  ?  ?  ?  ? Ankle Pumps 2 x 10 B Quad Sets X10 B Gluteal Sets Hip ABd/ADduction Straight Leg Raises X5 B Knee Slides X5 B Short Arc [facility] [place] [address] Chair Slides B = bilateral; AA = active assistive; A = active; P = passive Braces/Orthotics/Lines/Etc:  
IV 
O2 Device: Room air Treatment/Session Assessment:   
Response to Treatment:  SLOW, weak, dizzy. Interdisciplinary Collaboration:  
Registered Nurse, Jeffery Goncalves After treatment position/precautions:  
Up in chair Compliance with Program/Exercises: Will assess as treatment progresses Recommendations/Intent for next treatment session: \"Next visit will focus on advancements to more challenging activities and reduction in assistance provided\". Total Treatment Duration: PT Patient Time In/Time Out Time In: 1008 Time Out: 1056 Domenica Adair PT

## 2019-04-29 ENCOUNTER — APPOINTMENT (OUTPATIENT)
Dept: CT IMAGING | Age: 64
DRG: 872 | End: 2019-04-29
Attending: INTERNAL MEDICINE
Payer: COMMERCIAL

## 2019-04-29 LAB
ANION GAP SERPL CALC-SCNC: 9 MMOL/L (ref 7–16)
BACTERIA SPEC CULT: NORMAL
BACTERIA SPEC CULT: NORMAL
BUN SERPL-MCNC: 31 MG/DL (ref 8–23)
CALCIUM SERPL-MCNC: 7.7 MG/DL (ref 8.3–10.4)
CHLORIDE SERPL-SCNC: 114 MMOL/L (ref 98–107)
CO2 SERPL-SCNC: 16 MMOL/L (ref 21–32)
CREAT SERPL-MCNC: 0.98 MG/DL (ref 0.6–1)
GLUCOSE BLD STRIP.AUTO-MCNC: 108 MG/DL (ref 65–100)
GLUCOSE BLD STRIP.AUTO-MCNC: 117 MG/DL (ref 65–100)
GLUCOSE BLD STRIP.AUTO-MCNC: 121 MG/DL (ref 65–100)
GLUCOSE BLD STRIP.AUTO-MCNC: 77 MG/DL (ref 65–100)
GLUCOSE SERPL-MCNC: 76 MG/DL (ref 65–100)
MAGNESIUM SERPL-MCNC: 1.8 MG/DL (ref 1.8–2.4)
MM INDURATION POC: 0 MM (ref 0–5)
POTASSIUM SERPL-SCNC: 3.7 MMOL/L (ref 3.5–5.1)
PPD POC: NORMAL NEGATIVE
SERVICE CMNT-IMP: NORMAL
SERVICE CMNT-IMP: NORMAL
SODIUM SERPL-SCNC: 139 MMOL/L (ref 136–145)

## 2019-04-29 PROCEDURE — 36415 COLL VENOUS BLD VENIPUNCTURE: CPT

## 2019-04-29 PROCEDURE — 97530 THERAPEUTIC ACTIVITIES: CPT

## 2019-04-29 PROCEDURE — 77030020263 HC SOL INJ SOD CL0.9% LFCR 1000ML

## 2019-04-29 PROCEDURE — 97165 OT EVAL LOW COMPLEX 30 MIN: CPT

## 2019-04-29 PROCEDURE — 77030031642 HC BOOT FT ROOKE HFS OSBM -B

## 2019-04-29 PROCEDURE — 65270000029 HC RM PRIVATE

## 2019-04-29 PROCEDURE — 80048 BASIC METABOLIC PNL TOTAL CA: CPT

## 2019-04-29 PROCEDURE — 82962 GLUCOSE BLOOD TEST: CPT

## 2019-04-29 PROCEDURE — 74011250637 HC RX REV CODE- 250/637: Performed by: FAMILY MEDICINE

## 2019-04-29 PROCEDURE — 73701 CT LOWER EXTREMITY W/DYE: CPT

## 2019-04-29 PROCEDURE — 74011250636 HC RX REV CODE- 250/636: Performed by: FAMILY MEDICINE

## 2019-04-29 PROCEDURE — 74011250637 HC RX REV CODE- 250/637: Performed by: INTERNAL MEDICINE

## 2019-04-29 PROCEDURE — 74011636637 HC RX REV CODE- 636/637: Performed by: FAMILY MEDICINE

## 2019-04-29 PROCEDURE — 83735 ASSAY OF MAGNESIUM: CPT

## 2019-04-29 PROCEDURE — 74011636320 HC RX REV CODE- 636/320: Performed by: INTERNAL MEDICINE

## 2019-04-29 RX ORDER — HYDROCODONE BITARTRATE AND ACETAMINOPHEN 5; 325 MG/1; MG/1
1 TABLET ORAL
Status: DISCONTINUED | OUTPATIENT
Start: 2019-04-29 | End: 2019-05-02 | Stop reason: HOSPADM

## 2019-04-29 RX ADMIN — GABAPENTIN 300 MG: 300 CAPSULE ORAL at 20:41

## 2019-04-29 RX ADMIN — Medication 10 ML: at 12:04

## 2019-04-29 RX ADMIN — CEPHALEXIN 500 MG: 500 CAPSULE ORAL at 05:32

## 2019-04-29 RX ADMIN — ATORVASTATIN CALCIUM 10 MG: 10 TABLET, FILM COATED ORAL at 08:14

## 2019-04-29 RX ADMIN — PANTOPRAZOLE SODIUM 40 MG: 40 TABLET, DELAYED RELEASE ORAL at 08:14

## 2019-04-29 RX ADMIN — Medication 500 MG: at 17:05

## 2019-04-29 RX ADMIN — LEVOTHYROXINE SODIUM 125 MCG: 75 TABLET ORAL at 05:32

## 2019-04-29 RX ADMIN — IBUPROFEN 600 MG: 600 TABLET ORAL at 11:01

## 2019-04-29 RX ADMIN — SERTRALINE HYDROCHLORIDE 50 MG: 50 TABLET ORAL at 08:14

## 2019-04-29 RX ADMIN — Medication 10 ML: at 13:34

## 2019-04-29 RX ADMIN — INSULIN GLARGINE 40 UNITS: 100 INJECTION, SOLUTION SUBCUTANEOUS at 07:00

## 2019-04-29 RX ADMIN — DOXYCYCLINE HYCLATE 100 MG: 100 CAPSULE ORAL at 20:40

## 2019-04-29 RX ADMIN — CEPHALEXIN 500 MG: 500 CAPSULE ORAL at 17:05

## 2019-04-29 RX ADMIN — IOPAMIDOL 100 ML: 755 INJECTION, SOLUTION INTRAVENOUS at 12:04

## 2019-04-29 RX ADMIN — Medication 10 ML: at 05:33

## 2019-04-29 RX ADMIN — TRAMADOL HYDROCHLORIDE 50 MG: 50 TABLET, FILM COATED ORAL at 08:14

## 2019-04-29 RX ADMIN — DOXYCYCLINE HYCLATE 100 MG: 100 CAPSULE ORAL at 08:14

## 2019-04-29 RX ADMIN — TRAMADOL HYDROCHLORIDE 50 MG: 50 TABLET, FILM COATED ORAL at 17:04

## 2019-04-29 RX ADMIN — Medication 10 ML: at 22:26

## 2019-04-29 RX ADMIN — CEPHALEXIN 500 MG: 500 CAPSULE ORAL at 23:13

## 2019-04-29 RX ADMIN — SODIUM CHLORIDE 125 ML/HR: 900 INJECTION, SOLUTION INTRAVENOUS at 05:37

## 2019-04-29 RX ADMIN — CEPHALEXIN 500 MG: 500 CAPSULE ORAL at 11:07

## 2019-04-29 RX ADMIN — Medication 500 MG: at 08:14

## 2019-04-29 RX ADMIN — IBUPROFEN 600 MG: 600 TABLET ORAL at 20:40

## 2019-04-29 NOTE — WOUND CARE
Patient seen for large blister and purple areas to right foot. Large clear blister to dorsal foot with redness and new purple discoloration/blistering to plantar instep/arch. Weeping serous drainage. Started xeroform dressing. Had CT today. May need surgical consult if area does not improve. 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 She just started back to work prior to this admission. Discussed blood glucose and healing. Answered all present questions. Will order a [place] boot for this extremity as discussed with patient. Wound team will follow.

## 2019-04-29 NOTE — PROGRESS NOTES
Hospitalist Progress Note Admit Date:  2019  6:20 PM  
Name:  Jose G Spring Age:  60 y.o. 
:  1955 MRN:  [id number] PCP:  Keren Pérez MD 
Treatment Team: Attending Provider: Chito Salmon MD; Care Manager: Elaine Potter RN; Occupational Therapist: Dory Goncalves OT 
 
HPI/Subjective:  
Patient is a 67yo F with hx DM, recent diagnosis cirrhosis, anemia with ablation of AVM on colonoscopy who presented with redness, swelling pain on dorsum of R foot.  Admitted for diabetic foot infection. : Foot and RLE still swollen, redness the same. Labs stable and normal, afebrile. She's in bed, lights off. Worked w PT yesterday who rec STR v HH but patient declines both, says she has friends who work in PT/HH who will help her. Still c/o foot pain. A few more blisters developed. ROS otherwise negative. Objective:  
 
Patient Vitals for the past 24 hrs: 
 Temp Pulse Resp BP SpO2  
19 0735 98 °F (36.7 °C) 78 16 110/66 98 % 19 0512 97.9 °F (36.6 °C) 89 16 105/60 97 % 19 0022 97.8 °F (36.6 °C) 85 16 96/55 98 % 19 1939 97.9 °F (36.6 °C) 90 16 98/59 97 % 19 1608 97.9 °F (36.6 °C) 82 16 90/43 99 % 19 1203 97.5 °F (36.4 °C) 84 16 92/50 98 % Oxygen Therapy O2 Sat (%): 98 % (19 0735) Pulse via Oximetry: 81 beats per minute (19 2141) O2 Device: Room air (19 0215) Intake/Output Summary (Last 24 hours) at 2019 9071 Last data filed at 2019 4871 Gross per 24 hour Intake 1742 ml Output 700 ml Net 1042 ml *Note that automatically entered I/Os may not be accurate; dependent on patient compliance with collection and accurate  by techs. General:    Well nourished. Alert. CV:   RRR. No murmur, rub, or gallop. Lungs:   CTAB. No wheezing, rhonchi, or rales. Abdomen:   Soft, nontender, nondistended. Extremities: Warm and dry.  2+ pitting edema to RLE and foot, no crepitus appreciated. Erythema to dorsal mid foot with a few scattered blisters, large one on dorsum popped and dressed (serous drainage). Skin:     No rashes or jaundice. Neuro:  No gross focal deficits Data Review: 
I have reviewed all labs, meds, and studies from the last 24 hours: 
 
Recent Results (from the past 24 hour(s)) GLUCOSE, POC Collection Time: 04/28/19 11:26 AM  
Result Value Ref Range Glucose (POC) 103 (H) 65 - 100 mg/dL GLUCOSE, POC Collection Time: 04/28/19  4:07 PM  
Result Value Ref Range Glucose (POC) 104 (H) 65 - 100 mg/dL GLUCOSE, POC Collection Time: 04/28/19  8:48 PM  
Result Value Ref Range Glucose (POC) 146 (H) 65 - 100 mg/dL METABOLIC PANEL, BASIC Collection Time: 04/29/19  4:30 AM  
Result Value Ref Range Sodium 139 136 - 145 mmol/L Potassium 3.7 3.5 - 5.1 mmol/L Chloride 114 (H) 98 - 107 mmol/L  
 CO2 16 (L) 21 - 32 mmol/L Anion gap 9 7 - 16 mmol/L Glucose 76 65 - 100 mg/dL BUN 31 (H) 8 - 23 MG/DL Creatinine 0.98 0.6 - 1.0 MG/DL  
 GFR est AA >60 >60 ml/min/1.73m2 GFR est non-AA >60 >60 ml/min/1.73m2 Calcium 7.7 (L) 8.3 - 10.4 MG/DL MAGNESIUM Collection Time: 04/29/19  4:30 AM  
Result Value Ref Range Magnesium 1.8 1.8 - 2.4 mg/dL GLUCOSE, POC Collection Time: 04/29/19  7:14 AM  
Result Value Ref Range Glucose (POC) 77 65 - 100 mg/dL All Micro Results Procedure Component Value Units Date/Time CULTURE, BLOOD [[id number]] Collected:  04/24/19 1706 Order Status:  Completed Specimen:  Blood Updated:  04/28/19 7078 Special Requests: --     
  LEFT 
FOREARM Culture result: NO GROWTH 4 DAYS     
 CULTURE, BLOOD [[id number]] Collected:  04/24/19 1839 Order Status:  Completed Specimen:  Blood Updated:  04/28/19 7857 Special Requests: LEFT ANTECUBITAL Culture result: NO GROWTH 4 DAYS     
 CULTURE, URINE [[id number]] Collected:  04/25/19 0356 Order Status:  Completed Specimen:  Urine from Clean catch Updated:  04/27/19 0745 Special Requests: NO SPECIAL REQUESTS Culture result:    
  10,000 to 50,000 COLONIES/mL MIXED SKIN VINCE ISOLATED No results found for this visit on 04/24/19. Current Meds: 
Current Facility-Administered Medications Medication Dose Route Frequency  tuberculin injection 5 Units  5 Units IntraDERMal ONCE  Saccharomyces boulardii (FLORASTOR) capsule 500 mg  500 mg Oral BID  doxycycline (VIBRAMYCIN) capsule 100 mg  100 mg Oral Q12H  cephALEXin (KEFLEX) capsule 500 mg  500 mg Oral Q6H  
 ibuprofen (MOTRIN) tablet 600 mg  600 mg Oral Q6H PRN  
 lip protectant (BLISTEX) ointment   Topical PRN  
 traMADol (ULTRAM) tablet 50 mg  50 mg Oral Q6H PRN  
 sodium chloride (NS) flush 5-10 mL  5-10 mL IntraVENous PRN  
 atorvastatin (LIPITOR) tablet 10 mg  10 mg Oral DAILY  insulin regular (NOVOLIN R, HUMULIN R) injection   SubCUTAneous AC&HS  
 gabapentin (NEURONTIN) capsule 300 mg  300 mg Oral QHS  insulin glargine (LANTUS) injection 40 Units  40 Units SubCUTAneous 7am  
 levothyroxine (SYNTHROID) tablet 125 mcg  125 mcg Oral ACB  pantoprazole (PROTONIX) tablet 40 mg  40 mg Oral DAILY  sertraline (ZOLOFT) tablet 50 mg  50 mg Oral DAILY  sodium chloride (NS) flush 5-40 mL  5-40 mL IntraVENous Q8H  
 sodium chloride (NS) flush 5-40 mL  5-40 mL IntraVENous PRN  
 acetaminophen (TYLENOL) tablet 650 mg  650 mg Oral Q4H PRN  
 ondansetron (ZOFRAN) injection 4 mg  4 mg IntraVENous Q4H PRN  
 magnesium hydroxide (MILK OF MAGNESIA) 400 mg/5 mL oral suspension 30 mL  30 mL Oral DAILY PRN  
 0.9% sodium chloride infusion  125 mL/hr IntraVENous CONTINUOUS Other Studies (last 24 hours): No results found. Assessment and Plan:  
 
Hospital Problems as of 4/29/2019 Date Reviewed: 4/24/2019 Codes Class Noted - Resolved POA Sepsis ([facility] ICD-10-CM: A41.9 ICD-9-CM: 038.9, 995.91  4/25/2019 - Present Yes Type 2 diabetes mellitus with diabetic neuropathy (HCC) ICD-10-CM: E11.40 ICD-9-CM: 250.60, 357.2  4/24/2019 - Present Yes * (Principal) Cellulitis ICD-10-CM: L03.90 ICD-9-CM: 682.9  4/24/2019 - Present Yes Cirrhosis of liver without ascites (HCC) ICD-10-CM: K74.60 ICD-9-CM: 571.5  3/7/2019 - Present Yes Microcytic anemia ICD-10-CM: D50.9 ICD-9-CM: 280.9  2/27/2019 - Present Yes Type 2 diabetes with nephropathy ([facility] ICD-10-CM: E11.21 
ICD-9-CM: 250.40, 583.81  1/16/2019 - Present Yes Hyperlipidemia ICD-10-CM: E78.5 ICD-9-CM: 272.4  1/12/2016 - Present Yes Essential hypertension ICD-10-CM: I10 
ICD-9-CM: 401.9  1/12/2016 - Present Yes Acquired hypothyroidism ICD-10-CM: E03.9 ICD-9-CM: 244.9  1/12/2016 - Present Yes Uncontrolled type II diabetes mellitus ([facility] ICD-10-CM: E11.65 ICD-9-CM: 250.02  1/12/2016 - Present Yes Plan: # Right foot cellulitis             - X-rays neg for fracture or bone destruction             - Change to doxy/keflex 4/26, add probiotic. Elevate RLE.             - US w/o DVT 
            - Still with edema and pain. CT foot today to r/o abscess/fluid collection. 
  
# HypoK             - resolved 
  
# Microcytic anemia             - Hb stable. DC heparin and encourage ambulation 
            - recently admitted with colonic AVMs  
  
# Hypothyroid 
            - Synthroid 
  
# DM2 
            - Basal/bolus 
 - sugars controlled 
  
# HLD 
            - statin 
  
# Cirrhosis/thrombocytopenia 
            - stable DC planning/Dispo: Declines HH/STR. Home when able. Diet:  DIET DIABETIC CONSISTENT CARB 
DVT ppx: ambulation Signed: 
Neeru Gonzales MD

## 2019-04-29 NOTE — PROGRESS NOTES
PHYSICAL THERAPY: Daily Note and PM 4/29/2019 INPATIENT: PT Visit Days : 1 Payor: [employer] / Plan: [place] BLUE CROSS [facility] / Product Type: PPO /   
  
NAME/AGE/GENDER: Chaz Snow is a 60 y.o. female PRIMARY DIAGNOSIS: Cellulitis [L03.90] Cellulitis ICD-10: Treatment Diagnosis:  
 · Generalized Muscle Weakness (M62.81) · Other lack of cordination (R27.8) · Difficulty in walking, Not elsewhere classified (R26.2) · Dizziness and Giddiness (R42) · Localized edema (R60.1) · pain Precaution/Allergies: 
Pcn [penicillins] ASSESSMENT:  
Ms. Dionne Salazar presents sitting up in ibo 9127 with continued right foot pain due to ulcer/cellulitis. Recent dressing change. Pt performs transfers without assistance. Discussed use of walker to offload/decrease weight bearing on right foot. Has increased pain in dorsum of foot with standing. Ambulates 45 ft in room with walker and CGA-SBA for safety. Verbal cues for posture, gait mechanics, and safety. Slow, slightly unsteady gait with no reports of dizziness. Pt declines further activity (Exercises or sitting in chair) and requests to return back to bed. Bed mobility with supervision. Left in supine with needs in reach. She does not want rollator but would prefer rolling walker. Does not want rehab. This section established at most recent assessment PROBLEM LIST (Impairments causing functional limitations): 1. Decreased Strength 2. Decreased ADL/Functional Activities 3. Decreased Transfer Abilities 4. Decreased Ambulation Ability/Technique 5. Decreased Balance 6. Increased Pain 7. Decreased Activity Tolerance 8. Increased Fatigue 9. Decreased Flexibility/Joint Mobility 10. Edema/Girth 11. Decreased Skin Integrity/Hygeine 12. Decreased [place] with Home Exercise Program 
 INTERVENTIONS PLANNED: (Benefits and precautions of physical therapy have been discussed with the patient.) 1. Balance Exercise 2. Gait Training 3. Home Exercise Program (HEP) 4. Range of Motion (ROM) 5. Therapeutic Activites 6. Therapeutic Exercise/Strengthening 7. Transfer Training TREATMENT PLAN: Frequency/Duration: 3 times a week for duration of hospital stay Rehabilitation Potential For Stated Goals: Good REHAB RECOMMENDATIONS (at time of discharge pending progress):   
Placement: It is my opinion, based on this patient's performance to date, that Ms. Kauffman may benefit from intensive therapy at an [address] [place] [address] after discharge due to a probable need for multiple therapy disciplines and potential to make ongoing and sustainable functional improvement that is of practical value. Tyson Botello Equipment: ? Hygiene Aides, Type: Raised Toilet Seat and Tub Seat/Chair 
? rolling walker HISTORY:  
History of Present Injury/Illness (Reason for Referral): 
Roselyn Duque is a 60 y.o. female with a past medical history of DM type II, HTN who presents to the ER with complaint of progressively worsening R dorsal foot pain, redness, and swelling for hte past 2 days. She reports that the swelling started first about 3 days ago, then yesterday she noticed what appeared to be a dark purple bug bite on the dorsum of her foot. Since then, the area has become much larger, extending down nearly to her toes and almost to her ankle. She reports a fever and chills earlier today. Reports that PO tylenol helped some with the pain but that it is still present. Denies any nausea, vomiting. Past Medical History/Comorbidities: Ms. Julissa Blanton  has a past medical history of Diabetes ([address] and Hypertension. Ms. Julissa Blanton  has a past surgical history that includes colonoscopy (N/A, 3/2/2019). Social History/Living Environment:  
Home Environment: Private residence # Steps to Enter: 1 Rails to Enter: Yes One/Two Story Residence: One story Living Alone: Yes Support Systems: Friends \ neighbors Patient Expects to be Discharged to[de-identified] Unknown Current DME Used/Available at Home: None Tub or Shower Type: Tub/Shower combination Prior Level of Function/Work/Activity: 
Full time RN - coordinator Trevor Number of Personal Factors/Comorbidities that affect the Plan of Care: 3+: HIGH COMPLEXITY EXAMINATION:  
Most Recent Physical Functioning:  
Gross Assessment: 
  
         
  
Posture: 
  
Balance: 
Sitting: Intact Standing: Impaired Standing - Static: Fair Standing - Dynamic : Fair Bed Mobility: 
Sit to Supine: Supervision Scooting: Supervision Wheelchair Mobility: 
  
Transfers: 
Sit to Stand: Stand-by assistance Stand to Sit: Stand-by assistance Bed to Chair: Stand-by assistance Interventions: Verbal cues; Safety awareness training Duration: 15 Minutes Gait:   
  
Base of Support: Center of gravity altered;Shift to left Speed/Tamela: Pace decreased (<100 feet/min); Slow Step Length: Left shortened;Right shortened Gait Abnormalities: Trunk sway increased;Decreased step clearance Distance (ft): 45 Feet (ft) Assistive Device: Walker, rolling Ambulation - Level of Assistance: Stand-by assistance;Supervision Interventions: Verbal cues; Safety awareness training Body Structures Involved: 1. Nerves 2. Bones 3. Joints 4. Muscles 5. Ligaments Body Functions Affected: 1. Sensory/Pain 2. Neuromusculoskeletal 
3. Movement Related 4. Skin Related Activities and Participation Affected: 1. Learning and Applying Knowledge 2. General Tasks and Demands 3. Mobility 4. Self Care 5. Domestic Life 6. Interpersonal Interactions and Relationships 7. Community, Social and [place] Number of elements that affect the Plan of Care: 4+: HIGH COMPLEXITY CLINICAL PRESENTATION:  
Presentation: Evolving clinical presentation with unstable and unpredictable characteristics: HIGH COMPLEXITY CLINICAL DECISION MAKING:  
[employer] AM-PAC 6 Clicks Basic Mobility Inpatient Short Form How much difficulty does the patient currently have. .. Unable A Lot A Little None 1. Turning over in bed (including adjusting bedclothes, sheets and blankets)? ? 1   ? 2   ? 3   ? 4  
2. Sitting down on and standing up from a chair with arms ( e.g., wheelchair, bedside commode, etc.)   ? 1   ? 2   ? 3   ? 4  
3. Moving from lying on back to sitting on the side of the bed?   ? 1   ? 2   ? 3   ? 4 How much help from another person does the patient currently need. .. Total A Lot A Little None 4. Moving to and from a bed to a chair (including a wheelchair)? ? 1   ? 2   ? 3   ? 4  
5. Need to walk in hospital room? ? 1   ? 2   ? 3   ? 4  
6. Climbing 3-5 steps with a railing? ? 1   ? 2   ? 3   ? 4  
© 2007, Trustees of [address] [place] [id number], under license to [employer] All rights reserved Score:  Initial: 17 Most Recent: X (Date: -- ) Interpretation of Tool:  Represents activities that are increasingly more difficult (i.e. Bed mobility, Transfers, Gait). Medical Necessity:    
· Skilled intervention continues to be required due to debility and weakness. Reason for Services/Other Comments: 
· Patient continues to require skilled intervention due to medical complications and patient unable to attend/participate in therapy as expected · . Use of outcome tool(s) and clinical judgement create a POC that gives a: Difficult prediction of patient's progress: HIGH COMPLEXITY  
  
 
 
 
TREATMENT:  
(In addition to Assessment/Re-Assessment sessions the following treatments were rendered) Pre-treatment Symptoms/Complaints:  \"I can do a little walking\" Pain: Initial:  
Pain Intensity 1: 6 Pain Location 1: Foot Pain Orientation 1: Right Pain Intervention(s) 1: Repositioned  Post Session:  Better once in supine Therapeutic Activity: (  15 Minutes ):  Therapeutic activities including Bed mobility, Chair transfers, standing balance and review of gait mechanics to decrease pain, and Ambulation on level ground to improve mobility, strength and balance. Required minimal assist and Verbal cues; Safety awareness training to promote dynamic balance in standing. Therapeutic Exercise: ( 0):  Exercises per grid below to improve strength and coordination. Required minimal visual and verbal cues to promote proper body breathing techniques. Progressed repetitions as indicated. Date: 
4/28/19 Date: 
 Date: 
  
ACTIVITY/EXERCISE AM PM AM PM AM PM  
GROUP THERAPY  ?  ?  ?  ?  ?  ? Ankle Pumps 2 x 10 B Quad Sets X10 B Gluteal Sets Hip ABd/ADduction Straight Leg Raises X5 B Knee Slides X5 B Short Arc The [address] Chair Slides B = bilateral; AA = active assistive; A = active; P = passive Braces/Orthotics/Lines/Etc:  
· O2 Device: Room air Treatment/Session Assessment:   
· Response to Treatment:  R foot pain with standing · Interdisciplinary Collaboration:  
o Physical Therapist 
o Registered Nurse · After treatment position/precautions:  
o Supine in bed 
o Bed/Chair-wheels locked 
o Bed in low position 
o Call light within reach · Compliance with Program/Exercises: Will assess as treatment progresses · Recommendations/Intent for next treatment session: \"Next visit will focus on advancements to more challenging activities and reduction in assistance provided\". Total Treatment Duration: PT Patient Time In/Time Out Time In: 1659 Time Out: 1400 Cristofer Leone DPT

## 2019-04-29 NOTE — PROGRESS NOTES
Problem: Self Care Deficits Care Plan (Adult) Goal: *Acute Goals and Plan of Care (Insert Text) Description 1. Patient will complete lower body bathing and dressing with modified independence and adaptive equipment as needed. 2. Patient will complete toileting with modified independence. 3. Patient will tolerate 30 minutes of OT treatment with 2-3 rest breaks to increase activity tolerance for ADLs. 4. Patient will complete functional transfers with modified independence and adaptive equipment as needed. 5. Patient will complete functional mobility for household distances with modified independence and good safety awareness. Timeframe: 7 visits Outcome: Progressing Towards Goal 
  
 
OCCUPATIONAL THERAPY: Initial Assessment, Daily Note and AM 4/29/2019 INPATIENT: OT Visit Days: 1 Payor: BLUE CROSS / Plan: [place] [employer] [place] / Product Type: PPO /  
  
NAME/AGE/GENDER: Nunu Cantu is a 60 y.o. female PRIMARY DIAGNOSIS:  Cellulitis [L03.90] Cellulitis Cellulitis ICD-10: Treatment Diagnosis:  
 Generalized Muscle Weakness (M62.81) Other lack of cordination (R27.8) History of falling (Z91.81) Dizziness and Giddiness (R42) Localized edema (R60.1) Precautions/Allergies: 
   Pcn [penicillins] ASSESSMENT:  
Ms. Skyla Krishnamurthy presents to the hospital with cellulitis of her R foot. MD is examining pt's foot upon arrival. Pt's R foot continues to be painful with redness, blistering, and swelling. Pt is supine in bed and appears appropriate for her age. Pt needed some encouragement to participate in therapy this am. Pt was able to complete her own bed mobility but did use the bed rails to assist. Pt was unable to don socks at the edge of the bed and therapist assisted her with this today. Pt demonstrates functional but overall decreased upper body strength. Pt was able to complete functional transfers with SBA/CGA.  Pt educated on use of walker to decrease weight through R foot. Pt was able to balance on her R heel as she took steps with the rolling walker in the room. Pt does c/o dizziness while standing but did not demonstrate any major loss of balance (only completed functional mobility for short distance). Pt educated on allowing additional time between transfers to assess dizziness with pt verbalizing understanding. Pt is concerned about going home alone but does not feel she has an option due to stating she would not be able to afford co-payment at rehab. [facility] due to having a somewhat \"unfriendly\" dog. Pt educated on safety strategies for home with pt verbalizing understanding. Pt is currently functioning below baseline and will benefit from OT services to address stated goals and plan of care. BP sitting 125/58 BP standing 97/54 BP post-activity 109/50 This section established at most recent assessment PROBLEM LIST (Impairments causing functional limitations): 
Decreased Strength Decreased ADL/Functional Activities Decreased Transfer Abilities Decreased Ambulation Ability/Technique Decreased Balance Increased Pain Decreased Activity Tolerance Decreased Flexibility/Joint Mobility Edema/Girth Decreased Skin Integrity/Hygeine Decreased [place] with Home Exercise Program 
 INTERVENTIONS PLANNED: (Benefits and precautions of occupational therapy have been discussed with the patient.) Activities of daily living training Adaptive equipment training Balance training Clothing management Donning&doffing training Neuromuscular re-eduation Therapeutic activity Therapeutic exercise TREATMENT PLAN: Frequency/Duration: Follow patient 3 times per week to address above goals. Rehabilitation Potential For Stated Goals: Good REHAB RECOMMENDATIONS (at time of discharge pending progress):   
Placement:  
It is my opinion, based on this patient's performance to date, that Ms. Daly may benefit from intensive therapy at an [address] [place] [address] after discharge due to potential to make ongoing and sustainable functional improvement that is of practical value. Wily Bernabe Equipment:  
BSC (discussed with patient and she states she will get it on her own) OCCUPATIONAL PROFILE AND HISTORY:  
History of Present Injury/Illness (Reason for Referral): 
See H&P Past Medical History/Comorbidities: Ms. Lisa Go  has a past medical history of Diabetes ([address] and Hypertension. Ms. Lisa Go  has a past surgical history that includes colonoscopy (N/A, 3/2/2019). Social History/Living Environment:  
Home Environment: Private residence # Steps to Enter: 1 Rails to Enter: Yes One/Two Story Residence: One story Living Alone: Yes Support Systems: Friends \ neighbors Patient Expects to be Discharged to[de-identified] Unknown Current DME Used/Available at Home: None Tub or Shower Type: Tub/Shower combination Prior Level of Function/Work/Activity: 
Pt lives at home alone. Pt is typically independent with ADL/functional mobility. Pt had a fall a few months ago. 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   
      Social Background:  Lives alone Profession:  RN and works full-time Other factors that influence how disability is experienced by the patient:  multiple co-morbidities Number of Personal Factors/Comorbidities that affect the Plan of Care: Expanded review of therapy/medical records (1-2):  MODERATE COMPLEXITY ASSESSMENT OF OCCUPATIONAL PERFORMANCE[de-identified]  
Activities of Daily Living:  
Basic ADLs (From Assessment) Complex ADLs (From Assessment) Feeding: Setup Oral Facial Hygiene/Grooming: Stand-by assistance Bathing: Moderate assistance Upper Body Dressing: Stand-by assistance Lower Body Dressing: Moderate assistance Toileting: Minimum assistance Instrumental ADL Meal Preparation: Moderate assistance Homemaking: Maximum assistance Grooming/Bathing/Dressing Activities of Daily Living Cognitive Retraining Safety/Judgement: Decreased awareness of need for assistance; Fall prevention;Home safety Bed/Mat Mobility Rolling: Stand-by assistance Supine to Sit: Stand-by assistance Sit to Supine: Stand-by assistance Sit to Stand: Contact guard assistance Stand to Sit: Stand-by assistance Bed to Chair: Stand-by assistance Scooting: Stand-by assistance Most Recent Physical Functioning:  
Gross Assessment: 
AROM: Within functional limits Strength: Generally decreased, functional 
Coordination: Generally decreased, functional 
         
  
Posture: 
  
Balance: 
Sitting: Intact Standing: Impaired Standing - Static: Fair Standing - Dynamic : Fair Bed Mobility: 
Rolling: Stand-by assistance Supine to Sit: Stand-by assistance Sit to Supine: Stand-by assistance Scooting: Stand-by assistance Wheelchair Mobility: 
  
Transfers: 
Sit to Stand: Contact guard assistance Stand to Sit: Stand-by assistance Bed to Chair: Stand-by assistance Patient Vitals for the past 6 hrs: 
 BP SpO2 Pulse 19 0735 110/66 98 % 78  
19 1024 100/58 97 % 68 Mental Status Neurologic State: Alert Orientation Level: Oriented X4 Cognition: Appropriate for age attention/concentration, Follows commands Perception: Appears intact Perseveration: No perseveration noted Safety/Judgement: Decreased awareness of need for assistance, Fall prevention, Home safety Physical Skills Involved: 
Balance Strength Activity Tolerance Sensation Pain (acute) Edema Skin Integrity Cognitive Skills Affected (resulting in the inability to perform in a timely and safe manner): 
None  Psychosocial Skills Affected: 
Self-Awareness Number of elements that affect the Plan of Care: 5+:  HIGH COMPLEXITY CLINICAL DECISION MAKIN [facility] [address] [address] AM-PAC? ?6 Clicks? Daily Activity Inpatient Short Form How much help from another person does the patient currently need. .. Total A Lot A Little None 1. Putting on and taking off regular lower body clothing? ? 1   ? 2   ? 3   ? 4  
2. Bathing (including washing, rinsing, drying)? ? 1   ? 2   ? 3   ? 4  
3. Toileting, which includes using toilet, bedpan or urinal?   ? 1   ? 2   ? 3   ? 4  
4. Putting on and taking off regular upper body clothing? ? 1   ? 2   ? 3   ? 4  
5. Taking care of personal grooming such as brushing teeth? ? 1   ? 2   ? 3   ? 4  
6. Eating meals? ? 1   ? 2   ? 3   ? 4  
© 2007, Trustees of [address] [place] [address] [address] [address], under license to [employer]. All rights reserved Score:  Initial: 16 Most Recent: X (Date: -- ) Interpretation of Tool:  Represents activities that are increasingly more difficult (i.e. Bed mobility, Transfers, Gait). Medical Necessity:    
Patient demonstrates good 
 rehab potential due to higher previous functional level. Reason for Services/Other Comments: 
Patient continues to require skilled intervention due to decreased independence with ADL/functional transfers Juwan Hernandez Use of outcome tool(s) and clinical judgement create a POC that gives a: LOW COMPLEXITY  
 
 
 
TREATMENT:  
(In addition to Assessment/Re-Assessment sessions the following treatments were rendered) Pre-treatment Symptoms/Complaints:   
Pain: Initial:  
Pain Intensity 1: 7 Pain Location 1: Foot Pain Orientation 1: Right Pain Intervention(s) 1: Repositioned  Post Session:  same Therapeutic Activity: (    8 minutes): Therapeutic activities including Bed transfers, Chair transfers and Ambulation on level ground to improve mobility, strength, balance and coordination. Required minimal verbal/visual/tactile cues   to promote dynamic balance in standing. N/a Braces/Orthotics/Lines/Etc:  
IV 
O2 Device: Room air Treatment/Session Assessment:   
Response to Treatment:  Pt tolerated with c/o pain in R foot and some dizziness. Interdisciplinary Collaboration: Occupational Therapist 
Registered Nurse Physician After treatment position/precautions:  
Supine in bed Bed/Chair-wheels locked Call light within reach RN notified Compliance with Program/Exercises: Will assess as treatment progresses. Recommendations/Intent for next treatment session: \"Next visit will focus on advancements to more challenging activities and reduction in assistance provided\". Total Treatment Duration: OT Patient Time In/Time Out Time In: 0945 Time Out: 1013 Lois Urbina OT

## 2019-04-29 NOTE — PROGRESS NOTES
Problem: Falls - Risk of 
Goal: *Absence of Falls Description Document Spring Getting Fall Risk and appropriate interventions in the flowsheet.  
Outcome: Progressing Towards Goal

## 2019-04-29 NOTE — PROGRESS NOTES
Changed dressing on right foot using xeroform, 4x4 gauze, and kerlix dressing. Patient tolerated the procedure well.

## 2019-04-29 NOTE — PROGRESS NOTES
Problem: Falls - Risk of 
Goal: *Absence of Falls Description Document Elver Armstrong Fall Risk and appropriate interventions in the flowsheet.  
Outcome: Progressing Towards Goal

## 2019-04-29 NOTE — PROGRESS NOTES
Per therapy, pt needs rolling walker.   Referral sent to [facility] - KAREEN POWELL

## 2019-04-29 NOTE — PROGRESS NOTES
Consult received for setup of [facility] services. Met with patient at bedside. Discussed recommendation for [facility] services. Patient states she does not want [facility] because she plans to return back to work. Patient also stated that she was hiring someone privately to help with therapy at home. MD notified.

## 2019-04-29 NOTE — PROGRESS NOTES
Hourly rounds done. Pt denies pain, nausea, vomiting. Nervous about possible DC this am. 
All needs met at this time.

## 2019-04-30 LAB
ANION GAP SERPL CALC-SCNC: 9 MMOL/L (ref 7–16)
BASOPHILS # BLD: 0 K/UL (ref 0–0.2)
BASOPHILS NFR BLD: 0 % (ref 0–2)
BUN SERPL-MCNC: 29 MG/DL (ref 8–23)
CALCIUM SERPL-MCNC: 7.7 MG/DL (ref 8.3–10.4)
CHLORIDE SERPL-SCNC: 113 MMOL/L (ref 98–107)
CO2 SERPL-SCNC: 17 MMOL/L (ref 21–32)
CREAT SERPL-MCNC: 0.97 MG/DL (ref 0.6–1)
DIFFERENTIAL METHOD BLD: ABNORMAL
EOSINOPHIL # BLD: 0.1 K/UL (ref 0–0.8)
EOSINOPHIL NFR BLD: 2 % (ref 0.5–7.8)
ERYTHROCYTE [DISTWIDTH] IN BLOOD BY AUTOMATED COUNT: 19.1 % (ref 11.9–14.6)
GLUCOSE BLD STRIP.AUTO-MCNC: 136 MG/DL (ref 65–100)
GLUCOSE BLD STRIP.AUTO-MCNC: 167 MG/DL (ref 65–100)
GLUCOSE BLD STRIP.AUTO-MCNC: 176 MG/DL (ref 65–100)
GLUCOSE BLD STRIP.AUTO-MCNC: 58 MG/DL (ref 65–100)
GLUCOSE BLD STRIP.AUTO-MCNC: 92 MG/DL (ref 65–100)
GLUCOSE SERPL-MCNC: 54 MG/DL (ref 65–100)
HCT VFR BLD AUTO: 22.8 % (ref 35.8–46.3)
HGB BLD-MCNC: 7.4 G/DL (ref 11.7–15.4)
IMM GRANULOCYTES # BLD AUTO: 0.1 K/UL (ref 0–0.5)
IMM GRANULOCYTES NFR BLD AUTO: 1 % (ref 0–5)
LYMPHOCYTES # BLD: 0.7 K/UL (ref 0.5–4.6)
LYMPHOCYTES NFR BLD: 10 % (ref 13–44)
MCH RBC QN AUTO: 25.7 PG (ref 26.1–32.9)
MCHC RBC AUTO-ENTMCNC: 32.5 G/DL (ref 31.4–35)
MCV RBC AUTO: 79.2 FL (ref 79.6–97.8)
MM INDURATION POC: 0 MM (ref 0–5)
MONOCYTES # BLD: 0.9 K/UL (ref 0.1–1.3)
MONOCYTES NFR BLD: 12 % (ref 4–12)
NEUTS SEG # BLD: 5.4 K/UL (ref 1.7–8.2)
NEUTS SEG NFR BLD: 75 % (ref 43–78)
NRBC # BLD: 0 K/UL (ref 0–0.2)
PLATELET # BLD AUTO: 116 K/UL (ref 150–450)
PMV BLD AUTO: 9.8 FL (ref 9.4–12.3)
POTASSIUM SERPL-SCNC: 3.5 MMOL/L (ref 3.5–5.1)
PPD POC: NORMAL NEGATIVE
RBC # BLD AUTO: 2.88 M/UL (ref 4.05–5.2)
SODIUM SERPL-SCNC: 139 MMOL/L (ref 136–145)
WBC # BLD AUTO: 7.2 K/UL (ref 4.3–11.1)

## 2019-04-30 PROCEDURE — 82962 GLUCOSE BLOOD TEST: CPT

## 2019-04-30 PROCEDURE — 65270000029 HC RM PRIVATE

## 2019-04-30 PROCEDURE — 74011250637 HC RX REV CODE- 250/637: Performed by: INTERNAL MEDICINE

## 2019-04-30 PROCEDURE — 80048 BASIC METABOLIC PNL TOTAL CA: CPT

## 2019-04-30 PROCEDURE — 36415 COLL VENOUS BLD VENIPUNCTURE: CPT

## 2019-04-30 PROCEDURE — 85025 COMPLETE CBC W/AUTO DIFF WBC: CPT

## 2019-04-30 PROCEDURE — 74011250637 HC RX REV CODE- 250/637: Performed by: FAMILY MEDICINE

## 2019-04-30 PROCEDURE — 74011636637 HC RX REV CODE- 636/637: Performed by: FAMILY MEDICINE

## 2019-04-30 RX ADMIN — DOXYCYCLINE HYCLATE 100 MG: 100 CAPSULE ORAL at 21:24

## 2019-04-30 RX ADMIN — Medication 500 MG: at 08:21

## 2019-04-30 RX ADMIN — Medication 10 ML: at 21:27

## 2019-04-30 RX ADMIN — IBUPROFEN 600 MG: 600 TABLET ORAL at 15:01

## 2019-04-30 RX ADMIN — PANTOPRAZOLE SODIUM 40 MG: 40 TABLET, DELAYED RELEASE ORAL at 08:21

## 2019-04-30 RX ADMIN — CEPHALEXIN 500 MG: 500 CAPSULE ORAL at 11:54

## 2019-04-30 RX ADMIN — DOXYCYCLINE HYCLATE 100 MG: 100 CAPSULE ORAL at 08:21

## 2019-04-30 RX ADMIN — Medication 10 ML: at 14:00

## 2019-04-30 RX ADMIN — CEPHALEXIN 500 MG: 500 CAPSULE ORAL at 17:06

## 2019-04-30 RX ADMIN — CEPHALEXIN 500 MG: 500 CAPSULE ORAL at 23:41

## 2019-04-30 RX ADMIN — HUMAN INSULIN 3 UNITS: 100 INJECTION, SOLUTION SUBCUTANEOUS at 17:07

## 2019-04-30 RX ADMIN — GABAPENTIN 300 MG: 300 CAPSULE ORAL at 21:24

## 2019-04-30 RX ADMIN — Medication 500 MG: at 17:06

## 2019-04-30 RX ADMIN — SERTRALINE HYDROCHLORIDE 50 MG: 50 TABLET ORAL at 08:21

## 2019-04-30 RX ADMIN — LEVOTHYROXINE SODIUM 125 MCG: 75 TABLET ORAL at 05:25

## 2019-04-30 RX ADMIN — INSULIN GLARGINE 40 UNITS: 100 INJECTION, SOLUTION SUBCUTANEOUS at 08:21

## 2019-04-30 RX ADMIN — HYDROCODONE BITARTRATE AND ACETAMINOPHEN 1 TABLET: 5; 325 TABLET ORAL at 09:22

## 2019-04-30 RX ADMIN — ATORVASTATIN CALCIUM 10 MG: 10 TABLET, FILM COATED ORAL at 08:21

## 2019-04-30 RX ADMIN — CEPHALEXIN 500 MG: 500 CAPSULE ORAL at 05:25

## 2019-04-30 RX ADMIN — Medication 10 ML: at 05:28

## 2019-04-30 RX ADMIN — HYDROCODONE BITARTRATE AND ACETAMINOPHEN 1 TABLET: 5; 325 TABLET ORAL at 20:26

## 2019-04-30 RX ADMIN — HUMAN INSULIN 3 UNITS: 100 INJECTION, SOLUTION SUBCUTANEOUS at 21:25

## 2019-04-30 NOTE — PROGRESS NOTES
bg 54 this morning. 4 oz of apple juice given. Pt ate breakfast. bg now 92. Will continue to monitor.

## 2019-04-30 NOTE — PROGRESS NOTES
Interdisciplinary Rounds completed 04/30/19. Nursing, Case Management, Physician and PT present. Plan of care reviewed and updated.  
 
Possible d/c in am.

## 2019-04-30 NOTE — PROGRESS NOTES
Hospitalist Progress Note 2019 Admit Date: 2019  6:20 PM  
NAME: Avis Avalos :  1955 MRN:  [id number] Attending: Jessica Chin MD 
PCP:  Annetta Degroot MD 
 
SUBJECTIVE:  
Patient 24C with hx of DM, recent diagnosis cirrhosis, anemia with ablation of AVM on colonoscopy who presented with redness, swelling and pain on dorsum of right foot. Admitted for diabetic foot infection.  - pt reports erythema and pain remain unchanged. New blister formation at dorsum of midfoot. Review of Systems negative with exception of pertinent positives noted above PHYSICAL EXAM  
 
Visit Vitals /60 Pulse 78 Temp 98 °F (36.7 °C) Resp 16 Ht 5' 7\" (1.702 m) Wt 91.6 kg (202 lb) SpO2 99% BMI 31.64 kg/m² Temp (24hrs), Av.4 °F (36.9 °C), Min:97.9 °F (36.6 °C), Max:99.3 °F (37.4 °C) Oxygen Therapy O2 Sat (%): 99 % (19 1530) Pulse via Oximetry: 81 beats per minute (19 2141) O2 Device: Room air (19 1400) Intake/Output Summary (Last 24 hours) at 2019 1723 Last data filed at 2019 0577 Gross per 24 hour Intake 140 ml Output 500 ml Net -360 ml General: No acute distress   
Lungs:  CTA Bilaterally. Heart:  Regular rate and rhythm,  No murmur, rub, or gallop Abdomen: Soft, Non distended, Non tender, Positive bowel sounds Extremities: Right foot with bandline erythema on dorsum of foot extending around sides with large area open skin tear and blister formation with serous drainage. Neurologic:  No focal deficits ASSESSMENT Active Hospital Problems Diagnosis Date Noted  Sepsis ([facility] 2019  Cellulitis 2019  Type 2 diabetes mellitus with diabetic neuropathy ([facility] 2019  Cirrhosis of liver without ascites ([facility] 2019  Microcytic anemia 2019  Type 2 diabetes with nephropathy ([facility] 2019  Acquired hypothyroidism 2016  Essential hypertension 01/12/2016  Hyperlipidemia 01/12/2016  Uncontrolled type II diabetes mellitus ([facility] 01/12/2016 A/p # Right foot cellulitis             - X-rays neg for fracture or bone destruction             - Changed to doxy/keflex 4/26, add probiotic. Elevate RLE.             - NC w/o DVT 
 - CT scan w/o abscess or focal infection             - Still with edema and pain, unchanged. Will request ID eval.  
 
  
# HypoK             - resolved 
  
# Microcytic anemia             - Hb stable. DC heparin and encourage ambulation 
            - recently admitted with colonic AVMs  
  
# Hypothyroid 
            - Synthroid 
  
# DM2 
            - Basal/bolus 
            - sugars controlled 
  
# HLD 
            - statin 
  
# Cirrhosis/thrombocytopenia 
            - stable, plt counts improving DVT Prophylaxis: scds Signed By: Flor Orellana DO April 30, 2019

## 2019-04-30 NOTE — PROGRESS NOTES
I&O as follows: 140ml, UOP 500ml. Dressing on the right foot c/d/i. Patient refused wearing the [facility] boot over the night, stated that  \"it's just feeling odd. \" RLE elevated with pillow. Pain addressed with prn meds per MAR. Hourly rounds performed;all pt needs met. Bed in low position and call light/ personal items within reach. Will continue to monitor and give bedside shift report to oncoming day shift nurse.

## 2019-04-30 NOTE — PROGRESS NOTES
Pt wound dressed. Open areas cleaned with wound cleanser and covered with tefla pad. Hospitalist did not want xeroform or petroleum gauze placed over draining areas at the moment. Wrapped in [facility]. Pt tolerated well.

## 2019-04-30 NOTE — PROGRESS NOTES
Intake/Output Summary (Last 24 hours) at 4/30/2019 1827 Last data filed at 4/30/2019 1706 Gross per 24 hour Intake 620 ml Output 1300 ml Net -680 ml Pt concerned about LLE. This nurse notes erythema and edema 3+ around right foot. Does not appear improved compared to last week. Pt anxious to go home. Hourly rounds completed this shift. Call light within reach.

## 2019-04-30 NOTE — PROGRESS NOTES
PT note: treatment deferred as patient refused stating that she has walked already to the bathroom several times. Attempted to get the patient to participate in therapeutic exercises however she also refused. RN made aware.   Paul Stanley PTA

## 2019-04-30 NOTE — PROGRESS NOTES
Problem: Falls - Risk of 
Goal: *Absence of Falls Description Document Redgie Curet Fall Risk and appropriate interventions in the flowsheet. Outcome: Progressing Towards Goal 
  
Problem: Patient Education: Go to Patient Education Activity Goal: Patient/Family Education Outcome: Progressing Towards Goal 
  
Problem: Patient Education: Go to Patient Education Activity Goal: Patient/Family Education Outcome: Progressing Towards Goal 
  
Problem: Patient Education: Go to Patient Education Activity Goal: Patient/Family Education Outcome: Progressing Towards Goal

## 2019-05-01 LAB
ANION GAP SERPL CALC-SCNC: 9 MMOL/L (ref 7–16)
BUN SERPL-MCNC: 30 MG/DL (ref 8–23)
CALCIUM SERPL-MCNC: 8.1 MG/DL (ref 8.3–10.4)
CHLORIDE SERPL-SCNC: 112 MMOL/L (ref 98–107)
CO2 SERPL-SCNC: 17 MMOL/L (ref 21–32)
CREAT SERPL-MCNC: 1.17 MG/DL (ref 0.6–1)
ERYTHROCYTE [DISTWIDTH] IN BLOOD BY AUTOMATED COUNT: 19.3 % (ref 11.9–14.6)
GLUCOSE BLD STRIP.AUTO-MCNC: 104 MG/DL (ref 65–100)
GLUCOSE BLD STRIP.AUTO-MCNC: 119 MG/DL (ref 65–100)
GLUCOSE BLD STRIP.AUTO-MCNC: 147 MG/DL (ref 65–100)
GLUCOSE BLD STRIP.AUTO-MCNC: 191 MG/DL (ref 65–100)
GLUCOSE BLD STRIP.AUTO-MCNC: 224 MG/DL (ref 65–100)
GLUCOSE SERPL-MCNC: 99 MG/DL (ref 65–100)
HCT VFR BLD AUTO: 23.1 % (ref 35.8–46.3)
HGB BLD-MCNC: 7.5 G/DL (ref 11.7–15.4)
MCH RBC QN AUTO: 25.5 PG (ref 26.1–32.9)
MCHC RBC AUTO-ENTMCNC: 32.5 G/DL (ref 31.4–35)
MCV RBC AUTO: 78.6 FL (ref 79.6–97.8)
NRBC # BLD: 0 K/UL (ref 0–0.2)
PLATELET # BLD AUTO: 107 K/UL (ref 150–450)
PMV BLD AUTO: 9.5 FL (ref 9.4–12.3)
POTASSIUM SERPL-SCNC: 3.8 MMOL/L (ref 3.5–5.1)
RBC # BLD AUTO: 2.94 M/UL (ref 4.05–5.2)
SODIUM SERPL-SCNC: 138 MMOL/L (ref 136–145)
WBC # BLD AUTO: 6.1 K/UL (ref 4.3–11.1)

## 2019-05-01 PROCEDURE — 36415 COLL VENOUS BLD VENIPUNCTURE: CPT

## 2019-05-01 PROCEDURE — 82962 GLUCOSE BLOOD TEST: CPT

## 2019-05-01 PROCEDURE — 65270000029 HC RM PRIVATE

## 2019-05-01 PROCEDURE — 93306 TTE W/DOPPLER COMPLETE: CPT

## 2019-05-01 PROCEDURE — 85027 COMPLETE CBC AUTOMATED: CPT

## 2019-05-01 PROCEDURE — 74011250637 HC RX REV CODE- 250/637: Performed by: INTERNAL MEDICINE

## 2019-05-01 PROCEDURE — 74011250637 HC RX REV CODE- 250/637: Performed by: FAMILY MEDICINE

## 2019-05-01 PROCEDURE — 80048 BASIC METABOLIC PNL TOTAL CA: CPT

## 2019-05-01 PROCEDURE — 74011636637 HC RX REV CODE- 636/637: Performed by: FAMILY MEDICINE

## 2019-05-01 PROCEDURE — 74011250636 HC RX REV CODE- 250/636: Performed by: INTERNAL MEDICINE

## 2019-05-01 RX ORDER — CEPHALEXIN 500 MG/1
500 CAPSULE ORAL 4 TIMES DAILY
Status: DISCONTINUED | OUTPATIENT
Start: 2019-05-01 | End: 2019-05-02 | Stop reason: HOSPADM

## 2019-05-01 RX ORDER — DOXYCYCLINE 100 MG/1
100 CAPSULE ORAL EVERY 12 HOURS
Status: DISCONTINUED | OUTPATIENT
Start: 2019-05-01 | End: 2019-05-02 | Stop reason: HOSPADM

## 2019-05-01 RX ORDER — FUROSEMIDE 10 MG/ML
40 INJECTION INTRAMUSCULAR; INTRAVENOUS ONCE
Status: COMPLETED | OUTPATIENT
Start: 2019-05-01 | End: 2019-05-01

## 2019-05-01 RX ADMIN — HYDROCODONE BITARTRATE AND ACETAMINOPHEN 1 TABLET: 5; 325 TABLET ORAL at 21:43

## 2019-05-01 RX ADMIN — Medication 500 MG: at 08:29

## 2019-05-01 RX ADMIN — FUROSEMIDE 40 MG: 10 INJECTION, SOLUTION INTRAMUSCULAR; INTRAVENOUS at 14:54

## 2019-05-01 RX ADMIN — Medication 500 MG: at 16:58

## 2019-05-01 RX ADMIN — CEPHALEXIN 500 MG: 500 CAPSULE ORAL at 16:57

## 2019-05-01 RX ADMIN — IBUPROFEN 600 MG: 600 TABLET ORAL at 08:28

## 2019-05-01 RX ADMIN — HUMAN INSULIN 3 UNITS: 100 INJECTION, SOLUTION SUBCUTANEOUS at 16:57

## 2019-05-01 RX ADMIN — CEPHALEXIN 500 MG: 500 CAPSULE ORAL at 05:07

## 2019-05-01 RX ADMIN — HYDROCODONE BITARTRATE AND ACETAMINOPHEN 1 TABLET: 5; 325 TABLET ORAL at 09:58

## 2019-05-01 RX ADMIN — INSULIN GLARGINE 40 UNITS: 100 INJECTION, SOLUTION SUBCUTANEOUS at 08:28

## 2019-05-01 RX ADMIN — ATORVASTATIN CALCIUM 10 MG: 10 TABLET, FILM COATED ORAL at 08:28

## 2019-05-01 RX ADMIN — Medication 10 ML: at 21:47

## 2019-05-01 RX ADMIN — DOXYCYCLINE HYCLATE 100 MG: 100 CAPSULE ORAL at 21:43

## 2019-05-01 RX ADMIN — PANTOPRAZOLE SODIUM 40 MG: 40 TABLET, DELAYED RELEASE ORAL at 08:28

## 2019-05-01 RX ADMIN — Medication 10 ML: at 05:54

## 2019-05-01 RX ADMIN — Medication 10 ML: at 14:00

## 2019-05-01 RX ADMIN — IBUPROFEN 600 MG: 600 TABLET ORAL at 18:34

## 2019-05-01 RX ADMIN — HUMAN INSULIN 6 UNITS: 100 INJECTION, SOLUTION SUBCUTANEOUS at 21:45

## 2019-05-01 RX ADMIN — LEVOTHYROXINE SODIUM 125 MCG: 75 TABLET ORAL at 05:07

## 2019-05-01 RX ADMIN — SERTRALINE HYDROCHLORIDE 50 MG: 50 TABLET ORAL at 08:28

## 2019-05-01 RX ADMIN — CEPHALEXIN 500 MG: 500 CAPSULE ORAL at 21:43

## 2019-05-01 RX ADMIN — GABAPENTIN 300 MG: 300 CAPSULE ORAL at 22:00

## 2019-05-01 NOTE — PROGRESS NOTES
Met with patient again today to discuss [facility] services. Patient states she does not want HH at this time and if she changes her mind she will have her pcp write the orders. MD notified.

## 2019-05-01 NOTE — PROGRESS NOTES
PT note:  Patient refused therapy again today. Patient is sidelying in the bed upon contact. Patient is encouraged to participate and she is reminded of the benefits of therapy however the patient refused stating that she had been up today back and forth to the bathroom. Patient states that this writer should come earlier in the day, asked what time is early and she said 10 am.  I told the patient that I would try to come at that time tomorrow. RN made aware.   Calin Veliz, PTA

## 2019-05-01 NOTE — PROGRESS NOTES
upon check this AM around 0500. Dressing on the LLE c/d/i, elevated with pills over the night. Hourly rounds performed;all pt needs met. Bed in low position and call light/ personal items within reach. Will continue to monitor and give bedside shift report to oncoming day shift nurse.

## 2019-05-01 NOTE — PROGRESS NOTES
Interdisciplinary Rounds completed 05/01/19. Nursing, Case Management, Physician and PT present. Plan of care reviewed and updated. Discharge with pt refusing home care.

## 2019-05-01 NOTE — PROGRESS NOTES
Hospitalist Progress Note 2019 Admit Date: 2019  6:20 PM  
NAME: Gifty Arellano :  1955 MRN:  [id number] Attending: Mat Young MD 
PCP:  Sandeep Connelly MD 
 
SUBJECTIVE:  
Patient 53G with hx of DM, recent diagnosis cirrhosis, anemia with ablation of AVM on colonoscopy who presented with redness, swelling and pain on dorsum of right foot. Admitted for diabetic foot infection. Seen by ID.  - pt reports erythema has improved today. She is concerned regarding b/l LE swelling. Review of Systems negative with exception of pertinent positives noted above PHYSICAL EXAM  
 
Visit Vitals /56 (BP 1 Location: Left arm, BP Patient Position: At rest) Pulse 73 Temp 97.8 °F (36.6 °C) Resp 16 Ht 5' 7\" (1.702 m) Wt 93.1 kg (205 lb 4.8 oz) SpO2 99% BMI 32.15 kg/m² Temp (24hrs), Av.8 °F (36.6 °C), Min:97.5 °F (36.4 °C), Max:98 °F (36.7 °C) Oxygen Therapy O2 Sat (%): 99 % (19 144) Pulse via Oximetry: 81 beats per minute (19) O2 Device: Room air (19 1446) Intake/Output Summary (Last 24 hours) at 2019 Last data filed at 2019 1615 Gross per 24 hour Intake  Output 1200 ml Net -1200 ml General: No acute distress   
Lungs:  CTA Bilaterally. Heart:  Regular rate and rhythm,  No murmur, rub, or gallop Abdomen: Soft, Non distended, Non tender, Positive bowel sounds Extremities: Right foot with bandline erythema on dorsum of foot extending around sides with large area open skin tear and blister formation with serous drainage. Neurologic:  No focal deficits ASSESSMENT Active Hospital Problems Diagnosis Date Noted  Sepsis ([address] 2019  Cellulitis 2019  Type 2 diabetes mellitus with diabetic neuropathy ([facility] 2019  Cirrhosis of liver without ascites ([facility] 2019  Microcytic anemia 2019  Type 2 diabetes with nephropathy ([facility] 2019  Acquired hypothyroidism 01/12/2016  Essential hypertension 01/12/2016  Hyperlipidemia 01/12/2016  Uncontrolled type II diabetes mellitus ([facility] 01/12/2016 A/p # Right foot cellulitis             - X-rays neg for fracture or bone destruction             - Changed to doxy/keflex 4/26, add probiotic. Elevate RLE.             - VT w/o DVT 
 - CT scan w/o abscess or focal infection             - Still with edema and pain, unchanged. ID recs to continue oral atbx x 7 more days 
  
# HypoK             - resolved 
  
# Microcytic anemia             - Hb stable. DC heparin and encourage ambulation 
            - recently admitted with colonic AVMs  
  
# Hypothyroid 
            - Synthroid 
  
# DM2 
            - Basal/bolus 
            - sugars controlled 
  
# HLD 
            - statin 
  
# Cirrhosis/thrombocytopenia 
            - stable, plt counts improving. Lasix for LE edema x 1 today. DVT Prophylaxis: scds Dispo - home in AM 
 
Signed By: Miladis Mcmillan, DO May 1, 2019

## 2019-05-01 NOTE — CONSULTS
Infectious Disease Consult    Today's Date: 5/1/2019   Admit Date: 4/24/2019    Impression:   · RLE cellulitis, no abscess or OM per CT  · DMII with neuropathy  · PCN allergy as a child. Tolerating Amoxicillin and cephalosporins     Plan:   ·  Continue Doxycycline 100mg oral BID and Keflex 500mg oral QID for another 7 days. Educated that edema will be present for several weeks, encouraged elevation. Erythema has improved. She will need wound care. · **Will sign off at this time. Please call with questions or concerns. Anti-infectives:   · Doxy/Keflex (4/25-  · Vanc (4/24-4/25)    Subjective:   Date of Consultation:  May 1, 2019  Referring Physician: Radha Ford     Patient is a 60 y.o. female with significant medical hx for DMII, who presented to ED on 4/24 for worsening erythema, pain to her R foot. States first started as \"bug bite\" on dorsal aspect of her foot. + subjective fever and chills. BCx2 NG. CT R foot with subq edema, no abscess or OM. Initially started on Vanc/flagyl then changed to doxy/Keflex for 7 days. Remains afebrile since admission, no leukocytosis. Hospitalist reports no change in R foot appearance. US NG for DVT. From review of pictures from pt's phone at admission, erythema improved. Blister has since burst. She complains of R foot pain and residual swelling.  Denies nausea, vomiting, diarrhea, fever, chills, or sweats    Patient Active Problem List   Diagnosis Code    Hyperlipidemia E78.5    Essential hypertension I10    Acquired hypothyroidism E03.9    Uncontrolled type II diabetes mellitus ([address] E11.65    Type 2 diabetes with nephropathy ([address] E11.21    Microcytic anemia D50.9    AVM (arteriovenous malformation) of colon K55.20    Cirrhosis of liver without ascites (HCC) K74.60    Type 2 diabetes mellitus with diabetic neuropathy (HCC) E11.40    Cellulitis L03.90    Sepsis (HCC) A41.9     Past Medical History:   Diagnosis Date    Diabetes ([address]     Hypertension       Family History   Problem Relation Age of Onset    Hypertension Mother     Other Mother         PVD    Diabetes Mother     Parkinson's Disease Mother     Diabetes Father       Social History     Tobacco Use    Smoking status: Never Smoker    Smokeless tobacco: Never Used   Substance Use Topics    Alcohol use: No     Past Surgical History:   Procedure Laterality Date    COLONOSCOPY N/A 3/2/2019    COLONOSCOPY ROOM 615 performed by Jonathon Velasco MD at [facility] ENDOSCOPY      Prior to Admission medications    Medication Sig Start Date End Date Taking? Authorizing Provider   Ferrous Fumarate 325 mg (106 mg iron) tab Take 325 mg by mouth daily. Yes Provider, Historical   insulin glargine (LANTUS) 100 unit/mL injection 40 Units by SubCUTAneous route every morning. Patient taking differently: 50 Units by SubCUTAneous route every morning. 3/4/19  Yes Steven Glasgow,    lisinopril (PRINIVIL, ZESTRIL) 2.5 mg tablet Take 1 Tab by mouth daily. Patient taking differently: Take 20 mg by mouth daily. 3/4/19  Yes Steven Glasgow DO   pantoprazole (PROTONIX) 40 mg tablet Take 1 Tab by mouth daily. 3/4/19  Yes Steven Glasgow DO   insulin aspart U-100 (NOVOLOG) 100 unit/mL inpn Less than 150 =   0 units           150 -199 =   2 units  200 -249 =   4 units  250 -299 =   6 units  300 -349 =   8 units  Before meals and at night 3/4/19  Yes Steven Glasgow DO   gabapentin (NEURONTIN) 300 mg capsule Take 1 Cap by mouth nightly. 2/27/19  Yes Dustin Pavon MD   sertraline (ZOLOFT) 50 mg tablet Take 1 Tab by mouth daily. 2/27/19  Yes Dustin Pavon MD   atorvastatin (LIPITOR) 10 mg tablet Take 1 Tab by mouth daily. Patient taking differently: Take 20 mg by mouth daily. 1/23/19  Yes Dustin Pavon MD   levothyroxine (SYNTHROID) 125 mcg tablet Take 1 Tab by mouth Daily (before breakfast). 1/17/19  Yes Dustin Pavon MD   hydroCHLOROthiazide (HYDRODIURIL) 25 mg tablet as needed.  1/23/19   Provider, Historical   Insulin Needles, Disposable, (ARIE PEN NEEDLE) 32 gauge x \" ndle For use with insulin pens    Uncontrolled type 2 DM 19   Jessica Kenny MD   Blood-Glucose Meter monitoring kit Test FSBS 3x daily before meals. 19   Jessica Kenny MD   lancets misc Test FSBS 3x daily before meals. 19   Jessica Kenny MD   glucose blood VI test strips (BLOOD GLUCOSE TEST) strip Test FSBS 3x daily before meals. 19   Jessica Kenny MD   Insulin Needles, Disposable, 31 gauge x \" ndle by SubCUTAneous route daily. 16   Jessica Kenny MD       Allergies   Allergen Reactions    Pcn [Penicillins] Hives and Rash     Tolerates cephalosporins        Review of Systems:  A comprehensive review of systems was negative except for that written in the History of Present Illness. Objective:     Visit Vitals  /70   Pulse 81   Temp 97.8 °F (36.6 °C)   Resp 16   Ht 5' 7\" (1.702 m)   Wt 93.1 kg (205 lb 4.8 oz)   SpO2 99%   BMI 32.15 kg/m²     Temp (24hrs), Av.9 °F (36.6 °C), Min:97.6 °F (36.4 °C), Max:98.1 °F (36.7 °C)       Lines:  Peripheral IV:       Physical Exam:    General:  Alert, cooperative, well noursished, well developed, appears stated age   Eyes:  Sclera anicteric. Pupils equally round and reactive to light. Mouth/Throat: Mucous membranes normal, oral pharynx clear   Neck: Supple   Lungs:   Clear to auscultation bilaterally, good effort   CV:  Regular rate and rhythm,no murmur, click, rub or gallop   Abdomen:   Soft, non-tender. bowel sounds normal. non-distended   Extremities: No cyanosis.  + 2 RLE edema   Skin: R dorsal foot: see pic    Lymph nodes: Cervical and supraclavicular normal   Musculoskeletal: No swelling or deformity   Lines/Devices:  Intact, no erythema, drainage or tenderness   Psych: Alert and oriented, normal mood affect given the setting               Data Review:     CBC:  Recent Labs     19  0520 04/30/19  0342   WBC 6.1 7.2   GRANS  --  75   MONOS  --  12   EOS  --  2   ANEU  --  5.4   ABL  --  0.7   HGB 7.5* 7.4*   HCT 23.1* 22.8*   * 116*       BMP:  Recent Labs     05/01/19  0520 04/30/19  0342 04/29/19  0430   CREA 1.17* 0.97 0.98   BUN 30* 29* 31*    139 139   K 3.8 3.5 3.7   * 113* 114*   CO2 17* 17* 16*   AGAP 9 9 9   GLU 99 54* 76       LFTS:  No results for input(s): TBILI, ALT, SGOT, AP, TP, ALB in the last 72 hours.     Microbiology:     All Micro Results     Procedure Component Value Units Date/Time    CULTURE, BLOOD [[id number]] Collected:  04/24/19 1839    Order Status:  Completed Specimen:  Blood Updated:  04/29/19 1341     Special Requests: LEFT ANTECUBITAL        Culture result: NO GROWTH 5 DAYS       CULTURE, BLOOD [[id number]] Collected:  04/24/19 1706    Order Status:  Completed Specimen:  Blood Updated:  04/29/19 1341     Special Requests: --        LEFT  FOREARM       Culture result: NO GROWTH 5 DAYS       CULTURE, URINE [[id number]] Collected:  04/25/19 0356    Order Status:  Completed Specimen:  Urine from Clean catch Updated:  04/27/19 0745     Special Requests: NO SPECIAL REQUESTS        Culture result:       10,000 to 50,000 COLONIES/mL MIXED SKIN VINCE ISOLATED                Imaging:   See HPI/EPIC     Signed By: Edgar Baig NP     May 1, 2019

## 2019-05-01 NOTE — WOUND CARE
Patient seen for follow up on right foot wound. Noted ID team has been in and evaluated, signed off. Patient blister is demarcating and starting to slough off. Still with edema but redness is improving. Discussed foot/heel protection again today. Will continue xeroform dressings for now. Answered all patient questions. She would benefit from outpatient wound clinic care post acute care discharge. Patient would like to use [facility] service. Will continue to monitor and adjust as needed.

## 2019-05-01 NOTE — PROGRESS NOTES
's follow-up visit requested by staff. I offered spiritual support although limited under circumstances. Ms. German Mcdonough told me that she had received pain medicine recently. Ms. German Mcdonough is hopeful for discharge tomorrow. Les Bhardwaj MDiv Board Certified [employer]

## 2019-05-02 VITALS
HEIGHT: 67 IN | OXYGEN SATURATION: 99 % | HEART RATE: 76 BPM | RESPIRATION RATE: 17 BRPM | WEIGHT: 205.3 LBS | BODY MASS INDEX: 32.22 KG/M2 | SYSTOLIC BLOOD PRESSURE: 104 MMHG | TEMPERATURE: 98.2 F | DIASTOLIC BLOOD PRESSURE: 68 MMHG

## 2019-05-02 LAB
ANION GAP SERPL CALC-SCNC: 11 MMOL/L (ref 7–16)
BUN SERPL-MCNC: 34 MG/DL (ref 8–23)
CALCIUM SERPL-MCNC: 7.8 MG/DL (ref 8.3–10.4)
CHLORIDE SERPL-SCNC: 109 MMOL/L (ref 98–107)
CO2 SERPL-SCNC: 17 MMOL/L (ref 21–32)
CREAT SERPL-MCNC: 1.36 MG/DL (ref 0.6–1)
ERYTHROCYTE [DISTWIDTH] IN BLOOD BY AUTOMATED COUNT: 19.1 % (ref 11.9–14.6)
GLUCOSE BLD STRIP.AUTO-MCNC: 69 MG/DL (ref 65–100)
GLUCOSE BLD STRIP.AUTO-MCNC: 80 MG/DL (ref 65–100)
GLUCOSE BLD STRIP.AUTO-MCNC: 87 MG/DL (ref 65–100)
GLUCOSE SERPL-MCNC: 60 MG/DL (ref 65–100)
HCT VFR BLD AUTO: 22.1 % (ref 35.8–46.3)
HGB BLD-MCNC: 7.4 G/DL (ref 11.7–15.4)
MCH RBC QN AUTO: 26 PG (ref 26.1–32.9)
MCHC RBC AUTO-ENTMCNC: 33.5 G/DL (ref 31.4–35)
MCV RBC AUTO: 77.5 FL (ref 79.6–97.8)
NRBC # BLD: 0 K/UL (ref 0–0.2)
PLATELET # BLD AUTO: 97 K/UL (ref 150–450)
PMV BLD AUTO: 9.4 FL (ref 9.4–12.3)
POTASSIUM SERPL-SCNC: 3.8 MMOL/L (ref 3.5–5.1)
RBC # BLD AUTO: 2.85 M/UL (ref 4.05–5.2)
SODIUM SERPL-SCNC: 137 MMOL/L (ref 136–145)
WBC # BLD AUTO: 6.1 K/UL (ref 4.3–11.1)

## 2019-05-02 PROCEDURE — 82962 GLUCOSE BLOOD TEST: CPT

## 2019-05-02 PROCEDURE — 97110 THERAPEUTIC EXERCISES: CPT

## 2019-05-02 PROCEDURE — 80048 BASIC METABOLIC PNL TOTAL CA: CPT

## 2019-05-02 PROCEDURE — 74011250637 HC RX REV CODE- 250/637: Performed by: FAMILY MEDICINE

## 2019-05-02 PROCEDURE — 85027 COMPLETE CBC AUTOMATED: CPT

## 2019-05-02 PROCEDURE — 74011250637 HC RX REV CODE- 250/637: Performed by: INTERNAL MEDICINE

## 2019-05-02 PROCEDURE — 97530 THERAPEUTIC ACTIVITIES: CPT

## 2019-05-02 PROCEDURE — 36415 COLL VENOUS BLD VENIPUNCTURE: CPT

## 2019-05-02 RX ORDER — DOXYCYCLINE 100 MG/1
100 CAPSULE ORAL EVERY 12 HOURS
Qty: 12 CAP | Refills: 0 | Status: SHIPPED | OUTPATIENT
Start: 2019-05-02 | End: 2019-05-23

## 2019-05-02 RX ORDER — TRAMADOL HYDROCHLORIDE 50 MG/1
50 TABLET ORAL
Qty: 20 TAB | Refills: 0 | Status: SHIPPED | OUTPATIENT
Start: 2019-05-02 | End: 2019-05-05

## 2019-05-02 RX ORDER — CEPHALEXIN 500 MG/1
500 CAPSULE ORAL 4 TIMES DAILY
Qty: 24 CAP | Refills: 0 | Status: SHIPPED | OUTPATIENT
Start: 2019-05-02 | End: 2019-05-23

## 2019-05-02 RX ADMIN — Medication 500 MG: at 08:46

## 2019-05-02 RX ADMIN — LEVOTHYROXINE SODIUM 125 MCG: 75 TABLET ORAL at 05:49

## 2019-05-02 RX ADMIN — Medication 10 ML: at 05:50

## 2019-05-02 RX ADMIN — ATORVASTATIN CALCIUM 10 MG: 10 TABLET, FILM COATED ORAL at 08:46

## 2019-05-02 RX ADMIN — DOXYCYCLINE HYCLATE 100 MG: 100 CAPSULE ORAL at 08:46

## 2019-05-02 RX ADMIN — CEPHALEXIN 500 MG: 500 CAPSULE ORAL at 08:46

## 2019-05-02 RX ADMIN — IBUPROFEN 600 MG: 600 TABLET ORAL at 08:47

## 2019-05-02 RX ADMIN — CEPHALEXIN 500 MG: 500 CAPSULE ORAL at 14:14

## 2019-05-02 RX ADMIN — PANTOPRAZOLE SODIUM 40 MG: 40 TABLET, DELAYED RELEASE ORAL at 08:46

## 2019-05-02 RX ADMIN — HYDROCODONE BITARTRATE AND ACETAMINOPHEN 1 TABLET: 5; 325 TABLET ORAL at 14:14

## 2019-05-02 RX ADMIN — SERTRALINE HYDROCHLORIDE 50 MG: 50 TABLET ORAL at 08:47

## 2019-05-02 NOTE — PROGRESS NOTES
OT note: 
Pt declined treatment despite encouragement. Will re-attempt at a later date. Eamon Strauss

## 2019-05-02 NOTE — PROGRESS NOTES
PHYSICAL THERAPY: Daily Note and PM 5/2/2019 INPATIENT: PT Visit Days : 2 Payor: BLUE CROSS / Plan: [place] BLUE CROSS [facility] / Product Type: PPO /   
  
NAME/AGE/GENDER: Iris Wilson is a 60 y.o. female PRIMARY DIAGNOSIS: Cellulitis [L03.90] Cellulitis ICD-10: Treatment Diagnosis:  
 · Generalized Muscle Weakness (M62.81) · Other lack of cordination (R27.8) · Difficulty in walking, Not elsewhere classified (R26.2) · Dizziness and Giddiness (R42) · Localized edema (R60.1) · pain Precaution/Allergies: 
Pcn [penicillins] ASSESSMENT:  
Ms. Lexii Coffey presents in supine without complaints, agreeable to therapy treatment with some encouragement. R LE still somewhat painful during mobility but is improving. Pt transfers to sitting independently. Intact balance. [place] left sock independent, needs assist with right sock due to pain and large bandage on right foot. Performs sit-stand transfers x3 independently from bed and chair. Adjusted new walker to correct height. Pt ambulates 50 ft in room with walker, SBA-supervision, and very minimal cueing for gait mechanics. Up to chair after activity for short break, then performs LE exercises per HEP in sitting. Developed HEP through [employer] and sent to patient via smartphone message/link. Pt is progressing well with therapy. She denies the need for continued therapy services at [place]. Will continue to follow during hospital stay to maximize safety/independence. This section established at most recent assessment PROBLEM LIST (Impairments causing functional limitations): 1. Decreased Strength 2. Decreased ADL/Functional Activities 3. Decreased Transfer Abilities 4. Decreased Ambulation Ability/Technique 5. Decreased Balance 6. Increased Pain 7. Decreased Activity Tolerance 8. Increased Fatigue 9. Decreased Flexibility/Joint Mobility 10. Edema/Girth 11. Decreased Skin Integrity/Hygeine 12.  Decreased Foster with Home Exercise Program 
 INTERVENTIONS PLANNED: (Benefits and precautions of physical therapy have been discussed with the patient.) 1. Balance Exercise 2. Gait Training 3. Home Exercise Program (HEP) 4. Range of Motion (ROM) 5. Therapeutic Activites 6. Therapeutic Exercise/Strengthening 7. Transfer Training TREATMENT PLAN: Frequency/Duration: 3 times a week for duration of hospital stay Rehabilitation Potential For Stated Goals: Good REHAB RECOMMENDATIONS (at time of discharge pending progress):   
Placement: It is my opinion, based on this patient's performance to date, that Ms. Kauffman may benefit from intensive therapy at an [address] [place] [address] after discharge due to a probable need for multiple therapy disciplines and potential to make ongoing and sustainable functional improvement that is of practical value. Billy Rainey Equipment: ? Hygiene Aides, Type: Raised Toilet Seat and Tub Seat/Chair 
? rolling walker HISTORY:  
History of Present Injury/Illness (Reason for Referral): 
Shy Melvin is a 60 y.o. female with a past medical history of DM type II, HTN who presents to the ER with complaint of progressively worsening R dorsal foot pain, redness, and swelling for hte past 2 days. She reports that the swelling started first about 3 days ago, then yesterday she noticed what appeared to be a dark purple bug bite on the dorsum of her foot. Since then, the area has become much larger, extending down nearly to her toes and almost to her ankle. She reports a fever and chills earlier today. Reports that PO tylenol helped some with the pain but that it is still present. Denies any nausea, vomiting. Past Medical History/Comorbidities: Ms. Kain Quiroz  has a past medical history of Diabetes ([address] and Hypertension. Ms. Kain Quiroz  has a past surgical history that includes colonoscopy (N/A, 3/2/2019). Social History/Living Environment:  
Home Environment: Private residence # Steps to Enter: 1 Rails to Enter:  Yes 
 One/Two Story Residence: One story Living Alone: Yes Support Systems: Friends \ neighbors Patient Expects to be Discharged to[de-identified] Unknown Current DME Used/Available at Home: None Tub or Shower Type: Tub/Shower combination Prior Level of Function/Work/Activity: 
Full time RN - coordinator Trevor Number of Personal Factors/Comorbidities that affect the Plan of Care: 3+: HIGH COMPLEXITY EXAMINATION:  
Most Recent Physical Functioning:  
Gross Assessment: 
  
         
  
Posture: 
  
Balance: 
Sitting: Intact Standing: Impaired Standing - Static: Good Standing - Dynamic : Fair(+) Bed Mobility: 
Rolling: Independent Supine to Sit: Independent Scooting: Independent Wheelchair Mobility: 
  
Transfers: 
Sit to Stand: Independent Stand to Sit: Independent Bed to Chair: Supervision Interventions: Verbal cues; Safety awareness training Duration: 15 Minutes Gait:   
  
Base of Support: Center of gravity altered Speed/Tamela: Pace decreased (<100 feet/min) Step Length: Left shortened;Right shortened Gait Abnormalities: Antalgic Distance (ft): 50 Feet (ft) Assistive Device: Walker, rolling Ambulation - Level of Assistance: Stand-by assistance;Supervision Interventions: Verbal cues; Safety awareness training Body Structures Involved: 1. Nerves 2. Bones 3. Joints 4. Muscles 5. Ligaments Body Functions Affected: 1. Sensory/Pain 2. Neuromusculoskeletal 
3. Movement Related 4. Skin Related Activities and Participation Affected: 1. Learning and Applying Knowledge 2. General Tasks and Demands 3. Mobility 4. Self Care 5. Domestic Life 6. Interpersonal Interactions and Relationships 7. Community, Social and [place] Number of elements that affect the Plan of Care: 4+: HIGH COMPLEXITY CLINICAL PRESENTATION:  
Presentation: Evolving clinical presentation with unstable and unpredictable characteristics: HIGH COMPLEXITY CLINICAL DECISION MAKING:  
 [facility] Basic Mobility Inpatient Short Form How much difficulty does the patient currently have. .. Unable A Lot A Little None 1. Turning over in bed (including adjusting bedclothes, sheets and blankets)? ? 1   ? 2   ? 3   ? 4  
2. Sitting down on and standing up from a chair with arms ( e.g., wheelchair, bedside commode, etc.)   ? 1   ? 2   ? 3   ? 4  
3. Moving from lying on back to sitting on the side of the bed?   ? 1   ? 2   ? 3   ? 4 How much help from another person does the patient currently need. .. Total A Lot A Little None 4. Moving to and from a bed to a chair (including a wheelchair)? ? 1   ? 2   ? 3   ? 4  
5. Need to walk in hospital room? ? 1   ? 2   ? 3   ? 4  
6. Climbing 3-5 steps with a railing? ? 1   ? 2   ? 3   ? 4  
© 2007, Trustees of [address] [place] [address], under license to [employer]. All rights reserved Score:  Initial: 17 Most Recent: X (Date: -- ) Interpretation of Tool:  Represents activities that are increasingly more difficult (i.e. Bed mobility, Transfers, Gait). Medical Necessity:    
· Skilled intervention continues to be required due to debility and weakness. Reason for Services/Other Comments: 
· Patient continues to require skilled intervention due to medical complications and patient unable to attend/participate in therapy as expected · . Use of outcome tool(s) and clinical judgement create a POC that gives a: Difficult prediction of patient's progress: HIGH COMPLEXITY  
  
 
 
 
TREATMENT:  
(In addition to Assessment/Re-Assessment sessions the following treatments were rendered) Pre-treatment Symptoms/Complaints:  \"I will try\" Pain: Initial:  
Pain Intensity 1: 0 Pain Location 1: Foot Pain Orientation 1: Right Pain Intervention(s) 1: Repositioned  Post Session:  0/10 Therapeutic Activity: (  15 Minutes ):  Therapeutic activities including Bed mobility, Chair transfers, standing balance and review of gait mechanics to decrease pain, and Ambulation on level ground to improve mobility, strength and balance. Required stand by assist and Verbal cues; Safety awareness training to promote dynamic balance in standing. Therapeutic Exercise: (10 Minutes:  Exercises per grid below to improve strength and coordination. Required minimal visual and verbal cues to promote proper body breathing techniques. Progressed repetitions as indicated. Date: 
4/28/19 Date: 
5/2/19 Date: 
  
ACTIVITY/EXERCISE AM PM AM PM AM PM  
GROUP THERAPY  ?  ?  ?  ?  ?  ? Ankle Pumps 2 x 10 B  15x AB Quad Sets X10 B  15x AB Gluteal Sets Hip ABd/ADduction Straight Leg Raises X5 B Knee Slides X5 B Short Arc [employer] [place] Long Arc Quads   15x AB Chair Slides        
marching   15x AB     
B = bilateral; AA = active assistive; A = active; P = passive HEP information: Access Code: [email or web address] URL: [email or web address]  
Date: 05/02/2019 Prepared by: Jeffrey Lira Long Sitting Quad Set - 15 reps - 1-2 sets - 2x daily - 7x weekly Seated Gluteal Sets - 15 reps - 1-2 sets - 2x daily - 7x weekly Seated Long Arc Quad - 15 reps - 1-2 sets - 2x daily - 7x weekly Seated March - 15 reps - 1-2 sets - 2x daily - 7x weekly Standing Hip Abduction - 15 reps - 1-2 sets - 2x daily - 7x weekly Mini Squat with Counter Support - 15 reps - 1-2 sets - 2x daily - 7x weekly Standing Heel Raise with Support - 15 reps - 1-2 sets - 2x daily - 7x weekly Standing Knee Flexion AROM with Chair Support - 15 reps - 1-2 sets - 2x daily - 7x weekly Braces/Orthotics/Lines/Etc:  
· O2 Device: Room air Treatment/Session Assessment:   
· Response to Treatment: improved mobility, less pain · Interdisciplinary Collaboration:  
o Physical Therapist 
o Registered Nurse · After treatment position/precautions:  
o Up in chair 
o Bed/Chair-wheels locked 
o Bed in low position o Call light within reach · Compliance with Program/Exercises: Will assess as treatment progresses · Recommendations/Intent for next treatment session: \"Next visit will focus on advancements to more challenging activities and reduction in assistance provided\". Total Treatment Duration: PT Patient Time In/Time Out Time In: 3706 Time Out: 1005 Jose Fraire DPT

## 2019-05-02 NOTE — PROGRESS NOTES
5/2/2019 RE: All Crouch To Whom it May Concern: This is to certify that All Crouch has been hospitalized from April 24, 2018 - May 2, 2019 for acute illness. She may return to work on Monday May 20, 2019. Please feel free to contact my office if you have any questions or concerns. Thank you for your assistance in this matter. Sincerely, 
 
 
Dr. Javi Bean [phone]

## 2019-05-02 NOTE — WOUND CARE
Right foot dressing changed and ace bandage added from toes to just below knee, should continue daily dressing changes. Would benefit from follow up with wound clinic as diabetic, patient informed that if wound persists she should have a referral to wound clinic. Suggested to patient to consider getting fit for diabetic shoes. Wound on dorsal foot is purple erythema with [place] extending up foot, edema 2+ pitting on right foot, pulses are palpable and foot is warm and on Medial/ plantar foot purple areas ulcers with slough, concern that this areas are covering deep infection or abscess. Noted patient is likely to discharge today, patient feels she can change bandage at home. Will monitor while in acute care.

## 2019-05-02 NOTE — DISCHARGE SUMMARY
Hospitalist Discharge Summary     Patient ID:  Nunu Cantu  [id number]  60 y.o.  1955  Admit date: 4/24/2019  6:20 PM  Discharge date and time: 5/2/2019  Attending: No att. providers found  PCP:  Abundio Donahue MD  Treatment Team: Consulting Provider: Kris Winn MD; Utilization Review: Monae Morfin; Physical Therapist: Aiyana Roberts DPT; Occupational Therapy Assistant: Oliva Lozada    Principal Diagnosis Cellulitis   Principal Problem:    Cellulitis (4/24/2019)    Active Problems:    Hyperlipidemia (1/12/2016)      Essential hypertension (1/12/2016)      Acquired hypothyroidism (1/12/2016)      Uncontrolled type II diabetes mellitus ([address] (1/12/2016)      Type 2 diabetes with nephropathy ([address] (1/16/2019)      Microcytic anemia (2/27/2019)      Cirrhosis of liver without ascites ([address] (3/7/2019)      Type 2 diabetes mellitus with diabetic neuropathy ([address] (4/24/2019)      Sepsis ([address] (4/25/2019)             Hospital Course:  Please refer to the admission H&P for details of presentation. 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 Admitted for diabetic foot infection. Seen by ID and wound care. Clinically improving with antibiotics with recommendations to continue keflex and doxycycline at discharge. Pt with borderline hypotension during hospital course so lisinopril held at discharge. BP did tolerate dose of IV lasix for peripheral edema day prior to discharge. HCTZ resumed at discharge. Echo reveals moderate aortic stenosis and known mitral regurgitation. She is agreeable to home health care and PT/RN ordered. She is medically stable for discharge home and agreeable to plan.      Significant Diagnostic Studies:   Final [99] 4/29/2019 12:04 4/29/2019 12:15   Study Result     CT of the right foot with contrast.     CLINICAL INDICATION: Cellulitis and swelling of the right foot     PROCEDURE: Serial thin section axial images obtained through the right foot  following the administration of 100 cc of Isovue 370 intravenous contrast.     COMPARISON: Right foot radiographs dated 4/24/2019.     FINDINGS: No aggressive bone lesions destructive bone change, or fracture  identified. Blistering is noted over the dorsum of the midfoot with extensive  underlying subcutaneous edema. No discrete, measurable fluid collection  appreciated. The findings are nonspecific. No obvious intra-articular joint  derangement appreciated.     IMPRESSION  IMPRESSION:  1. Generalized predominantly dorsal subcutaneous edema over the midfoot with  skin blistering noted. No discrete measurable fluid collection or abscess  appreciated. 2. No destructive bone changes noted to indicate osteomyelitis  3. No intra-articular joint derangement or fracture identified. RIGHT LOWER EXTREMITY DEEP VENOUS SONOGRAPHY:     CLINICAL HISTORY: Leg pain and swelling.       COMPARISON: Abdominopelvic CT of February 27, 2019.     FINDINGS: Multiple images from real time ultrasound evaluation of the deep  venous system of the right leg demonstrate normal venous flow in the posterior  tibial, popliteal, and superficial and common femoral veins. Normal  compressibility was demonstrated. Flow was also documented in the proximal  saphenous and deep femoral veins. No intraluminal echogenic material was seen to  suggest the presence of nonobstructive thrombus. Multiple borderline-enlarged  right inguinal lymph nodes are again noted, the largest measuring 2.2 x 1.0 x  2.0 cm. The appearance is not definitely changed from CT in February.     IMPRESSION  IMPRESSION:     1.  NO ULTRASOUND EVIDENCE OF DEEP VENOUS THROMBOSIS IN THE RIGHT LEG.     2.   PROMINENT RIGHT INGUINAL LYMPH NODES WITHOUT SIGNIFICANT CHANGE FROM  FEBRUARY 27.     Final [99] 4/24/2019 18:44 4/24/2019 18:51   Study Result Right foot series     HISTORY: Pain and swelling along dorsum of foot x2 days. Redness.     3 views of the right foot were obtained. No prior studies are available for  comparison.     FINDINGS: There is diffuse soft tissue swelling along the dorsum of the foot. There is a moderate plantar calcaneal spur. There is no evidence of acute  fracture or dislocation. There is no bony destruction.     IMPRESSION  IMPRESSION:  1. Soft tissue swelling. 2. Calcaneal spur. SUMMARY:    -  Left ventricle: Systolic function was normal. Ejection fraction was  estimated in the range of 60 % to 65 %. There were no regional wall motion  abnormalities. There was mild concentric hypertrophy. -  Left atrium: The atrium was markedly dilated. -  Right atrium: The atrium was mildly dilated. -  Aortic valve: The aortic valve area by the continuity equation was 1.54   cm2.    -  Mitral valve: There was mild annular calcification. There was mild to  moderate regurgitation. Labs: Results:       Chemistry Recent Labs     05/02/19 0420 05/01/19  0520 04/30/19  0342   GLU 60* 99 54*    138 139   K 3.8 3.8 3.5   * 112* 113*   CO2 17* 17* 17*   BUN 34* 30* 29*   CREA 1.36* 1.17* 0.97   CA 7.8* 8.1* 7.7*   AGAP 11 9 9      CBC w/Diff Recent Labs     05/02/19 0420 05/01/19 0520 04/30/19  0342   WBC 6.1 6.1 7.2   RBC 2.85* 2.94* 2.88*   HGB 7.4* 7.5* 7.4*   HCT 22.1* 23.1* 22.8*   PLT 97* 107* 116*   GRANS  --   --  75   LYMPH  --   --  10*   EOS  --   --  2      Cardiac Enzymes No results for input(s): CPK, CKND1, CAMMIE in the last 72 hours. No lab exists for component: CKRMB, TROIP   Coagulation No results for input(s): PTP, INR, APTT in the last 72 hours.     No lab exists for component: INREXT    Lipid Panel Lab Results   Component Value Date/Time    Cholesterol, total 158 02/27/2019 11:28 AM    HDL Cholesterol 49 02/27/2019 11:28 AM    LDL, calculated 96 02/27/2019 11:28 AM    VLDL, calculated 13 02/27/2019 11:28 AM    Triglyceride 65 02/27/2019 11:28 AM      BNP No results for input(s): BNPP in the last 72 hours. Liver Enzymes No results for input(s): TP, ALB, TBIL, AP, SGOT, GPT in the last 72 hours. No lab exists for component: DBIL   Thyroid Studies Lab Results   Component Value Date/Time    TSH 1.950 02/27/2019 01:28 PM            Discharge Exam:  Visit Vitals  /68 (BP 1 Location: Right arm, BP Patient Position: Sitting)   Pulse 76   Temp 98.2 °F (36.8 °C)   Resp 17   Ht 5' 7\" (1.702 m)   Wt 93.1 kg (205 lb 4.8 oz)   SpO2 99%   BMI 32.15 kg/m²     General appearance: alert, cooperative, no distress, appears stated age  Lungs: clear to auscultation bilaterally  Heart: regular rate and rhythm, S1, S2 normal, no murmur, click, rub or gallop  Abdomen: soft, non-tender. Bowel sounds normal. No masses,  no organomegaly  Extremities: right mid foot with dorsal skin tear, associated erythema (improved) and sloughing skin. Edema still +2 but improved from yesterday. Wound care has wrapped with mild compression. Neurologic: Grossly normal    Disposition: home with Zora Dozier RN, PT  Discharge Condition: stable  Patient Instructions:   Discharge Medication List as of 5/2/2019  2:42 PM      START taking these medications    Details   cephALEXin (KEFLEX) 500 mg capsule Take 1 Cap by mouth four (4) times daily for 6 days. , Print, Disp-24 Cap, R-0      doxycycline (VIBRAMYCIN) 100 mg capsule Take 1 Cap by mouth every twelve (12) hours for 6 days. , Print, Disp-12 Cap, R-0      traMADol (ULTRAM) 50 mg tablet Take 1 Tab by mouth every six (6) hours as needed for Pain for up to 3 days. Max Daily Amount: 200 mg., Print, Disp-20 Tab, R-0         CONTINUE these medications which have NOT CHANGED    Details   hydroCHLOROthiazide (HYDRODIURIL) 25 mg tablet as needed., Historical Med, R-1      Ferrous Fumarate 325 mg (106 mg iron) tab Take 325 mg by mouth daily. , Historical Med      insulin glargine (LANTUS) 100 unit/mL injection 40 Units by SubCUTAneous route every morning., Print, Disp-1 Vial, R-0      pantoprazole (PROTONIX) 40 mg tablet Take 1 Tab by mouth daily. , Print, Disp-30 Tab, R-0      insulin aspart U-100 (NOVOLOG) 100 unit/mL inpn Less than 150 =   0 units           150 -199 =   2 units  200 -249 =   4 units  250 -299 =   6 units  300 -349 =   8 units  Before meals and at night, Print, Disp-1 Pen, R-0      gabapentin (NEURONTIN) 300 mg capsule Take 1 Cap by mouth nightly., Normal, Disp-90 Cap, R-3      sertraline (ZOLOFT) 50 mg tablet Take 1 Tab by mouth daily. , Normal, Disp-90 Tab, R-3      atorvastatin (LIPITOR) 10 mg tablet Take 1 Tab by mouth daily. , Normal, Disp-30 Tab, R-6      Insulin Needles, Disposable, (ARIE PEN NEEDLE) 32 gauge x 5/32\" ndle For use with insulin pens   E11.65 Uncontrolled type 2 DM, Normal, Disp-100 Pen Needle, R-11      levothyroxine (SYNTHROID) 125 mcg tablet Take 1 Tab by mouth Daily (before breakfast). , Normal, Disp-90 Tab, R-1      Blood-Glucose Meter monitoring kit Test FSBS 3x daily before meals. E11.65, Print, Disp-1 Kit, R-0      lancets misc Test FSBS 3x daily before meals. E11.65, Print, Disp-100 Each, R-12      glucose blood VI test strips (BLOOD GLUCOSE TEST) strip Test FSBS 3x daily before meals. E11.65, Print, Disp-100 Strip, R-12      Insulin Needles, Disposable, 31 gauge x 5/16\" ndle by SubCUTAneous route daily. E11.65, Normal, Disp-1 Package, R-11         STOP taking these medications       lisinopril (PRINIVIL, ZESTRIL) 2.5 mg tablet Comments:   Reason for Stopping:               Activity: as tolerated  Diet: low sodium      Follow-up  ·   PCP in 1 week.    Time spent to discharge patient 35 minutes  Signed:  Davey Caro DO  5/2/2019  7:36 PM

## 2019-05-02 NOTE — DISCHARGE INSTRUCTIONS
DISCHARGE SUMMARY from Nurse    PATIENT INSTRUCTIONS:    After general anesthesia or intravenous sedation, for 24 hours or while taking prescription Narcotics:  · Limit your activities  · Do not drive and operate hazardous machinery  · Do not make important personal or business decisions  · Do  not drink alcoholic beverages  · If you have not urinated within 8 hours after discharge, please contact your surgeon on call. Report the following to your surgeon:  · Excessive pain, swelling, redness or odor of or around the surgical area  · Temperature over 100.5  · Nausea and vomiting lasting longer than 4 hours or if unable to take medications  · Any signs of decreased circulation or nerve impairment to extremity: change in color, persistent  numbness, tingling, coldness or increase pain  · Any questions    What to do at Home:  Recommended activity: Activity as tolerated    If you experience any of the following symptoms increased redness or swelling of wound, fever greater than 101, nausea/vomiting, or chills, please follow up with your primary care physician. *  Please give a list of your current medications to your Primary Care Provider. *  Please update this list whenever your medications are discontinued, doses are      changed, or new medications (including over-the-counter products) are added. *  Please carry medication information at all times in case of emergency situations. These are general instructions for a healthy lifestyle:    No smoking/ No tobacco products/ Avoid exposure to second hand smoke  Surgeon General's Warning:  Quitting smoking now greatly reduces serious risk to your health.     Obesity, smoking, and sedentary lifestyle greatly increases your risk for illness    A healthy diet, regular physical exercise & weight monitoring are important for maintaining a healthy lifestyle    You may be retaining fluid if you have a history of heart failure or if you experience any of the following symptoms:  Weight gain of 3 pounds or more overnight or 5 pounds in a week, increased swelling in our hands or feet or shortness of breath while lying flat in bed. Please call your doctor as soon as you notice any of these symptoms; do not wait until your next office visit. Recognize signs and symptoms of STROKE:    F-face looks uneven    A-arms unable to move or move unevenly    S-speech slurred or non-existent    T-time-call 911 as soon as signs and symptoms begin-DO NOT go       Back to bed or wait to see if you get better-TIME IS BRAIN. Warning Signs of HEART ATTACK     Call 911 if you have these symptoms:   Chest discomfort. Most heart attacks involve discomfort in the center of the chest that lasts more than a few minutes, or that goes away and comes back. It can feel like uncomfortable pressure, squeezing, fullness, or pain.  Discomfort in other areas of the upper body. Symptoms can include pain or discomfort in one or both arms, the back, neck, jaw, or stomach.  Shortness of breath with or without chest discomfort.  Other signs may include breaking out in a cold sweat, nausea, or lightheadedness. Don't wait more than five minutes to call 911 - MINUTES MATTER! Fast action can save your life. Calling 911 is almost always the fastest way to get lifesaving treatment. Emergency Medical Services staff can begin treatment when they arrive -- up to an hour sooner than if someone gets to the hospital by car. The discharge information has been reviewed with the patient. The patient verbalized understanding. Discharge medications reviewed with the patient and appropriate educational materials and side effects teaching were provided.   ___________________________________________________________________________________________________________________________________

## 2019-05-02 NOTE — PROGRESS NOTES
Nutrition Reason for assessment: LOS Day 7 Assessment:  
Diet: DIET DIABETIC CONSISTENT CARB Regular PMH: 
Past Medical History:  
Diagnosis Date  Diabetes ([address]  Hypertension Food/Nutrition Patient History:  The patient was not \"in the mood\" to speak with RD. She states that she needs to go to the bathroom at this time and she is expecting to discharge today. She states she eats \"fine. \" She has no problems with food. She has no questions or concerns. Anthropometrics:Height: 5' 7\" (170.2 cm),  Weight: 93.1 kg (205 lb 4.8 oz),  , Body mass index is 32.15 kg/m². BMI class of obesity class I. Macronutrient needs: EER:  0326-5222 kcal /day (15-20 kcal/kg listed BW of 93.1 kg) EPR:  74-93 grams protein/day (0.8-1 grams/kg listed BW) Intake/Comparative Standards: No po intakes have been recorded at this time. Per patient report she is eating \"fine. \" 
 
Nutrition Diagnosis: No acute nutrition related diagnosis at this time. Intervention: 
Meals and snacks: Continue current diet. Nutrition Supplement Therapy: None at this time Nutrition Discharge Plan: No nutrition needs identified Lazarus Dad Edmundo Gilles, [address],  [phone]

## 2019-05-02 NOTE — PROGRESS NOTES
Discharge instructions and prescriptions given and reviewed with pt, verbalizes understanding, offered home health to assist with dsg changes, pt declined, pt to be discharged home,  Waiting on ride, to call desk when ready to leave.

## 2019-05-07 ENCOUNTER — HOSPITAL ENCOUNTER (INPATIENT)
Age: 64
LOS: 16 days | Discharge: NURSING, REHAB OR LONG-TERM CARE | DRG: 982 | End: 2019-05-23
Attending: EMERGENCY MEDICINE | Admitting: HOSPITALIST
Payer: COMMERCIAL

## 2019-05-07 DIAGNOSIS — E11.628 DIABETIC FOOT INFECTION (HCC): Primary | ICD-10-CM

## 2019-05-07 DIAGNOSIS — L08.9 DIABETIC FOOT INFECTION (HCC): Primary | ICD-10-CM

## 2019-05-07 DIAGNOSIS — L03.119 CELLULITIS OF LOWER EXTREMITY, UNSPECIFIED LATERALITY: ICD-10-CM

## 2019-05-07 DIAGNOSIS — L03.115 CELLULITIS OF RIGHT LOWER EXTREMITY: ICD-10-CM

## 2019-05-07 LAB
ALBUMIN SERPL-MCNC: 2.3 G/DL (ref 3.2–4.6)
ALBUMIN/GLOB SERPL: 0.5 {RATIO} (ref 1.2–3.5)
ALP SERPL-CCNC: 413 U/L (ref 50–136)
ALT SERPL-CCNC: 20 U/L (ref 12–65)
ANION GAP SERPL CALC-SCNC: 9 MMOL/L (ref 7–16)
AST SERPL-CCNC: 29 U/L (ref 15–37)
BASOPHILS # BLD: 0 K/UL (ref 0–0.2)
BASOPHILS NFR BLD: 0 % (ref 0–2)
BILIRUB SERPL-MCNC: 2.6 MG/DL (ref 0.2–1.1)
BUN SERPL-MCNC: 26 MG/DL (ref 8–23)
CALCIUM SERPL-MCNC: 8.6 MG/DL (ref 8.3–10.4)
CHLORIDE SERPL-SCNC: 105 MMOL/L (ref 98–107)
CO2 SERPL-SCNC: 22 MMOL/L (ref 21–32)
CREAT SERPL-MCNC: 0.93 MG/DL (ref 0.6–1)
DIFFERENTIAL METHOD BLD: ABNORMAL
EOSINOPHIL # BLD: 0.1 K/UL (ref 0–0.8)
EOSINOPHIL NFR BLD: 0 % (ref 0.5–7.8)
ERYTHROCYTE [DISTWIDTH] IN BLOOD BY AUTOMATED COUNT: 18 % (ref 11.9–14.6)
GLOBULIN SER CALC-MCNC: 4.5 G/DL (ref 2.3–3.5)
GLUCOSE SERPL-MCNC: 108 MG/DL (ref 65–100)
HCT VFR BLD AUTO: 25.8 % (ref 35.8–46.3)
HGB BLD-MCNC: 8.3 G/DL (ref 11.7–15.4)
IMM GRANULOCYTES # BLD AUTO: 0.1 K/UL (ref 0–0.5)
IMM GRANULOCYTES NFR BLD AUTO: 1 % (ref 0–5)
LACTATE BLD-SCNC: 1.97 MMOL/L (ref 0.5–1.9)
LYMPHOCYTES # BLD: 0.8 K/UL (ref 0.5–4.6)
LYMPHOCYTES NFR BLD: 6 % (ref 13–44)
MCH RBC QN AUTO: 25.7 PG (ref 26.1–32.9)
MCHC RBC AUTO-ENTMCNC: 32.2 G/DL (ref 31.4–35)
MCV RBC AUTO: 79.9 FL (ref 79.6–97.8)
MONOCYTES # BLD: 0.7 K/UL (ref 0.1–1.3)
MONOCYTES NFR BLD: 5 % (ref 4–12)
NEUTS SEG # BLD: 12.7 K/UL (ref 1.7–8.2)
NEUTS SEG NFR BLD: 88 % (ref 43–78)
NRBC # BLD: 0 K/UL (ref 0–0.2)
PLATELET # BLD AUTO: 152 K/UL (ref 150–450)
PMV BLD AUTO: 8.7 FL (ref 9.4–12.3)
POTASSIUM SERPL-SCNC: 4.6 MMOL/L (ref 3.5–5.1)
PROT SERPL-MCNC: 6.8 G/DL (ref 6.3–8.2)
RBC # BLD AUTO: 3.23 M/UL (ref 4.05–5.2)
SODIUM SERPL-SCNC: 136 MMOL/L (ref 136–145)
WBC # BLD AUTO: 14.4 K/UL (ref 4.3–11.1)

## 2019-05-07 PROCEDURE — 80053 COMPREHEN METABOLIC PANEL: CPT

## 2019-05-07 PROCEDURE — 65270000029 HC RM PRIVATE

## 2019-05-07 PROCEDURE — 74011250636 HC RX REV CODE- 250/636: Performed by: EMERGENCY MEDICINE

## 2019-05-07 PROCEDURE — 87040 BLOOD CULTURE FOR BACTERIA: CPT

## 2019-05-07 PROCEDURE — 85025 COMPLETE CBC W/AUTO DIFF WBC: CPT

## 2019-05-07 PROCEDURE — 99284 EMERGENCY DEPT VISIT MOD MDM: CPT | Performed by: EMERGENCY MEDICINE

## 2019-05-07 PROCEDURE — 36415 COLL VENOUS BLD VENIPUNCTURE: CPT

## 2019-05-07 PROCEDURE — 74011250637 HC RX REV CODE- 250/637: Performed by: EMERGENCY MEDICINE

## 2019-05-07 PROCEDURE — 74011250637 HC RX REV CODE- 250/637: Performed by: HOSPITALIST

## 2019-05-07 PROCEDURE — 74011250636 HC RX REV CODE- 250/636: Performed by: HOSPITALIST

## 2019-05-07 PROCEDURE — 83605 ASSAY OF LACTIC ACID: CPT

## 2019-05-07 RX ORDER — DEXTROSE MONOHYDRATE 25 G/50ML
25-50 INJECTION, SOLUTION INTRAVENOUS AS NEEDED
Status: DISCONTINUED | OUTPATIENT
Start: 2019-05-07 | End: 2019-05-23 | Stop reason: HOSPADM

## 2019-05-07 RX ORDER — PANTOPRAZOLE SODIUM 40 MG/1
40 TABLET, DELAYED RELEASE ORAL DAILY
Status: DISCONTINUED | OUTPATIENT
Start: 2019-05-08 | End: 2019-05-23 | Stop reason: HOSPADM

## 2019-05-07 RX ORDER — ATORVASTATIN CALCIUM 10 MG/1
10 TABLET, FILM COATED ORAL DAILY
Status: DISCONTINUED | OUTPATIENT
Start: 2019-05-08 | End: 2019-05-12

## 2019-05-07 RX ORDER — INSULIN GLARGINE 100 [IU]/ML
40 INJECTION, SOLUTION SUBCUTANEOUS
Status: DISCONTINUED | OUTPATIENT
Start: 2019-05-08 | End: 2019-05-08

## 2019-05-07 RX ORDER — LANOLIN ALCOHOL/MO/W.PET/CERES
325 CREAM (GRAM) TOPICAL DAILY
Status: DISCONTINUED | OUTPATIENT
Start: 2019-05-08 | End: 2019-05-23 | Stop reason: HOSPADM

## 2019-05-07 RX ORDER — HYDROCODONE BITARTRATE AND ACETAMINOPHEN 5; 325 MG/1; MG/1
1 TABLET ORAL
Status: COMPLETED | OUTPATIENT
Start: 2019-05-07 | End: 2019-05-07

## 2019-05-07 RX ORDER — SERTRALINE HYDROCHLORIDE 50 MG/1
50 TABLET, FILM COATED ORAL DAILY
Status: DISCONTINUED | OUTPATIENT
Start: 2019-05-08 | End: 2019-05-10

## 2019-05-07 RX ORDER — NALOXONE HYDROCHLORIDE 0.4 MG/ML
0.4 INJECTION, SOLUTION INTRAMUSCULAR; INTRAVENOUS; SUBCUTANEOUS AS NEEDED
Status: DISCONTINUED | OUTPATIENT
Start: 2019-05-07 | End: 2019-05-23 | Stop reason: HOSPADM

## 2019-05-07 RX ORDER — OXYCODONE AND ACETAMINOPHEN 7.5; 325 MG/1; MG/1
1 TABLET ORAL
Status: DISCONTINUED | OUTPATIENT
Start: 2019-05-07 | End: 2019-05-23 | Stop reason: HOSPADM

## 2019-05-07 RX ORDER — SODIUM CHLORIDE 0.9 % (FLUSH) 0.9 %
5-40 SYRINGE (ML) INJECTION AS NEEDED
Status: DISCONTINUED | OUTPATIENT
Start: 2019-05-07 | End: 2019-05-23 | Stop reason: HOSPADM

## 2019-05-07 RX ORDER — INSULIN LISPRO 100 [IU]/ML
0-10 INJECTION, SOLUTION INTRAVENOUS; SUBCUTANEOUS
Status: DISCONTINUED | OUTPATIENT
Start: 2019-05-08 | End: 2019-05-21

## 2019-05-07 RX ORDER — VANCOMYCIN 2 GRAM/500 ML IN 0.9 % SODIUM CHLORIDE INTRAVENOUS
2000
Status: COMPLETED | OUTPATIENT
Start: 2019-05-07 | End: 2019-05-07

## 2019-05-07 RX ORDER — ACETAMINOPHEN 325 MG/1
650 TABLET ORAL
Status: DISCONTINUED | OUTPATIENT
Start: 2019-05-07 | End: 2019-05-23 | Stop reason: HOSPADM

## 2019-05-07 RX ORDER — ENOXAPARIN SODIUM 100 MG/ML
40 INJECTION SUBCUTANEOUS EVERY 24 HOURS
Status: DISCONTINUED | OUTPATIENT
Start: 2019-05-07 | End: 2019-05-12

## 2019-05-07 RX ORDER — SODIUM CHLORIDE 9 MG/ML
100 INJECTION, SOLUTION INTRAVENOUS CONTINUOUS
Status: DISCONTINUED | OUTPATIENT
Start: 2019-05-07 | End: 2019-05-12

## 2019-05-07 RX ORDER — GABAPENTIN 300 MG/1
300 CAPSULE ORAL
Status: DISCONTINUED | OUTPATIENT
Start: 2019-05-07 | End: 2019-05-23 | Stop reason: HOSPADM

## 2019-05-07 RX ORDER — VANCOMYCIN 1.75 GRAM/500 ML IN 0.9 % SODIUM CHLORIDE INTRAVENOUS
1750 EVERY 12 HOURS
Status: DISCONTINUED | OUTPATIENT
Start: 2019-05-08 | End: 2019-05-09

## 2019-05-07 RX ORDER — DEXTROSE 40 %
15 GEL (GRAM) ORAL AS NEEDED
Status: DISCONTINUED | OUTPATIENT
Start: 2019-05-07 | End: 2019-05-23 | Stop reason: HOSPADM

## 2019-05-07 RX ORDER — SODIUM CHLORIDE 0.9 % (FLUSH) 0.9 %
5-40 SYRINGE (ML) INJECTION EVERY 8 HOURS
Status: DISCONTINUED | OUTPATIENT
Start: 2019-05-07 | End: 2019-05-23 | Stop reason: HOSPADM

## 2019-05-07 RX ADMIN — VANCOMYCIN HYDROCHLORIDE 2000 MG: 10 INJECTION, POWDER, LYOPHILIZED, FOR SOLUTION INTRAVENOUS at 21:32

## 2019-05-07 RX ADMIN — SODIUM CHLORIDE 1000 ML: 900 INJECTION, SOLUTION INTRAVENOUS at 21:34

## 2019-05-07 RX ADMIN — HYDROCODONE BITARTRATE AND ACETAMINOPHEN 1 TABLET: 5; 325 TABLET ORAL at 21:32

## 2019-05-07 RX ADMIN — GABAPENTIN 300 MG: 300 CAPSULE ORAL at 23:19

## 2019-05-07 RX ADMIN — ENOXAPARIN SODIUM 40 MG: 40 INJECTION SUBCUTANEOUS at 23:19

## 2019-05-07 RX ADMIN — SODIUM CHLORIDE 100 ML/HR: 900 INJECTION, SOLUTION INTRAVENOUS at 23:35

## 2019-05-08 ENCOUNTER — APPOINTMENT (OUTPATIENT)
Dept: GENERAL RADIOLOGY | Age: 64
DRG: 982 | End: 2019-05-08
Attending: NURSE PRACTITIONER
Payer: COMMERCIAL

## 2019-05-08 LAB
ANION GAP SERPL CALC-SCNC: 7 MMOL/L (ref 7–16)
BASOPHILS # BLD: 0 K/UL (ref 0–0.2)
BASOPHILS NFR BLD: 0 % (ref 0–2)
BUN SERPL-MCNC: 28 MG/DL (ref 8–23)
CALCIUM SERPL-MCNC: 7.8 MG/DL (ref 8.3–10.4)
CHLORIDE SERPL-SCNC: 110 MMOL/L (ref 98–107)
CO2 SERPL-SCNC: 20 MMOL/L (ref 21–32)
CREAT SERPL-MCNC: 0.66 MG/DL (ref 0.6–1)
CRP SERPL-MCNC: 4.6 MG/DL (ref 0–0.9)
DIFFERENTIAL METHOD BLD: ABNORMAL
EOSINOPHIL # BLD: 0 K/UL (ref 0–0.8)
EOSINOPHIL NFR BLD: 1 % (ref 0.5–7.8)
ERYTHROCYTE [DISTWIDTH] IN BLOOD BY AUTOMATED COUNT: 17.8 % (ref 11.9–14.6)
EST. AVERAGE GLUCOSE BLD GHB EST-MCNC: 123 MG/DL
GLUCOSE BLD STRIP.AUTO-MCNC: 124 MG/DL (ref 65–100)
GLUCOSE BLD STRIP.AUTO-MCNC: 137 MG/DL (ref 65–100)
GLUCOSE BLD STRIP.AUTO-MCNC: 152 MG/DL (ref 65–100)
GLUCOSE BLD STRIP.AUTO-MCNC: 81 MG/DL (ref 65–100)
GLUCOSE SERPL-MCNC: 84 MG/DL (ref 65–100)
HBA1C MFR BLD: 5.9 % (ref 4.8–6)
HCT VFR BLD AUTO: 21.3 % (ref 35.8–46.3)
HCT VFR BLD AUTO: 22.8 % (ref 35.8–46.3)
HGB BLD-MCNC: 7 G/DL (ref 11.7–15.4)
HGB BLD-MCNC: 7.3 G/DL (ref 11.7–15.4)
IMM GRANULOCYTES # BLD AUTO: 0 K/UL (ref 0–0.5)
IMM GRANULOCYTES NFR BLD AUTO: 1 % (ref 0–5)
LYMPHOCYTES # BLD: 0.8 K/UL (ref 0.5–4.6)
LYMPHOCYTES NFR BLD: 16 % (ref 13–44)
MAGNESIUM SERPL-MCNC: 1.5 MG/DL (ref 1.8–2.4)
MCH RBC QN AUTO: 25.9 PG (ref 26.1–32.9)
MCHC RBC AUTO-ENTMCNC: 32.4 G/DL (ref 31.4–35)
MCV RBC AUTO: 80.1 FL (ref 79.6–97.8)
MONOCYTES # BLD: 0.4 K/UL (ref 0.1–1.3)
MONOCYTES NFR BLD: 8 % (ref 4–12)
NEUTS SEG # BLD: 3.8 K/UL (ref 1.7–8.2)
NEUTS SEG NFR BLD: 74 % (ref 43–78)
NRBC # BLD: 0 K/UL (ref 0–0.2)
PLATELET # BLD AUTO: 70 K/UL (ref 150–450)
PMV BLD AUTO: 9.2 FL (ref 9.4–12.3)
POTASSIUM SERPL-SCNC: 4.1 MMOL/L (ref 3.5–5.1)
RBC # BLD AUTO: 2.66 M/UL (ref 4.05–5.2)
SODIUM SERPL-SCNC: 137 MMOL/L (ref 136–145)
WBC # BLD AUTO: 5.1 K/UL (ref 4.3–11.1)

## 2019-05-08 PROCEDURE — 83735 ASSAY OF MAGNESIUM: CPT

## 2019-05-08 PROCEDURE — 74011000258 HC RX REV CODE- 258: Performed by: HOSPITALIST

## 2019-05-08 PROCEDURE — C1751 CATH, INF, PER/CENT/MIDLINE: HCPCS

## 2019-05-08 PROCEDURE — 36430 TRANSFUSION BLD/BLD COMPNT: CPT

## 2019-05-08 PROCEDURE — 97165 OT EVAL LOW COMPLEX 30 MIN: CPT

## 2019-05-08 PROCEDURE — 86140 C-REACTIVE PROTEIN: CPT

## 2019-05-08 PROCEDURE — 30253N1 TRANSFUSE NONAUT RED BLOOD CELLS IN PERIPH ART, PERC: ICD-10-PCS | Performed by: NURSE PRACTITIONER

## 2019-05-08 PROCEDURE — 77030020256 HC SOL INJ NACL 0.9%  500ML

## 2019-05-08 PROCEDURE — 74011636637 HC RX REV CODE- 636/637: Performed by: NURSE PRACTITIONER

## 2019-05-08 PROCEDURE — P9016 RBC LEUKOCYTES REDUCED: HCPCS

## 2019-05-08 PROCEDURE — 0KDV0ZZ EXTRACTION OF RIGHT FOOT MUSCLE, OPEN APPROACH: ICD-10-PCS | Performed by: SURGERY

## 2019-05-08 PROCEDURE — 86900 BLOOD TYPING SEROLOGIC ABO: CPT

## 2019-05-08 PROCEDURE — 36415 COLL VENOUS BLD VENIPUNCTURE: CPT

## 2019-05-08 PROCEDURE — 82962 GLUCOSE BLOOD TEST: CPT

## 2019-05-08 PROCEDURE — 74011250636 HC RX REV CODE- 250/636: Performed by: HOSPITALIST

## 2019-05-08 PROCEDURE — 86923 COMPATIBILITY TEST ELECTRIC: CPT

## 2019-05-08 PROCEDURE — 76937 US GUIDE VASCULAR ACCESS: CPT

## 2019-05-08 PROCEDURE — 74011250637 HC RX REV CODE- 250/637: Performed by: NURSE PRACTITIONER

## 2019-05-08 PROCEDURE — 85025 COMPLETE CBC W/AUTO DIFF WBC: CPT

## 2019-05-08 PROCEDURE — 65270000029 HC RM PRIVATE

## 2019-05-08 PROCEDURE — 85018 HEMOGLOBIN: CPT

## 2019-05-08 PROCEDURE — 74011250637 HC RX REV CODE- 250/637: Performed by: HOSPITALIST

## 2019-05-08 PROCEDURE — 77030039270 HC TU BLD FLTR CARD -A

## 2019-05-08 PROCEDURE — 77030020263 HC SOL INJ SOD CL0.9% LFCR 1000ML

## 2019-05-08 PROCEDURE — 73630 X-RAY EXAM OF FOOT: CPT

## 2019-05-08 PROCEDURE — 74011636637 HC RX REV CODE- 636/637: Performed by: HOSPITALIST

## 2019-05-08 PROCEDURE — 80048 BASIC METABOLIC PNL TOTAL CA: CPT

## 2019-05-08 PROCEDURE — 83036 HEMOGLOBIN GLYCOSYLATED A1C: CPT

## 2019-05-08 RX ORDER — SODIUM CHLORIDE 9 MG/ML
250 INJECTION, SOLUTION INTRAVENOUS AS NEEDED
Status: DISCONTINUED | OUTPATIENT
Start: 2019-05-08 | End: 2019-05-23 | Stop reason: HOSPADM

## 2019-05-08 RX ORDER — SODIUM CHLORIDE 0.9 % (FLUSH) 0.9 %
10 SYRINGE (ML) INJECTION EVERY 8 HOURS
Status: DISCONTINUED | OUTPATIENT
Start: 2019-05-08 | End: 2019-05-23 | Stop reason: HOSPADM

## 2019-05-08 RX ORDER — SODIUM CHLORIDE 0.9 % (FLUSH) 0.9 %
10 SYRINGE (ML) INJECTION AS NEEDED
Status: DISCONTINUED | OUTPATIENT
Start: 2019-05-08 | End: 2019-05-23 | Stop reason: HOSPADM

## 2019-05-08 RX ORDER — LANOLIN ALCOHOL/MO/W.PET/CERES
400 CREAM (GRAM) TOPICAL 2 TIMES DAILY
Status: COMPLETED | OUTPATIENT
Start: 2019-05-08 | End: 2019-05-08

## 2019-05-08 RX ORDER — LOPERAMIDE HYDROCHLORIDE 2 MG/1
4 CAPSULE ORAL ONCE
Status: DISPENSED | OUTPATIENT
Start: 2019-05-08 | End: 2019-05-08

## 2019-05-08 RX ORDER — INSULIN GLARGINE 100 [IU]/ML
15 INJECTION, SOLUTION SUBCUTANEOUS DAILY
Status: DISCONTINUED | OUTPATIENT
Start: 2019-05-09 | End: 2019-05-09

## 2019-05-08 RX ORDER — LOPERAMIDE HYDROCHLORIDE 2 MG/1
2 CAPSULE ORAL
Status: DISCONTINUED | OUTPATIENT
Start: 2019-05-08 | End: 2019-05-23 | Stop reason: HOSPADM

## 2019-05-08 RX ADMIN — OXYCODONE HYDROCHLORIDE AND ACETAMINOPHEN 1 TABLET: 7.5; 325 TABLET ORAL at 00:46

## 2019-05-08 RX ADMIN — Medication 10 ML: at 13:06

## 2019-05-08 RX ADMIN — INSULIN GLARGINE 15 UNITS: 100 INJECTION, SOLUTION SUBCUTANEOUS at 11:42

## 2019-05-08 RX ADMIN — INSULIN LISPRO 2 UNITS: 100 INJECTION, SOLUTION INTRAVENOUS; SUBCUTANEOUS at 17:07

## 2019-05-08 RX ADMIN — Medication 10 ML: at 23:54

## 2019-05-08 RX ADMIN — Medication 10 ML: at 05:39

## 2019-05-08 RX ADMIN — SODIUM CHLORIDE 1 G: 900 INJECTION, SOLUTION INTRAVENOUS at 00:00

## 2019-05-08 RX ADMIN — ATORVASTATIN CALCIUM 10 MG: 10 TABLET, FILM COATED ORAL at 08:16

## 2019-05-08 RX ADMIN — GABAPENTIN 300 MG: 300 CAPSULE ORAL at 21:50

## 2019-05-08 RX ADMIN — Medication 10 ML: at 00:01

## 2019-05-08 RX ADMIN — LOPERAMIDE HYDROCHLORIDE 2 MG: 2 CAPSULE ORAL at 10:10

## 2019-05-08 RX ADMIN — OXYCODONE HYDROCHLORIDE AND ACETAMINOPHEN 1 TABLET: 7.5; 325 TABLET ORAL at 17:01

## 2019-05-08 RX ADMIN — SERTRALINE HYDROCHLORIDE 50 MG: 50 TABLET ORAL at 08:16

## 2019-05-08 RX ADMIN — VANCOMYCIN HYDROCHLORIDE 1750 MG: 10 INJECTION, POWDER, LYOPHILIZED, FOR SOLUTION INTRAVENOUS at 08:57

## 2019-05-08 RX ADMIN — ENOXAPARIN SODIUM 40 MG: 40 INJECTION SUBCUTANEOUS at 22:00

## 2019-05-08 RX ADMIN — Medication 10 ML: at 17:42

## 2019-05-08 RX ADMIN — LEVOTHYROXINE SODIUM 125 MCG: 75 TABLET ORAL at 05:38

## 2019-05-08 RX ADMIN — OXYCODONE HYDROCHLORIDE AND ACETAMINOPHEN 1 TABLET: 7.5; 325 TABLET ORAL at 21:59

## 2019-05-08 RX ADMIN — SODIUM CHLORIDE 1 G: 900 INJECTION, SOLUTION INTRAVENOUS at 08:17

## 2019-05-08 RX ADMIN — SODIUM CHLORIDE 1 G: 900 INJECTION, SOLUTION INTRAVENOUS at 21:50

## 2019-05-08 RX ADMIN — MAGNESIUM OXIDE TAB 400 MG (241.3 MG ELEMENTAL MG) 400 MG: 400 (241.3 MG) TAB at 17:01

## 2019-05-08 RX ADMIN — PANTOPRAZOLE SODIUM 40 MG: 40 TABLET, DELAYED RELEASE ORAL at 08:16

## 2019-05-08 RX ADMIN — VANCOMYCIN HYDROCHLORIDE 1750 MG: 10 INJECTION, POWDER, LYOPHILIZED, FOR SOLUTION INTRAVENOUS at 22:48

## 2019-05-08 RX ADMIN — MAGNESIUM OXIDE TAB 400 MG (241.3 MG ELEMENTAL MG) 400 MG: 400 (241.3 MG) TAB at 10:10

## 2019-05-08 RX ADMIN — FERROUS SULFATE TAB 325 MG (65 MG ELEMENTAL FE) 325 MG: 325 (65 FE) TAB at 08:16

## 2019-05-08 RX ADMIN — SODIUM CHLORIDE 1 G: 900 INJECTION, SOLUTION INTRAVENOUS at 00:46

## 2019-05-08 RX ADMIN — OXYCODONE HYDROCHLORIDE AND ACETAMINOPHEN 1 TABLET: 7.5; 325 TABLET ORAL at 11:35

## 2019-05-08 RX ADMIN — SODIUM CHLORIDE 100 ML/HR: 900 INJECTION, SOLUTION INTRAVENOUS at 20:12

## 2019-05-08 NOTE — PROGRESS NOTES
Unit of blood started, signed consent is in chart, pt tolerating well. Will continue to monitor care.

## 2019-05-08 NOTE — PROGRESS NOTES
Hospitalist Progress Note Admit Date:  2019  8:39 PM  
Name:  Ileana Raza Age:  60 y.o. 
:  1955 MRN:  [id number] PCP:  Ever Dougherty MD 
Treatment Team: Attending Provider: Dave Villafana MD; Consulting Provider: Aubrie Amor MD; Utilization Review: Joe Sweet RN; Care Manager: Junior Kemp RN; Physical Therapist: Ramón Mcmahan PT; Occupational Therapist: Coleta Libman, OT Subjective:  
 
Pt is a 61 yo female with pmh DM 2, cirrhosis, anemia with ablation of AVM colon who presented to ER last night for worsening right foot cellulitis. She was just d/christian from our hospital  to cont Doxy/Keflex and outpatient wound care for right foto cellulitis. She reports compliance with meds and wound care but after 2-3 noticed worsening blisters and dark spots. In ER noted with WBC 14, no fevers but does report chills. This morning pt states weak due to diarrhea. Discussed Hgb 7.0, she denies dark or bloody stools. She has taken ibuprofen for foot pain and admits she was out of Protonix at home. Objective:  
 
Patient Vitals for the past 24 hrs: 
 Temp Pulse Resp BP SpO2  
19 0802 97.7 °F (36.5 °C) 79 20 107/52 100 % 19 0541 98.2 °F (36.8 °C) 74 20 100/50 100 % 19 0346 98.2 °F (36.8 °C) 78 20 136/68 100 % 19 2309 97.8 °F (36.6 °C) 80 20 145/64 100 % 19 2200  82  144/67 100 % 19 97.6 °F (36.4 °C) 87 20 118/69 100 % Oxygen Therapy O2 Sat (%): 100 % (19 08) Pulse via Oximetry: 82 beats per minute (190) O2 Device: Room air (19) No intake or output data in the 24 hours ending 19 1033 General:    Well nourished. Alert. CV:   RRR. No murmur, rub, or gallop. Lungs:   CTAB. No wheezing, rhonchi, or rales. Abdomen:   Soft, nontender, nondistended. Extremities: Warm and dry. No cyanosis or edema. Skin:     No rashes or jaundice. Right foot cellulitis with open blisters Neuro:  No gross focal deficits Data Review: 
I have reviewed all labs, meds, telemetry events, and studies from the last 24 hours: 
 
Recent Results (from the past 24 hour(s)) CBC WITH AUTOMATED DIFF Collection Time: 05/07/19  8:22 PM  
Result Value Ref Range WBC 14.4 (H) 4.3 - 11.1 K/uL  
 RBC 3.23 (L) 4.05 - 5.2 M/uL HGB 8.3 (L) 11.7 - 15.4 g/dL HCT 25.8 (L) 35.8 - 46.3 % MCV 79.9 79.6 - 97.8 FL  
 MCH 25.7 (L) 26.1 - 32.9 PG  
 MCHC 32.2 31.4 - 35.0 g/dL  
 RDW 18.0 (H) 11.9 - 14.6 % PLATELET 481 597 - 162 K/uL MPV 8.7 (L) 9.4 - 12.3 FL ABSOLUTE NRBC 0.00 0.0 - 0.2 K/uL  
 DF AUTOMATED NEUTROPHILS 88 (H) 43 - 78 % LYMPHOCYTES 6 (L) 13 - 44 % MONOCYTES 5 4.0 - 12.0 % EOSINOPHILS 0 (L) 0.5 - 7.8 % BASOPHILS 0 0.0 - 2.0 % IMMATURE GRANULOCYTES 1 0.0 - 5.0 %  
 ABS. NEUTROPHILS 12.7 (H) 1.7 - 8.2 K/UL  
 ABS. LYMPHOCYTES 0.8 0.5 - 4.6 K/UL  
 ABS. MONOCYTES 0.7 0.1 - 1.3 K/UL  
 ABS. EOSINOPHILS 0.1 0.0 - 0.8 K/UL  
 ABS. BASOPHILS 0.0 0.0 - 0.2 K/UL  
 ABS. IMM. GRANS. 0.1 0.0 - 0.5 K/UL METABOLIC PANEL, COMPREHENSIVE Collection Time: 05/07/19  8:22 PM  
Result Value Ref Range Sodium 136 136 - 145 mmol/L Potassium 4.6 3.5 - 5.1 mmol/L Chloride 105 98 - 107 mmol/L  
 CO2 22 21 - 32 mmol/L Anion gap 9 7 - 16 mmol/L Glucose 108 (H) 65 - 100 mg/dL BUN 26 (H) 8 - 23 MG/DL Creatinine 0.93 0.6 - 1.0 MG/DL  
 GFR est AA >60 >60 ml/min/1.73m2 GFR est non-AA >60 >60 ml/min/1.73m2 Calcium 8.6 8.3 - 10.4 MG/DL Bilirubin, total 2.6 (H) 0.2 - 1.1 MG/DL  
 ALT (SGPT) 20 12 - 65 U/L  
 AST (SGOT) 29 15 - 37 U/L Alk. phosphatase 413 (H) 50 - 136 U/L Protein, total 6.8 6.3 - 8.2 g/dL Albumin 2.3 (L) 3.2 - 4.6 g/dL Globulin 4.5 (H) 2.3 - 3.5 g/dL A-G Ratio 0.5 (L) 1.2 - 3.5 POC LACTIC ACID Collection Time: 05/07/19  8:30 PM  
Result Value Ref Range Lactic Acid (POC) 1.97 (H) 0.5 - 1.9 mmol/L  
CBC WITH AUTOMATED DIFF Collection Time: 05/08/19  4:35 AM  
Result Value Ref Range WBC 5.1 4.3 - 11.1 K/uL  
 RBC 2.66 (L) 4.05 - 5.2 M/uL HGB 7.0 (L) 11.7 - 15.4 g/dL HCT 21.3 (L) 35.8 - 46.3 % MCV 80.1 79.6 - 97.8 FL  
 MCH 25.9 (L) 26.1 - 32.9 PG  
 MCHC 32.4 31.4 - 35.0 g/dL  
 RDW 17.8 (H) 11.9 - 14.6 % PLATELET 70 (L) 550 - 450 K/uL MPV 9.2 (L) 9.4 - 12.3 FL ABSOLUTE NRBC 0.00 0.0 - 0.2 K/uL  
 DF AUTOMATED NEUTROPHILS 74 43 - 78 % LYMPHOCYTES 16 13 - 44 % MONOCYTES 8 4.0 - 12.0 % EOSINOPHILS 1 0.5 - 7.8 % BASOPHILS 0 0.0 - 2.0 % IMMATURE GRANULOCYTES 1 0.0 - 5.0 %  
 ABS. NEUTROPHILS 3.8 1.7 - 8.2 K/UL  
 ABS. LYMPHOCYTES 0.8 0.5 - 4.6 K/UL  
 ABS. MONOCYTES 0.4 0.1 - 1.3 K/UL  
 ABS. EOSINOPHILS 0.0 0.0 - 0.8 K/UL  
 ABS. BASOPHILS 0.0 0.0 - 0.2 K/UL  
 ABS. IMM. GRANS. 0.0 0.0 - 0.5 K/UL METABOLIC PANEL, BASIC Collection Time: 05/08/19  4:35 AM  
Result Value Ref Range Sodium 137 136 - 145 mmol/L Potassium 4.1 3.5 - 5.1 mmol/L Chloride 110 (H) 98 - 107 mmol/L  
 CO2 20 (L) 21 - 32 mmol/L Anion gap 7 7 - 16 mmol/L Glucose 84 65 - 100 mg/dL BUN 28 (H) 8 - 23 MG/DL Creatinine 0.66 0.6 - 1.0 MG/DL  
 GFR est AA >60 >60 ml/min/1.73m2 GFR est non-AA >60 >60 ml/min/1.73m2 Calcium 7.8 (L) 8.3 - 10.4 MG/DL  
C REACTIVE PROTEIN, QT Collection Time: 05/08/19  4:35 AM  
Result Value Ref Range C-Reactive protein 4.6 (H) 0.0 - 0.9 mg/dL MAGNESIUM Collection Time: 05/08/19  4:35 AM  
Result Value Ref Range Magnesium 1.5 (L) 1.8 - 2.4 mg/dL GLUCOSE, POC Collection Time: 05/08/19  7:26 AM  
Result Value Ref Range Glucose (POC) 81 65 - 100 mg/dL All Micro Results Procedure Component Value Units Date/Time CULTURE, BLOOD [[id number]] Collected:  05/07/19 2345 Order Status:  Completed Specimen:  Whole Blood Updated:  05/08/19 0035 CULTURE, BLOOD [[id number]] Collected:  05/07/19 2138 Order Status:  Completed Specimen:  Whole Blood Updated:  05/07/19 2236 No results found for this visit on 05/07/19. Current Meds: 
Current Facility-Administered Medications Medication Dose Route Frequency  loperamide (IMODIUM) capsule 4 mg  4 mg Oral ONCE  
 loperamide (IMODIUM) capsule 2 mg  2 mg Oral Q8H PRN  
 magnesium oxide (MAG-OX) tablet 400 mg  400 mg Oral BID  [START ON 5/9/2019] vancomycin trough reminder   Other ONCE  
 0.9% sodium chloride infusion 250 mL  250 mL IntraVENous PRN  
 atorvastatin (LIPITOR) tablet 10 mg  10 mg Oral DAILY  ferrous sulfate tablet 325 mg  325 mg Oral DAILY  gabapentin (NEURONTIN) capsule 300 mg  300 mg Oral QHS  insulin glargine (LANTUS) injection 40 Units  40 Units SubCUTAneous 7am  
 levothyroxine (SYNTHROID) tablet 125 mcg  125 mcg Oral ACB  sertraline (ZOLOFT) tablet 50 mg  50 mg Oral DAILY  pantoprazole (PROTONIX) tablet 40 mg  40 mg Oral DAILY  dextrose 40% (GLUTOSE) oral gel 1 Tube  15 g Oral PRN  
 glucagon (GLUCAGEN) injection 1 mg  1 mg IntraMUSCular PRN  
 dextrose (D50) infusion 12.5-25 g  25-50 mL IntraVENous PRN  
 insulin lispro (HUMALOG) injection 0-10 Units  0-10 Units SubCUTAneous AC&HS  sodium chloride (NS) flush 5-40 mL  5-40 mL IntraVENous Q8H  
 sodium chloride (NS) flush 5-40 mL  5-40 mL IntraVENous PRN  
 0.9% sodium chloride infusion  100 mL/hr IntraVENous CONTINUOUS  
 acetaminophen (TYLENOL) tablet 650 mg  650 mg Oral Q4H PRN  
 oxyCODONE-acetaminophen (PERCOCET 7.5) 7.5-325 mg per tablet 1 Tab  1 Tab Oral Q4H PRN  
 naloxone (NARCAN) injection 0.4 mg  0.4 mg IntraVENous PRN  
 enoxaparin (LOVENOX) injection 40 mg  40 mg SubCUTAneous Q24H  cefepime (MAXIPIME) 1 g in 0.9% sodium chloride (MBP/ADV) 50 mL ADV  1 g IntraVENous Q12H  
 vancomycin (VANCOCIN) 1750 mg in  ml infusion  1,750 mg IntraVENous Q12H Other Studies (last 24 hours): No results found. Assessment and Plan:  
 
Hospital Problems as of 5/8/2019 Date Reviewed: 4/24/2019 Codes Class Noted - Resolved POA * (Principal) Cellulitis of right foot ICD-10-CM: F75.947 ICD-9-CM: 682.7  5/7/2019 - Present Yes Cirrhosis of liver without ascites (HCC) ICD-10-CM: K74.60 ICD-9-CM: 571.5  3/7/2019 - Present Yes Microcytic anemia ICD-10-CM: D50.9 ICD-9-CM: 280.9  2/27/2019 - Present Yes Type 2 diabetes with nephropathy ([facility] ICD-10-CM: E11.21 
ICD-9-CM: 250.40, 583.81  1/16/2019 - Present Yes Hyperlipidemia ICD-10-CM: E78.5 ICD-9-CM: 272.4  1/12/2016 - Present Yes Essential hypertension ICD-10-CM: I10 
ICD-9-CM: 401.9  1/12/2016 - Present Yes Acquired hypothyroidism ICD-10-CM: E03.9 ICD-9-CM: 244.9  1/12/2016 - Present Yes Plan: 
 
Right foot cellulitis Worsening despite Keflex/Doxy at home X Ray to assess for osteo Consult wound care Consult surgery to assess for debridement Cont Vanc/Cefepime started overnight NS @ 100 Anemia, uncertain etiology Previous AVM but repaired, check FOBT Transfuse 1 unit PRBC 
PO Protonix Avoid NSAIDs  
 
DM Humalog SSI achs Likely will need reduced dose Lantus due to lower BS this am  
 
Diarrhea Likely due to abx If meets criteria check for C Diff 
Probiotic and PRN Imodium DC planning Consult CM for poss home health Diet:  DIET DIABETIC CONSISTENT CARB 
DVT ppx:  Lovenox Signed: 
Susan Amor NP

## 2019-05-08 NOTE — H&P
Hospitalist H&P/Consult Note Admit Date:  2019  8:39 PM  
Name:  Roselyn Duque Age:  60 y.o. 
:  1955 MRN:  [id number] PCP:  Kacey Mcfadden MD 
Treatment Team: Attending Provider: Carlitos Eldridge MD 
 
HPI:  
Patient is a 59 y/o female with hx diabetes and HTN was recently hospitalized for cellulitis of right foot. Symptoms began initially last month on the 23d. She was discharged home  on keflex and doxycycline. She had been doing well for a time but now she has noticed a blackened appearance to the dorsum of right foot. Also had two blisters on the medial aspect of foot that have ruptured. She has a WBC ct of 14.4. No fever but has had chills. Her right foot has diffuse cellulitis with a large black eschar on dorsum. Will admit for IV antibiotics and surgical evaluation. 10 systems reviewed and negative except as noted in HPI. Past Medical History:  
Diagnosis Date  Diabetes ([facility] [address]  Hypertension Past Surgical History:  
Procedure Laterality Date  COLONOSCOPY N/A 3/2/2019 COLONOSCOPY ROOM 615 performed by Jimbo Galvan MD at [facility] ENDOSCOPY Prior to Admission Medications Prescriptions Last Dose Informant Patient Reported? Taking? Blood-Glucose Meter monitoring kit 2019 at Unknown time  No Yes Sig: Test FSBS 3x daily before meals. E11.65 Ferrous Fumarate 325 mg (106 mg iron) tab 2019 at Unknown time  Yes Yes Sig: Take 325 mg by mouth daily. Insulin Needles, Disposable, (ARIE PEN NEEDLE) 32 gauge x 32\" ndle 2019 at Unknown time  No Yes Sig: For use with insulin pens   E11.65 Uncontrolled type 2 DM Insulin Needles, Disposable, 31 gauge x 5/16\" ndle 2019 at Unknown time  No Yes Sig: by SubCUTAneous route daily. E11.65  
atorvastatin (LIPITOR) 10 mg tablet 2019 at Unknown time  No Yes Sig: Take 1 Tab by mouth daily. Patient taking differently: Take 20 mg by mouth daily. cephALEXin (KEFLEX) 500 mg capsule 2019 at Unknown time  No Yes Sig: Take 1 Cap by mouth four (4) times daily for 6 days. doxycycline (VIBRAMYCIN) 100 mg capsule 2019 at Unknown time  No Yes Sig: Take 1 Cap by mouth every twelve (12) hours for 6 days. gabapentin (NEURONTIN) 300 mg capsule 2019 at Unknown time  No Yes Sig: Take 1 Cap by mouth nightly. glucose blood VI test strips (BLOOD GLUCOSE TEST) strip 2019 at Unknown time  No Yes Sig: Test FSBS 3x daily before meals. E11.65  
hydroCHLOROthiazide (HYDRODIURIL) 25 mg tablet 2019 at Unknown time  Yes Yes Sig: as needed. insulin aspart U-100 (NOVOLOG) 100 unit/mL inpn 2019 at Unknown time  No Yes Sig: Less than 150 =   0 units          
150 -199 =   2 units 200 -249 =   4 units 250 -299 =   6 units 300 -349 =   8 units Before meals and at night  
insulin glargine (LANTUS) 100 unit/mL injection 2019 at Unknown time  No Yes Si Units by SubCUTAneous route every morning. Patient taking differently: 50 Units by SubCUTAneous route every morning. lancets misc 2019 at Unknown time  No Yes Sig: Test FSBS 3x daily before meals. E11.65  
levothyroxine (SYNTHROID) 125 mcg tablet 2019 at Unknown time  No Yes Sig: Take 1 Tab by mouth Daily (before breakfast). pantoprazole (PROTONIX) 40 mg tablet 2019 at Unknown time  No Yes Sig: Take 1 Tab by mouth daily. sertraline (ZOLOFT) 50 mg tablet 2019 at Unknown time  No Yes Sig: Take 1 Tab by mouth daily. Facility-Administered Medications: None Allergies Allergen Reactions  Pcn [Penicillins] Hives and Rash Tolerates cephalosporins Social History Tobacco Use  Smoking status: Never Smoker  Smokeless tobacco: Never Used Substance Use Topics  Alcohol use: No  
  
Family History Problem Relation Age of Onset  Hypertension Mother Clove.Goldberg Other Mother PVD  Diabetes Mother  Parkinson's Disease Mother  Diabetes Father Immunization History Administered Date(s) Administered  TB Skin Test (PPD) Intradermal 04/28/2019 Objective:  
 
Patient Vitals for the past 24 hrs: 
 Temp Pulse Resp BP SpO2  
05/07/19 2309 97.8 °F (36.6 °C) 80 20 145/64 100 % 05/07/19 2200  82  144/67 100 % 05/07/19 2013 97.6 °F (36.4 °C) 87 20 118/69 100 % Oxygen Therapy O2 Sat (%): 100 % (05/07/19 2309) Pulse via Oximetry: 82 beats per minute (05/07/19 2200) O2 Device: Room air (05/07/19 2013) No intake or output data in the 24 hours ending 05/08/19 0101 Physical Exam: 
General:    Well nourished. Alert. Eyes:   Normal sclera. Extraocular movements intact. ENT:  Normocephalic, atraumatic. Moist mucous membranes CV:   RRR. No murmur, rub, or gallop. Lungs:  CTAB. No wheezing, rhonchi, or rales. Abdomen: Soft, nontender, nondistended. Bowel sounds normal.  
Extremities: Warm and dry. No cyanosis or edema. Neurologic: CN II-XII grossly intact. Sensation intact. Skin:     Right foot with cellulitis and large black eschar dorsum right foot Right medial aspect of foot with 2 deflated blisters. Psych:  Normal mood and affect. I reviewed the labs, imaging, EKGs, telemetry, and other studies done this admission. Data Review:  
Recent Results (from the past 24 hour(s)) CBC WITH AUTOMATED DIFF Collection Time: 05/07/19  8:22 PM  
Result Value Ref Range WBC 14.4 (H) 4.3 - 11.1 K/uL  
 RBC 3.23 (L) 4.05 - 5.2 M/uL HGB 8.3 (L) 11.7 - 15.4 g/dL HCT 25.8 (L) 35.8 - 46.3 % MCV 79.9 79.6 - 97.8 FL  
 MCH 25.7 (L) 26.1 - 32.9 PG  
 MCHC 32.2 31.4 - 35.0 g/dL  
 RDW 18.0 (H) 11.9 - 14.6 % PLATELET 983 437 - 352 K/uL MPV 8.7 (L) 9.4 - 12.3 FL ABSOLUTE NRBC 0.00 0.0 - 0.2 K/uL  
 DF AUTOMATED NEUTROPHILS 88 (H) 43 - 78 % LYMPHOCYTES 6 (L) 13 - 44 % MONOCYTES 5 4.0 - 12.0 % EOSINOPHILS 0 (L) 0.5 - 7.8 %  BASOPHILS 0 0.0 - 2.0 %  
 IMMATURE GRANULOCYTES 1 0.0 - 5.0 %  
 ABS. NEUTROPHILS 12.7 (H) 1.7 - 8.2 K/UL  
 ABS. LYMPHOCYTES 0.8 0.5 - 4.6 K/UL  
 ABS. MONOCYTES 0.7 0.1 - 1.3 K/UL  
 ABS. EOSINOPHILS 0.1 0.0 - 0.8 K/UL  
 ABS. BASOPHILS 0.0 0.0 - 0.2 K/UL  
 ABS. IMM. GRANS. 0.1 0.0 - 0.5 K/UL METABOLIC PANEL, COMPREHENSIVE Collection Time: 05/07/19  8:22 PM  
Result Value Ref Range Sodium 136 136 - 145 mmol/L Potassium 4.6 3.5 - 5.1 mmol/L Chloride 105 98 - 107 mmol/L  
 CO2 22 21 - 32 mmol/L Anion gap 9 7 - 16 mmol/L Glucose 108 (H) 65 - 100 mg/dL BUN 26 (H) 8 - 23 MG/DL Creatinine 0.93 0.6 - 1.0 MG/DL  
 GFR est AA >60 >60 ml/min/1.73m2 GFR est non-AA >60 >60 ml/min/1.73m2 Calcium 8.6 8.3 - 10.4 MG/DL Bilirubin, total 2.6 (H) 0.2 - 1.1 MG/DL  
 ALT (SGPT) 20 12 - 65 U/L  
 AST (SGOT) 29 15 - 37 U/L Alk. phosphatase 413 (H) 50 - 136 U/L Protein, total 6.8 6.3 - 8.2 g/dL Albumin 2.3 (L) 3.2 - 4.6 g/dL Globulin 4.5 (H) 2.3 - 3.5 g/dL A-G Ratio 0.5 (L) 1.2 - 3.5 POC LACTIC ACID Collection Time: 05/07/19  8:30 PM  
Result Value Ref Range Lactic Acid (POC) 1.97 (H) 0.5 - 1.9 mmol/L Imaging Studies: CXR Results  (Last 48 hours) None CT Results  (Last 48 hours) None Assessment and Plan:  
 
Hospital Problems as of 5/8/2019 Date Reviewed: 4/24/2019 Codes Class Noted - Resolved POA * (Principal) Cellulitis of right foot ICD-10-CM: W48.264 ICD-9-CM: 682.7  5/7/2019 - Present Yes Cirrhosis of liver without ascites (HCC) ICD-10-CM: K74.60 ICD-9-CM: 571.5  3/7/2019 - Present Yes Microcytic anemia ICD-10-CM: D50.9 ICD-9-CM: 280.9  2/27/2019 - Present Yes [facility] ICD-10-CM: E11.21 
ICD-9-CM: 250.40, 583.81  1/16/2019 - Present Yes Hyperlipidemia ICD-10-CM: E78.5 ICD-9-CM: 272.4  1/12/2016 - Present Yes Essential hypertension ICD-10-CM: I10 
ICD-9-CM: 401.9  1/12/2016 - Present Yes Acquired hypothyroidism ICD-10-CM: E03.9 ICD-9-CM: 244.9  1/12/2016 - Present Yes PLAN: 
· Admit inpatient to medical bed · Keep affected foot elevated · Continue IV vancomycin and cefipime · Consult Surgery in am for possible debridement · Control pain and continue glucose control · Continue other home medications · SQ lovenox for DVT prophylaxis Estimated LOS: greater than 2 midnights Signed: 
Bjorn Mckeon MD

## 2019-05-08 NOTE — PROGRESS NOTES
Problem: Self Care Deficits Care Plan (Adult) Goal: *Acute Goals and Plan of Care (Insert Text) Description 1. Pt will toilet with SBA 2. Pt will complete functional mobility for ADLs with SBA 3. Pt will complete lower body dressing with SBA using AE as needed 4. Pt will complete grooming and hygiene at sink with SBA 5. Pt will demonstrate independence with HEP to promote increased BUE strength and functional use for ADLs 6. Pt will tolerate 23 minutes functional activity with min or fewer rest breaks to promote increased endurance for ADLs Timeframe: 7 days Outcome: Progressing Towards Goal 
  
OCCUPATIONAL THERAPY: Initial Assessment 5/8/2019 INPATIENT:   
Payor: BLUE CROSS / Plan: [facility] [place] [place] / Product Type: PPO /  
  
NAME/AGE/GENDER: Sadia Smart is a 60 y.o. female PRIMARY DIAGNOSIS:  Cellulitis of right foot [L03.115] Cellulitis of right foot Cellulitis of right foot ICD-10: Treatment Diagnosis:  
 Generalized Muscle Weakness (M62.81) Precautions/Allergies: 
  falls Pcn [penicillins] ASSESSMENT:  
Ms. Isha Chen was admitted with R foot cellulitis, is s/p recent admission for the same thing. Pt lives alone, reports that she is independent and works as a nurse at baseline but has been struggling to complete ADLs and IADLs since last d/c. Pt uses a RW for mobility. Pt stated that she has been out of work for a long time and is behind on her rent, is worried about eviction. This session, pt presented with deficits in mobility, strength, balance, and endurance impacting ADLs. BUE strength is generally decreased, grossly 4/5. Pt completed bed mobility with CGA, transferred to/ from [facility] with CGA. Pt donned/ doffed L socks with min assistance, required max A for R sock. Pt is below her functional baseline and would benefit from skilled OT services to address deficits, recommend d/c home w/ HH. This section established at most recent assessment PROBLEM LIST (Impairments causing functional limitations): 
Decreased Strength Decreased ADL/Functional Activities Decreased Transfer Abilities Decreased Balance Increased Pain Increased Fatigue INTERVENTIONS PLANNED: (Benefits and precautions of occupational therapy have been discussed with the patient.) Activities of daily living training Balance training Therapeutic activity Therapeutic exercise TREATMENT PLAN: Frequency/Duration: Follow patient 3 times/ week to address above goals. Rehabilitation Potential For Stated Goals: Good REHAB RECOMMENDATIONS (at time of discharge pending progress):   
Placement: It is my opinion, based on this patient's performance to date, that Ms. Kauffman may benefit from [address] after discharge due to the functional deficits listed above that are likely to improve with skilled rehabilitation because he/she has multiple medical issues that affect his/her functional mobility in the community. Equipment:  
3:1 BSC, reacher, sock aide OCCUPATIONAL PROFILE AND HISTORY:  
History of Present Injury/Illness (Reason for Referral): 
See H&P Past Medical History/Comorbidities: Ms. Florecita Colorado  has a past medical history of Diabetes ([facility] [address] and Hypertension. Ms. Florecita Colorado  has a past surgical history that includes colonoscopy (N/A, 3/2/2019). Social History/Living Environment:  
Home Environment: Private residence # Steps to Enter: 4 One/Two Story Residence: One story Living Alone: Yes Support Systems: Family member(s), Friends \ neighbors Patient Expects to be Discharged to[de-identified] Private residence Current DME Used/Available at Home: Walker, rolling Tub or Shower Type: Tub/Shower combination Prior Level of Function/Work/Activity: 
Independent, lives alone Number of Personal Factors/Comorbidities that affect the Plan of Care: Expanded review of therapy/medical records (1-2):  MODERATE COMPLEXITY ASSESSMENT OF OCCUPATIONAL PERFORMANCE[de-identified]  
 Activities of Daily Living:  
Basic ADLs (From Assessment) Complex ADLs (From Assessment) Feeding: Independent Oral Facial Hygiene/Grooming: Setup Bathing: Contact guard assistance Upper Body Dressing: Setup Lower Body Dressing: Minimum assistance Toileting: Contact guard assistance Instrumental ADL Meal Preparation: Minimum assistance Homemaking: Minimum assistance Grooming/Bathing/Dressing Activities of Daily Living Cognitive Retraining Safety/Judgement: Awareness of environment Bed/Mat Mobility Supine to Sit: Contact guard assistance Sit to Supine: Contact guard assistance Sit to Stand: Contact guard assistance Stand to Sit: Contact guard assistance Bed to Chair: Contact guard assistance(BSC) Most Recent Physical Functioning:  
Gross Assessment: 
AROM: Within functional limits Strength: Generally decreased, functional 
         
  
Posture: 
  
Balance: 
Sitting: Intact Standing: Impaired Standing - Static: Good Standing - Dynamic : Fair Bed Mobility: 
Supine to Sit: Contact guard assistance Sit to Supine: Contact guard assistance Wheelchair Mobility: 
  
Transfers: 
Sit to Stand: Contact guard assistance Stand to Sit: Contact guard assistance Bed to Chair: Contact guard assistance(BSC) Patient Vitals for the past 6 hrs: 
 BP SpO2 Pulse 05/08/19 0802 107/52 100 % 79 Mental Status Neurologic State: Alert Orientation Level: Oriented X4 Cognition: Appropriate decision making, Appropriate for age attention/concentration, Appropriate safety awareness, Follows commands Perception: Appears intact Perseveration: No perseveration noted Safety/Judgement: Awareness of environment Physical Skills Involved: 
Balance Strength Activity Tolerance Pain (acute) Cognitive Skills Affected (resulting in the inability to perform in a timely and safe manner): 
none  Psychosocial Skills Affected: 
Habits/Routines Environmental Adaptation Number of elements that affect the Plan of Care: 3-5:  MODERATE COMPLEXITY CLINICAL DECISION MAKIN[address] [place] [address] AM-PAC? ?6 Clicks? Daily Activity Inpatient Short Form How much help from another person does the patient currently need. .. Total A Lot A Little None 1. Putting on and taking off regular lower body clothing? ? 1   ? 2   ? 3   ? 4  
2. Bathing (including washing, rinsing, drying)? ? 1   ? 2   ? 3   ? 4  
3. Toileting, which includes using toilet, bedpan or urinal?   ? 1   ? 2   ? 3   ? 4  
4. Putting on and taking off regular upper body clothing? ? 1   ? 2   ? 3   ? 4  
5. Taking care of personal grooming such as brushing teeth? ? 1   ? 2   ? 3   ? 4  
6. Eating meals? ? 1   ? 2   ? 3   ? 4  
© , Trustees of [address] [place] [address], under license to [employer]. All rights reserved Score:  Initial: 21 Most Recent: X (Date: -- ) Interpretation of Tool:  Represents activities that are increasingly more difficult (i.e. Bed mobility, Transfers, Gait). Medical Necessity:    
Patient demonstrates good 
 rehab potential due to higher previous functional level. Reason for Services/Other Comments: 
Patient continues to require skilled intervention due to decreased ADLs and functional performance from baseline Angelita Carrillo Use of outcome tool(s) and clinical judgement create a POC that gives a: MODERATE COMPLEXITY  
 
 
 
TREATMENT:  
(In addition to Assessment/Re-Assessment sessions the following treatments were rendered) Pre-treatment Symptoms/Complaints:   
Pain: Initial:  
Pain Intensity 1: 7 Pain Location 1: Foot Pain Orientation 1: Right Pain Intervention(s) 1: Nurse notified  Post Session:  8 Assessment/Reassessment only, no treatment provided today Braces/Orthotics/Lines/Etc:  
IV 
O2 Device: Room air Treatment/Session Assessment:   
Response to Treatment:  increased pain in R foot Interdisciplinary Collaboration: Occupational Therapist 
Registered Nurse After treatment position/precautions:  
Supine in bed Bed alarm/tab alert on Bed/Chair-wheels locked Call light within reach RN notified Compliance with Program/Exercises: Will assess as treatment progresses. Recommendations/Intent for next treatment session: \"Next visit will focus on advancements to more challenging activities and reduction in assistance provided\". Total Treatment Duration: OT Patient Time In/Time Out Time In: 1115 Time Out: 1132 Duncan Moyer OT

## 2019-05-08 NOTE — PROGRESS NOTES
TRANSFER - IN REPORT: 
 
[facility]  being received from ED for routine progression of care Report consisted of patients Situation, Background, Assessment and  
Recommendations(SBAR). Information from the following report(s) SBAR, Kardex and ED Summary was reviewed with the receiving nurse. Opportunity for questions and clarification was provided. Assessment completed upon patients arrival to unit and care assumed.

## 2019-05-08 NOTE — ED PROVIDER NOTES
Karyle Banner is a 60 y.o. female who presents to the ED with a chief complaint of worsening right foot infection. She was admitted to the hospital last week and had IV antibiotics. She was discharged on doxycycline and Keflex. She states that the infection now looks worse and she's had increased pain in the foot. She is diabetic but states her sugars have been well-controlled. I reviewed the CT scan from the hospital and it showed no abscess just soft tissue swelling. Pain is 8 out of 10 and constant. Past Medical History:  
Diagnosis Date  Diabetes ([address]  Hypertension Past Surgical History:  
Procedure Laterality Date  COLONOSCOPY N/A 3/2/2019 COLONOSCOPY ROOM 615 performed by Daphnie Whittaker MD at [facility] ENDOSCOPY Family History:  
Problem Relation Age of Onset  Hypertension Mother Bellatrix.Wilson Other Mother PVD  Diabetes Mother  Parkinson's Disease Mother  Diabetes Father Social History Socioeconomic History  Marital status:  Spouse name: Not on file  Number of children: Not on file  Years of education: Not on file  Highest education level: Not on file Occupational History  Not on file Social Needs  Financial resource strain: Not on file  Food insecurity:  
  Worry: Not on file Inability: Not on file  Transportation needs:  
  Medical: Not on file Non-medical: Not on file Tobacco Use  Smoking status: Never Smoker  Smokeless tobacco: Never Used Substance and Sexual Activity  Alcohol use: No  
 Drug use: Not on file  Sexual activity: Not on file Lifestyle  Physical activity:  
  Days per week: Not on file Minutes per session: Not on file  Stress: Not on file Relationships  Social connections:  
  Talks on phone: Not on file Gets together: Not on file Attends Church service: Not on file Active member of club or organization: Not on file Attends meetings of clubs or organizations: Not on file Relationship status: Not on file  Intimate partner violence:  
  Fear of current or ex partner: Not on file Emotionally abused: Not on file Physically abused: Not on file Forced sexual activity: Not on file Other Topics Concern  Not on file Social History Narrative  Not on file ALLERGIES: Pcn [penicillins] Review of Systems Constitutional: Negative for chills and fever. HENT: Negative for congestion, rhinorrhea, sneezing, sore throat and voice change. Respiratory: Negative for chest tightness and shortness of breath. Cardiovascular: Negative for chest pain and palpitations. Gastrointestinal: Negative for abdominal pain, diarrhea, nausea and vomiting. Musculoskeletal: Negative for arthralgias, back pain, joint swelling, neck pain and neck stiffness. Skin: Positive for color change and wound. Negative for pallor and rash. All other systems reviewed and are negative. Vitals:  
 05/07/19 2013 BP: 118/69 Pulse: 87 Resp: 20 Temp: 97.6 °F (36.4 °C) SpO2: 100% Weight: 88.9 kg (196 lb) Height: 5' 7\" (1.702 m) Physical Exam  
Constitutional: She is oriented to person, place, and time. She appears well-developed and well-nourished. No distress. HENT:  
Head: Normocephalic and atraumatic. Right Ear: External ear normal.  
Left Ear: External ear normal.  
Eyes: Pupils are equal, round, and reactive to light. Conjunctivae and EOM are normal. No scleral icterus. Neck: Normal range of motion. Pulmonary/Chest: Effort normal. No stridor. No respiratory distress. She has no wheezes. She has no rales. Abdominal: Soft. She exhibits no mass. There is no tenderness. There is no rebound and no guarding. Musculoskeletal: Normal range of motion. She exhibits no edema, tenderness or deformity. Neurological: She is alert and oriented to person, place, and time.  No cranial nerve deficit. Skin: Skin is warm. Capillary refill takes less than 2 seconds. No rash noted. She is not diaphoretic. There is erythema. No pallor. The dorsum of the patient's right foot is very inflamed with erythema diffusely. There is also a dark colored blister on the surface and several smaller white blisters on the side of the foot. The whole area is tender. No obvious drainable abscess on exam.  
Psychiatric: She has a normal mood and affect. Her behavior is normal.  
Nursing note and vitals reviewed. MDM Number of Diagnoses or Management Options Diagnosis management comments: Patient has worsening cellulitis and increased pain to the right foot her lactic acid is still within the normal range however white count is increasing. I am giving her IV antibiotics and paging hospitalist for further evaluation. Chirag Celis MD; 5/7/2019 @8:58 PM Voice dictation software was used during the making of this note. This software is not perfect and grammatical and other typographical errors may be present. This note has not been proofread for errors. 
=================================================================== Amount and/or Complexity of Data Reviewed Clinical lab tests: ordered and reviewed Procedures

## 2019-05-08 NOTE — PROGRESS NOTES
MIDLINE Placement Note PRE-PROCEDURE VERIFICATION 
PROCEDURE DETAIL Time out completed with Halie Crespo RN, VAT and everyone in agreement with procedure. A single lumen Midline was started for vascular access. The following documentation is in addition to the Midline properties in the lines/airways flowsheet : 
Lot #: [id number] Xylocaine used: yes Mid-Arm Circumference: 28 (cm) Internal Catheter Length: 8 (cm) Internal Catheter Total Length: 8 (cm) Vein Selection for Midline:left cephalic Line is okay to use: yes Anna Allison RN, VAT

## 2019-05-08 NOTE — ED TRIAGE NOTES
Pt sts \"I just left you last Thursday. I have a bleed on the top of my foot and they told me that if I had any change in it and a fever that I should come back. I've been having chills\" Pt presents in wheelchair to triage. Pt in mild distress. Pt c/o pain to right foot. Onset of sx last week. Pt c/o increased swelling when she changed the dress and that \"it just didn't look as good as the day before\" Pt afebrile in triage Pt has been taking tramadol and ibuprofen at home. Pt does not meet septic criteria during triage, but a set of cultures was drawn and placed with temp labels

## 2019-05-08 NOTE — PROGRESS NOTES
05/07/19 2239 Dual Skin Pressure Injury Assessment Dual Skin Pressure Injury Assessment WDL Second Care Provider (Based on [address] [place] [address]) Christina Herrera Dual skin assessment with Christina Herrera, patient has a black wound, blisters, skin peeling on the right foot. No other signs of skin breakdown. Bed low locked and call light within reach

## 2019-05-08 NOTE — ADT AUTH CERT NOTES
Cellulitis - Care Day 8 (5/1/2019) by Carly Davidson RN  
 
   
Review Status Review Entered Completed 5/2/2019 11:01  
   
Criteria Review Care Day: 8 Care Date: 5/1/2019 Level of Care: Inpatient Floor Guideline Day 3 Clinical Status  
(X) * Hemodynamic stability 5/2/2019 11:01:57 EDT by Nehal Esparza   
119/56, 97.8, 73, 16, 99%RA (X) * Fever absent or improved 5/2/2019 11:01:57 EDT by Nehal Esparza   
97.8   
  
( ) * Skin exam stable or improved 5/2/2019 11:01:57 EDT by Nehal Esparza Right foot with bandline erythema on dorsum of foot extending around sides with large area open skin tear and blister formation with serous drainage. She will need wound care. (X) * Mental status at baseline 5/2/2019 11:01:57 EDT by Nehal Esparza Baseline   
  
(X) * Antibiotic treatment needs appropriate for next level of care 5/2/2019 11:01:57 EDT by Nehal Esparza Keflex 500mg PO 4X Daily Doxycycline 100mg PO Q12hrs   
  
(X) * Pain absent or manageable at next level of care 5/2/2019 11:01:57 EDT by Nehal Esparza Motrin 600mg PO Q6hrs PRN X2 
Norco 5-325mg PO Q4hrs PRN X2   
  
( ) * Discharge plans and education understood Activity  
(X) * Ambulatory 5/2/2019 11:01:57 EDT by Nehal Esparza W/PT and OT Routes  
(X) * Oral hydration, medications,[E]and diet 5/2/2019 11:01:57 EDT by Nehal Esparza Diabetic consistant carb diet Interventions (X) WBC   
5/2/2019 11:01:57 EDT by Nehal Esparza WNL Medications (X) Parenteral or oral antibiotics[A] 5/2/2019 11:01:57 EDT by Nehal Esparza Keflex 500mg PO 4X Daily Doxycycline 100mg PO Q12hrs * Milestone Additional Notes Clinical Date Reviewed:   5/1/19 LOS; Status; Review Type:  Med/Inpt/CS Vital Signs:  119/56, 97.8, 73, 16, 99%RA Abn. Findings LABS/RADIOLOGY:  
H&H 7.5/23.1, , Chl 112, CO2 17, BUN 30, Creat 1.17, David 8.1, GFR 50  
 POC , 104, 147, 191, 224 ECHO: SUMMARY:  
Left ventricle: Systolic function was normal. Ejection fraction was estimated in the range of 60 % to 65 %. There were no regional wall motion abnormalities. There was mild concentric hypertrophy. Left atrium: The atrium was markedly dilated. Right atrium: The atrium was mildly dilated. Aortic valve: The aortic valve area by the continuity equation was 1.54 cm2. Mitral valve: There was mild annular calcification. There was mild to moderate regurgitation. Meds:  
Lipitor 10mg PO Daily Keflex 500mg PO 4X Daily Doxycycline 100mg PO Q12hrs Neurontin 300mg PO QHS Motrin 600mg PO Q6hrs PRN X2  
Norco 5-325mg PO Q4hrs PRN X2 Lantus 40u SC Daily Reg Insulin SC 4X Daily Synthroid 125mcg PO Daily Protonix 40mg PO Daily Florastor 500mg PO BID Zoloft 50mg PO Daily Lasix 40mg IV Infectious Diseases Impression:  
\" RLE cellulitis, no abscess or OM per CT  
\" DMII with neuropathy \" PCN allergy as a child. Tolerating Amoxicillin and cephalosporins   
   
Plan:  
\" Continue Doxycycline 100mg oral BID and Keflex 500mg oral QID for another 7 days. Educated that edema will be present for several weeks, encouraged elevation. Erythema has improved. She will need wound care.   
   
  
  
Patient is a 60 y.o. female with significant medical hx for DMII, who presented to ED on 4/24 for worsening erythema, pain to her R foot. States first started as \"bug bite\" on dorsal aspect of her foot. + subjective fever and chills. BCx2 NG. CT R foot with subq edema, no abscess or OM. Initially started on Vanc/flagyl then changed to doxy/Keflex for 7 days. Remains afebrile since admission, no leukocytosis. Hospitalist reports no change in R foot appearance. US NG for DVT.  From review of pictures from pt's phone at admission, erythema improved. Blister has since burst. She complains of R foot pain and residual swelling.  Denies nausea, vomiting, diarrhea, fever, chills, or sweats  
  Internal Medicine SUBJECTIVE:  
Patient 64F with hx of DM, recent diagnosis cirrhosis, anemia with ablation of AVM on colonoscopy who presented with redness, swelling and pain on dorsum of right foot. Admitted for diabetic foot infection. Seen by ID.  
   
5/1 - pt reports erythema has improved today. She is concerned regarding b/l LE swelling. PE: Extremities:     Right foot with bandline erythema on dorsum of foot extending around sides with large area open skin tear and blister formation with serous drainage. A/p # Right foot cellulitis  
            - X-rays neg for fracture or bone destruction  
            - Changed to doxy/keflex 4/26, add probiotic. Elevate RLE.             - US w/o DVT  
            - CT scan w/o abscess or focal infection  
            - Still with edema and pain, unchanged. ID recs to continue oral atbx x 7 more days  
   
# HypoK             - resolved  
   
# Microcytic anemia  
            - Hb stable. DC heparin and encourage ambulation  
            - recently admitted with colonic AVMs   
   
# Hypothyroid  
            - Synthroid  
   
# DM2  
            - Basal/bolus             - sugars controlled  
   
# HLD  
            - statin  
   
# Cirrhosis/thrombocytopenia  
            - stable, plt counts improving. Lasix for LE edema x 1 today.   
   
   
DVT Prophylaxis: scds  
   
Dispo - home in AM  
  
  
   
Cellulitis - Care Day 7 (4/30/2019) by Omar Baltazar RN  
 
   
Review Status Review Entered Completed 5/2/2019 10:59  
   
Criteria Review Care Day: 7 Care Date: 4/30/2019 Level of Care: Inpatient Floor Guideline Day 3 Clinical Status  
(X) * Hemodynamic stability 5/2/2019 10:59:31 EDT by Kim Delay   
100/60, 98.0, 79, 16, 98%RA   
  
(X) * Fever absent or improved 5/2/2019 10:59:31 EDT by Kim Delay 98.0   
  
( ) * Skin exam stable or improved 5/2/2019 10:59:31 EDT by Sai Rushing New blister formation at dorsum of midfoot.   
  
(X) * Mental status at baseline 5/2/2019 10:59:31 EDT by Sai Rushing Baseline   
  
(X) * Antibiotic treatment needs appropriate for next level of care 5/2/2019 10:59:31 EDT by Sai Rushing Keflex 500mg PO Q6hrs Doxycycline 100mg PO Q12hrs   
  
(X) * Pain absent or manageable at next level of care 5/2/2019 10:59:31 EDT by Sai Rushing Motrin 600mg PO Q6hrs PRN X2 
Norco 5-325mg PO Q4hrs PRN X2   
  
( ) * Discharge plans and education understood Activity  
(X) * Ambulatory 5/2/2019 10:59:31 EDT by Sai Rushing W/PT and OT Routes  
(X) * Oral hydration, medications,[E]and diet 5/2/2019 10:59:31 EDT by Sai Rushing NS IV 125mL hr , Diabetic consistant Carb diet Interventions (X) WBC   
5/2/2019 10:59:31 EDT by Sai Rushing WNL Medications (X) Parenteral or oral antibiotics[A] 5/2/2019 10:59:31 EDT by Sai Rushing Keflex 500mg PO Q6hrs Doxycycline 100mg PO Q12hrs * Milestone Additional Notes Clinical Date Reviewed:   4/30/19 LOS; Status; Review Type:  Med/Inpt/CS Vital Signs:  100/60, 98.0, 79, 16, 98%RA Abn. Findings LABS/RADIOLOGY:  
H&H 7.4/22.8, , Lymph 10, Chl 113, CO2 17, GLU 54, BUN 29, David 7.7 POC GLU 58, 136, 176, 167 Meds:  
Lipitor 10mg PO Daily Neurontin 300mg PO QHS Motrin 600mg PO Q6hrs PRN X2  
Norco 5-325mg PO Q4hrs PRN X2 Lantus 40u SC Daily Reg Insulin SC 4X Daily Synthroid 125mcg PO Daily Protonix 40mg PO Daily Florastor 500mg PO BID Zoloft 50mg PO Daily Keflex 500mg PO Q6hrs Doxycycline 100mg PO Q12hrs NS IV 125mL hr   
  
Internal Medicine SUBJECTIVE:  
Patient 64F with hx of DM, recent diagnosis cirrhosis, anemia with ablation of AVM on colonoscopy who presented with redness, swelling and pain on dorsum of right foot. Admitted for diabetic foot infection.   
   
4/30 - pt reports erythema and pain remain unchanged. New blister formation at dorsum of midfoot. PE: Extremities:     Right foot with bandline erythema on dorsum of foot extending around sides with large area open skin tear and blister formation with serous drainage. A/p # Right foot cellulitis  
            - X-rays neg for fracture or bone destruction  
            - Changed to doxy/keflex 4/26, add probiotic. Elevate RLE.             - US w/o DVT  
            - CT scan w/o abscess or focal infection  
            - Still with edema and pain, unchanged. Will request ID eval.   
   
   
# HypoK             - resolved  
   
# Microcytic anemia  
            - Hb stable. DC heparin and encourage ambulation  
            - recently admitted with colonic AVMs   
   
# Hypothyroid  
            - Synthroid  
   
# DM2  
            - Basal/bolus             - sugars controlled  
   
# HLD  
            - statin  
   
# Cirrhosis/thrombocytopenia  
            - stable, plt counts improving  
   
   
   
DVT Prophylaxis: scds  
   
  
  
  
   
Cellulitis - Care Day 6 (4/29/2019) by Osmin Sky RN  
 
   
Review Status Review Entered Completed 5/2/2019 10:57  
   
Criteria Review Care Day: 6 Care Date: 4/29/2019 Level of Care: Inpatient Floor Guideline Day 3 Clinical Status  
(X) * Hemodynamic stability 5/2/2019 10:57:54 EDT by Kelvin Khan   
112/68, 99.3, 81, 18, 97%RA (X) * Fever absent or improved 5/2/2019 10:57:54 EDT by Kelvin Khan   
99.3   
  
( ) * Skin exam stable or improved 5/2/2019 10:57:54 EDT by Kelvin Khan Foot and RLE still swollen, redness the same, 2+ pitting edema to RLE and foot   
  
(X) * Mental status at baseline 5/2/2019 10:57:54 EDT by Kelvin Khan Baseline   
  
(X) * Antibiotic treatment needs appropriate for next level of care 5/2/2019 10:57:54 EDT by Ellen Anguiano Keflex 500mg PO Q6hrs Doxycycline 100mg PO Q12hrs   
  
(X) * Pain absent or manageable at next level of care 5/2/2019 10:57:54 EDT by Ellen Anguiano Motrin 600mg PO Q6hrs PRN X2 
Norco 5-325mg PO Q4hrs PRN X2 Ultram 50mg PO Q6hrs PRN X2   
  
( ) * Discharge plans and education understood Activity  
(X) * Ambulatory 5/2/2019 10:57:54 EDT by Ellen Anguiano   
with PT and OT Routes  
(X) * Oral hydration, medications,[E]and diet 5/2/2019 10:57:54 EDT by Ellen Anguiano NS IV 125mL hr , Diabetic consistant carb diet Interventions (X) WBC   
5/2/2019 10:57:54 EDT by Ellen Anguiano WNL Medications (X) Parenteral or oral antibiotics[A] 5/2/2019 10:57:54 EDT by Ellen Anguiano Keflex 500mg PO Q6hrs Doxycycline 100mg PO Q12hrs * Milestone Additional Notes Clinical Date Reviewed:   4/29/19 LOS; Status; Review Type:  Med/Inpt/CS Vital Signs:  112/68, 99.3, 81, 18, 97%RA Abn. Findings LABS/RADIOLOGY:  
Chl 114, CO2 16, BUN 31, David 7.7 POC , 108, 121 CT Foot: IMPRESSION:  
1. Generalized predominantly dorsal subcutaneous edema over the midfoot with  
skin blistering noted. No discrete measurable fluid collection or abscess  
appreciated. 2. No destructive bone changes noted to indicate osteomyelitis 3. No intra-articular joint derangement or fracture identified. Meds:  
Lipitor 10mg PO Daily Neurontin 300mg PO QHS Motrin 600mg PO Q6hrs PRN X2  
Norco 5-325mg PO Q4hrs PRN X2 Lantus 40u SC Daily Reg Insulin SC 4X Daily Synthroid 125mcg PO Daily Protonix 40mg PO Daily Florastor 500mg PO BID Zoloft 50mg PO Daily Keflex 500mg PO Q6hrs Doxycycline 100mg PO Q12hrs NS IV 125mL hr   
Ultram 50mg PO Q6hrs PRN X2 Internal Medicine HPI/Subjective:  
Patient is a 65yo F with hx DM, recent diagnosis cirrhosis, anemia with ablation of AVM on colonoscopy who presented with redness, swelling pain on dorsum of R foot.  Admitted for diabetic foot infection.  
   
4/29: Foot and RLE still swollen, redness the same. Labs stable and normal, afebrile. She's in bed, lights off. Worked w PT yesterday who rec STR v HH but patient declines both, says she has friends who work in PT/HH who will help her. Still c/o foot pain. A few more blisters developed. ROS otherwise negative PE: Extremities:  Warm and dry. 2+ pitting edema to RLE and foot, no crepitus appreciated. Erythema to dorsal mid foot with a few scattered blisters, large one on dorsum popped and dressed (serous drainage). Assessment/Plan: # Right foot cellulitis  
            - X-rays neg for fracture or bone destruction  
            - Change to doxy/keflex 4/26, add probiotic. Elevate RLE.             - US w/o DVT  
            - Still with edema and pain. CT foot today to r/o abscess/fluid collection.  
   
# HypoK             - resolved  
   
# Microcytic anemia  
            - Hb stable. DC heparin and encourage ambulation  
            - recently admitted with colonic AVMs   
   
# Hypothyroid  
            - Synthroid  
   
# DM2  
            - Basal/bolus             - sugars controlled  
   
# HLD  
            - statin  
   
# Cirrhosis/thrombocytopenia  
            - stable  
   
DC planning/Dispo: Declines HH/STR. Home when able. Diet:  DIET DIABETIC CONSISTENT CARB  
DVT ppx: ambulation Occupational Therapy Ms. Kauffman presents to the hospital with cellulitis of her R foot. MD is examining pt's foot upon arrival. Pt's R foot continues to be painful with redness, blistering, and swelling. Pt is supine in bed and appears appropriate for her age.  Pt needed some encouragement to participate in therapy this am. Pt was able to complete her own bed mobility but did use the bed rails to assist. Pt was unable to don socks at the edge of the bed and therapist assisted her with this today. Pt demonstrates functional but overall decreased upper body strength. Pt was able to complete functional transfers with SBA/CGA. Pt educated on use of walker to decrease weight through R foot. Pt was able to balance on her R heel as she took steps with the rolling walker in the room. Pt does c/o dizziness while standing but did not demonstrate any major loss of balance (only completed functional mobility for short distance). Pt educated on allowing additional time between transfers to assess dizziness with pt verbalizing understanding. Pt is concerned about going home alone but does not feel she has an option due to stating she would not be able to afford co-payment at rehab. Pt is declining set-up of [place] therapy due to having a somewhat \"unfriendly\" dog. Pt educated on safety strategies for home with pt verbalizing understanding. Pt is currently functioning below baseline and will benefit from OT services to address stated goals and plan of care. BP sitting 125/58 BP standing 97/54 BP post-activity 109/50 Wound Care Patient seen for large blister and purple areas to right foot. Large clear blister to dorsal foot with redness and new purple discoloration/blistering to plantar instep/arch. Weeping serous drainage. Started xeroform dressing. Had CT today. May need surgical consult if area does not improve. 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 She just started back to work prior to this admission. Discussed blood glucose and healing.  Answered all present questions. Will order a [place] boot for this extremity as discussed with patient. Wound team will follow Progress Notes Ms. Kauffman presents sitting up in [place] with continued right foot pain due to ulcer/cellulitis. Recent dressing change. Pt performs transfers without assistance.  Discussed use of walker to offload/decrease weight bearing on right foot. Has increased pain in dorsum of foot with standing. Ambulates 45 ft in room with walker and CGA-SBA for safety. Verbal cues for posture, gait mechanics, and safety. Slow, slightly unsteady gait with no reports of dizziness. Pt declines further activity (Exercises or sitting in chair) and requests to return back to bed. Bed mobility with supervision. Left in supine with needs in reach. She does not want rollator but would prefer rolling walker. Does not want rehab  
  
   
Cellulitis - Care Day 5 (4/28/2019) by Gregoria Barrios RN  
 
   
Review Status Review Entered Completed 5/2/2019 10:55  
   
Criteria Review Care Day: 5 Care Date: 4/28/2019 Level of Care: Inpatient Floor Guideline Day 3 Clinical Status  
(X) * Hemodynamic stability 5/2/2019 10:55:24 EDT by Shirin Rivera 92/50, 97.9, 85, 16, 97%RA (X) * Fever absent or improved 5/2/2019 10:55:24 EDT by Shirin Rivera   
97.9   
  
( ) * Skin exam stable or improved 5/2/2019 10:55:24 EDT by Shirin Rivera Erythema to dorsal rt foot, blister has popped; 2+ pitting edema RLE to shin; two smaller blisters developing medial right foot near the arch. (X) * Mental status at baseline 5/2/2019 10:55:24 EDT by Shirin Rivera Baseline   
  
(X) * Antibiotic treatment needs appropriate for next level of care 5/2/2019 10:55:24 EDT by Shirin Rivera Keflex 500mg PO Q6hrs Doxycycline 100mg PO Q12hrs   
  
(X) * Pain absent or manageable at next level of care 5/2/2019 10:55:24 EDT by Shirin Rivera Motrin 600mg PO Q6hrs PRN X1, Ultram 50mg PO Q6hrs PRN X1   
  
( ) * Discharge plans and education understood Activity  
(X) * Ambulatory 5/2/2019 10:55:24 EDT by Shirin Rivera With assist   
  
  
Routes  
(X) * Oral hydration, medications,[E]and diet 5/2/2019 10:55:24 EDT by Shirin Rivera NS IV 125mL hr , Diabetic consistant carb diet Interventions (X) WBC 5/2/2019 10:55:24 EDT by Latonya Sharp WNL Medications (X) Parenteral or oral antibiotics[A] 5/2/2019 10:55:24 EDT by Latonya Sharp Keflex 500mg PO Q6hrs Doxycycline 100mg PO Q12hrs * Milestone Additional Notes Clinical Date Reviewed:   4/28/19 LOS; Status; Review Type:  Med/Inpt/CS Vital Signs:  92/50, 97.9, 85, 16, 97%RA Abn. Findings LABS/RADIOLOGY:  
H&H 7.7/23.6, , Lymph 8, Mono 16, Chl 110, CO2 18, GLU 18, GLU 58, BUN 35, Creat 1.15, David 6.6, GFR 51 POC GLU 63, 103, 104, 146 Meds:  
Lipitor 10mg PO Daily Neurontin 300mg PO QHS Motrin 600mg PO Q6hrs PRN X1 Lantus 40u SC Daily Reg Insulin SC 4X Daily Synthroid 125mcg PO Daily Zofran 4mg IV Q4hrs PRN X1  
Protonix 40mg PO Daily Florastor 500mg PO BID Zoloft 50mg PO Daily Keflex 500mg PO Q6hrs Doxycycline 100mg PO Q12hrs NS IV 125mL hr   
Ultram 50mg PO Q6hrs PRN X1 Internal Medicine HPI/Subjective:  
Patient is a 67yo F with hx DM, recent diagnosis cirrhosis, anemia with ablation of AVM on colonoscopy who presented with redness, swelling pain on dorsum of R foot.  Admitted for diabetic foot infection.  
   
4/28: Blister on rt foot popped overnight, dressing applied. RLE still swollen. Felt sluggish yesterday so she did not work with PT. She reports feeling much better this morning. ROS neg otherwise. PE: Skin:  Erythema to dorsal rt foot, blister has popped; 2+ pitting edema RLE to shin; two smaller blisters developing medial right foot near the arch. Assessment/Plan: # Right foot cellulitis  
            - X-rays neg for fracture or bone destruction  
            - Change to doxy/keflex 4/26, add probiotic. Elevate RLE.             - US w/o DVT  
            - Ambulate w PT today  
   
# HypoK             - resolved  
   
# Microcytic anemia  
            - Hb stable. DC heparin and encourage ambulation             - recently admitted with colonic AVMs   
   
# Hypothyroid  
            - Synthroid  
   
# DM2  
            - Basal/bolus  
   
# HLD  
            - statin  
   
# Cirrhosis/thrombocytopenia  
            - stable  
   
DC planning/Dispo:  Home with HH, hopefully tomorrow. Diet:  DIET DIABETIC CONSISTENT CARB  
DVT ppx: Ambulation Physical Therapist  
Ms. Kauffman presents with painful right foot 2* to her diagnosis.  Pt has multiple medical problems.  Pt is very weak from this hospitalization as well as what appears to be severe neuropathy in both legs.  She can barely lift her legs through full range.  She was dizzy upon standing and walking.  She had difficulty getting to standing and to lift her leg to don socks.  Pt has a nephew and brother and sister in law in the area, but state that they cannot offer her assistance.  Pt really would benefit from some STR or inpatient Rehab to get back to full strength, but she does not think her insurance covers it.  The very least would be HHPT but as of today she is not strong enough or safe enough to go home.  Pt needs a SW and OT consult.  Pt needs a rolling walker with a seat (rollator).  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  Pt will benefit from physical therapy for strengthening and mobility training.    
  
   
Cellulitis - Care Day 4 (4/27/2019) by Herve Grimes RN  
 
   
Review Status Review Entered Completed 5/2/2019 10:53  
   
Criteria Review Care Day: 4 Care Date: 4/27/2019 Level of Care: Inpatient Floor Guideline Day 3 Clinical Status  
(X) * Hemodynamic stability 5/2/2019 10:53:34 EDT by Sai Rushing 92/52, 97.8, 74, 16, 97%RA (X) * Fever absent or improved 5/2/2019 10:53:34 EDT by Sai Rushing   
97.8   
  
( ) * Skin exam stable or improved 5/2/2019 10:53:34 EDT by Sai Rushing Foot redness a little better, swelling about the same   
  
(X) * Mental status at baseline 5/2/2019 10:53:34 EDT by Conrad Monterroso Baseline   
  
(X) * Antibiotic treatment needs appropriate for next level of care 5/2/2019 10:53:34 EDT by Conrad Monterroso Keflex 500mg PO Q6hrs Doxycycline 100mg PO Q12hrs   
  
(X) * Pain absent or manageable at next level of care 5/2/2019 10:53:34 EDT by Conrad Monterroso Motrin 600mg PO Q6hrs PRN X3   
  
( ) * Discharge plans and education understood Activity  
(X) * Ambulatory 5/2/2019 10:53:34 EDT by Conrad Monterroso With assist   
  
  
Routes  
(X) * Oral hydration, medications,[E]and diet 5/2/2019 10:53:34 EDT by Conrad Monterroso Diabetic consistant carb diet, NS IV 125mL hr   
  
  
Interventions (X) WBC   
5/2/2019 10:53:34 EDT by Conrad Monterroso WNL Medications (X) Parenteral or oral antibiotics[A] 5/2/2019 10:53:34 EDT by Conrad Monterroso Keflex 500mg PO Q6hrs Doxycycline 100mg PO Q12hrs * Milestone Additional Notes Clinical Date Reviewed:   4/27/19 LOS; Status; Review Type:  Med/Inpt/CS Vital Signs:  92/52, 97.8, 74, 16, 97%RA Abn. Findings LABS/RADIOLOGY:  
H&H 7.3/22.8, PLT 85, Lymph 8, ABS Lymph 0.4, K+ 3.4, Chl 109, CO2 17, BUN 34, David 6.1 POC , 154, 136, 116 LE Duplex: IMPRESSION:  
1.  NO ULTRASOUND EVIDENCE OF DEEP VENOUS THROMBOSIS IN THE RIGHT LEG. 2.  PROMINENT RIGHT INGUINAL LYMPH NODES WITHOUT SIGNIFICANT CHANGE FROM  
FEBRUARY 27.  
   
  
Meds:  
Lipitor 10mg PO Daily Neurontin 300mg PO QHS Motrin 600mg PO Q6hrs PRN X3 Lantus 40u SC Daily Reg Insulin SC 4X Daily Synthroid 125mcg PO Daily Protonix 40mg PO Daily Florastor 500mg PO BID Zoloft 50mg PO Daily Keflex 500mg PO Q6hrs Doxycycline 100mg PO Q12hrs Klor-Con 40mEq PO   
NS IV 125mL hr   
  
Internal Medicine HPI/Subjective: Patient is a 67yo F with hx DM, recent diagnosis cirrhosis, anemia with ablation of AVM on colonoscopy who presented with redness, swelling pain on dorsum of R foot.  Admitted for diabetic foot infection.  
   
4/27: Foot redness a little better, swelling about the same. Says she's been trying to keep foot elevated. Afebrile, WBCs normal. Ambulating a little on that foot. Had some mild diarrhea with keflex last night. ROS otherwise negative. Assessment/Plan: # Right foot cellulitis  
            - X-rays neg for fracture or bone destruction  
            - No foot wounds or trauma  
            - Change to doxy/keflex 4/26, add probiotic. Elevate RLE.             - RLE US to r/o DVT  
   
# HypoK             - replaced  
   
# Microcytic anemia  
            - Hb 7; no s/s bleeding. DC heparin and encourage ambulation  
            - recently admitted with colonic AVMs   
            - hb stable  
   
# Hypothyroid  
            - Synthroid  
   
# DM2  
            - Basal/bolus  
   
# HLD  
            - statin  
   
# Cirrhosis  
   
DC planning/Dispo:  Home tomorrow hopefully. Diet:  DIET DIABETIC CONSISTENT CARB  
DVT ppx: heparin dc with anemia  
  
  
  
   
Cellulitis - Care Day 3 (4/26/2019) by Richard Waldrop  
 
   
Review Status Review Entered Completed 4/26/2019 14:11  
   
Criteria Review Care Day: 3 Care Date: 4/26/2019 Level of Care: Inpatient Floor Guideline Day 2 Level Of Care (X) Floor 4/26/2019 14:11:42 EDT by Richard Waldrop Medical unit Clinical Status  
(X) * Dehydration absent 4/26/2019 14:11:42 EDT by Richard Waldrop   
absent   
  
(X) * Mental status at baseline 4/26/2019 14:11:42 EDT by Richard Waldrop   
A&O   
  
(X) * Hemodynamic stability 4/26/2019 14:11:42 EDT by Richard Waldrop   
84.7-57-69-14/14, 98% on RA   
  
(X) * Fever absent or improved 4/26/2019 14:11:42 EDT by Richard Waldrop   
improved Activity (X) Activity as tolerated Routes  
(X) * Oral hydration, medications (X) Diet as tolerated 4/26/2019 14:11:42 EDT by Shlomo Santo   
diabetic diet Interventions (X) WBC   
4/26/2019 14:11:42 EDT by Shlomo Santo WBC 6.4 (X) Elevation of affected area, if possible Medications (X) IV antibiotics 4/26/2019 14:11:42 EDT by Shlomo Santo Flagyl 500mg IV x1   
  
  
4/26/2019 14:11:42 EDT by Shlomo Santo Subject: Additional Clinical Information  4/26/19 - Medical unit Wt: 84.7kg Urine C&S pending, K 3.2, Cl 108, CO2 19, BUN 31, Ca 7.2, PT 17.5, H&H 7.2/22.0, Plt 73 Lipitor 10mg QD, Keflex 500mg PO Q6h, Vibramycin 100mg PO Q12h, Neurontin 300mg QHS, Motrin 600mg x 1, Lantus 40u QD, Accu QID with SSI x 1, Protonix 40mg PO QD, Zoloft 50mg QD, Ultram 50mg x 2, KCL 40meq PO x 1 Orders: diabetic diet, PT eval, elevate RLE, up with assistance, I&O, oximetry spot check prn   
   
  
  
  
  
* Milestone Additional Notes Internal Medicine progress note 4/26/19:  
  
4/26: Up to edge of bed eating breakfast. Appetite good, no N/V/D. Says redness and swelling of the right foot are both improved. Still having some pain on ambulation. Would like to see her BP higher. Plan: # Right foot cellulitis  
            - X-rays neg for fracture or bone destruction  
            - No foot wounds or trauma. Erythema/swelling improving.  
            - Change to doxy/keflex. Elevate RLE as much as able.  
   
# HypoK             - replaced  
   
# Microcytic anemia  
            - Hb 7; no s/s bleeding. DC heparin and encourage ambulation  
            - recently admitted with colonic AVMs   
   
# Hypothyroid  
            - Synthroid  
   
# DM2  
            - Basal/bolus  
   
# HLD  
            - statin  
   
# Cirrhosis

## 2019-05-08 NOTE — CONSULTS
H&P/Consult Note/Progress Note/Office Note:   Roselyn Duque  MRN: [id number]  :1955  Age:64 y.o.    HPI: Roselyn Duque is a 60 y.o. female who we are asked by Dr. Pino Marsh to see for a right foot wound. She has a PMHx of DM and HTN. She reports she first noticed redness of the right foot dorsum on . She reports this area worsened so she came to the ER on  and was admitted for cellulitis. The dorsum of the right foot developed a blister that eventually collapsed. She was discharged on  with Keflex and doxy. She also had a right foot CT that admission which showed no abscess or OM. She returned to the ER on  after noticing increased edema and necrosis of the right foot dorsum. She was admitted by the hospitalist service for cellulitis. Repeat right foot xray is pending.     Past Medical History:   Diagnosis Date    Diabetes ([facility] [address]     Hypertension      Past Surgical History:   Procedure Laterality Date    COLONOSCOPY N/A 3/2/2019    COLONOSCOPY ROOM 615 performed by Jimbo Galvan MD at [facility] ENDOSCOPY     Current Facility-Administered Medications   Medication Dose Route Frequency    loperamide (IMODIUM) capsule 4 mg  4 mg Oral ONCE    loperamide (IMODIUM) capsule 2 mg  2 mg Oral Q8H PRN    magnesium oxide (MAG-OX) tablet 400 mg  400 mg Oral BID    [START ON 2019] vancomycin trough reminder   Other ONCE    0.9% sodium chloride infusion 250 mL  250 mL IntraVENous PRN    atorvastatin (LIPITOR) tablet 10 mg  10 mg Oral DAILY    ferrous sulfate tablet 325 mg  325 mg Oral DAILY    gabapentin (NEURONTIN) capsule 300 mg  300 mg Oral QHS    insulin glargine (LANTUS) injection 40 Units  40 Units SubCUTAneous 7am    levothyroxine (SYNTHROID) tablet 125 mcg  125 mcg Oral ACB    sertraline (ZOLOFT) tablet 50 mg  50 mg Oral DAILY    pantoprazole (PROTONIX) tablet 40 mg  40 mg Oral DAILY    dextrose 40% (GLUTOSE) oral gel 1 Tube  15 g Oral PRN    glucagon (GLUCAGEN) injection 1 mg  1 mg IntraMUSCular PRN    dextrose (D50) infusion 12.5-25 g  25-50 mL IntraVENous PRN    insulin lispro (HUMALOG) injection 0-10 Units  0-10 Units SubCUTAneous AC&HS    sodium chloride (NS) flush 5-40 mL  5-40 mL IntraVENous Q8H    sodium chloride (NS) flush 5-40 mL  5-40 mL IntraVENous PRN    0.9% sodium chloride infusion  100 mL/hr IntraVENous CONTINUOUS    acetaminophen (TYLENOL) tablet 650 mg  650 mg Oral Q4H PRN    oxyCODONE-acetaminophen (PERCOCET 7.5) 7.5-325 mg per tablet 1 Tab  1 Tab Oral Q4H PRN    naloxone (NARCAN) injection 0.4 mg  0.4 mg IntraVENous PRN    enoxaparin (LOVENOX) injection 40 mg  40 mg SubCUTAneous Q24H    cefepime (MAXIPIME) 1 g in 0.9% sodium chloride (MBP/ADV) 50 mL ADV  1 g IntraVENous Q12H    vancomycin (VANCOCIN) 1750 mg in  ml infusion  1,750 mg IntraVENous Q12H     Pcn [penicillins]  Social History     Socioeconomic History    Marital status:      Spouse name: Not on file    Number of children: Not on file    Years of education: Not on file    Highest education level: Not on file   Tobacco Use    Smoking status: Never Smoker    Smokeless tobacco: Never Used   Substance and Sexual Activity    Alcohol use: No     Social History     Tobacco Use   Smoking Status Never Smoker   Smokeless Tobacco Never Used     Family History   Problem Relation Age of Onset    Hypertension Mother     Other Mother         PVD    Diabetes Mother     Parkinson's Disease Mother     Diabetes Father      ROS: The patient has no difficulty with chest pain or shortness of breath. No fever or chills. Comprehensive review of systems was otherwise unremarkable except as noted above. Physical Exam:   Visit Vitals  /52   Pulse 79   Temp 97.7 °F (36.5 °C)   Resp 20   Ht 5' 7\" (1.702 m)   Wt 191 lb 8 oz (86.9 kg)   SpO2 100%   BMI 29.99 kg/m²     Constitutional: Alert, oriented, cooperative patient in no acute distress; appears stated age    Eyes:Sclera are clear.  EOMs intact  ENMT: no external lesions gross hearing normal; no obvious neck masses, no ear or lip lesions, nares normal  CV: RRR. Normal perfusion 2+ edema to BLE  Resp: No JVD. Breathing is  non-labored; no audible wheezing. GI: soft and non-distended     Musculoskeletal: unremarkable with normal function. No embolic signs or cyanosis.  Right dorsal foot with [id number] area of necrotic tissue, right medial foot with two 2-3 cm annular areas of slough, extensive cellulitis of the right foot,  Neuro:  Oriented; moves all 4; no focal deficits  Psychiatric: normal affect and mood, no memory impairment    Recent vitals (if inpt):  Patient Vitals for the past 24 hrs:   BP Temp Pulse Resp SpO2 Height Weight   05/08/19 0802 107/52 97.7 °F (36.5 °C) 79 20 100 %     05/08/19 0541 100/50 98.2 °F (36.8 °C) 74 20 100 %     05/08/19 0539       191 lb 8 oz (86.9 kg)   05/08/19 0346 136/68 98.2 °F (36.8 °C) 78 20 100 %     05/07/19 2309 145/64 97.8 °F (36.6 °C) 80 20 100 %     05/07/19 2200 144/67  82  100 %     05/07/19 2013 118/69 97.6 °F (36.4 °C) 87 20 100 % 5' 7\" (1.702 m) 196 lb (88.9 kg)       Labs:  Recent Labs     05/08/19  0435 05/07/19 2022   WBC 5.1 14.4*   HGB 7.0* 8.3*   PLT 70* 152    136   K 4.1 4.6   * 105   CO2 20* 22   BUN 28* 26*   CREA 0.66 0.93   GLU 84 108*   TBILI  --  2.6*   SGOT  --  29   ALT  --  20   AP  --  413*       Lab Results   Component Value Date/Time    WBC 5.1 05/08/2019 04:35 AM    HGB 7.0 (L) 05/08/2019 04:35 AM    PLATELET 70 (L) 50/10/7416 04:35 AM    Sodium 137 05/08/2019 04:35 AM    Potassium 4.1 05/08/2019 04:35 AM    Chloride 110 (H) 05/08/2019 04:35 AM    CO2 20 (L) 05/08/2019 04:35 AM    BUN 28 (H) 05/08/2019 04:35 AM    Creatinine 0.66 05/08/2019 04:35 AM    Glucose 84 05/08/2019 04:35 AM    INR 1.5 04/26/2019 03:50 AM    Bilirubin, total 2.6 (H) 05/07/2019 08:22 PM    Bilirubin, direct 0.19 04/29/2016 04:11 PM    AST (SGOT) 29 05/07/2019 08:22 PM    ALT (SGPT) 20 05/07/2019 08:22 PM    Alk. phosphatase 413 (H) 05/07/2019 08:22 PM    Lipase 187 02/27/2019 01:28 PM       I reviewed recent labs and recent radiologic studies. I independently reviewed radiology images for studies I described above or studies I have ordered.    Admission date (for inpatients): 5/7/2019   * No surgery found *  * No surgery found *    ASSESSMENT/PLAN:  Problem List  Date Reviewed: 4/24/2019          Codes Class Noted    * (Principal) Cellulitis of right foot ICD-10-CM: L03.115  ICD-9-CM: 682.7  5/7/2019        Sepsis ([facility] ICD-10-CM: A41.9  ICD-9-CM: 038.9, 995.91  4/25/2019        Type 2 diabetes mellitus with diabetic neuropathy ([facility] ICD-10-CM: E11.40  ICD-9-CM: 250.60, 357.2  4/24/2019        Cellulitis ICD-10-CM: L03.90  ICD-9-CM: 682.9  4/24/2019        AVM (arteriovenous malformation) of colon ICD-10-CM: K55.20  ICD-9-CM: 569.84  3/7/2019        Cirrhosis of liver without ascites ([facility] ICD-10-CM: K74.60  ICD-9-CM: 571.5  3/7/2019        Microcytic anemia ICD-10-CM: D50.9  ICD-9-CM: 280.9  2/27/2019        Type 2 diabetes with nephropathy ([facility] ICD-10-CM: E11.21  ICD-9-CM: 250.40, 583.81  1/16/2019        Hyperlipidemia ICD-10-CM: E78.5  ICD-9-CM: 272.4  1/12/2016        Essential hypertension ICD-10-CM: I10  ICD-9-CM: 401.9  1/12/2016        Acquired hypothyroidism ICD-10-CM: E03.9  ICD-9-CM: 244.9  1/12/2016        Uncontrolled type II diabetes mellitus ([facility] ICD-10-CM: E11.65  ICD-9-CM: 250.02  1/12/2016            Principal Problem:    Cellulitis of right foot (5/7/2019)    Active Problems:    Hyperlipidemia (1/12/2016)      Essential hypertension (1/12/2016)      Acquired hypothyroidism (1/12/2016)      Type 2 diabetes with nephropathy ([facility] [address] (1/16/2019)      Microcytic anemia (2/27/2019)      Cirrhosis of liver without ascites ([facility] [address] (3/7/2019)      Await xray to r/o OM  Continue wound care  Will likely need debridement in OR  Surgical decision pending Dr. Ellis Ontiveros    Signed: Nat Esposito, NP

## 2019-05-08 NOTE — ED NOTES
This RN attempted draw of second set of blood cultures. On two attempts, blood draw not successful. Lab notified of assistance needed to obtain second set of cultures. Spoke with Quincy Mcbride in the lab. Sofie to come attempt draw of second set of blood cultures

## 2019-05-08 NOTE — PROGRESS NOTES
Per pt request, referrals sent to [facility] and [facility]. Awaiting response.

## 2019-05-08 NOTE — PROGRESS NOTES
's initial visit. Ms. Demian Hampton is known to me from a prior admission. She indicated that she was saddened by her return. Abbreviated visit to allow patient care and provided 's card for follow-up upon request. Chaplains remain available for support. Camila Marrero MDiv Board Certified [employer]

## 2019-05-08 NOTE — PROGRESS NOTES
Dressing changed on right foot, wounds has eschar and opaque wound beds, minimum drainage noted with no odor.

## 2019-05-08 NOTE — PROGRESS NOTES
Attempted evaluation but Ms. Kauffman tells me she is worn out. She got to the room at 11 pm last night.,  She tells me she is having diarrhea and up and down to bedside commode repeatedly. She asked if PT would come back tomorrow.   
Scott Schwartz, PT

## 2019-05-08 NOTE — WOUND CARE
Wound on right foot seen today by General Surgery. Noted possible surgery later this week. Defer to general surgery for care of this foot and wound. Available if needed please call or re consult.

## 2019-05-08 NOTE — PROGRESS NOTES
AM BG 81,pt refused 40 Lantus this morning. She states she normally takes Lantus at 1pm daily. Will make MD aware.

## 2019-05-08 NOTE — PROGRESS NOTES
Patient known to CM. Previous admission patient declined [facility] services. PT/OT eval ordered. CM will meet with patient once eval completed to discuss discharge plan. CM will continue to follow. Care Management Interventions PCP Verified by CM: Yes Mode of Transport at Discharge: Other (see comment) Transition of Care Consult (CM Consult): Discharge Planning Discharge Durable Medical Equipment: No 
Physical Therapy Consult: Yes Occupational Therapy Consult: Yes Current Support Network: Own Home Confirm Follow Up Transport: Family Plan discussed with Pt/Family/Caregiver: Yes Freedom of Choice Offered: Yes Discharge Location Discharge Placement: Unable to determine at this time

## 2019-05-08 NOTE — PROGRESS NOTES
Hourly rounds completed on patient. Complaint of pain in the right foot. Medicated per Mar. Wrapped right foot in xeroform, 4x4. Kerlix and tape. Patient denies any needs at this time. Will report to oncoming nurse.

## 2019-05-08 NOTE — ED NOTES
TRANSFER - OUT REPORT: 
 
Verbal report given to 6th floor nurse(name) on Delmy Kauffman  being transferred to 604(unit) for routine progression of care Report consisted of patients Situation, Background, Assessment and  
Recommendations(SBAR). Information from the following report(s) SBAR and ED Summary was reviewed with the receiving nurse. Lines:  
Peripheral IV 05/07/19 Left Antecubital (Active) Site Assessment Clean, dry, & intact 5/7/2019  8:20 PM  
Phlebitis Assessment 0 5/7/2019  8:20 PM  
Infiltration Assessment 0 5/7/2019  8:20 PM  
Dressing Status Clean, dry, & intact 5/7/2019  8:20 PM  
Dressing Type Transparent 5/7/2019  8:20 PM  
Hub Color/Line Status Pink 5/7/2019  8:20 PM  
Action Taken Blood drawn 5/7/2019  8:20 PM  
  
 
Opportunity for questions and clarification was provided. Patient transported with: 
 [employer]

## 2019-05-09 LAB
ABO + RH BLD: NORMAL
APPEARANCE UR: CLEAR
BACTERIA URNS QL MICRO: 0 /HPF
BILIRUB UR QL: NEGATIVE
BLD PROD TYP BPU: NORMAL
BLOOD GROUP ANTIBODIES SERPL: NORMAL
BPU ID: NORMAL
COLOR UR: YELLOW
CROSSMATCH RESULT,%XM: NORMAL
GLUCOSE BLD STRIP.AUTO-MCNC: 108 MG/DL (ref 65–100)
GLUCOSE BLD STRIP.AUTO-MCNC: 116 MG/DL (ref 65–100)
GLUCOSE BLD STRIP.AUTO-MCNC: 147 MG/DL (ref 65–100)
GLUCOSE BLD STRIP.AUTO-MCNC: 75 MG/DL (ref 65–100)
GLUCOSE UR STRIP.AUTO-MCNC: NEGATIVE MG/DL
HGB UR QL STRIP: NEGATIVE
KETONES UR QL STRIP.AUTO: NEGATIVE MG/DL
LEUKOCYTE ESTERASE UR QL STRIP.AUTO: NEGATIVE
NITRITE UR QL STRIP.AUTO: NEGATIVE
PH UR STRIP: 5.5 [PH] (ref 5–9)
PROT UR STRIP-MCNC: NEGATIVE MG/DL
SP GR UR REFRACTOMETRY: 1.02 (ref 1–1.02)
SPECIMEN EXP DATE BLD: NORMAL
STATUS OF UNIT,%ST: NORMAL
UNIT DIVISION, %UDIV: 0
UROBILINOGEN UR QL STRIP.AUTO: 0.2 EU/DL (ref 0.2–1)
VANCOMYCIN TROUGH SERPL-MCNC: 24.6 UG/ML (ref 5–20)

## 2019-05-09 PROCEDURE — 74011250637 HC RX REV CODE- 250/637: Performed by: HOSPITALIST

## 2019-05-09 PROCEDURE — 82962 GLUCOSE BLOOD TEST: CPT

## 2019-05-09 PROCEDURE — 77030020263 HC SOL INJ SOD CL0.9% LFCR 1000ML

## 2019-05-09 PROCEDURE — 81003 URINALYSIS AUTO W/O SCOPE: CPT

## 2019-05-09 PROCEDURE — 97162 PT EVAL MOD COMPLEX 30 MIN: CPT

## 2019-05-09 PROCEDURE — 74011000258 HC RX REV CODE- 258: Performed by: HOSPITALIST

## 2019-05-09 PROCEDURE — 74011250636 HC RX REV CODE- 250/636: Performed by: HOSPITALIST

## 2019-05-09 PROCEDURE — 65270000029 HC RM PRIVATE

## 2019-05-09 PROCEDURE — 80202 ASSAY OF VANCOMYCIN: CPT

## 2019-05-09 RX ORDER — VANCOMYCIN HYDROCHLORIDE
1250 EVERY 12 HOURS
Status: DISCONTINUED | OUTPATIENT
Start: 2019-05-10 | End: 2019-05-15

## 2019-05-09 RX ADMIN — OXYCODONE HYDROCHLORIDE AND ACETAMINOPHEN 1 TABLET: 7.5; 325 TABLET ORAL at 04:48

## 2019-05-09 RX ADMIN — PANTOPRAZOLE SODIUM 40 MG: 40 TABLET, DELAYED RELEASE ORAL at 08:35

## 2019-05-09 RX ADMIN — Medication 10 ML: at 05:02

## 2019-05-09 RX ADMIN — SODIUM CHLORIDE 100 ML/HR: 900 INJECTION, SOLUTION INTRAVENOUS at 08:37

## 2019-05-09 RX ADMIN — Medication 10 ML: at 23:02

## 2019-05-09 RX ADMIN — OXYCODONE HYDROCHLORIDE AND ACETAMINOPHEN 1 TABLET: 7.5; 325 TABLET ORAL at 13:25

## 2019-05-09 RX ADMIN — GABAPENTIN 300 MG: 300 CAPSULE ORAL at 20:51

## 2019-05-09 RX ADMIN — VANCOMYCIN HYDROCHLORIDE 1750 MG: 10 INJECTION, POWDER, LYOPHILIZED, FOR SOLUTION INTRAVENOUS at 09:28

## 2019-05-09 RX ADMIN — Medication 10 ML: at 23:01

## 2019-05-09 RX ADMIN — Medication 10 ML: at 05:03

## 2019-05-09 RX ADMIN — SODIUM CHLORIDE 1 G: 900 INJECTION, SOLUTION INTRAVENOUS at 08:35

## 2019-05-09 RX ADMIN — OXYCODONE HYDROCHLORIDE AND ACETAMINOPHEN 1 TABLET: 7.5; 325 TABLET ORAL at 20:50

## 2019-05-09 RX ADMIN — ATORVASTATIN CALCIUM 10 MG: 10 TABLET, FILM COATED ORAL at 08:35

## 2019-05-09 RX ADMIN — ENOXAPARIN SODIUM 40 MG: 40 INJECTION SUBCUTANEOUS at 22:59

## 2019-05-09 RX ADMIN — FERROUS SULFATE TAB 325 MG (65 MG ELEMENTAL FE) 325 MG: 325 (65 FE) TAB at 08:35

## 2019-05-09 RX ADMIN — Medication 10 ML: at 13:26

## 2019-05-09 RX ADMIN — OXYCODONE HYDROCHLORIDE AND ACETAMINOPHEN 1 TABLET: 7.5; 325 TABLET ORAL at 09:31

## 2019-05-09 RX ADMIN — SODIUM CHLORIDE 1 G: 900 INJECTION, SOLUTION INTRAVENOUS at 20:42

## 2019-05-09 RX ADMIN — SERTRALINE HYDROCHLORIDE 50 MG: 50 TABLET ORAL at 08:35

## 2019-05-09 RX ADMIN — LEVOTHYROXINE SODIUM 125 MCG: 75 TABLET ORAL at 05:02

## 2019-05-09 RX ADMIN — SODIUM CHLORIDE 100 ML/HR: 900 INJECTION, SOLUTION INTRAVENOUS at 20:42

## 2019-05-09 NOTE — PROGRESS NOTES
H&P/Consult Note/Progress Note/Office Note:  
Marilee Salmon  MRN: [id number]  :1955  Age:64 y.o. 
 
HPI: Marilee Salmon is a 60 y.o. female who we are asked by Dr. Koenig Prudent to see for a right foot wound. She has a PMHx of DM and HTN. She reports she first noticed redness of the right foot dorsum on . She reports this area worsened so she came to the ER on  and was admitted for cellulitis. The dorsum of the right foot developed a blister that eventually collapsed. She was discharged on  with Keflex and doxy. She also had a right foot CT that admission which showed no abscess or OM. She returned to the ER on  after noticing increased edema and necrosis of the right foot dorsum. She was admitted by the hospitalist service for cellulitis. Repeat right foot xray is pending. 19: s/p bedside debridement . Doing well. Reports improved pain. Still with necrosis on edges and slough in base--pain prevented full bedside debridement of all necrotic tissue. AF, VSS. Past Medical History:  
Diagnosis Date  Diabetes ([facility] [address]  Hypertension Past Surgical History:  
Procedure Laterality Date  COLONOSCOPY N/A 3/2/2019 COLONOSCOPY ROOM 615 performed by Jose Daniel Baker MD at [facility] ENDOSCOPY Current Facility-Administered Medications Medication Dose Route Frequency  loperamide (IMODIUM) capsule 2 mg  2 mg Oral Q8H PRN  
 0.9% sodium chloride infusion 250 mL  250 mL IntraVENous PRN  
 insulin glargine (LANTUS) injection 15 Units  15 Units SubCUTAneous DAILY  sodium chloride (NS) flush 10 mL  10 mL InterCATHeter Q8H  
 sodium chloride (NS) flush 10 mL  10 mL InterCATHeter PRN  
 atorvastatin (LIPITOR) tablet 10 mg  10 mg Oral DAILY  ferrous sulfate tablet 325 mg  325 mg Oral DAILY  gabapentin (NEURONTIN) capsule 300 mg  300 mg Oral QHS  levothyroxine (SYNTHROID) tablet 125 mcg  125 mcg Oral ACB  sertraline (ZOLOFT) tablet 50 mg  50 mg Oral DAILY  pantoprazole (PROTONIX) tablet 40 mg  40 mg Oral DAILY  dextrose 40% (GLUTOSE) oral gel 1 Tube  15 g Oral PRN  
 glucagon (GLUCAGEN) injection 1 mg  1 mg IntraMUSCular PRN  
 dextrose (D50) infusion 12.5-25 g  25-50 mL IntraVENous PRN  
 insulin lispro (HUMALOG) injection 0-10 Units  0-10 Units SubCUTAneous AC&HS  sodium chloride (NS) flush 5-40 mL  5-40 mL IntraVENous Q8H  
 sodium chloride (NS) flush 5-40 mL  5-40 mL IntraVENous PRN  
 0.9% sodium chloride infusion  100 mL/hr IntraVENous CONTINUOUS  
 acetaminophen (TYLENOL) tablet 650 mg  650 mg Oral Q4H PRN  
 oxyCODONE-acetaminophen (PERCOCET 7.5) 7.5-325 mg per tablet 1 Tab  1 Tab Oral Q4H PRN  
 naloxone (NARCAN) injection 0.4 mg  0.4 mg IntraVENous PRN  
 enoxaparin (LOVENOX) injection 40 mg  40 mg SubCUTAneous Q24H  cefepime (MAXIPIME) 1 g in 0.9% sodium chloride (MBP/ADV) 50 mL ADV  1 g IntraVENous Q12H  
 vancomycin (VANCOCIN) 1750 mg in  ml infusion  1,750 mg IntraVENous Q12H Pcn [penicillins] Social History Socioeconomic History  Marital status:  Spouse name: Not on file  Number of children: Not on file  Years of education: Not on file  Highest education level: Not on file Tobacco Use  Smoking status: Never Smoker  Smokeless tobacco: Never Used Substance and Sexual Activity  Alcohol use: No  
 
Social History Tobacco Use Smoking Status Never Smoker Smokeless Tobacco Never Used Family History Problem Relation Age of Onset  Hypertension Mother Verlinda Smoker Other Mother PVD  Diabetes Mother  Parkinson's Disease Mother  Diabetes Father ROS: The patient has no difficulty with chest pain or shortness of breath. No fever or chills. Comprehensive review of systems was otherwise unremarkable except as noted above. Physical Exam:  
Visit Vitals /53 Pulse 73 Temp 98.9 °F (37.2 °C) Resp 20 Ht 5' 7\" (1.702 m) Wt 192 lb 4.8 oz (87.2 kg) SpO2 97% BMI 30.12 kg/m² Constitutional: Alert, oriented, cooperative patient in no acute distress; appears stated age Eyes:Sclera are clear. EOMs intact ENMT: no external lesions gross hearing normal; no obvious neck masses, no ear or lip lesions, nares normal 
CV: RRR. Normal perfusion 2+ edema to BLE Resp: No JVD. Breathing is  non-labored; no audible wheezing. GI: soft and non-distended Musculoskeletal: unremarkable with normal function. No embolic signs or cyanosis. Right dorsal/medial foot s/p debridement, still with slough on all wounds and necrosis on dorsum Neuro:  Oriented; moves all 4; no focal deficits Psychiatric: normal affect and mood, no memory impairment Recent vitals (if inpt): 
Patient Vitals for the past 24 hrs: 
 BP Temp Pulse Resp SpO2 Weight 05/09/19 0828 107/53 98.9 °F (37.2 °C) 73 20 97 %   
05/09/19 0444 110/58       
05/09/19 0418 90/48 98 °F (36.7 °C) 71 18 95 % 192 lb 4.8 oz (87.2 kg) 05/08/19 2300 106/46 97.9 °F (36.6 °C) 72 18 95 %   
05/08/19 1919 130/61 97.9 °F (36.6 °C) 73 18 97 %   
05/08/19 1830 114/59 97.9 °F (36.6 °C) 75 18 95 %   
05/08/19 1725 134/63 97.5 °F (36.4 °C) 70 20 96 %   
05/08/19 1708 134/52 97.8 °F (36.6 °C) 74 18 98 %   
05/08/19 1300 110/60 98.1 °F (36.7 °C) 78 20 99 %  Labs: 
Recent Labs 05/08/19 
2247 05/08/19 
0435 05/07/19 2022 WBC  --  5.1 14.4* HGB 7.3* 7.0* 8.3*  
PLT  --  70* 152 NA  --  137 136 K  --  4.1 4.6 CL  --  110* 105 CO2  --  20* 22 BUN  --  28* 26* CREA  --  0.66 0.93 GLU  --  84 108* TBILI  --   --  2.6* SGOT  --   --  29 ALT  --   --  20  
AP  --   --  413* Lab Results Component Value Date/Time  WBC 5.1 05/08/2019 04:35 AM  
 HGB 7.3 (L) 05/08/2019 10:47 PM  
 PLATELET 70 (L) 78/92/7821 04:35 AM  
 Sodium 137 05/08/2019 04:35 AM  
 Potassium 4.1 05/08/2019 04:35 AM  
 Chloride 110 (H) 05/08/2019 04:35 AM  
 CO2 20 (L) 05/08/2019 04:35 AM  
 BUN 28 (H) 05/08/2019 04:35 AM  
 Creatinine 0.66 05/08/2019 04:35 AM  
 Glucose 84 05/08/2019 04:35 AM  
 INR 1.5 04/26/2019 03:50 AM  
 Bilirubin, total 2.6 (H) 05/07/2019 08:22 PM  
 Bilirubin, direct 0.19 04/29/2016 04:11 PM  
 AST (SGOT) 29 05/07/2019 08:22 PM  
 ALT (SGPT) 20 05/07/2019 08:22 PM  
 Alk. phosphatase 413 (H) 05/07/2019 08:22 PM  
 Lipase 187 02/27/2019 01:28 PM  
 
 
I reviewed recent labs and recent radiologic studies. I independently reviewed radiology images for studies I described above or studies I have ordered. Admission date (for inpatients): 5/7/2019 * No surgery found *  * No surgery found * ASSESSMENT/PLAN: 
Problem List  Date Reviewed: 4/24/2019 Codes Class Noted * (Principal) Cellulitis of right foot ICD-10-CM: P99.923 ICD-9-CM: 682.7  5/7/2019 Sepsis ([facility] ICD-10-CM: A41.9 ICD-9-CM: 038.9, 995.91  4/25/2019 Type 2 diabetes mellitus with diabetic neuropathy (HCC) ICD-10-CM: E11.40 ICD-9-CM: 250.60, 357.2  4/24/2019 Cellulitis ICD-10-CM: L03.90 ICD-9-CM: 682.9  4/24/2019 AVM (arteriovenous malformation) of colon ICD-10-CM: K55.20 ICD-9-CM: 614.77  3/7/2019 Cirrhosis of liver without ascites (HCC) ICD-10-CM: K74.60 ICD-9-CM: 571.5  3/7/2019 Microcytic anemia ICD-10-CM: D50.9 ICD-9-CM: 280.9  2/27/2019 Type 2 diabetes with nephropathy ([facility] ICD-10-CM: E11.21 
ICD-9-CM: 250.40, 583.81  1/16/2019 Hyperlipidemia ICD-10-CM: E78.5 ICD-9-CM: 272.4  1/12/2016 Essential hypertension ICD-10-CM: I10 
ICD-9-CM: 401.9  1/12/2016 Acquired hypothyroidism ICD-10-CM: E03.9 ICD-9-CM: 244.9  1/12/2016 Uncontrolled type II diabetes mellitus ([facility] ICD-10-CM: E11.65 ICD-9-CM: 250.02  1/12/2016 Principal Problem: 
  Cellulitis of right foot (5/7/2019) Active Problems: Hyperlipidemia (1/12/2016) Essential hypertension (1/12/2016) Acquired hypothyroidism (1/12/2016) Type 2 diabetes with nephropathy ([facility] [address] [facility] (1/16/2019) Microcytic anemia (2/27/2019) Cirrhosis of liver without ascites ([facility] [address] [facility] (3/7/2019) Continue wound care Daily wet to dry dressings May need OR debridement--will follow Signed:  Zachary Banegas NP

## 2019-05-09 NOTE — PROGRESS NOTES
Message left with [facility], to see if they can accept patient. Awaiting call back.

## 2019-05-09 NOTE — PROGRESS NOTES
Interdisciplinary Rounds completed 05/09/19. Nursing, Case Management, Physician and PT present. Plan of care reviewed and updated. Rehab at discharge. Per surgery foot debrided in room again today. Surgery possibly needed.

## 2019-05-09 NOTE — PROGRESS NOTES
Hourly rounds performed, all needs met. Pain medication given per [facility] for R foot pain, drsg reinforced, heavy purulent drainage. Packing not removed per orders of Gissel English NP with surgery. Will continue to monitor pt and give report to oncoming nurse.

## 2019-05-09 NOTE — PROGRESS NOTES
Pharmacokinetic Consult to Pharmacist 
 
Kaitlynn Bird is a 60 y.o. female being treated for diabetic foot infection with Vancomycin. Height: 5' 7\" (170.2 cm)  Weight: 87.2 kg (192 lb 4.8 oz) Lab Results Component Value Date/Time BUN 28 (H) 05/08/2019 04:35 AM  
 Creatinine 0.66 05/08/2019 04:35 AM  
 WBC 5.1 05/08/2019 04:35 AM  
 Procalcitonin 10.6 04/24/2019 06:29 PM  
 Lactic Acid (POC) 1.97 (H) 05/07/2019 08:30 PM  
  
Estimated Creatinine Clearance: 100.2 mL/min (based on SCr of 0.66 mg/dL). CULTURES: 
Pending Lab Results Component Value Date/Time Vancomycin,trough 24.6 (HH) 05/09/2019 08:34 AM  
 
 
 
Day 3 of vancomycin. Goal trough is 15-20. Vancomycin trough resulted above goal at 24.6, so will reduce dose to 1250 mg Q12H. Plan next trough prior to 4th dose of new regimen. Will continue to follow patient. Thank you, 
Alphia Boxer, PharmD Clinical Pharmacist 
[phone]

## 2019-05-09 NOTE — PROGRESS NOTES
Problem: Mobility Impaired (Adult and Pediatric) Goal: *Acute Goals and Plan of Care (Insert Text) Description 1. Ms. Erlinda Romero will perform sit to stand and bed to chair independently in 7 days. 2.  Ms. Erlinda Romero will perform gait with rolling walker 150 ft independently in 7 days. 3.  Ms. Erlinda Romero will perform up and down 2 steps with rail independently in 7 days. Outcome: Progressing Towards Goal 
 
 
PHYSICAL THERAPY: Initial Assessment 5/9/2019 INPATIENT:   
Payor: BLUE CROSS / Plan: [place] [employer] [place] / Product Type: PPO /   
  
NAME/AGE/GENDER: Nila Lundberg is a 60 y.o. female PRIMARY DIAGNOSIS: Cellulitis of right foot [L03.115] Cellulitis of right foot Cellulitis of right foot ICD-10: Treatment Diagnosis:  
 Generalized Muscle Weakness (M62.81) Difficulty in walking, Not elsewhere classified (R26.2) Precaution/Allergies: 
Pcn [penicillins] ASSESSMENT:  
 
Ms. Erlinda Romero presents in bed with pain at a 9. She tells me she just had her foot debrided. She did agree to get up in the chair though. Bed mobility and gait a few steps to the chair with supervision. She was not able to tolerate a sock on her foot over dressing so she and the nurses had come up with putting a sock on the floor and sliding her foot. That is fine for just short transfer but need to come up with something for longer distances. She did not need the walker for bed to chair but says she uses one for gait at home. She is very anxious about insurance coverage and the discharge plan. 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 Ms. Erlinda Romero is functioning below baseline and is appropriate for skilled PT to maximize her rehab potential.  
 
This section established at most recent assessment PROBLEM LIST (Impairments causing functional limitations): 
Decreased Strength Decreased Transfer Abilities Decreased Ambulation Ability/Technique Increased Pain Decreased Activity Tolerance INTERVENTIONS PLANNED: (Benefits and precautions of physical therapy have been discussed with the patient.) Bed Mobility Gait Training Therapeutic Activites Transfer Training TREATMENT PLAN: Frequency/Duration: 4 times a week for duration of hospital stay Rehabilitation Potential For Stated Goals: Good REHAB RECOMMENDATIONS (at time of discharge pending progress):   
Placement: It is my opinion, based on this patient's performance to date, that Ms. Kauffman may benefit from either home with home health vs inpt rehab stay. Its too soon to tell what her needs may be. Per surgical note may be going to OR for debridement at some point. Equipment: To be determined. HISTORY:  
History of Present Injury/Illness (Reason for Referral): 
Patient is a 61 y/o female with hx diabetes and HTN was recently hospitalized for cellulitis of right foot. Symptoms began initially last month on the 23d. She was discharged home 5/2 on keflex and doxycycline. She had been doing well for a time but now she has noticed a blackened appearance to the dorsum of right foot. Also had two blisters on the medial aspect of foot that have ruptured. She has a WBC ct of 14.4. No fever but has had chills. Her right foot has diffuse cellulitis with a large black eschar on dorsum. Will admit for IV antibiotics and surgical evaluation Past Medical History/Comorbidities: Ms. Kain Quiroz  has a past medical history of Diabetes ([address] and Hypertension. Ms. Kain Quiroz  has a past surgical history that includes colonoscopy (N/A, 3/2/2019). Social History/Living Environment:  
Home Environment: Private residence # Steps to Enter: 4 One/Two Story Residence: One story Living Alone: Yes Support Systems: Family member(s), Friends \ neighbors Patient Expects to be Discharged to[de-identified] Private residence Current DME Used/Available at Home: Walker, rolling Tub or Shower Type: Tub/Shower combination Prior Level of Function/Work/Activity: Independent with rolling walker 
age Number of Personal Factors/Comorbidities that affect the Plan of Care: 1-2: MODERATE COMPLEXITY EXAMINATION:  
Most Recent Physical Functioning:  
Gross Assessment: 
AROM: Within functional limits Strength: Generally decreased, functional 
         
  
Posture: 
Posture (WDL): Within defined limits Balance: 
Sitting: Intact Standing: Impaired; With support Standing - Static: Good Standing - Dynamic : Fair Bed Mobility: 
Rolling: Independent Supine to Sit: Supervision Wheelchair Mobility: 
  
Transfers: 
Sit to Stand: Supervision Stand to Sit: Supervision Gait: 
  
Base of Support: Widened Speed/Tamela: Shuffled; Slow Step Length: Right shortened;Left shortened Distance (ft): 3 Feet (ft) Body Structures Involved: Muscles Body Functions Affected: Movement Related Activities and Participation Affected: Mobility Number of elements that affect the Plan of Care: 3: MODERATE COMPLEXITY CLINICAL PRESENTATION:  
Presentation: Evolving clinical presentation with changing clinical characteristics: MODERATE COMPLEXITY CLINICAL DECISION MAKIN [facility] [place] [address] AM-PAC? ?6 Clicks? Basic Mobility Inpatient Short Form How much difficulty does the patient currently have. .. Unable A Lot A Little None 1. Turning over in bed (including adjusting bedclothes, sheets and blankets)? ? 1   ? 2   ? 3   ? 4  
2. Sitting down on and standing up from a chair with arms ( e.g., wheelchair, bedside commode, etc.)   ? 1   ? 2   ? 3   ? 4  
3. Moving from lying on back to sitting on the side of the bed?   ? 1   ? 2   ? 3   ? 4 How much help from another person does the patient currently need. .. Total A Lot A Little None 4. Moving to and from a bed to a chair (including a wheelchair)? ? 1   ? 2   ? 3   ? 4  
5. Need to walk in hospital room? ? 1   ? 2   ? 3   ? 4  
6. Climbing 3-5 steps with a railing?    ? 1   ? 2   ? 3   ? 4  
 © 2007, Trustees of [address] [place] [address] [address] [address], under license to [employer]. All rights reserved Score:  Initial: 18 Most Recent: X (Date: -- ) Interpretation of Tool:  Represents activities that are increasingly more difficult (i.e. Bed mobility, Transfers, Gait). Medical Necessity:    
Patient is expected to demonstrate progress in functional technique 
 to decrease assistance required with mobility and gait. . 
Reason for Services/Other Comments: 
Patient continues to require present interventions due to patient's inability to function at baseline. .  
Use of outcome tool(s) and clinical judgement create a POC that gives a: Questionable prediction of patient's progress: MODERATE COMPLEXITY  
  
 
 
 
TREATMENT:  
(In addition to Assessment/Re-Assessment sessions the following treatments were rendered) Pre-treatment Symptoms/Complaints:  having pain in her foot after debridement. Rather anxious about finances. Pain: Initial:  
Pain Intensity 1: 9 Pain Location 1: Foot Pain Orientation 1: Right Pain Intervention(s) 1: (she was given pain medine. )  Post Session:  same Assessment/Reassessment only, no treatment provided today Braces/Orthotics/Lines/Etc:  
IV 
O2 Device: Room air Treatment/Session Assessment:   
Response to Treatment:  fair Interdisciplinary Collaboration:  
Registered Nurse After treatment position/precautions:  
Up in chair Call light within reach RN notified Compliance with Program/Exercises: Will assess as treatment progresses Recommendations/Intent for next treatment session: \"Next visit will focus on advancements to more challenging activities and reduction in assistance provided\". Total Treatment Duration: PT Patient Time In/Time Out Time In: 1100 Time Out: 1115 Shannon Schwartz, PT

## 2019-05-09 NOTE — PROGRESS NOTES
Hospitalist Progress Note Subjective:  
Daily Progress Note: 5/9/2019 0920 Patient admitted for right foot cellulitis 4/24-5/2, discharged on keflex and doxy. Returned to ER 5/7 with increased edema, erythema and necrosis of the wound. 5/9 Bedside debridement 5/8 per surgery. Still will necrosis on edges and slough in base, possible additional surgery this week. Reports pain controlled. Current Facility-Administered Medications Medication Dose Route Frequency  loperamide (IMODIUM) capsule 2 mg  2 mg Oral Q8H PRN  
 vancomycin trough reminder   Other ONCE  
 0.9% sodium chloride infusion 250 mL  250 mL IntraVENous PRN  
 insulin glargine (LANTUS) injection 15 Units  15 Units SubCUTAneous DAILY  sodium chloride (NS) flush 10 mL  10 mL InterCATHeter Q8H  
 sodium chloride (NS) flush 10 mL  10 mL InterCATHeter PRN  
 atorvastatin (LIPITOR) tablet 10 mg  10 mg Oral DAILY  ferrous sulfate tablet 325 mg  325 mg Oral DAILY  gabapentin (NEURONTIN) capsule 300 mg  300 mg Oral QHS  levothyroxine (SYNTHROID) tablet 125 mcg  125 mcg Oral ACB  sertraline (ZOLOFT) tablet 50 mg  50 mg Oral DAILY  pantoprazole (PROTONIX) tablet 40 mg  40 mg Oral DAILY  dextrose 40% (GLUTOSE) oral gel 1 Tube  15 g Oral PRN  
 glucagon (GLUCAGEN) injection 1 mg  1 mg IntraMUSCular PRN  
 dextrose (D50) infusion 12.5-25 g  25-50 mL IntraVENous PRN  
 insulin lispro (HUMALOG) injection 0-10 Units  0-10 Units SubCUTAneous AC&HS  sodium chloride (NS) flush 5-40 mL  5-40 mL IntraVENous Q8H  
 sodium chloride (NS) flush 5-40 mL  5-40 mL IntraVENous PRN  
 0.9% sodium chloride infusion  100 mL/hr IntraVENous CONTINUOUS  
 acetaminophen (TYLENOL) tablet 650 mg  650 mg Oral Q4H PRN  
 oxyCODONE-acetaminophen (PERCOCET 7.5) 7.5-325 mg per tablet 1 Tab  1 Tab Oral Q4H PRN  
 naloxone (NARCAN) injection 0.4 mg  0.4 mg IntraVENous PRN  
 enoxaparin (LOVENOX) injection 40 mg  40 mg SubCUTAneous Q24H  cefepime (MAXIPIME) 1 g in 0.9% sodium chloride (MBP/ADV) 50 mL ADV  1 g IntraVENous Q12H  
 vancomycin (VANCOCIN) 1750 mg in  ml infusion  1,750 mg IntraVENous Q12H Review of Systems A comprehensive review of systems was negative except for that written in the HPI. Objective:  
 
Visit Vitals /58 Pulse 71 Temp 98 °F (36.7 °C) Resp 18 Ht 5' 7\" (1.702 m) Wt 87.2 kg (192 lb 4.8 oz) SpO2 95% BMI 30.12 kg/m² O2 Device: Room air Temp (24hrs), Av.9 °F (36.6 °C), Min:97.5 °F (36.4 °C), Max:98.1 °F (36.7 °C) 
 
 1901 -  0700 In: 424.2 Out: 600 [Urine:600] General appearance: Oriented and alert, cooperative, Pain controlled. Obese. Head: Normocephalic, without obvious abnormality, atraumatic Neck: supple, symmetrical, trachea midline, and no JVD Lungs: clear to auscultation bilaterally Heart: regular rate and rhythm, S1, S2 normal, no murmur, click, rub or gallop Abdomen: soft, non-tender. Bowel sounds normal. No masses,  no organomegaly Extremities: Dry, intact dressing to right foot. All other extremities normal, atraumatic, no cyanosis or edema Skin: Skin color, texture, turgor normal. No rashes or lesions Neurologic: Grossly normal 
 
Additional comments: Notes,orders, test results, vitals reviewed Data Review Recent Results (from the past 24 hour(s)) GLUCOSE, POC Collection Time: 19  7:26 AM  
Result Value Ref Range Glucose (POC) 81 65 - 100 mg/dL TYPE + CROSSMATCH Collection Time: 19 11:10 AM  
Result Value Ref Range Crossmatch Expiration 2019 ABO/Rh(D) O POSITIVE Antibody screen NEG Unit number [id number] Blood component type [facility] Unit division 00 Status of unit ISSUED Crossmatch result Compatible GLUCOSE, POC Collection Time: 19 11:10 AM  
Result Value Ref Range Glucose (POC) 137 (H) 65 - 100 mg/dL GLUCOSE, POC  Collection Time: 19  5:03 PM  
 Result Value Ref Range Glucose (POC) 152 (H) 65 - 100 mg/dL GLUCOSE, POC Collection Time: 05/08/19  9:23 PM  
Result Value Ref Range Glucose (POC) 124 (H) 65 - 100 mg/dL HGB & HCT Collection Time: 05/08/19 10:47 PM  
Result Value Ref Range HGB 7.3 (L) 11.7 - 15.4 g/dL HCT 22.8 (L) 35.8 - 46.3 %  
 
 4/29:  CT RIGHT FOOT:  Generalized predominantly dorsal subcutaneous edema over the midfoot with 
skin blistering noted. No discrete measurable fluid collection or abscess appreciated. No destructive bone changes noted to indicate osteomyelitis No intra-articular joint derangement or fracture identified. 5/8:  RIGHT FOOT XRAY: Soft tissue swelling and degenerative change. 
  
Assessment/Plan:  
Cellulitis of right foot Surgery on board Bedside I+D 5/8 May need additional surgery Continue maxipime and vancomycin Hyperlipidemia:  Home meds Hypertension:  Continue home meds Hypothyroidism:  Home meds IDDM II:  A1C: 5.9 Microcytic anemia Monitor Cirrhosis of liver without ascites Monitor Care Plan discussed with: Patient and Nurse Signed By: Kervin Hernandes NP May 9, 2019

## 2019-05-09 NOTE — PROGRESS NOTES
Hourly rounds completed on patient. Complaints of pain throughout the night, Medicated per Mar. Patient denies any needs at this time. Will report to oncoming nurse.

## 2019-05-10 ENCOUNTER — HOME HEALTH ADMISSION (OUTPATIENT)
Dept: HOME HEALTH SERVICES | Facility: HOME HEALTH | Age: 64
End: 2019-05-10

## 2019-05-10 LAB
ALBUMIN SERPL-MCNC: 1.6 G/DL (ref 3.2–4.6)
ALBUMIN/GLOB SERPL: 0.5 {RATIO} (ref 1.2–3.5)
ALP SERPL-CCNC: 332 U/L (ref 50–136)
ALT SERPL-CCNC: 17 U/L (ref 12–65)
ANION GAP SERPL CALC-SCNC: 7 MMOL/L (ref 7–16)
AST SERPL-CCNC: 25 U/L (ref 15–37)
BASOPHILS # BLD: 0 K/UL (ref 0–0.2)
BASOPHILS NFR BLD: 1 % (ref 0–2)
BILIRUB SERPL-MCNC: 1 MG/DL (ref 0.2–1.1)
BUN SERPL-MCNC: 17 MG/DL (ref 8–23)
CALCIUM SERPL-MCNC: 6.7 MG/DL (ref 8.3–10.4)
CHLORIDE SERPL-SCNC: 111 MMOL/L (ref 98–107)
CO2 SERPL-SCNC: 20 MMOL/L (ref 21–32)
CREAT SERPL-MCNC: 0.63 MG/DL (ref 0.6–1)
CRP SERPL-MCNC: 3.1 MG/DL (ref 0–0.9)
DIFFERENTIAL METHOD BLD: ABNORMAL
EOSINOPHIL # BLD: 0.1 K/UL (ref 0–0.8)
EOSINOPHIL NFR BLD: 2 % (ref 0.5–7.8)
ERYTHROCYTE [DISTWIDTH] IN BLOOD BY AUTOMATED COUNT: 17.2 % (ref 11.9–14.6)
ERYTHROCYTE [SEDIMENTATION RATE] IN BLOOD: 44 MM/HR (ref 0–30)
GLOBULIN SER CALC-MCNC: 3.3 G/DL (ref 2.3–3.5)
GLUCOSE BLD STRIP.AUTO-MCNC: 123 MG/DL (ref 65–100)
GLUCOSE BLD STRIP.AUTO-MCNC: 148 MG/DL (ref 65–100)
GLUCOSE BLD STRIP.AUTO-MCNC: 176 MG/DL (ref 65–100)
GLUCOSE BLD STRIP.AUTO-MCNC: 192 MG/DL (ref 65–100)
GLUCOSE SERPL-MCNC: 130 MG/DL (ref 65–100)
HCT VFR BLD AUTO: 22 % (ref 35.8–46.3)
HGB BLD-MCNC: 7.2 G/DL (ref 11.7–15.4)
IMM GRANULOCYTES # BLD AUTO: 0 K/UL (ref 0–0.5)
IMM GRANULOCYTES NFR BLD AUTO: 0 % (ref 0–5)
LYMPHOCYTES # BLD: 0.7 K/UL (ref 0.5–4.6)
LYMPHOCYTES NFR BLD: 26 % (ref 13–44)
MAGNESIUM SERPL-MCNC: 1.6 MG/DL (ref 1.8–2.4)
MCH RBC QN AUTO: 26.7 PG (ref 26.1–32.9)
MCHC RBC AUTO-ENTMCNC: 32.7 G/DL (ref 31.4–35)
MCV RBC AUTO: 81.5 FL (ref 79.6–97.8)
MONOCYTES # BLD: 0.3 K/UL (ref 0.1–1.3)
MONOCYTES NFR BLD: 10 % (ref 4–12)
NEUTS SEG # BLD: 1.7 K/UL (ref 1.7–8.2)
NEUTS SEG NFR BLD: 61 % (ref 43–78)
NRBC # BLD: 0 K/UL (ref 0–0.2)
PLATELET # BLD AUTO: 68 K/UL (ref 150–450)
PMV BLD AUTO: 10 FL (ref 9.4–12.3)
POTASSIUM SERPL-SCNC: 4 MMOL/L (ref 3.5–5.1)
PROT SERPL-MCNC: 4.9 G/DL (ref 6.3–8.2)
RBC # BLD AUTO: 2.7 M/UL (ref 4.05–5.2)
SODIUM SERPL-SCNC: 138 MMOL/L (ref 136–145)
WBC # BLD AUTO: 2.8 K/UL (ref 4.3–11.1)

## 2019-05-10 PROCEDURE — 97110 THERAPEUTIC EXERCISES: CPT

## 2019-05-10 PROCEDURE — 77030020263 HC SOL INJ SOD CL0.9% LFCR 1000ML

## 2019-05-10 PROCEDURE — 85652 RBC SED RATE AUTOMATED: CPT

## 2019-05-10 PROCEDURE — 74011000258 HC RX REV CODE- 258: Performed by: HOSPITALIST

## 2019-05-10 PROCEDURE — 74011636637 HC RX REV CODE- 636/637: Performed by: HOSPITALIST

## 2019-05-10 PROCEDURE — 65270000029 HC RM PRIVATE

## 2019-05-10 PROCEDURE — 82962 GLUCOSE BLOOD TEST: CPT

## 2019-05-10 PROCEDURE — 80053 COMPREHEN METABOLIC PANEL: CPT

## 2019-05-10 PROCEDURE — 74011250636 HC RX REV CODE- 250/636: Performed by: HOSPITALIST

## 2019-05-10 PROCEDURE — 74011250637 HC RX REV CODE- 250/637: Performed by: HOSPITALIST

## 2019-05-10 PROCEDURE — 85025 COMPLETE CBC W/AUTO DIFF WBC: CPT

## 2019-05-10 PROCEDURE — 83735 ASSAY OF MAGNESIUM: CPT

## 2019-05-10 PROCEDURE — 97530 THERAPEUTIC ACTIVITIES: CPT

## 2019-05-10 PROCEDURE — 86140 C-REACTIVE PROTEIN: CPT

## 2019-05-10 RX ORDER — SERTRALINE HYDROCHLORIDE 100 MG/1
100 TABLET, FILM COATED ORAL DAILY
Status: DISCONTINUED | OUTPATIENT
Start: 2019-05-11 | End: 2019-05-23 | Stop reason: HOSPADM

## 2019-05-10 RX ADMIN — SODIUM CHLORIDE 1 G: 900 INJECTION, SOLUTION INTRAVENOUS at 08:10

## 2019-05-10 RX ADMIN — INSULIN LISPRO 2 UNITS: 100 INJECTION, SOLUTION INTRAVENOUS; SUBCUTANEOUS at 16:42

## 2019-05-10 RX ADMIN — VANCOMYCIN HYDROCHLORIDE 1250 MG: 10 INJECTION, POWDER, LYOPHILIZED, FOR SOLUTION INTRAVENOUS at 01:34

## 2019-05-10 RX ADMIN — Medication 10 ML: at 14:02

## 2019-05-10 RX ADMIN — SODIUM CHLORIDE 100 ML/HR: 900 INJECTION, SOLUTION INTRAVENOUS at 16:22

## 2019-05-10 RX ADMIN — SODIUM CHLORIDE 100 ML/HR: 900 INJECTION, SOLUTION INTRAVENOUS at 07:21

## 2019-05-10 RX ADMIN — OXYCODONE HYDROCHLORIDE AND ACETAMINOPHEN 1 TABLET: 7.5; 325 TABLET ORAL at 16:21

## 2019-05-10 RX ADMIN — Medication 10 ML: at 21:05

## 2019-05-10 RX ADMIN — VANCOMYCIN HYDROCHLORIDE 1250 MG: 10 INJECTION, POWDER, LYOPHILIZED, FOR SOLUTION INTRAVENOUS at 14:00

## 2019-05-10 RX ADMIN — Medication 10 ML: at 05:54

## 2019-05-10 RX ADMIN — SODIUM CHLORIDE 1 G: 900 INJECTION, SOLUTION INTRAVENOUS at 21:03

## 2019-05-10 RX ADMIN — OXYCODONE HYDROCHLORIDE AND ACETAMINOPHEN 1 TABLET: 7.5; 325 TABLET ORAL at 08:10

## 2019-05-10 RX ADMIN — INSULIN LISPRO 2 UNITS: 100 INJECTION, SOLUTION INTRAVENOUS; SUBCUTANEOUS at 11:31

## 2019-05-10 RX ADMIN — PANTOPRAZOLE SODIUM 40 MG: 40 TABLET, DELAYED RELEASE ORAL at 08:10

## 2019-05-10 RX ADMIN — GABAPENTIN 300 MG: 300 CAPSULE ORAL at 21:04

## 2019-05-10 RX ADMIN — OXYCODONE HYDROCHLORIDE AND ACETAMINOPHEN 1 TABLET: 7.5; 325 TABLET ORAL at 21:06

## 2019-05-10 RX ADMIN — SERTRALINE HYDROCHLORIDE 50 MG: 50 TABLET ORAL at 08:10

## 2019-05-10 RX ADMIN — ATORVASTATIN CALCIUM 10 MG: 10 TABLET, FILM COATED ORAL at 08:10

## 2019-05-10 RX ADMIN — FERROUS SULFATE TAB 325 MG (65 MG ELEMENTAL FE) 325 MG: 325 (65 FE) TAB at 08:10

## 2019-05-10 RX ADMIN — ENOXAPARIN SODIUM 40 MG: 40 INJECTION SUBCUTANEOUS at 23:01

## 2019-05-10 RX ADMIN — LEVOTHYROXINE SODIUM 125 MCG: 75 TABLET ORAL at 05:54

## 2019-05-10 NOTE — PROGRESS NOTES
[facility] cannot take patient d/t no bed availability. Notified patient that [facility] have no beds at this time. Patient states that she is waiting to hear from her insurance to see if they will even cover SNF. Patient also stated that at this point she may go stay with a friend for a while. She will let weekend CM know once she talks to her friend. Patient also requested affordable housing information. Provided patient with list of affordable housing. Cm will continue to follow.

## 2019-05-10 NOTE — PROGRESS NOTES
Cardiac room called to report 6 run of V Tach again, pt is asymptomatic. Called MD to make aware, no orders received.

## 2019-05-10 NOTE — PROGRESS NOTES
H&P/Consult Note/Progress Note/Office Note:  
Joel Sargent  MRN: [id number]  :1955  Age:64 y.o. 
 
HPI: Joel Sargent is a 60 y.o. female who we are asked by Dr. Jose Campbell to see for a right foot wound. She has a PMHx of DM and HTN. She reports she first noticed redness of the right foot dorsum on . She reports this area worsened so she came to the ER on  and was admitted for cellulitis. The dorsum of the right foot developed a blister that eventually collapsed. She was discharged on  with Keflex and doxy. She also had a right foot CT that admission which showed no abscess or OM. She returned to the ER on  after noticing increased edema and necrosis of the right foot dorsum. She was admitted by the hospitalist service for cellulitis. Repeat right foot xray is pending. 19: s/p bedside debridement . Doing well. Reports improved pain. Still with necrosis on edges and slough in base--pain prevented full bedside debridement of all necrotic tissue. AF, VSS. 
5/10/19: s/p bedside debridement . Pt doing well, reports foot is feeling better. Still with necrosis on edges and slough in base--pain prevented full bedside debridement of all necrotic tissue. She is AF, VSS. WBC 2.8. Plts 68, H/H . Blood cultures NGTD. Past Medical History:  
Diagnosis Date  Diabetes ([facility] [address]  Hypertension Past Surgical History:  
Procedure Laterality Date  COLONOSCOPY N/A 3/2/2019 COLONOSCOPY ROOM 615 performed by Mag Arzola MD at [facility] ENDOSCOPY Current Facility-Administered Medications Medication Dose Route Frequency  vancomycin (VANCOCIN) 1250 mg in  ml infusion  1,250 mg IntraVENous Q12H  
 loperamide (IMODIUM) capsule 2 mg  2 mg Oral Q8H PRN  
 0.9% sodium chloride infusion 250 mL  250 mL IntraVENous PRN  
 sodium chloride (NS) flush 10 mL  10 mL InterCATHeter Q8H  
 sodium chloride (NS) flush 10 mL  10 mL InterCATHeter PRN  
  atorvastatin (LIPITOR) tablet 10 mg  10 mg Oral DAILY  ferrous sulfate tablet 325 mg  325 mg Oral DAILY  gabapentin (NEURONTIN) capsule 300 mg  300 mg Oral QHS  levothyroxine (SYNTHROID) tablet 125 mcg  125 mcg Oral ACB  sertraline (ZOLOFT) tablet 50 mg  50 mg Oral DAILY  pantoprazole (PROTONIX) tablet 40 mg  40 mg Oral DAILY  dextrose 40% (GLUTOSE) oral gel 1 Tube  15 g Oral PRN  
 glucagon (GLUCAGEN) injection 1 mg  1 mg IntraMUSCular PRN  
 dextrose (D50) infusion 12.5-25 g  25-50 mL IntraVENous PRN  
 insulin lispro (HUMALOG) injection 0-10 Units  0-10 Units SubCUTAneous AC&HS  sodium chloride (NS) flush 5-40 mL  5-40 mL IntraVENous Q8H  
 sodium chloride (NS) flush 5-40 mL  5-40 mL IntraVENous PRN  
 0.9% sodium chloride infusion  100 mL/hr IntraVENous CONTINUOUS  
 acetaminophen (TYLENOL) tablet 650 mg  650 mg Oral Q4H PRN  
 oxyCODONE-acetaminophen (PERCOCET 7.5) 7.5-325 mg per tablet 1 Tab  1 Tab Oral Q4H PRN  
 naloxone (NARCAN) injection 0.4 mg  0.4 mg IntraVENous PRN  
 enoxaparin (LOVENOX) injection 40 mg  40 mg SubCUTAneous Q24H  cefepime (MAXIPIME) 1 g in 0.9% sodium chloride (MBP/ADV) 50 mL ADV  1 g IntraVENous Q12H Pcn [penicillins] Social History Socioeconomic History  Marital status:  Spouse name: Not on file  Number of children: Not on file  Years of education: Not on file  Highest education level: Not on file Tobacco Use  Smoking status: Never Smoker  Smokeless tobacco: Never Used Substance and Sexual Activity  Alcohol use: No  
 
Social History Tobacco Use Smoking Status Never Smoker Smokeless Tobacco Never Used Family History Problem Relation Age of Onset  Hypertension Mother [facility] Other Mother [place]  Diabetes Mother  Parkinson's Disease Mother  Diabetes Father ROS: The patient has no difficulty with chest pain or shortness of breath. No fever or chills. Comprehensive review of systems was otherwise unremarkable except as noted above. Physical Exam:  
Visit Vitals /74 (BP 1 Location: Right arm, BP Patient Position: At rest) Pulse 71 Temp 97.9 °F (36.6 °C) Resp 18 Ht 5' 7\" (1.702 m) Wt 209 lb 3.2 oz (94.9 kg) SpO2 98% BMI 32.77 kg/m² Constitutional: Alert, oriented, cooperative patient in no acute distress; appears stated age Eyes:Sclera are clear. EOMs intact ENMT: no external lesions gross hearing normal; no obvious neck masses, no ear or lip lesions, nares normal 
CV:Normal perfusion 2+ edema to BLE Resp: No JVD. Breathing is  non-labored; no audible wheezing. GI: non-distended Musculoskeletal: unremarkable with normal function. No embolic signs or cyanosis. Right dorsal/medial foot s/p debridement, still with slough on all wounds and necrosis on dorsum with some extension toward toes Neuro:  Oriented; moves all 4; no focal deficits Psychiatric: normal affect and mood, no memory impairment Recent vitals (if inpt): 
Patient Vitals for the past 24 hrs: 
 BP Temp Pulse Resp SpO2 Weight 05/10/19 0707 132/74 97.9 °F (36.6 °C) 71 18 98 %   
05/10/19 0437 103/44 98.4 °F (36.9 °C) 69 16 97 % 209 lb 3.2 oz (94.9 kg) 05/09/19 2302 116/53 98.5 °F (36.9 °C) 76 18 99 %   
05/09/19 1909 135/65 97.7 °F (36.5 °C) 71 18 98 %   
05/09/19 1454 118/58 97.9 °F (36.6 °C) 73  97 %   
05/09/19 1118 116/49 98.9 °F (37.2 °C)  20 97 %  Labs: 
Recent Labs 05/10/19 
0400 WBC 2.8* HGB 7.2*  
PLT 68*   
K 4.0  
* CO2 20* BUN 17 CREA 0.63 * TBILI 1.0  
SGOT 25 ALT 17  
* Lab Results Component Value Date/Time  WBC 2.8 (L) 05/10/2019 04:00 AM  
 HGB 7.2 (L) 05/10/2019 04:00 AM  
 PLATELET 68 (L) 49/45/2081 04:00 AM  
 Sodium 138 05/10/2019 04:00 AM  
 Potassium 4.0 05/10/2019 04:00 AM  
 Chloride 111 (H) 05/10/2019 04:00 AM  
 CO2 20 (L) 05/10/2019 04:00 AM  
 BUN 17 05/10/2019 04:00 AM  
 Creatinine 0.63 05/10/2019 04:00 AM  
 Glucose 130 (H) 05/10/2019 04:00 AM  
 INR 1.5 04/26/2019 03:50 AM  
 Bilirubin, total 1.0 05/10/2019 04:00 AM  
 Bilirubin, direct 0.19 04/29/2016 04:11 PM  
 AST (SGOT) 25 05/10/2019 04:00 AM  
 ALT (SGPT) 17 05/10/2019 04:00 AM  
 Alk. phosphatase 332 (H) 05/10/2019 04:00 AM  
 Lipase 187 02/27/2019 01:28 PM  
 
 
I reviewed recent labs and recent radiologic studies. I independently reviewed radiology images for studies I described above or studies I have ordered. Admission date (for inpatients): 5/7/2019 * No surgery found *  * No surgery found * ASSESSMENT/PLAN: 
Problem List  Date Reviewed: 4/24/2019 Codes Class Noted * (Principal) Cellulitis of right foot ICD-10-CM: Y40.116 ICD-9-CM: 682.7  5/7/2019 Sepsis ([facility] ICD-10-CM: A41.9 ICD-9-CM: 038.9, 995.91  4/25/2019 Type 2 diabetes mellitus with diabetic neuropathy (HCC) ICD-10-CM: E11.40 ICD-9-CM: 250.60, 357.2  4/24/2019 Cellulitis ICD-10-CM: L03.90 ICD-9-CM: 682.9  4/24/2019 AVM (arteriovenous malformation) of colon ICD-10-CM: K55.20 ICD-9-CM: 797.76  3/7/2019 Cirrhosis of liver without ascites (HCC) ICD-10-CM: K74.60 ICD-9-CM: 571.5  3/7/2019 Microcytic anemia ICD-10-CM: D50.9 ICD-9-CM: 280.9  2/27/2019 Type 2 diabetes with nephropathy ([facility] ICD-10-CM: E11.21 
ICD-9-CM: 250.40, 583.81  1/16/2019 Hyperlipidemia ICD-10-CM: E78.5 ICD-9-CM: 272.4  1/12/2016 Essential hypertension ICD-10-CM: I10 
ICD-9-CM: 401.9  1/12/2016 Acquired hypothyroidism ICD-10-CM: E03.9 ICD-9-CM: 244.9  1/12/2016 Uncontrolled type II diabetes mellitus ([facility] ICD-10-CM: E11.65 ICD-9-CM: 250.02  1/12/2016 Principal Problem: 
  Cellulitis of right foot (5/7/2019) Active Problems: Hyperlipidemia (1/12/2016) Essential hypertension (1/12/2016) Acquired hypothyroidism (1/12/2016) Type 2 diabetes with nephropathy ([facility] [address] (1/16/2019) Microcytic anemia (2/27/2019) Cirrhosis of liver without ascites ([facility] [address] (3/7/2019) Continue wound care Daily wet to dry dressings. Changed at bedside, RN taught. May need OR debridement--will follow closely Signed:  DIGNA Liriano

## 2019-05-10 NOTE — PROGRESS NOTES
Problem: Falls - Risk of 
Goal: *Absence of Falls Description Document Bishop Wilkerson Fall Risk and appropriate interventions in the flowsheet. Outcome: Progressing Towards Goal 
  
Problem: Patient Education: Go to Patient Education Activity Goal: Patient/Family Education Outcome: Progressing Towards Goal 
  
Problem: Pressure Injury - Risk of 
Goal: *Prevention of pressure injury Description Document Shane Scale and appropriate interventions in the flowsheet. Outcome: Progressing Towards Goal 
  
Problem: Patient Education: Go to Patient Education Activity Goal: Patient/Family Education Outcome: Progressing Towards Goal

## 2019-05-10 NOTE — PROGRESS NOTES
[facility] [place] - INPATIENT Face to Face Encounter Patients Name: Gila Jung    YOB: 1955 Ordering Physician: Dr. Edmar Land Primary Diagnosis: Cellulitis of right foot [X46.949] Date of Face to Face:   5/10/2019 Face to Face Encounter findings are related to primary reason for home care:   yes. 1. I certify that the patient needs intermittent care as follows: skilled nursing care:  skilled observation/assessment, patient education 
physical therapy: strengthening, stretching/ROM, transfer training, gait/stair training, balance training and pt/caregiver education 
occupational therapy:  ADL safety (ie. cooking, bathing, dressing), ROM and pt/caregiver education 2. I certify that this patient is homebound, that is: 1) patient requires the use of a walker device, special transportation, or assistance of another to leave the home; or 2) patient's condition makes leaving the home medically contraindicated; and 3) patient has a normal inability to leave the home and leaving the home requires considerable and taxing effort. Patient may leave the home for infrequent and short duration for medical reasons, and occasional absences for non-medical reasons. Homebound status is due to the following functional limitations: Patient with strength deficits limiting the performance of all ADL's without caregiver assistance or the use of an assistive device. 3. I certify that this patient is under my care and that I, or a nurse practitioner or  [id number], or clinical nurse specialist, or certified nurse midwife, working with me, had a Face-to-Face Encounter that meets the physician Face-to-Face Encounter requirements. The following are the clinical findings from the [address] [place] [address] encounter that support the need for skilled services and is a summary of the encounter: see hospital chart See summary of the patient's illness Robin Hernandez LMSW 5/10/2019 THE FOLLOWING TO BE COMPLETED BY THE COMMUNITY PHYSICIAN: 
 
I concur with the findings described above from the F2F encounter that this patient is homebound and in need of a skilled service. Certifying Physician: _____________________________________ Printed Certifying Physician Name: _____________________________________ Date: _________________

## 2019-05-10 NOTE — PROGRESS NOTES
Problem: Self Care Deficits Care Plan (Adult) Goal: *Acute Goals and Plan of Care (Insert Text) Description 1. Pt will toilet with SBA 2. Pt will complete functional mobility for ADLs with SBA 3. Pt will complete lower body dressing with SBA using AE as needed 4. Pt will complete grooming and hygiene at sink with SBA 5. Pt will demonstrate independence with HEP to promote increased BUE strength and functional use for ADLs 6. Pt will tolerate 23 minutes functional activity with min or fewer rest breaks to promote increased endurance for ADLs Timeframe: 7 days Outcome: Progressing Towards Goal 
  
OCCUPATIONAL THERAPY: Daily Note and AM 5/10/2019 INPATIENT: OT Visit Days: 1 Payor: BLUE CROSS / [place]: [place] [email or web address] [place] / Product Type: PPO /  
  
NAME/AGE/GENDER: Kaitlynn Bird is a 60 y.o. female PRIMARY DIAGNOSIS:  Cellulitis of right foot [L03.115] Cellulitis of right foot Cellulitis of right foot ICD-10: Treatment Diagnosis:  
 · Generalized Muscle Weakness (M62.81) Precautions/Allergies: 
  falls Pcn [penicillins] ASSESSMENT:  
Ms. Arsh Hoffman was admitted with R foot cellulitis, is s/p recent admission for the same thing. Pt lives alone, reports that she is independent and works as a nurse at baseline but has been struggling to complete ADLs and IADLs since last d/c. Pt uses a RW for mobility. Pt stated that she has been out of work for a long time and is behind on her rent, is worried about eviction. Pt completed the exercises below on B UE's. Pt had just had bandages changed and was sore. Requested to get out of bed this afternoon. Minimal progress made. Continue POC. This section established at most recent assessment PROBLEM LIST (Impairments causing functional limitations): 1. Decreased Strength 2. Decreased ADL/Functional Activities 3. Decreased Transfer Abilities 4. Decreased Balance 5. Increased Pain 6. Increased Fatigue INTERVENTIONS PLANNED: (Benefits and precautions of occupational therapy have been discussed with the patient.) 1. Activities of daily living training 2. Balance training 3. Therapeutic activity 4. Therapeutic exercise TREATMENT PLAN: Frequency/Duration: Follow patient 3 times/ week to address above goals. Rehabilitation Potential For Stated Goals: Good REHAB RECOMMENDATIONS (at time of discharge pending progress):   
Placement: It is my opinion, based on this patient's performance to date, that Ms. Kauffman may benefit from [address] after discharge due to the functional deficits listed above that are likely to improve with skilled rehabilitation because he/she has multiple medical issues that affect his/her functional mobility in the community. Equipment:  
? 3:1 BSC, reacher, sock aide OCCUPATIONAL PROFILE AND HISTORY:  
History of Present Injury/Illness (Reason for Referral): 
See H&P Past Medical History/Comorbidities: Ms. Addie Cavazos  has a past medical history of Diabetes ([facility] [address] and Hypertension. Ms. Addie Cavazos  has a past surgical history that includes colonoscopy (N/A, 3/2/2019). Social History/Living Environment:  
Home Environment: Private residence # Steps to Enter: 4 One/Two Story Residence: One story Living Alone: Yes Support Systems: Family member(s), Friends \ neighbors Patient Expects to be Discharged to[de-identified] Private residence Current DME Used/Available at Home: Walker, rolling Tub or Shower Type: Tub/Shower combination Prior Level of Function/Work/Activity: 
Independent, lives alone Number of Personal Factors/Comorbidities that affect the Plan of Care: Expanded review of therapy/medical records (1-2):  MODERATE COMPLEXITY ASSESSMENT OF OCCUPATIONAL PERFORMANCE[de-identified]  
Activities of Daily Living:  
Basic ADLs (From Assessment) Complex ADLs (From Assessment) Feeding: Independent Oral Facial Hygiene/Grooming: Setup Bathing: Contact guard assistance Upper Body Dressing: Setup Lower Body Dressing: Minimum assistance Toileting: Contact guard assistance Instrumental ADL Meal Preparation: Minimum assistance Homemaking: Minimum assistance Grooming/Bathing/Dressing Activities of Daily Living Bed/Mat Mobility Rolling: Independent Supine to Sit: Independent Sit to Stand: Supervision Stand to Sit: Supervision Bed to Chair: Stand-by assistance Scooting: Independent Most Recent Physical Functioning:  
Gross Assessment: 
  
         
  
Posture: 
Posture (WDL): Within defined limits Balance: 
Sitting: Intact Standing: Impaired Standing - Static: Good Standing - Dynamic : Fair Bed Mobility: 
Rolling: Independent Supine to Sit: Independent Scooting: Independent Wheelchair Mobility: 
  
Transfers: 
Sit to Stand: Supervision Stand to Sit: Supervision Bed to Chair: Stand-by assistance Interventions: Verbal cues; Safety awareness training Duration: 15 Minutes Patient Vitals for the past 6 hrs: 
 BP BP Patient Position SpO2 Pulse 05/10/19 0707 132/74 At rest 98 % 71  
05/10/19 1044 124/68 At rest 98 % 71 Mental Status Neurologic State: Appropriate for age, Alert Orientation Level: Oriented X4 Cognition: Appropriate for age attention/concentration, Appropriate safety awareness, Decreased attention/concentration, Follows commands Perception: Appears intact Perseveration: No perseveration noted Safety/Judgement: Awareness of environment Physical Skills Involved: 
1. Balance 2. Strength 3. Activity Tolerance 4. Pain (acute) Cognitive Skills Affected (resulting in the inability to perform in a timely and safe manner): 1. none  Psychosocial Skills Affected: 1. Habits/Routines 2. Environmental Adaptation Number of elements that affect the Plan of Care: 3-5:  MODERATE COMPLEXITY CLINICAL DECISION MAKIN [facility] [address] [address] AM-PAC 6 Clicks Daily Activity Inpatient Short Form How much help from another person does the patient currently need. .. Total A Lot A Little None 1. Putting on and taking off regular lower body clothing? ? 1   ? 2   ? 3   ? 4  
2. Bathing (including washing, rinsing, drying)? ? 1   ? 2   ? 3   ? 4  
3. Toileting, which includes using toilet, bedpan or urinal?   ? 1   ? 2   ? 3   ? 4  
4. Putting on and taking off regular upper body clothing? ? 1   ? 2   ? 3   ? 4  
5. Taking care of personal grooming such as brushing teeth? ? 1   ? 2   ? 3   ? 4  
6. Eating meals? ? 1   ? 2   ? 3   ? 4  
© 2007, Trustees of [address] [place] [address] [address] [address], under license to [employer]. All rights reserved Score:  Initial: 21 Most Recent: X (Date: -- ) Interpretation of Tool:  Represents activities that are increasingly more difficult (i.e. Bed mobility, Transfers, Gait). Medical Necessity:    
· Patient demonstrates good ·  rehab potential due to higher previous functional level. Reason for Services/Other Comments: 
· Patient continues to require skilled intervention due to decreased ADLs and functional performance from baseline · . Use of outcome tool(s) and clinical judgement create a POC that gives a: MODERATE COMPLEXITY  
 
 
 
TREATMENT:  
(In addition to Assessment/Re-Assessment sessions the following treatments were rendered) Pre-treatment Symptoms/Complaints:   
Pain: Initial:  
Pain Intensity 1: 4 Pain Location 1: Foot  Post Session:  8  
  
  
Therapeutic Exercise: ( 15):  Exercises per grid below to improve mobility and strength.  Required min verbal cues to promote proper body posture and promote proper body mechanics.  Progressed range and repetitions as indicated. 
  
B UE's  Date: 
5/10/19 Date: 
  Date: 
   
Activity/Exercise Parameters Parameters Parameters Elbow flex/ex 10 reps      
Shoulder hor add/abd 10 reps      
Punches  10 reps      
Shoulder flex/ex 10 reps       
         
         
          
  
 
 
 
 
 
Braces/Orthotics/Lines/Etc:  
· IV 
· O2 Device: Room air Treatment/Session Assessment:   
· Response to Treatment:  increased pain in R foot · Interdisciplinary Collaboration:  
o Certified Occupational Therapy Assistant 
o Registered Nurse · After treatment position/precautions:  
o Supine in bed 
o Bed alarm/tab alert on 
o Bed/Chair-wheels locked 
o Call light within reach 
o RN notified 
o Side rails x 2  
· Compliance with Program/Exercises: Will assess as treatment progresses. · Recommendations/Intent for next treatment session: \"Next visit will focus on advancements to more challenging activities and reduction in assistance provided\". Total Treatment Duration: OT Patient Time In/Time Out Time In: 4818 Time Out: 4501 Ariadne Davey

## 2019-05-10 NOTE — PROGRESS NOTES
Interdisciplinary Rounds completed 05/10/19. Nursing, Case Management, Physician and PT present. Plan of care reviewed and updated. Surgery following. Pt [facility].

## 2019-05-10 NOTE — PROGRESS NOTES
Hourly rounds performed. All needs meet. Patient had an uneventful night. Slept through most of shift. Dressing on R foot remains clean, dry, intact. Bed low/locked. Call light within reach. Patient denies needs at this time.   Will continue to monitor and report to oncoming RN

## 2019-05-10 NOTE — PROGRESS NOTES
Problem: Mobility Impaired (Adult and Pediatric) Goal: *Acute Goals and Plan of Care (Insert Text) Description 1. Ms. Adam Arias will perform sit to stand and bed to chair independently in 7 days. 2.  Ms. Adam Arias will perform gait with rolling walker 150 ft independently in 7 days. 3.  Ms. Adam Arias will perform up and down 2 steps with rail independently in 7 days. Outcome: Progressing Towards Goal 
 
 
PHYSICAL THERAPY: Daily Note and AM 5/10/2019 INPATIENT: PT Visit Days : 1 Payor: BLUE CROSS / Plan: [employer] [place] / Product Type: PPO /   
  
NAME/AGE/GENDER: Catrina Carrera is a 60 y.o. female PRIMARY DIAGNOSIS: Cellulitis of right foot [L03.115] Cellulitis of right foot Cellulitis of right foot ICD-10: Treatment Diagnosis:  
 · Generalized Muscle Weakness (M62.81) · Difficulty in walking, Not elsewhere classified (R26.2) Precaution/Allergies: 
Pcn [penicillins] ASSESSMENT:  
Ms. Adam Arias presents in supine without complaints aside from expected soreness in right foot after recent dressing change. Agreeable to therapy session. Transfers to sitting independently, stood with supervision. Pt unable to don sock due to pain and states she feels post op shoe which has velcro on top will aggravate her wound. Wonder if surgery has a better option for allowing mobility while protecting wound. Ambulates for 15 ft in room with walker, CGA-SBA and verbal cues for posture, gait safety. Slid right foot on slipper sock during ambulation and states she has been doing this for the past two days. Once in chair, we reviewed seated LE exercises which pt performs with good participation. Catrina Carrera is limited by right foot wound pain and needs to find a better solution for right LE during mobility. Suggested searching for slippers with velcro top? 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   Ms. Daly is functioning below baseline and is appropriate for skilled PT to maximize her rehab potential.  
 
This section established at most recent assessment PROBLEM LIST (Impairments causing functional limitations): 1. Decreased Strength 2. Decreased Transfer Abilities 3. Decreased Ambulation Ability/Technique 4. Increased Pain 5. Decreased Activity Tolerance INTERVENTIONS PLANNED: (Benefits and precautions of physical therapy have been discussed with the patient.) 1. Bed Mobility 2. Gait Training 3. Therapeutic Activites 4. Transfer Training TREATMENT PLAN: Frequency/Duration: 4 times a week for duration of hospital stay Rehabilitation Potential For Stated Goals: Good REHAB RECOMMENDATIONS (at time of discharge pending progress):   
Placement: It is my opinion, based on this patient's performance to date, that Ms. Kauffman may benefit from either home with home health vs inpt rehab stay. Its too soon to tell what her needs may be. Per surgical note may be going to OR for debridement at some point. Equipment: ? To be determined. HISTORY:  
History of Present Injury/Illness (Reason for Referral): 
Patient is a 59 y/o female with hx diabetes and HTN was recently hospitalized for cellulitis of right foot. Symptoms began initially last month on the 23d. She was discharged home 5/2 on keflex and doxycycline. She had been doing well for a time but now she has noticed a blackened appearance to the dorsum of right foot. Also had two blisters on the medial aspect of foot that have ruptured. She has a WBC ct of 14.4. No fever but has had chills. Her right foot has diffuse cellulitis with a large black eschar on dorsum. Will admit for IV antibiotics and surgical evaluation Past Medical History/Comorbidities: Ms. German Mcdonough  has a past medical history of Diabetes ([address] and Hypertension. Ms. German Mcdonough  has a past surgical history that includes colonoscopy (N/A, 3/2/2019). Social History/Living Environment:  
Home Environment: Private residence # Steps to Enter: 4 One/Two Story Residence: One story Living Alone: Yes Support Systems: Family member(s), Friends \ neighbors Patient Expects to be Discharged to[de-identified] Private residence Current DME Used/Available at Home: Walker, rolling Tub or Shower Type: Tub/Shower combination Prior Level of Function/Work/Activity: 
Independent with rolling walker 
age Number of Personal Factors/Comorbidities that affect the Plan of Care: 1-2: MODERATE COMPLEXITY EXAMINATION:  
Most Recent Physical Functioning:  
Gross Assessment: 
  
         
  
Posture: 
  
Balance: 
Sitting: Intact Standing: Impaired Standing - Static: Good Standing - Dynamic : Fair Bed Mobility: 
Rolling: Independent Supine to Sit: Independent Scooting: Independent Wheelchair Mobility: 
  
Transfers: 
Sit to Stand: Supervision Stand to Sit: Supervision Bed to Chair: Stand-by assistance Interventions: Verbal cues; Safety awareness training Duration: 15 Minutes Gait: 
  
Base of Support: Narrowed; Shift to left Speed/Tamela: Pace decreased (<100 feet/min); Slow Step Length: Left shortened;Right shortened Distance (ft): 15 Feet (ft) Assistive Device: Walker, rolling Interventions: Verbal cues; Safety awareness training Body Structures Involved: 1. Muscles Body Functions Affected: 1. Movement Related Activities and Participation Affected: 1. Mobility Number of elements that affect the Plan of Care: 3: MODERATE COMPLEXITY CLINICAL PRESENTATION:  
Presentation: Evolving clinical presentation with changing clinical characteristics: MODERATE COMPLEXITY CLINICAL DECISION MAKING:  
[facility] 6 Clicks Basic Mobility Inpatient Short Form How much difficulty does the patient currently have. .. Unable A Lot A Little None 1. Turning over in bed (including adjusting bedclothes, sheets and blankets)?    ? 1   ? 2   ? 3   ? 4  
 2.  Sitting down on and standing up from a chair with arms ( e.g., wheelchair, bedside commode, etc.)   ? 1   ? 2   ? 3   ? 4  
3. Moving from lying on back to sitting on the side of the bed?   ? 1   ? 2   ? 3   ? 4 How much help from another person does the patient currently need. .. Total A Lot A Little None 4. Moving to and from a bed to a chair (including a wheelchair)? ? 1   ? 2   ? 3   ? 4  
5. Need to walk in hospital room? ? 1   ? 2   ? 3   ? 4  
6. Climbing 3-5 steps with a railing? ? 1   ? 2   ? 3   ? 4  
© 2007, Trustees of [facility] [employer], under license to [employer]. All rights reserved Score:  Initial: 18 Most Recent: X (Date: -- ) Interpretation of Tool:  Represents activities that are increasingly more difficult (i.e. Bed mobility, Transfers, Gait). Medical Necessity:    
· Patient is expected to demonstrate progress in functional technique ·  to decrease assistance required with mobility and gait. · . Reason for Services/Other Comments: 
· Patient continues to require present interventions due to patient's inability to function at baseline. · . Use of outcome tool(s) and clinical judgement create a POC that gives a: Questionable prediction of patient's progress: MODERATE COMPLEXITY  
  
 
 
 
TREATMENT:  
(In addition to Assessment/Re-Assessment sessions the following treatments were rendered) Pre-treatment Symptoms/Complaints:  \"I'm not trying to be difficult, I'll walk\". Pain: Initial:  
Pain Intensity 1: 3 Pain Location 1: Foot Pain Orientation 1: Right Pain Intervention(s) 1: Repositioned  Post Session:  0/10 comfortable with LEs elevated in chair Therapeutic Activity: (  15 Minutes ):  Therapeutic activities including Bed transfers, Chair transfers, Ambulation on level ground and standing functional activities and mobility to improve mobility, strength, balance and coordination. Required minimal Verbal cues; Safety awareness training to promote static and dynamic balance in standing and promote motor control of bilateral, lower extremity(s). Therapeutic Exercise: (9 Minutes):  Exercises per grid below to improve mobility and strength. Required minimal visual and verbal cues to promote proper body mechanics and exercise technique. Progressed range and repetitions as indicated. Date: 
5/10/19 Date: 
 Date: Activity/Exercise Parameters Parameters Parameters LAQ 15x AB Seated marching 15x AB Ankle pumps 15x AB Quad sets 10x AB Braces/Orthotics/Lines/Etc:  
· IV 
· O2 Device: Room air Treatment/Session Assessment:   
· Response to Treatment:  Tolerates without complications · Interdisciplinary Collaboration:  
o Physical Therapist 
o Registered Nurse 
o Physician 
o  · After treatment position/precautions:  
o Up in chair 
o Bed/Chair-wheels locked 
o Bed in low position 
o Call light within reach 
o RN notified · Compliance with Program/Exercises: Will assess as treatment progresses · Recommendations/Intent for next treatment session: \"Next visit will focus on advancements to more challenging activities and reduction in assistance provided\". Total Treatment Duration: PT Patient Time In/Time Out Time In: 1100 Time Out: 1124 Ava Ramos DPT

## 2019-05-11 LAB
ALBUMIN SERPL-MCNC: 1.7 G/DL (ref 3.2–4.6)
ALBUMIN/GLOB SERPL: 0.5 {RATIO} (ref 1.2–3.5)
ALP SERPL-CCNC: 373 U/L (ref 50–136)
ALT SERPL-CCNC: 19 U/L (ref 12–65)
ANION GAP SERPL CALC-SCNC: 6 MMOL/L (ref 7–16)
AST SERPL-CCNC: 30 U/L (ref 15–37)
BASOPHILS # BLD: 0 K/UL (ref 0–0.2)
BASOPHILS NFR BLD: 1 % (ref 0–2)
BILIRUB SERPL-MCNC: 1 MG/DL (ref 0.2–1.1)
BUN SERPL-MCNC: 15 MG/DL (ref 8–23)
CALCIUM SERPL-MCNC: 7.2 MG/DL (ref 8.3–10.4)
CHLORIDE SERPL-SCNC: 110 MMOL/L (ref 98–107)
CO2 SERPL-SCNC: 22 MMOL/L (ref 21–32)
CREAT SERPL-MCNC: 0.63 MG/DL (ref 0.6–1)
DIFFERENTIAL METHOD BLD: ABNORMAL
EOSINOPHIL # BLD: 0.1 K/UL (ref 0–0.8)
EOSINOPHIL NFR BLD: 2 % (ref 0.5–7.8)
ERYTHROCYTE [DISTWIDTH] IN BLOOD BY AUTOMATED COUNT: 17.2 % (ref 11.9–14.6)
GLOBULIN SER CALC-MCNC: 3.6 G/DL (ref 2.3–3.5)
GLUCOSE BLD STRIP.AUTO-MCNC: 145 MG/DL (ref 65–100)
GLUCOSE BLD STRIP.AUTO-MCNC: 166 MG/DL (ref 65–100)
GLUCOSE BLD STRIP.AUTO-MCNC: 172 MG/DL (ref 65–100)
GLUCOSE BLD STRIP.AUTO-MCNC: 183 MG/DL (ref 65–100)
GLUCOSE SERPL-MCNC: 128 MG/DL (ref 65–100)
HCT VFR BLD AUTO: 22.3 % (ref 35.8–46.3)
HGB BLD-MCNC: 7.2 G/DL (ref 11.7–15.4)
IMM GRANULOCYTES # BLD AUTO: 0 K/UL (ref 0–0.5)
IMM GRANULOCYTES NFR BLD AUTO: 0 % (ref 0–5)
LYMPHOCYTES # BLD: 0.6 K/UL (ref 0.5–4.6)
LYMPHOCYTES NFR BLD: 25 % (ref 13–44)
MCH RBC QN AUTO: 26.1 PG (ref 26.1–32.9)
MCHC RBC AUTO-ENTMCNC: 32.3 G/DL (ref 31.4–35)
MCV RBC AUTO: 80.8 FL (ref 79.6–97.8)
MONOCYTES # BLD: 0.3 K/UL (ref 0.1–1.3)
MONOCYTES NFR BLD: 11 % (ref 4–12)
NEUTS SEG # BLD: 1.5 K/UL (ref 1.7–8.2)
NEUTS SEG NFR BLD: 61 % (ref 43–78)
NRBC # BLD: 0 K/UL (ref 0–0.2)
PLATELET # BLD AUTO: 65 K/UL (ref 150–450)
PMV BLD AUTO: 9.8 FL (ref 9.4–12.3)
POTASSIUM SERPL-SCNC: 4 MMOL/L (ref 3.5–5.1)
PROT SERPL-MCNC: 5.3 G/DL (ref 6.3–8.2)
RBC # BLD AUTO: 2.76 M/UL (ref 4.05–5.2)
SODIUM SERPL-SCNC: 138 MMOL/L (ref 136–145)
VANCOMYCIN TROUGH SERPL-MCNC: 19.8 UG/ML (ref 5–20)
WBC # BLD AUTO: 2.5 K/UL (ref 4.3–11.1)

## 2019-05-11 PROCEDURE — 85025 COMPLETE CBC W/AUTO DIFF WBC: CPT

## 2019-05-11 PROCEDURE — 74011636637 HC RX REV CODE- 636/637: Performed by: HOSPITALIST

## 2019-05-11 PROCEDURE — 74011250636 HC RX REV CODE- 250/636: Performed by: HOSPITALIST

## 2019-05-11 PROCEDURE — 74011250636 HC RX REV CODE- 250/636: Performed by: FAMILY MEDICINE

## 2019-05-11 PROCEDURE — 74011250637 HC RX REV CODE- 250/637: Performed by: HOSPITALIST

## 2019-05-11 PROCEDURE — 80202 ASSAY OF VANCOMYCIN: CPT

## 2019-05-11 PROCEDURE — 74011250637 HC RX REV CODE- 250/637: Performed by: NURSE PRACTITIONER

## 2019-05-11 PROCEDURE — 82962 GLUCOSE BLOOD TEST: CPT

## 2019-05-11 PROCEDURE — 74011000258 HC RX REV CODE- 258: Performed by: HOSPITALIST

## 2019-05-11 PROCEDURE — 65270000029 HC RM PRIVATE

## 2019-05-11 PROCEDURE — 80053 COMPREHEN METABOLIC PANEL: CPT

## 2019-05-11 PROCEDURE — 77030020263 HC SOL INJ SOD CL0.9% LFCR 1000ML

## 2019-05-11 RX ORDER — FUROSEMIDE 20 MG/1
20 TABLET ORAL DAILY
Status: DISCONTINUED | OUTPATIENT
Start: 2019-05-12 | End: 2019-05-12

## 2019-05-11 RX ORDER — ONDANSETRON 2 MG/ML
4 INJECTION INTRAMUSCULAR; INTRAVENOUS
Status: DISCONTINUED | OUTPATIENT
Start: 2019-05-11 | End: 2019-05-23 | Stop reason: HOSPADM

## 2019-05-11 RX ADMIN — VANCOMYCIN HYDROCHLORIDE 1250 MG: 10 INJECTION, POWDER, LYOPHILIZED, FOR SOLUTION INTRAVENOUS at 02:28

## 2019-05-11 RX ADMIN — Medication 10 ML: at 05:11

## 2019-05-11 RX ADMIN — PANTOPRAZOLE SODIUM 40 MG: 40 TABLET, DELAYED RELEASE ORAL at 08:27

## 2019-05-11 RX ADMIN — ONDANSETRON 4 MG: 2 INJECTION INTRAMUSCULAR; INTRAVENOUS at 12:59

## 2019-05-11 RX ADMIN — SODIUM CHLORIDE 100 ML/HR: 900 INJECTION, SOLUTION INTRAVENOUS at 12:53

## 2019-05-11 RX ADMIN — LEVOTHYROXINE SODIUM 125 MCG: 75 TABLET ORAL at 05:11

## 2019-05-11 RX ADMIN — SODIUM CHLORIDE 100 ML/HR: 900 INJECTION, SOLUTION INTRAVENOUS at 02:29

## 2019-05-11 RX ADMIN — FERROUS SULFATE TAB 325 MG (65 MG ELEMENTAL FE) 325 MG: 325 (65 FE) TAB at 08:27

## 2019-05-11 RX ADMIN — ONDANSETRON 4 MG: 2 INJECTION INTRAMUSCULAR; INTRAVENOUS at 22:27

## 2019-05-11 RX ADMIN — Medication 10 ML: at 22:00

## 2019-05-11 RX ADMIN — INSULIN LISPRO 2 UNITS: 100 INJECTION, SOLUTION INTRAVENOUS; SUBCUTANEOUS at 16:46

## 2019-05-11 RX ADMIN — SERTRALINE HYDROCHLORIDE 100 MG: 100 TABLET ORAL at 08:27

## 2019-05-11 RX ADMIN — INSULIN LISPRO 2 UNITS: 100 INJECTION, SOLUTION INTRAVENOUS; SUBCUTANEOUS at 21:02

## 2019-05-11 RX ADMIN — OXYCODONE HYDROCHLORIDE AND ACETAMINOPHEN 1 TABLET: 7.5; 325 TABLET ORAL at 08:26

## 2019-05-11 RX ADMIN — Medication 10 ML: at 21:29

## 2019-05-11 RX ADMIN — ENOXAPARIN SODIUM 40 MG: 40 INJECTION SUBCUTANEOUS at 22:33

## 2019-05-11 RX ADMIN — SODIUM CHLORIDE 1 G: 900 INJECTION, SOLUTION INTRAVENOUS at 08:26

## 2019-05-11 RX ADMIN — INSULIN LISPRO 2 UNITS: 100 INJECTION, SOLUTION INTRAVENOUS; SUBCUTANEOUS at 08:26

## 2019-05-11 RX ADMIN — Medication 10 ML: at 14:06

## 2019-05-11 RX ADMIN — SODIUM CHLORIDE 1 G: 900 INJECTION, SOLUTION INTRAVENOUS at 20:47

## 2019-05-11 RX ADMIN — VANCOMYCIN HYDROCHLORIDE 1250 MG: 10 INJECTION, POWDER, LYOPHILIZED, FOR SOLUTION INTRAVENOUS at 14:04

## 2019-05-11 RX ADMIN — GABAPENTIN 300 MG: 300 CAPSULE ORAL at 22:00

## 2019-05-11 RX ADMIN — ONDANSETRON 4 MG: 2 INJECTION INTRAMUSCULAR; INTRAVENOUS at 05:11

## 2019-05-11 RX ADMIN — ATORVASTATIN CALCIUM 10 MG: 10 TABLET, FILM COATED ORAL at 08:27

## 2019-05-11 NOTE — PROGRESS NOTES
Hospitalist Progress Note Subjective:  
Daily Progress Note: 5/10/2019 1545 Patient admitted for right foot cellulitis 4/24-5/2, discharged on keflex and doxy. Returned to ER 5/7 with increased edema, erythema and necrosis of the wound. 5/10:  Bedside debridement on 5/8, packed per surgery, reported heavy purulent drainage. Wound improving pr Surgery. Tentative plans for surgical debridement Monday May 13. Patient reports less pain. Current Facility-Administered Medications Medication Dose Route Frequency  [START ON 5/11/2019] Vancomycin Trough Reminder   Other ONCE  
 [START ON 5/11/2019] sertraline (ZOLOFT) tablet 100 mg  100 mg Oral DAILY  vancomycin (VANCOCIN) 1250 mg in  ml infusion  1,250 mg IntraVENous Q12H  
 loperamide (IMODIUM) capsule 2 mg  2 mg Oral Q8H PRN  
 0.9% sodium chloride infusion 250 mL  250 mL IntraVENous PRN  
 sodium chloride (NS) flush 10 mL  10 mL InterCATHeter Q8H  
 sodium chloride (NS) flush 10 mL  10 mL InterCATHeter PRN  
 atorvastatin (LIPITOR) tablet 10 mg  10 mg Oral DAILY  ferrous sulfate tablet 325 mg  325 mg Oral DAILY  gabapentin (NEURONTIN) capsule 300 mg  300 mg Oral QHS  levothyroxine (SYNTHROID) tablet 125 mcg  125 mcg Oral ACB  pantoprazole (PROTONIX) tablet 40 mg  40 mg Oral DAILY  dextrose 40% (GLUTOSE) oral gel 1 Tube  15 g Oral PRN  
 glucagon (GLUCAGEN) injection 1 mg  1 mg IntraMUSCular PRN  
 dextrose (D50) infusion 12.5-25 g  25-50 mL IntraVENous PRN  
 insulin lispro (HUMALOG) injection 0-10 Units  0-10 Units SubCUTAneous AC&HS  sodium chloride (NS) flush 5-40 mL  5-40 mL IntraVENous Q8H  
 sodium chloride (NS) flush 5-40 mL  5-40 mL IntraVENous PRN  
 0.9% sodium chloride infusion  100 mL/hr IntraVENous CONTINUOUS  
 acetaminophen (TYLENOL) tablet 650 mg  650 mg Oral Q4H PRN  
 oxyCODONE-acetaminophen (PERCOCET 7.5) 7.5-325 mg per tablet 1 Tab  1 Tab Oral Q4H PRN  
  naloxone (NARCAN) injection 0.4 mg  0.4 mg IntraVENous PRN  
 enoxaparin (LOVENOX) injection 40 mg  40 mg SubCUTAneous Q24H  cefepime (MAXIPIME) 1 g in 0.9% sodium chloride (MBP/ADV) 50 mL ADV  1 g IntraVENous Q12H Review of Systems A comprehensive review of systems was negative except for that written in the HPI. Objective:  
 
Visit Vitals /76 (BP 1 Location: Right arm, BP Patient Position: At rest) Pulse 71 Temp 97.9 °F (36.6 °C) Resp 18 Ht 5' 7\" (1.702 m) Wt 94.9 kg (209 lb 3.2 oz) SpO2 95% BMI 32.77 kg/m² O2 Device: Room air Temp (24hrs), Av.2 °F (36.8 °C), Min:97.9 °F (36.6 °C), Max:98.5 °F (36.9 °C) 
 
 07 - 05/10 1900 In: 2844 [P.O.:2635; I.V.:934] Out: 3493 [AOXBV:9965] General appearance: Oriented and alert, cooperative, Pain controlled and improving. Obese. Head: Normocephalic, without obvious abnormality, atraumatic Neck: supple, symmetrical, trachea midline, and no JVD Lungs: clear to auscultation bilaterally Heart: regular rate and rhythm, S1, S2 normal, no murmur, click, rub or gallop Abdomen: soft, non-tender. Bowel sounds normal. No masses,  no organomegaly Extremities: Dry, intact wrap dressing to right ankle and foot. All other extremities normal, atraumatic, no cyanosis or edema Skin: Skin color, texture, turgor normal. No rashes or lesions Neurologic: Grossly normal 
  
Additional comments: Notes,orders, test results, vitals reviewed Data Review Recent Results (from the past 24 hour(s)) METABOLIC PANEL, COMPREHENSIVE Collection Time: 05/10/19  4:00 AM  
Result Value Ref Range Sodium 138 136 - 145 mmol/L Potassium 4.0 3.5 - 5.1 mmol/L Chloride 111 (H) 98 - 107 mmol/L  
 CO2 20 (L) 21 - 32 mmol/L Anion gap 7 7 - 16 mmol/L Glucose 130 (H) 65 - 100 mg/dL BUN 17 8 - 23 MG/DL Creatinine 0.63 0.6 - 1.0 MG/DL  
 GFR est AA >60 >60 ml/min/1.73m2 GFR est non-AA >60 >60 ml/min/1.73m2 Calcium 6.7 (L) 8.3 - 10.4 MG/DL Bilirubin, total 1.0 0.2 - 1.1 MG/DL  
 ALT (SGPT) 17 12 - 65 U/L  
 AST (SGOT) 25 15 - 37 U/L Alk. phosphatase 332 (H) 50 - 136 U/L Protein, total 4.9 (L) 6.3 - 8.2 g/dL Albumin 1.6 (L) 3.2 - 4.6 g/dL Globulin 3.3 2.3 - 3.5 g/dL A-G Ratio 0.5 (L) 1.2 - 3.5    
CBC WITH AUTOMATED DIFF Collection Time: 05/10/19  4:00 AM  
Result Value Ref Range WBC 2.8 (L) 4.3 - 11.1 K/uL  
 RBC 2.70 (L) 4.05 - 5.2 M/uL HGB 7.2 (L) 11.7 - 15.4 g/dL HCT 22.0 (L) 35.8 - 46.3 % MCV 81.5 79.6 - 97.8 FL  
 MCH 26.7 26.1 - 32.9 PG  
 MCHC 32.7 31.4 - 35.0 g/dL  
 RDW 17.2 (H) 11.9 - 14.6 % PLATELET 68 (L) 681 - 450 K/uL MPV 10.0 9.4 - 12.3 FL ABSOLUTE NRBC 0.00 0.0 - 0.2 K/uL  
 DF AUTOMATED NEUTROPHILS 61 43 - 78 % LYMPHOCYTES 26 13 - 44 % MONOCYTES 10 4.0 - 12.0 % EOSINOPHILS 2 0.5 - 7.8 % BASOPHILS 1 0.0 - 2.0 % IMMATURE GRANULOCYTES 0 0.0 - 5.0 %  
 ABS. NEUTROPHILS 1.7 1.7 - 8.2 K/UL  
 ABS. LYMPHOCYTES 0.7 0.5 - 4.6 K/UL  
 ABS. MONOCYTES 0.3 0.1 - 1.3 K/UL  
 ABS. EOSINOPHILS 0.1 0.0 - 0.8 K/UL  
 ABS. BASOPHILS 0.0 0.0 - 0.2 K/UL  
 ABS. IMM. GRANS. 0.0 0.0 - 0.5 K/UL MAGNESIUM Collection Time: 05/10/19  4:00 AM  
Result Value Ref Range Magnesium 1.6 (L) 1.8 - 2.4 mg/dL C REACTIVE PROTEIN, QT Collection Time: 05/10/19  4:00 AM  
Result Value Ref Range C-Reactive protein 3.1 (H) 0.0 - 0.9 mg/dL SED RATE, AUTOMATED Collection Time: 05/10/19  4:00 AM  
Result Value Ref Range Sed rate, automated 44 (H) 0 - 30 mm/hr GLUCOSE, POC Collection Time: 05/10/19  7:06 AM  
Result Value Ref Range Glucose (POC) 123 (H) 65 - 100 mg/dL GLUCOSE, POC Collection Time: 05/10/19 10:43 AM  
Result Value Ref Range Glucose (POC) 192 (H) 65 - 100 mg/dL GLUCOSE, POC Collection Time: 05/10/19  3:58 PM  
Result Value Ref Range Glucose (POC) 176 (H) 65 - 100 mg/dL 4/29:  CT RIGHT FOOT:  Generalized predominantly dorsal subcutaneous edema over the midfoot with 
skin blistering noted. No discrete measurable fluid collection or abscess appreciated. No destructive bone changes noted to indicate osteomyelitis No intra-articular joint derangement or fracture identified. 5/8:  RIGHT FOOT XRAY: Soft tissue swelling and degenerative change. Assessment/Plan:  
Cellulitis of right foot Surgery on board Bedside I+D 5/8 May need additional surgery Monday May 13 Continue maxipime and vancomycin Hyperlipidemia:  Home meds Hypertension:  Continue home meds Hypothyroidism:  Home meds IDDM II:  A1C: 5.9 Pancytopenia Transfuse prn 
            Monitor Cirrhosis of liver without ascites Monitor Care Plan discussed with: Patient/Family and Nurse Signed By: Mary Beth Gutierrez NP May 10, 2019

## 2019-05-11 NOTE — PROGRESS NOTES
Hospitalist Progress Note Subjective:  
Daily Progress Note: 5/11/2019 2:00 PM 
 
Patient admitted for right foot cellulitis 4/24-5/2, discharged on keflex and doxy.  Returned to ER 5/7 with increased edema, erythema and necrosis of the wound.   
 
5/11:  Reports edema of abdomen and lower extremities, +1. No orthopnea, no sob. Anxious. Foot drainage about the same per RNs. Current Facility-Administered Medications Medication Dose Route Frequency  ondansetron (ZOFRAN) injection 4 mg  4 mg IntraVENous Q6H PRN  Vancomycin Trough Reminder   Other ONCE  
 sertraline (ZOLOFT) tablet 100 mg  100 mg Oral DAILY  vancomycin (VANCOCIN) 1250 mg in  ml infusion  1,250 mg IntraVENous Q12H  
 loperamide (IMODIUM) capsule 2 mg  2 mg Oral Q8H PRN  
 0.9% sodium chloride infusion 250 mL  250 mL IntraVENous PRN  
 sodium chloride (NS) flush 10 mL  10 mL InterCATHeter Q8H  
 sodium chloride (NS) flush 10 mL  10 mL InterCATHeter PRN  
 atorvastatin (LIPITOR) tablet 10 mg  10 mg Oral DAILY  ferrous sulfate tablet 325 mg  325 mg Oral DAILY  gabapentin (NEURONTIN) capsule 300 mg  300 mg Oral QHS  levothyroxine (SYNTHROID) tablet 125 mcg  125 mcg Oral ACB  pantoprazole (PROTONIX) tablet 40 mg  40 mg Oral DAILY  dextrose 40% (GLUTOSE) oral gel 1 Tube  15 g Oral PRN  
 glucagon (GLUCAGEN) injection 1 mg  1 mg IntraMUSCular PRN  
 dextrose (D50) infusion 12.5-25 g  25-50 mL IntraVENous PRN  
 insulin lispro (HUMALOG) injection 0-10 Units  0-10 Units SubCUTAneous AC&HS  sodium chloride (NS) flush 5-40 mL  5-40 mL IntraVENous Q8H  
 sodium chloride (NS) flush 5-40 mL  5-40 mL IntraVENous PRN  
 0.9% sodium chloride infusion  100 mL/hr IntraVENous CONTINUOUS  
 acetaminophen (TYLENOL) tablet 650 mg  650 mg Oral Q4H PRN  
 oxyCODONE-acetaminophen (PERCOCET 7.5) 7.5-325 mg per tablet 1 Tab  1 Tab Oral Q4H PRN  
 naloxone (NARCAN) injection 0.4 mg  0.4 mg IntraVENous PRN  
  enoxaparin (LOVENOX) injection 40 mg  40 mg SubCUTAneous Q24H  cefepime (MAXIPIME) 1 g in 0.9% sodium chloride (MBP/ADV) 50 mL ADV  1 g IntraVENous Q12H Review of Systems A comprehensive review of systems was negative except for that written in the HPI. Objective:  
 
Visit Vitals /57 (BP 1 Location: Right arm, BP Patient Position: Lying left side) Pulse 68 Temp 98.4 °F (36.9 °C) Resp 20 Ht 5' 7\" (1.702 m) Wt 97.2 kg (214 lb 4.8 oz) SpO2 97% BMI 33.56 kg/m² O2 Device: Room air Temp (24hrs), Av °F (36.7 °C), Min:97.8 °F (36.6 °C), Max:98.4 °F (36.9 °C) 
 
 07 -  1900 In: 240 [P.O.:240] Out: 600 [Urine:600]  190 -  0700 In: 16724 [P.O.:3115; I.V.:7208] Out: 951 [Urine:951] General appearance: Oriented and alert, cooperative, Pain controlled and improving.  Obese. Head: Normocephalic, without obvious abnormality, atraumatic Neck: supple, symmetrical, trachea midline, and no JVD Lungs: clear to auscultation bilaterally Heart: regular rate and rhythm, S1, S2 normal, no murmur, click, rub or gallop Abdomen: soft, non-tender. Bowel sounds normal. No masses,  no organomegaly Extremities: Dry, intact wrap dressing to right ankle and foot.  All other extremities normal, atraumatic, no cyanosis or edema Skin: Skin color, texture, turgor normal. No rashes or lesions Neurologic: Grossly normal 
  
Data Review Recent Results (from the past 24 hour(s)) GLUCOSE, POC Collection Time: 05/10/19  3:58 PM  
Result Value Ref Range Glucose (POC) 176 (H) 65 - 100 mg/dL GLUCOSE, POC Collection Time: 05/10/19 11:04 PM  
Result Value Ref Range Glucose (POC) 148 (H) 65 - 100 mg/dL METABOLIC PANEL, COMPREHENSIVE Collection Time: 19  4:22 AM  
Result Value Ref Range Sodium 138 136 - 145 mmol/L Potassium 4.0 3.5 - 5.1 mmol/L Chloride 110 (H) 98 - 107 mmol/L  
 CO2 22 21 - 32 mmol/L  Anion gap 6 (L) 7 - 16 mmol/L  
 Glucose 128 (H) 65 - 100 mg/dL BUN 15 8 - 23 MG/DL Creatinine 0.63 0.6 - 1.0 MG/DL  
 GFR est AA >60 >60 ml/min/1.73m2 GFR est non-AA >60 >60 ml/min/1.73m2 Calcium 7.2 (L) 8.3 - 10.4 MG/DL Bilirubin, total 1.0 0.2 - 1.1 MG/DL  
 ALT (SGPT) 19 12 - 65 U/L  
 AST (SGOT) 30 15 - 37 U/L Alk. phosphatase 373 (H) 50 - 136 U/L Protein, total 5.3 (L) 6.3 - 8.2 g/dL Albumin 1.7 (L) 3.2 - 4.6 g/dL Globulin 3.6 (H) 2.3 - 3.5 g/dL A-G Ratio 0.5 (L) 1.2 - 3.5    
CBC WITH AUTOMATED DIFF Collection Time: 05/11/19  4:22 AM  
Result Value Ref Range WBC 2.5 (L) 4.3 - 11.1 K/uL  
 RBC 2.76 (L) 4.05 - 5.2 M/uL HGB 7.2 (L) 11.7 - 15.4 g/dL HCT 22.3 (L) 35.8 - 46.3 % MCV 80.8 79.6 - 97.8 FL  
 MCH 26.1 26.1 - 32.9 PG  
 MCHC 32.3 31.4 - 35.0 g/dL  
 RDW 17.2 (H) 11.9 - 14.6 % PLATELET 65 (L) 734 - 450 K/uL MPV 9.8 9.4 - 12.3 FL ABSOLUTE NRBC 0.00 0.0 - 0.2 K/uL  
 DF AUTOMATED NEUTROPHILS 61 43 - 78 % LYMPHOCYTES 25 13 - 44 % MONOCYTES 11 4.0 - 12.0 % EOSINOPHILS 2 0.5 - 7.8 % BASOPHILS 1 0.0 - 2.0 % IMMATURE GRANULOCYTES 0 0.0 - 5.0 %  
 ABS. NEUTROPHILS 1.5 (L) 1.7 - 8.2 K/UL  
 ABS. LYMPHOCYTES 0.6 0.5 - 4.6 K/UL  
 ABS. MONOCYTES 0.3 0.1 - 1.3 K/UL  
 ABS. EOSINOPHILS 0.1 0.0 - 0.8 K/UL  
 ABS. BASOPHILS 0.0 0.0 - 0.2 K/UL  
 ABS. IMM. GRANS. 0.0 0.0 - 0.5 K/UL GLUCOSE, POC Collection Time: 05/11/19  6:57 AM  
Result Value Ref Range Glucose (POC) 172 (H) 65 - 100 mg/dL GLUCOSE, POC Collection Time: 05/11/19 10:56 AM  
Result Value Ref Range Glucose (POC) 145 (H) 65 - 100 mg/dL Zohra Donna Collection Time: 05/11/19 12:42 PM  
Result Value Ref Range Vancomycin,trough 19.8 5 - 20 ug/mL 4/29:  CT RIGHT FOOT:  Generalized predominantly dorsal subcutaneous edema over the midfoot with skin blistering noted.  No discrete measurable fluid collection or abscess appreciated.  No destructive bone changes noted to indicate osteomyelitis No intra-articular joint derangement or fracture identified. 
  
5/8:  RIGHT FOOT XRAY: Soft tissue swelling and degenerative change. Assessment/Plan:  
Cellulitis of right foot 
            Surgery on board 
            Bedside I+D 5/8 
            May need additional surgery Monday May 13 
            Continue maxipime and vancomycin Hyperlipidemia:  Home meds Hypertension:  Continue home meds Hypothyroidism:  Home meds IDDM II:  A1C: 5.9 Pancytopenia Transfuse prn 
            Monitor Cirrhosis of liver without ascites  
         Monitor  
  
Care Plan discussed with: Patient Signed By: Paula Love NP May 11, 2019

## 2019-05-11 NOTE — PROGRESS NOTES
Hourly rounds performed. All needs meet. Patient had uneventful night. 1 BM this shift and moderate UOP. Dressing of RLE changed this shift using gauze, ABD pad, and kerlex. Wound packing left intact as instructed by surgery. Patient naseaous this am.  4 mg IV Zofran given per MAR. Bed low/locked. Call light within reach. Patient denies needs at this time.   Will continue to monitor and report to oncoming RN

## 2019-05-11 NOTE — PROGRESS NOTES
Foot dressing changed. Soiled dressing presented with yellow colored drainage with no odor. Wound bed is opaque on all wound sites. Pt tolerated well.

## 2019-05-11 NOTE — PROGRESS NOTES
Problem: Falls - Risk of 
Goal: *Absence of Falls Description Document Clide Failing Fall Risk and appropriate interventions in the flowsheet. 5/11/2019 0336 by Jennifer Hook RN Outcome: Progressing Towards Goal 
5/11/2019 0335 by Jennifer Hook RN Outcome: Progressing Towards Goal 
  
Problem: Patient Education: Go to Patient Education Activity Goal: Patient/Family Education 5/11/2019 0336 by Jennifer Hook RN Outcome: Progressing Towards Goal 
5/11/2019 0335 by Jennifer Hook RN Outcome: Progressing Towards Goal 
  
Problem: Pressure Injury - Risk of 
Goal: *Prevention of pressure injury Description Document Shane Scale and appropriate interventions in the flowsheet. 5/11/2019 0336 by Jennifer Hook RN Outcome: Progressing Towards Goal 
5/11/2019 0335 by Jennifer Hook RN Outcome: Progressing Towards Goal 
  
Problem: Patient Education: Go to Patient Education Activity Goal: Patient/Family Education 5/11/2019 0336 by Jennifer Hoko RN Outcome: Progressing Towards Goal 
5/11/2019 0335 by Jennifer Hook RN Outcome: Progressing Towards Goal 
  
Problem: General Infection Care Plan (Adult and Pediatric) Goal: Improvement in signs and symptoms of infection Outcome: Progressing Towards Goal 
Goal: *Optimize nutritional status Outcome: Progressing Towards Goal 
  
Problem: Patient Education: Go to Patient Education Activity Goal: Patient/Family Education Outcome: Progressing Towards Goal

## 2019-05-12 ENCOUNTER — ANESTHESIA EVENT (OUTPATIENT)
Dept: SURGERY | Age: 64
DRG: 982 | End: 2019-05-12
Payer: COMMERCIAL

## 2019-05-12 LAB
ALBUMIN SERPL-MCNC: 1.7 G/DL (ref 3.2–4.6)
ALBUMIN/GLOB SERPL: 0.5 {RATIO} (ref 1.2–3.5)
ALP SERPL-CCNC: 373 U/L (ref 50–136)
ALT SERPL-CCNC: 14 U/L (ref 12–65)
ANION GAP SERPL CALC-SCNC: 9 MMOL/L (ref 7–16)
AST SERPL-CCNC: 28 U/L (ref 15–37)
BACTERIA SPEC CULT: NORMAL
BASOPHILS # BLD: 0 K/UL (ref 0–0.2)
BASOPHILS NFR BLD: 1 % (ref 0–2)
BILIRUB SERPL-MCNC: 0.8 MG/DL (ref 0.2–1.1)
BUN SERPL-MCNC: 15 MG/DL (ref 8–23)
CALCIUM SERPL-MCNC: 6.7 MG/DL (ref 8.3–10.4)
CHLORIDE SERPL-SCNC: 112 MMOL/L (ref 98–107)
CO2 SERPL-SCNC: 20 MMOL/L (ref 21–32)
CREAT SERPL-MCNC: 0.56 MG/DL (ref 0.6–1)
DIFFERENTIAL METHOD BLD: ABNORMAL
EOSINOPHIL # BLD: 0.1 K/UL (ref 0–0.8)
EOSINOPHIL NFR BLD: 2 % (ref 0.5–7.8)
ERYTHROCYTE [DISTWIDTH] IN BLOOD BY AUTOMATED COUNT: 17.6 % (ref 11.9–14.6)
GLOBULIN SER CALC-MCNC: 3.4 G/DL (ref 2.3–3.5)
GLUCOSE BLD STRIP.AUTO-MCNC: 167 MG/DL (ref 65–100)
GLUCOSE BLD STRIP.AUTO-MCNC: 180 MG/DL (ref 65–100)
GLUCOSE BLD STRIP.AUTO-MCNC: 200 MG/DL (ref 65–100)
GLUCOSE BLD STRIP.AUTO-MCNC: 234 MG/DL (ref 65–100)
GLUCOSE SERPL-MCNC: 157 MG/DL (ref 65–100)
HCT VFR BLD AUTO: 22.2 % (ref 35.8–46.3)
HGB BLD-MCNC: 7.3 G/DL (ref 11.7–15.4)
IMM GRANULOCYTES # BLD AUTO: 0 K/UL (ref 0–0.5)
IMM GRANULOCYTES NFR BLD AUTO: 0 % (ref 0–5)
LYMPHOCYTES # BLD: 0.6 K/UL (ref 0.5–4.6)
LYMPHOCYTES NFR BLD: 22 % (ref 13–44)
MCH RBC QN AUTO: 26.8 PG (ref 26.1–32.9)
MCHC RBC AUTO-ENTMCNC: 32.9 G/DL (ref 31.4–35)
MCV RBC AUTO: 81.6 FL (ref 79.6–97.8)
MONOCYTES # BLD: 0.3 K/UL (ref 0.1–1.3)
MONOCYTES NFR BLD: 9 % (ref 4–12)
NEUTS SEG # BLD: 2 K/UL (ref 1.7–8.2)
NEUTS SEG NFR BLD: 66 % (ref 43–78)
NRBC # BLD: 0 K/UL (ref 0–0.2)
PLATELET # BLD AUTO: 69 K/UL (ref 150–450)
PMV BLD AUTO: 9.8 FL (ref 9.4–12.3)
POTASSIUM SERPL-SCNC: 3.6 MMOL/L (ref 3.5–5.1)
PROT SERPL-MCNC: 5.1 G/DL (ref 6.3–8.2)
RBC # BLD AUTO: 2.72 M/UL (ref 4.05–5.2)
SERVICE CMNT-IMP: NORMAL
SODIUM SERPL-SCNC: 141 MMOL/L (ref 136–145)
WBC # BLD AUTO: 3 K/UL (ref 4.3–11.1)

## 2019-05-12 PROCEDURE — 82962 GLUCOSE BLOOD TEST: CPT

## 2019-05-12 PROCEDURE — 65270000029 HC RM PRIVATE

## 2019-05-12 PROCEDURE — P9047 ALBUMIN (HUMAN), 25%, 50ML: HCPCS | Performed by: HOSPITALIST

## 2019-05-12 PROCEDURE — 74011250636 HC RX REV CODE- 250/636: Performed by: FAMILY MEDICINE

## 2019-05-12 PROCEDURE — 74011250636 HC RX REV CODE- 250/636: Performed by: HOSPITALIST

## 2019-05-12 PROCEDURE — 85025 COMPLETE CBC W/AUTO DIFF WBC: CPT

## 2019-05-12 PROCEDURE — 77030020263 HC SOL INJ SOD CL0.9% LFCR 1000ML

## 2019-05-12 PROCEDURE — 74011250637 HC RX REV CODE- 250/637: Performed by: HOSPITALIST

## 2019-05-12 PROCEDURE — 74011250636 HC RX REV CODE- 250/636: Performed by: NURSE PRACTITIONER

## 2019-05-12 PROCEDURE — 80053 COMPREHEN METABOLIC PANEL: CPT

## 2019-05-12 PROCEDURE — 74011000258 HC RX REV CODE- 258: Performed by: HOSPITALIST

## 2019-05-12 PROCEDURE — 74011250637 HC RX REV CODE- 250/637: Performed by: NURSE PRACTITIONER

## 2019-05-12 PROCEDURE — 74011636637 HC RX REV CODE- 636/637: Performed by: HOSPITALIST

## 2019-05-12 RX ORDER — FUROSEMIDE 10 MG/ML
40 INJECTION INTRAMUSCULAR; INTRAVENOUS EVERY 12 HOURS
Status: DISPENSED | OUTPATIENT
Start: 2019-05-12 | End: 2019-05-15

## 2019-05-12 RX ORDER — LANOLIN ALCOHOL/MO/W.PET/CERES
400 CREAM (GRAM) TOPICAL 2 TIMES DAILY
Status: COMPLETED | OUTPATIENT
Start: 2019-05-12 | End: 2019-05-19

## 2019-05-12 RX ORDER — ALBUMIN HUMAN 250 G/1000ML
50 SOLUTION INTRAVENOUS ONCE
Status: COMPLETED | OUTPATIENT
Start: 2019-05-12 | End: 2019-05-12

## 2019-05-12 RX ORDER — POTASSIUM CHLORIDE 14.9 MG/ML
20 INJECTION INTRAVENOUS ONCE
Status: DISCONTINUED | OUTPATIENT
Start: 2019-05-12 | End: 2019-05-12

## 2019-05-12 RX ORDER — FUROSEMIDE 10 MG/ML
20 INJECTION INTRAMUSCULAR; INTRAVENOUS ONCE
Status: COMPLETED | OUTPATIENT
Start: 2019-05-12 | End: 2019-05-12

## 2019-05-12 RX ORDER — SPIRONOLACTONE 25 MG/1
25 TABLET ORAL DAILY
Status: DISCONTINUED | OUTPATIENT
Start: 2019-05-12 | End: 2019-05-14

## 2019-05-12 RX ADMIN — FUROSEMIDE 40 MG: 10 INJECTION, SOLUTION INTRAMUSCULAR; INTRAVENOUS at 21:24

## 2019-05-12 RX ADMIN — SODIUM CHLORIDE 1 G: 900 INJECTION, SOLUTION INTRAVENOUS at 21:21

## 2019-05-12 RX ADMIN — INSULIN LISPRO 2 UNITS: 100 INJECTION, SOLUTION INTRAVENOUS; SUBCUTANEOUS at 12:12

## 2019-05-12 RX ADMIN — Medication 10 ML: at 22:00

## 2019-05-12 RX ADMIN — FERROUS SULFATE TAB 325 MG (65 MG ELEMENTAL FE) 325 MG: 325 (65 FE) TAB at 09:01

## 2019-05-12 RX ADMIN — Medication 10 ML: at 21:35

## 2019-05-12 RX ADMIN — GABAPENTIN 300 MG: 300 CAPSULE ORAL at 21:21

## 2019-05-12 RX ADMIN — PANTOPRAZOLE SODIUM 40 MG: 40 TABLET, DELAYED RELEASE ORAL at 09:01

## 2019-05-12 RX ADMIN — FUROSEMIDE 20 MG: 10 INJECTION, SOLUTION INTRAMUSCULAR; INTRAVENOUS at 17:32

## 2019-05-12 RX ADMIN — ONDANSETRON 4 MG: 2 INJECTION INTRAMUSCULAR; INTRAVENOUS at 14:19

## 2019-05-12 RX ADMIN — VANCOMYCIN HYDROCHLORIDE 1250 MG: 10 INJECTION, POWDER, LYOPHILIZED, FOR SOLUTION INTRAVENOUS at 16:46

## 2019-05-12 RX ADMIN — PROMETHAZINE HYDROCHLORIDE 6.25 MG: 25 INJECTION INTRAMUSCULAR; INTRAVENOUS at 17:32

## 2019-05-12 RX ADMIN — Medication 10 ML: at 14:20

## 2019-05-12 RX ADMIN — ALBUMIN (HUMAN) 50 G: 0.25 INJECTION, SOLUTION INTRAVENOUS at 21:13

## 2019-05-12 RX ADMIN — Medication 10 ML: at 05:04

## 2019-05-12 RX ADMIN — SERTRALINE HYDROCHLORIDE 100 MG: 100 TABLET ORAL at 09:01

## 2019-05-12 RX ADMIN — INSULIN LISPRO 2 UNITS: 100 INJECTION, SOLUTION INTRAVENOUS; SUBCUTANEOUS at 09:01

## 2019-05-12 RX ADMIN — ONDANSETRON 4 MG: 2 INJECTION INTRAMUSCULAR; INTRAVENOUS at 09:00

## 2019-05-12 RX ADMIN — INSULIN LISPRO 4 UNITS: 100 INJECTION, SOLUTION INTRAVENOUS; SUBCUTANEOUS at 21:14

## 2019-05-12 RX ADMIN — FUROSEMIDE 20 MG: 20 TABLET ORAL at 09:01

## 2019-05-12 RX ADMIN — INSULIN LISPRO 4 UNITS: 100 INJECTION, SOLUTION INTRAVENOUS; SUBCUTANEOUS at 16:30

## 2019-05-12 RX ADMIN — SODIUM CHLORIDE 1 G: 900 INJECTION, SOLUTION INTRAVENOUS at 09:00

## 2019-05-12 RX ADMIN — ATORVASTATIN CALCIUM 10 MG: 10 TABLET, FILM COATED ORAL at 09:01

## 2019-05-12 RX ADMIN — SPIRONOLACTONE 25 MG: 25 TABLET, FILM COATED ORAL at 21:18

## 2019-05-12 RX ADMIN — LEVOTHYROXINE SODIUM 125 MCG: 75 TABLET ORAL at 05:03

## 2019-05-12 RX ADMIN — VANCOMYCIN HYDROCHLORIDE 1250 MG: 10 INJECTION, POWDER, LYOPHILIZED, FOR SOLUTION INTRAVENOUS at 04:36

## 2019-05-12 RX ADMIN — MAGNESIUM OXIDE TAB 400 MG (241.3 MG ELEMENTAL MG) 400 MG: 400 (241.3 MG) TAB at 17:50

## 2019-05-12 RX ADMIN — POTASSIUM CHLORIDE 20 MEQ: 200 INJECTION, SOLUTION INTRAVENOUS at 17:33

## 2019-05-12 NOTE — PROGRESS NOTES
Patient seen and examined by me. Feels miderable On exam: anasarca, noé LL edema of LL +3 extending proximally. Lab ordered and reviewed. Dx: 
1- Cellulitis w/ necrotic area, for minor surgery tomorrow 2- Anasarca, gained >8L since admission per record, some UO was not recorded per pt. Hx of cryptogenic cirrhosis and pancytopenia Rx: 
Diuresis iv plus spironolactone IO and weight monitor Sainz cath, pt agree Monitor lytes Abx as ordered Signed By: Sandoval Bear MD   
 May 12, 2019

## 2019-05-12 NOTE — PROGRESS NOTES
Problem: Falls - Risk of 
Goal: *Absence of Falls Description Document Jumana Moore Fall Risk and appropriate interventions in the flowsheet. Outcome: Progressing Towards Goal 
  
Problem: Patient Education: Go to Patient Education Activity Goal: Patient/Family Education Outcome: Progressing Towards Goal 
  
Problem: Pressure Injury - Risk of 
Goal: *Prevention of pressure injury Description Document Shane Scale and appropriate interventions in the flowsheet. Outcome: Progressing Towards Goal 
  
Problem: Patient Education: Go to Patient Education Activity Goal: Patient/Family Education Outcome: Progressing Towards Goal 
  
Problem: Patient Education: Go to Patient Education Activity Goal: Patient/Family Education Outcome: Progressing Towards Goal 
  
Problem: Patient Education: Go to Patient Education Activity Goal: Patient/Family Education Outcome: Progressing Towards Goal 
  
Problem: General Infection Care Plan (Adult and Pediatric) Goal: Improvement in signs and symptoms of infection Outcome: Progressing Towards Goal 
Goal: *Optimize nutritional status Outcome: Progressing Towards Goal 
  
Problem: Patient Education: Go to Patient Education Activity Goal: Patient/Family Education Outcome: Progressing Towards Goal

## 2019-05-12 NOTE — PROGRESS NOTES
Hourly rounds completed during shift. Abdominal swelling has increased this shift. Nausea addressed. Fluids stopped. Lasix changed to IV. K and Mg added. Appetite poor. Sainz inserted. Urine OP 2150 ml for shift. All needs met, bed locked in low position and call light within reach. Will give report to oncoming RN.

## 2019-05-12 NOTE — PROGRESS NOTES
Hospitalist Progress Note Subjective:  
Daily Progress Note: 5/12/2019 1645 Patient admitted for right foot cellulitis 4/24-5/2, discharged on keflex and doxy.  Returned to ER 5/7 with increased edema, erythema and necrosis of the wound.   
  
5/11:  Still complains of generalized abdominal and lower extremity edema. Worsening ascites. Stable after worsening pancytopenia. Diagnosed with cirrhosis and fatty liver without ascites in February of this year. EGD negative, colonoscopy with AVMs at that time. PO lasix given this am with 4000 ml output thus far. Nausea. No SOB. Also worked up in February for pancytopenia. Unclear etiology. Has never seen Hematology. Current Facility-Administered Medications Medication Dose Route Frequency  ondansetron (ZOFRAN) injection 4 mg  4 mg IntraVENous Q6H PRN  
 furosemide (LASIX) tablet 20 mg  20 mg Oral DAILY  sertraline (ZOLOFT) tablet 100 mg  100 mg Oral DAILY  vancomycin (VANCOCIN) 1250 mg in  ml infusion  1,250 mg IntraVENous Q12H  
 loperamide (IMODIUM) capsule 2 mg  2 mg Oral Q8H PRN  
 0.9% sodium chloride infusion 250 mL  250 mL IntraVENous PRN  
 sodium chloride (NS) flush 10 mL  10 mL InterCATHeter Q8H  
 sodium chloride (NS) flush 10 mL  10 mL InterCATHeter PRN  
 atorvastatin (LIPITOR) tablet 10 mg  10 mg Oral DAILY  ferrous sulfate tablet 325 mg  325 mg Oral DAILY  gabapentin (NEURONTIN) capsule 300 mg  300 mg Oral QHS  levothyroxine (SYNTHROID) tablet 125 mcg  125 mcg Oral ACB  pantoprazole (PROTONIX) tablet 40 mg  40 mg Oral DAILY  dextrose 40% (GLUTOSE) oral gel 1 Tube  15 g Oral PRN  
 glucagon (GLUCAGEN) injection 1 mg  1 mg IntraMUSCular PRN  
 dextrose (D50) infusion 12.5-25 g  25-50 mL IntraVENous PRN  
 insulin lispro (HUMALOG) injection 0-10 Units  0-10 Units SubCUTAneous AC&HS  sodium chloride (NS) flush 5-40 mL  5-40 mL IntraVENous Q8H  
  sodium chloride (NS) flush 5-40 mL  5-40 mL IntraVENous PRN  
 acetaminophen (TYLENOL) tablet 650 mg  650 mg Oral Q4H PRN  
 oxyCODONE-acetaminophen (PERCOCET 7.5) 7.5-325 mg per tablet 1 Tab  1 Tab Oral Q4H PRN  
 naloxone (NARCAN) injection 0.4 mg  0.4 mg IntraVENous PRN  
 enoxaparin (LOVENOX) injection 40 mg  40 mg SubCUTAneous Q24H  cefepime (MAXIPIME) 1 g in 0.9% sodium chloride (MBP/ADV) 50 mL ADV  1 g IntraVENous Q12H Review of Systems A comprehensive review of systems was negative except for that written in the HPI. Objective:  
 
Visit Vitals /67 (BP 1 Location: Right arm, BP Patient Position: Sitting) Pulse 83 Temp 98.1 °F (36.7 °C) Resp 18 Ht 5' 7\" (1.702 m) Wt 99.6 kg (219 lb 9.6 oz) SpO2 98% BMI 34.39 kg/m² O2 Device: Room air Temp (24hrs), Av.1 °F (36.7 °C), Min:97.8 °F (36.6 °C), Max:98.4 °F (36.9 °C) 
 
05/10 1901 -  0700 In: 5555 [P.O.:960; I.V.:2818] Out: 1102 [WLJGP:7050] General appearance: Oriented and alert, cooperative, nausea and generalized abdominal pain. Obese with recent unexplained weight loss with unknown amount. Head: Normocephalic, without obvious abnormality, atraumatic Neck: supple, symmetrical, trachea midline, and no JVD Lungs: clear to auscultation bilaterally Heart: regular rate and rhythm, S1, S2 normal, no murmur, click, rub or gallop Abdomen: distended,, larger than yesterday. Generalized pain. Soft, generalized tenderness. Bowel sounds normal. No masses,  no organomegaly Extremities: Dry, intact wrap dressing to right ankle and foot.  bilateral lower extremities with +3 pitting edema, painful. Upper extremities normal, atraumatic, no cyanosis or edema Skin: Skin color sallow, texture, turgor normal. No rashes or lesions Neurologic: Grossly normal 
  
Additional comments: Notes,orders, test results, vitals reviewed Data Review Recent Results (from the past 24 hour(s)) GLUCOSE, POC  
 Collection Time: 05/11/19 10:56 AM  
Result Value Ref Range Glucose (POC) 145 (H) 65 - 100 mg/dL Koki Aver Collection Time: 05/11/19 12:42 PM  
Result Value Ref Range Vancomycin,trough 19.8 5 - 20 ug/mL GLUCOSE, POC Collection Time: 05/11/19  4:02 PM  
Result Value Ref Range Glucose (POC) 166 (H) 65 - 100 mg/dL GLUCOSE, POC Collection Time: 05/11/19  8:58 PM  
Result Value Ref Range Glucose (POC) 183 (H) 65 - 100 mg/dL GLUCOSE, POC Collection Time: 05/12/19  7:19 AM  
Result Value Ref Range Glucose (POC) 180 (H) 65 - 100 mg/dL METABOLIC PANEL, COMPREHENSIVE Collection Time: 05/12/19  7:25 AM  
Result Value Ref Range Sodium 141 136 - 145 mmol/L Potassium 3.6 3.5 - 5.1 mmol/L Chloride 112 (H) 98 - 107 mmol/L  
 CO2 20 (L) 21 - 32 mmol/L Anion gap 9 7 - 16 mmol/L Glucose 157 (H) 65 - 100 mg/dL BUN 15 8 - 23 MG/DL Creatinine 0.56 (L) 0.6 - 1.0 MG/DL  
 GFR est AA >60 >60 ml/min/1.73m2 GFR est non-AA >60 >60 ml/min/1.73m2 Calcium 6.7 (L) 8.3 - 10.4 MG/DL Bilirubin, total 0.8 0.2 - 1.1 MG/DL  
 ALT (SGPT) 14 12 - 65 U/L  
 AST (SGOT) 28 15 - 37 U/L Alk. phosphatase 373 (H) 50 - 136 U/L Protein, total 5.1 (L) 6.3 - 8.2 g/dL Albumin 1.7 (L) 3.2 - 4.6 g/dL Globulin 3.4 2.3 - 3.5 g/dL A-G Ratio 0.5 (L) 1.2 - 3.5    
CBC WITH AUTOMATED DIFF Collection Time: 05/12/19  7:25 AM  
Result Value Ref Range WBC 3.0 (L) 4.3 - 11.1 K/uL  
 RBC 2.72 (L) 4.05 - 5.2 M/uL HGB 7.3 (L) 11.7 - 15.4 g/dL HCT 22.2 (L) 35.8 - 46.3 % MCV 81.6 79.6 - 97.8 FL  
 MCH 26.8 26.1 - 32.9 PG  
 MCHC 32.9 31.4 - 35.0 g/dL  
 RDW 17.6 (H) 11.9 - 14.6 % PLATELET 69 (L) 331 - 450 K/uL MPV 9.8 9.4 - 12.3 FL ABSOLUTE NRBC 0.00 0.0 - 0.2 K/uL  
 DF AUTOMATED NEUTROPHILS 66 43 - 78 % LYMPHOCYTES 22 13 - 44 % MONOCYTES 9 4.0 - 12.0 % EOSINOPHILS 2 0.5 - 7.8 % BASOPHILS 1 0.0 - 2.0 % IMMATURE GRANULOCYTES 0 0.0 - 5.0 % ABS. NEUTROPHILS 2.0 1.7 - 8.2 K/UL  
 ABS. LYMPHOCYTES 0.6 0.5 - 4.6 K/UL  
 ABS. MONOCYTES 0.3 0.1 - 1.3 K/UL  
 ABS. EOSINOPHILS 0.1 0.0 - 0.8 K/UL  
 ABS. BASOPHILS 0.0 0.0 - 0.2 K/UL  
 ABS. IMM. GRANS. 0.0 0.0 - 0.5 K/UL  
 
 4/29:  CT RIGHT FOOT:  Generalized predominantly dorsal subcutaneous edema over the midfoot with skin blistering noted. No discrete measurable fluid collection or abscess appreciated.  No destructive bone changes noted to indicate osteomyelitis No intra-articular joint derangement or fracture identified. 
  
5/8:  RIGHT FOOT XRAY: Soft tissue swelling and degenerative change. Assessment/Plan:  
Cellulitis of right foot 
            Surgery on board 
            Bedside I+D 5/8 
            May need additional surgery Monday May 13 
            Continue maxipime and vancomycin Hyperlipidemia:  Home meds Hypertension:  Continue home meds Hypothyroidism:  Home meds IDDM II:  A1C: 5.9, was 14 in January. Continue current insulin dosing Pancytopenia 
            Transfuse if needed, reluctant to do at present  
 due to worsening abdominal and LlE extremity           Monitor Cirrhosis of liver diagnosed 2/19:  Now with ascites  
         Monitor  
 Ultrasound abdomen in am.  
AVMs of colon Care Plan discussed with:  Dr Josse Moreno, Patient and Nurse Signed By: Mehnaz Sanchez NP May 12, 2019

## 2019-05-12 NOTE — ANESTHESIA PREPROCEDURE EVALUATION
Anesthetic History No history of anesthetic complications Review of Systems / Medical History Patient summary reviewed and pertinent labs reviewed Pulmonary Within defined limits Neuro/Psych Within defined limits Cardiovascular Hypertension: well controlled Valvular problems/murmurs: mitral insufficiency and aortic stenosis Exercise tolerance: >4 METS Comments: Echo 5/1/2019 showed EF 60-65% with dilated LA and RA, mild MR, moderate AS with valve area of 1.52 and mean gradient of 26mmg, peak gradient of 51mmHg. GI/Hepatic/Renal 
  
 
 
 
Liver disease (Liver cirrhosis) Comments: Newly diagnosed cirrhosis Endo/Other Diabetes: type 2 Hypothyroidism: well controlled Anemia (and Thrombocytopenia) Other Findings Physical Exam 
 
Airway Mallampati: II 
TM Distance: 4 - 6 cm Neck ROM: normal range of motion Mouth opening: Normal 
 
 Cardiovascular Rhythm: regular Rate: normal 
 
Murmur: Grade 4, Aortic area Dental 
 
Dentition: Poor dentition Pulmonary Breath sounds clear to auscultation Abdominal 
 
 
 
 Other Findings Anesthetic Plan ASA: 4 Anesthesia type: total IV anesthesia Post-op pain plan if not by surgeon: peripheral nerve block single Induction: Intravenous Anesthetic plan and risks discussed with: Patient Pop/saph with propofol TIVA

## 2019-05-12 NOTE — PROGRESS NOTES
Hourly round completed throughout the shift. No complain of pain Pt c/o nausea. Medicated per mar Dressing of RLE is dry and intact Pt denies any needs at this time Bed in lower position and call light/ personal items within reach. Will continue to monitor and give bed side shift report to on coming day shift nurse.

## 2019-05-13 ENCOUNTER — ANESTHESIA (OUTPATIENT)
Dept: SURGERY | Age: 64
DRG: 982 | End: 2019-05-13
Payer: COMMERCIAL

## 2019-05-13 ENCOUNTER — APPOINTMENT (OUTPATIENT)
Dept: ULTRASOUND IMAGING | Age: 64
DRG: 982 | End: 2019-05-13
Attending: HOSPITALIST
Payer: COMMERCIAL

## 2019-05-13 LAB
ALBUMIN SERPL-MCNC: 1.6 G/DL (ref 3.2–4.6)
ALBUMIN/GLOB SERPL: 0.5 {RATIO} (ref 1.2–3.5)
ALP SERPL-CCNC: 287 U/L (ref 50–136)
ALT SERPL-CCNC: 18 U/L (ref 12–65)
AMMONIA PLAS-SCNC: 29 UMOL/L (ref 11–32)
ANION GAP SERPL CALC-SCNC: 7 MMOL/L (ref 7–16)
AST SERPL-CCNC: 24 U/L (ref 15–37)
BACTERIA SPEC CULT: NORMAL
BASOPHILS # BLD: 0 K/UL (ref 0–0.2)
BASOPHILS NFR BLD: 1 % (ref 0–2)
BILIRUB SERPL-MCNC: 1 MG/DL (ref 0.2–1.1)
BUN SERPL-MCNC: 13 MG/DL (ref 8–23)
CALCIUM SERPL-MCNC: 7.2 MG/DL (ref 8.3–10.4)
CHLORIDE SERPL-SCNC: 112 MMOL/L (ref 98–107)
CO2 SERPL-SCNC: 23 MMOL/L (ref 21–32)
CREAT SERPL-MCNC: 0.58 MG/DL (ref 0.6–1)
DIFFERENTIAL METHOD BLD: ABNORMAL
EOSINOPHIL # BLD: 0 K/UL (ref 0–0.8)
EOSINOPHIL NFR BLD: 1 % (ref 0.5–7.8)
ERYTHROCYTE [DISTWIDTH] IN BLOOD BY AUTOMATED COUNT: 17.4 % (ref 11.9–14.6)
GLOBULIN SER CALC-MCNC: 3.4 G/DL (ref 2.3–3.5)
GLUCOSE BLD STRIP.AUTO-MCNC: 121 MG/DL (ref 65–100)
GLUCOSE BLD STRIP.AUTO-MCNC: 125 MG/DL (ref 65–100)
GLUCOSE BLD STRIP.AUTO-MCNC: 133 MG/DL (ref 65–100)
GLUCOSE BLD STRIP.AUTO-MCNC: 135 MG/DL (ref 65–100)
GLUCOSE BLD STRIP.AUTO-MCNC: 142 MG/DL (ref 65–100)
GLUCOSE BLD STRIP.AUTO-MCNC: 164 MG/DL (ref 65–100)
GLUCOSE SERPL-MCNC: 132 MG/DL (ref 65–100)
HCT VFR BLD AUTO: 20.4 % (ref 35.8–46.3)
HCT VFR BLD AUTO: 26.4 % (ref 35.8–46.3)
HGB BLD-MCNC: 6.6 G/DL (ref 11.7–15.4)
HGB BLD-MCNC: 8.6 G/DL (ref 11.7–15.4)
IMM GRANULOCYTES # BLD AUTO: 0 K/UL (ref 0–0.5)
IMM GRANULOCYTES NFR BLD AUTO: 0 % (ref 0–5)
INR PPP: 1.3
LYMPHOCYTES # BLD: 0.7 K/UL (ref 0.5–4.6)
LYMPHOCYTES NFR BLD: 32 % (ref 13–44)
MCH RBC QN AUTO: 26.1 PG (ref 26.1–32.9)
MCHC RBC AUTO-ENTMCNC: 32.4 G/DL (ref 31.4–35)
MCV RBC AUTO: 80.6 FL (ref 79.6–97.8)
MONOCYTES # BLD: 0.3 K/UL (ref 0.1–1.3)
MONOCYTES NFR BLD: 11 % (ref 4–12)
NEUTS SEG # BLD: 1.3 K/UL (ref 1.7–8.2)
NEUTS SEG NFR BLD: 55 % (ref 43–78)
NRBC # BLD: 0 K/UL (ref 0–0.2)
PLATELET # BLD AUTO: 65 K/UL (ref 150–450)
PMV BLD AUTO: 10.7 FL (ref 9.4–12.3)
POTASSIUM SERPL-SCNC: 3.4 MMOL/L (ref 3.5–5.1)
PROT SERPL-MCNC: 5 G/DL (ref 6.3–8.2)
PROTHROMBIN TIME: 15.5 SEC (ref 11.7–14.5)
RBC # BLD AUTO: 2.53 M/UL (ref 4.05–5.2)
SERVICE CMNT-IMP: NORMAL
SODIUM SERPL-SCNC: 142 MMOL/L (ref 136–145)
WBC # BLD AUTO: 2.3 K/UL (ref 4.3–11.1)

## 2019-05-13 PROCEDURE — 74011250636 HC RX REV CODE- 250/636: Performed by: HOSPITALIST

## 2019-05-13 PROCEDURE — 74011250637 HC RX REV CODE- 250/637: Performed by: HOSPITALIST

## 2019-05-13 PROCEDURE — 74011250636 HC RX REV CODE- 250/636: Performed by: SURGERY

## 2019-05-13 PROCEDURE — 77030020782 HC GWN BAIR PAWS FLX 3M -B: Performed by: ANESTHESIOLOGY

## 2019-05-13 PROCEDURE — 80053 COMPREHEN METABOLIC PANEL: CPT

## 2019-05-13 PROCEDURE — 74011250636 HC RX REV CODE- 250/636

## 2019-05-13 PROCEDURE — P9016 RBC LEUKOCYTES REDUCED: HCPCS

## 2019-05-13 PROCEDURE — 36415 COLL VENOUS BLD VENIPUNCTURE: CPT

## 2019-05-13 PROCEDURE — 85018 HEMOGLOBIN: CPT

## 2019-05-13 PROCEDURE — 74011000258 HC RX REV CODE- 258: Performed by: HOSPITALIST

## 2019-05-13 PROCEDURE — 82962 GLUCOSE BLOOD TEST: CPT

## 2019-05-13 PROCEDURE — 86923 COMPATIBILITY TEST ELECTRIC: CPT

## 2019-05-13 PROCEDURE — 76010010054 HC POST OP PAIN BLOCK: Performed by: SURGERY

## 2019-05-13 PROCEDURE — 76700 US EXAM ABDOM COMPLETE: CPT

## 2019-05-13 PROCEDURE — 77030018836 HC SOL IRR NACL ICUM -A: Performed by: SURGERY

## 2019-05-13 PROCEDURE — 97110 THERAPEUTIC EXERCISES: CPT

## 2019-05-13 PROCEDURE — 76210000016 HC OR PH I REC 1 TO 1.5 HR: Performed by: SURGERY

## 2019-05-13 PROCEDURE — 74011250637 HC RX REV CODE- 250/637: Performed by: SURGERY

## 2019-05-13 PROCEDURE — 76060000032 HC ANESTHESIA 0.5 TO 1 HR: Performed by: SURGERY

## 2019-05-13 PROCEDURE — 85025 COMPLETE CBC W/AUTO DIFF WBC: CPT

## 2019-05-13 PROCEDURE — 65270000029 HC RM PRIVATE

## 2019-05-13 PROCEDURE — 74011636637 HC RX REV CODE- 636/637: Performed by: SURGERY

## 2019-05-13 PROCEDURE — 85610 PROTHROMBIN TIME: CPT

## 2019-05-13 PROCEDURE — 74011250637 HC RX REV CODE- 250/637: Performed by: NURSE PRACTITIONER

## 2019-05-13 PROCEDURE — 74011000258 HC RX REV CODE- 258: Performed by: SURGERY

## 2019-05-13 PROCEDURE — 77030032490 HC SLV COMPR SCD KNE COVD -B

## 2019-05-13 PROCEDURE — 77030011283 HC ELECTRD NDL COVD -A: Performed by: SURGERY

## 2019-05-13 PROCEDURE — 76010000138 HC OR TIME 0.5 TO 1 HR: Performed by: SURGERY

## 2019-05-13 PROCEDURE — 82140 ASSAY OF AMMONIA: CPT

## 2019-05-13 PROCEDURE — 36430 TRANSFUSION BLD/BLD COMPNT: CPT

## 2019-05-13 PROCEDURE — 76942 ECHO GUIDE FOR BIOPSY: CPT | Performed by: SURGERY

## 2019-05-13 PROCEDURE — 86900 BLOOD TYPING SEROLOGIC ABO: CPT

## 2019-05-13 PROCEDURE — 77030003602 HC NDL NRV BLK BBMI -B: Performed by: ANESTHESIOLOGY

## 2019-05-13 PROCEDURE — 74011250636 HC RX REV CODE- 250/636: Performed by: ANESTHESIOLOGY

## 2019-05-13 RX ORDER — OXYCODONE HYDROCHLORIDE 5 MG/1
10 TABLET ORAL
Status: DISCONTINUED | OUTPATIENT
Start: 2019-05-13 | End: 2019-05-13

## 2019-05-13 RX ORDER — LIDOCAINE HYDROCHLORIDE 20 MG/ML
INJECTION, SOLUTION EPIDURAL; INFILTRATION; INTRACAUDAL; PERINEURAL
Status: COMPLETED | OUTPATIENT
Start: 2019-05-13 | End: 2019-05-13

## 2019-05-13 RX ORDER — SODIUM CHLORIDE 9 MG/ML
250 INJECTION, SOLUTION INTRAVENOUS AS NEEDED
Status: DISCONTINUED | OUTPATIENT
Start: 2019-05-13 | End: 2019-05-23 | Stop reason: HOSPADM

## 2019-05-13 RX ORDER — SODIUM CHLORIDE, SODIUM LACTATE, POTASSIUM CHLORIDE, CALCIUM CHLORIDE 600; 310; 30; 20 MG/100ML; MG/100ML; MG/100ML; MG/100ML
100 INJECTION, SOLUTION INTRAVENOUS CONTINUOUS
Status: DISCONTINUED | OUTPATIENT
Start: 2019-05-13 | End: 2019-05-13

## 2019-05-13 RX ORDER — FENTANYL CITRATE 50 UG/ML
100 INJECTION, SOLUTION INTRAMUSCULAR; INTRAVENOUS ONCE
Status: COMPLETED | OUTPATIENT
Start: 2019-05-13 | End: 2019-05-13

## 2019-05-13 RX ORDER — HYDROMORPHONE HYDROCHLORIDE 2 MG/ML
0.5 INJECTION, SOLUTION INTRAMUSCULAR; INTRAVENOUS; SUBCUTANEOUS
Status: DISCONTINUED | OUTPATIENT
Start: 2019-05-13 | End: 2019-05-13

## 2019-05-13 RX ORDER — PROPOFOL 10 MG/ML
INJECTION, EMULSION INTRAVENOUS
Status: DISCONTINUED | OUTPATIENT
Start: 2019-05-13 | End: 2019-05-13 | Stop reason: HOSPADM

## 2019-05-13 RX ORDER — MIDAZOLAM HYDROCHLORIDE 1 MG/ML
2 INJECTION, SOLUTION INTRAMUSCULAR; INTRAVENOUS
Status: COMPLETED | OUTPATIENT
Start: 2019-05-13 | End: 2019-05-13

## 2019-05-13 RX ORDER — LIDOCAINE HYDROCHLORIDE 10 MG/ML
0.3 INJECTION INFILTRATION; PERINEURAL ONCE
Status: DISCONTINUED | OUTPATIENT
Start: 2019-05-13 | End: 2019-05-13

## 2019-05-13 RX ORDER — SIMETHICONE 80 MG
80 TABLET,CHEWABLE ORAL
Status: DISCONTINUED | OUTPATIENT
Start: 2019-05-13 | End: 2019-05-23 | Stop reason: HOSPADM

## 2019-05-13 RX ADMIN — LIDOCAINE HYDROCHLORIDE 8 ML: 20 INJECTION, SOLUTION EPIDURAL; INFILTRATION; INTRACAUDAL; PERINEURAL at 16:31

## 2019-05-13 RX ADMIN — Medication 10 ML: at 06:00

## 2019-05-13 RX ADMIN — FUROSEMIDE 40 MG: 10 INJECTION, SOLUTION INTRAMUSCULAR; INTRAVENOUS at 21:27

## 2019-05-13 RX ADMIN — FUROSEMIDE 40 MG: 10 INJECTION, SOLUTION INTRAMUSCULAR; INTRAVENOUS at 11:06

## 2019-05-13 RX ADMIN — FERROUS SULFATE TAB 325 MG (65 MG ELEMENTAL FE) 325 MG: 325 (65 FE) TAB at 11:05

## 2019-05-13 RX ADMIN — VANCOMYCIN HYDROCHLORIDE 1250 MG: 10 INJECTION, POWDER, LYOPHILIZED, FOR SOLUTION INTRAVENOUS at 04:02

## 2019-05-13 RX ADMIN — MAGNESIUM OXIDE TAB 400 MG (241.3 MG ELEMENTAL MG) 400 MG: 400 (241.3 MG) TAB at 11:05

## 2019-05-13 RX ADMIN — PANTOPRAZOLE SODIUM 40 MG: 40 TABLET, DELAYED RELEASE ORAL at 11:05

## 2019-05-13 RX ADMIN — MAGNESIUM OXIDE TAB 400 MG (241.3 MG ELEMENTAL MG) 400 MG: 400 (241.3 MG) TAB at 20:03

## 2019-05-13 RX ADMIN — PROPOFOL 50 MCG/KG/MIN: 10 INJECTION, EMULSION INTRAVENOUS at 16:46

## 2019-05-13 RX ADMIN — GABAPENTIN 300 MG: 300 CAPSULE ORAL at 21:28

## 2019-05-13 RX ADMIN — MIDAZOLAM HYDROCHLORIDE 1 MG: 2 INJECTION, SOLUTION INTRAMUSCULAR; INTRAVENOUS at 16:20

## 2019-05-13 RX ADMIN — SPIRONOLACTONE 25 MG: 25 TABLET, FILM COATED ORAL at 11:05

## 2019-05-13 RX ADMIN — LEVOTHYROXINE SODIUM 125 MCG: 75 TABLET ORAL at 06:00

## 2019-05-13 RX ADMIN — Medication 10 ML: at 14:00

## 2019-05-13 RX ADMIN — SERTRALINE HYDROCHLORIDE 100 MG: 100 TABLET ORAL at 11:05

## 2019-05-13 RX ADMIN — Medication 10 ML: at 21:28

## 2019-05-13 RX ADMIN — INSULIN LISPRO 2 UNITS: 100 INJECTION, SOLUTION INTRAVENOUS; SUBCUTANEOUS at 21:28

## 2019-05-13 RX ADMIN — SODIUM CHLORIDE 1 G: 900 INJECTION, SOLUTION INTRAVENOUS at 11:06

## 2019-05-13 RX ADMIN — SODIUM CHLORIDE 1 G: 900 INJECTION, SOLUTION INTRAVENOUS at 21:27

## 2019-05-13 RX ADMIN — SODIUM CHLORIDE, SODIUM LACTATE, POTASSIUM CHLORIDE, AND CALCIUM CHLORIDE 100 ML/HR: 600; 310; 30; 20 INJECTION, SOLUTION INTRAVENOUS at 16:22

## 2019-05-13 RX ADMIN — FENTANYL CITRATE 50 MCG: 50 INJECTION INTRAMUSCULAR; INTRAVENOUS at 16:20

## 2019-05-13 RX ADMIN — VANCOMYCIN HYDROCHLORIDE 1250 MG: 10 INJECTION, POWDER, LYOPHILIZED, FOR SOLUTION INTRAVENOUS at 16:10

## 2019-05-13 NOTE — PROGRESS NOTES
Hospitalist Progress Note Subjective:  
Daily Progress Note: 5/13/2019 1327 Patient admitted for right foot cellulitis 4/24-5/2, discharged on keflex and doxy.  Returned to ER 5/7 with increased edema, erythema and necrosis of the wound.   
 
5/13:  Old records reviewed, history of cryptogenic cirrhosis under care of GI.  no ascites until now. Ascites and bilateral LE edema worsening. Aldactone and IV lasix begun 5/12. Transfused 1 unit PRBC and albumin. Nausea and vomiting yesterday. Nausea only today. Continued marked pancytopenia. I+D foot wound planned for later today. Current Facility-Administered Medications Medication Dose Route Frequency  0.9% sodium chloride infusion 250 mL  250 mL IntraVENous PRN  
 0.9% sodium chloride infusion 250 mL  250 mL IntraVENous PRN  promethazine (PHENERGAN) with saline injection 6.25 mg  6.25 mg IntraVENous Q4H PRN  
 magnesium oxide (MAG-OX) tablet 400 mg  400 mg Oral BID  furosemide (LASIX) injection 40 mg  40 mg IntraVENous Q12H  
 spironolactone (ALDACTONE) tablet 25 mg  25 mg Oral DAILY  ondansetron (ZOFRAN) injection 4 mg  4 mg IntraVENous Q6H PRN  
 sertraline (ZOLOFT) tablet 100 mg  100 mg Oral DAILY  vancomycin (VANCOCIN) 1250 mg in  ml infusion  1,250 mg IntraVENous Q12H  
 loperamide (IMODIUM) capsule 2 mg  2 mg Oral Q8H PRN  
 0.9% sodium chloride infusion 250 mL  250 mL IntraVENous PRN  
 sodium chloride (NS) flush 10 mL  10 mL InterCATHeter Q8H  
 sodium chloride (NS) flush 10 mL  10 mL InterCATHeter PRN  
 ferrous sulfate tablet 325 mg  325 mg Oral DAILY  gabapentin (NEURONTIN) capsule 300 mg  300 mg Oral QHS  levothyroxine (SYNTHROID) tablet 125 mcg  125 mcg Oral ACB  pantoprazole (PROTONIX) tablet 40 mg  40 mg Oral DAILY  dextrose 40% (GLUTOSE) oral gel 1 Tube  15 g Oral PRN  
 glucagon (GLUCAGEN) injection 1 mg  1 mg IntraMUSCular PRN  
 dextrose (D50) infusion 12.5-25 g  25-50 mL IntraVENous PRN  
  insulin lispro (HUMALOG) injection 0-10 Units  0-10 Units SubCUTAneous AC&HS  sodium chloride (NS) flush 5-40 mL  5-40 mL IntraVENous Q8H  
 sodium chloride (NS) flush 5-40 mL  5-40 mL IntraVENous PRN  
 acetaminophen (TYLENOL) tablet 650 mg  650 mg Oral Q4H PRN  
 oxyCODONE-acetaminophen (PERCOCET 7.5) 7.5-325 mg per tablet 1 Tab  1 Tab Oral Q4H PRN  
 naloxone (NARCAN) injection 0.4 mg  0.4 mg IntraVENous PRN  
 cefepime (MAXIPIME) 1 g in 0.9% sodium chloride (MBP/ADV) 50 mL ADV  1 g IntraVENous Q12H Review of Systems A comprehensive review of systems was negative except for that written in the HPI. Objective:  
 
Visit Vitals /64 (BP 1 Location: Right arm, BP Patient Position: At rest) Pulse 73 Temp 97.9 °F (36.6 °C) Resp 16 Ht 5' 7\" (1.702 m) Wt 99.6 kg (219 lb 9.6 oz) SpO2 97% BMI 34.39 kg/m² O2 Device: Room air Temp (24hrs), Av.1 °F (36.7 °C), Min:97.8 °F (36.6 °C), Max:98.4 °F (36.9 °C) 
 
 1901 -  0700 In: 240 [P.O.:240] Out: 5451 [X:9175] General appearance: Oriented and alert, cooperative, nausea and abdominal fullness. Normal BM. Obese with recent unexplained weight loss with unknown amount. Head: Normocephalic, without obvious abnormality, atraumatic Neck: supple, symmetrical, trachea midline, and no JVD Lungs: clear to auscultation bilaterally Heart: regular rate and rhythm, S1, S2 normal, no murmur, click, rub or gallop Abdomen: Soft, mildly ballotable. Distended, excessive gas per patient. Normal BMs, not discolored. Generalized pain due to fullness. Bowel sounds normal. No masses,  no organomegaly Extremities: Dry, intact wrap dressing to right ankle and foot. Bilateral lower extremities with +3 pitting edema to mid thigh. Painful. Upper extremities normal, atraumatic, no cyanosis or edema Skin: Skin color sallow, texture, turgor normal. No rashes or lesions Neurologic: Grossly normal 
  
 Additional comments: Notes,orders, test results, vitals reviewed 
  
Data Review Recent Results (from the past 24 hour(s)) GLUCOSE, POC Collection Time: 05/12/19 11:42 AM  
Result Value Ref Range Glucose (POC) 167 (H) 65 - 100 mg/dL GLUCOSE, POC Collection Time: 05/12/19  4:02 PM  
Result Value Ref Range Glucose (POC) 200 (H) 65 - 100 mg/dL GLUCOSE, POC Collection Time: 05/12/19  8:46 PM  
Result Value Ref Range Glucose (POC) 234 (H) 65 - 100 mg/dL CBC WITH AUTOMATED DIFF Collection Time: 05/13/19  3:51 AM  
Result Value Ref Range WBC 2.3 (L) 4.3 - 11.1 K/uL  
 RBC 2.53 (L) 4.05 - 5.2 M/uL HGB 6.6 (LL) 11.7 - 15.4 g/dL HCT 20.4 (LL) 35.8 - 46.3 % MCV 80.6 79.6 - 97.8 FL  
 MCH 26.1 26.1 - 32.9 PG  
 MCHC 32.4 31.4 - 35.0 g/dL  
 RDW 17.4 (H) 11.9 - 14.6 % PLATELET 65 (L) 609 - 450 K/uL MPV 10.7 9.4 - 12.3 FL ABSOLUTE NRBC 0.00 0.0 - 0.2 K/uL  
 DF AUTOMATED NEUTROPHILS 55 43 - 78 % LYMPHOCYTES 32 13 - 44 % MONOCYTES 11 4.0 - 12.0 % EOSINOPHILS 1 0.5 - 7.8 % BASOPHILS 1 0.0 - 2.0 % IMMATURE GRANULOCYTES 0 0.0 - 5.0 %  
 ABS. NEUTROPHILS 1.3 (L) 1.7 - 8.2 K/UL  
 ABS. LYMPHOCYTES 0.7 0.5 - 4.6 K/UL  
 ABS. MONOCYTES 0.3 0.1 - 1.3 K/UL  
 ABS. EOSINOPHILS 0.0 0.0 - 0.8 K/UL  
 ABS. BASOPHILS 0.0 0.0 - 0.2 K/UL  
 ABS. IMM. GRANS. 0.0 0.0 - 0.5 K/UL  
AMMONIA Collection Time: 05/13/19  3:51 AM  
Result Value Ref Range Ammonia 29 11 - 32 UMOL/L  
METABOLIC PANEL, COMPREHENSIVE Collection Time: 05/13/19  5:21 AM  
Result Value Ref Range Sodium 142 136 - 145 mmol/L Potassium 3.4 (L) 3.5 - 5.1 mmol/L Chloride 112 (H) 98 - 107 mmol/L  
 CO2 23 21 - 32 mmol/L Anion gap 7 7 - 16 mmol/L Glucose 132 (H) 65 - 100 mg/dL BUN 13 8 - 23 MG/DL Creatinine 0.58 (L) 0.6 - 1.0 MG/DL  
 GFR est AA >60 >60 ml/min/1.73m2 GFR est non-AA >60 >60 ml/min/1.73m2 Calcium 7.2 (L) 8.3 - 10.4 MG/DL  Bilirubin, total 1.0 0.2 - 1.1 MG/DL  
 ALT (SGPT) 18 12 - 65 U/L  
 AST (SGOT) 24 15 - 37 U/L Alk. phosphatase 287 (H) 50 - 136 U/L Protein, total 5.0 (L) 6.3 - 8.2 g/dL Albumin 1.6 (L) 3.2 - 4.6 g/dL Globulin 3.4 2.3 - 3.5 g/dL A-G Ratio 0.5 (L) 1.2 - 3.5 GLUCOSE, POC Collection Time: 05/13/19  6:59 AM  
Result Value Ref Range Glucose (POC) 142 (H) 65 - 100 mg/dL 4/29:  CT RIGHT FOOT:  Generalized predominantly dorsal subcutaneous edema over the midfoot with skin blistering noted. No discrete measurable fluid collection or abscess appreciated.  No destructive bone changes noted to indicate osteomyelitis No intra-articular joint derangement or fracture identified. 
  
5/8:  RIGHT FOOT XRAY: Soft tissue swelling and degenerative change. 5/13:  ABDOMINAL ULTRASOUND: Coarsened liver echotexture with ascites and splenomegaly, probably cirrhosis and portal venous hypertension. Gallbladder sludge without biliary tree obstruction. 
  
Assessment/Plan:  
Cellulitis with necrotic abscess right foot 
            Surgery on board 
            Bedside I+D 5/8 5/13 to OR for I+D/washout 
            Continue maxipime and vancomycin Hyperlipidemia:  Home meds Hypertension:  Continue home meds Hypothyroidism:  Home meds IDDM II:  A1C: 5.9, was 14 in January. Continue current insulin dosing Pancytopenia 
            Transfuse if needed, reluctant to do at present  
            due to worsening abdominal and LLE extremity edema.  
          Monitor closely Has never seen Hematology:  [facility] handling Cirrhosis of liver diagnosed 2/19:  ? Cryptogenic? Now with ascites:  New   
             Monitor  
            Ultrasound abdomen 5/13 May need GI input, will need ongoing workup History of AVMs of colon Morbid obesity Care Plan discussed with: Patient, care team and Nurse Signed By: Eun Huerta NP May 13, 2019

## 2019-05-13 NOTE — PERIOP NOTES
TRANSFER - OUT REPORT: 
 
Verbal report given to ANI Harry on Gifty Arellano  being transferred to  604 for routine post - op Report consisted of patients Situation, Background, Assessment and  
Recommendations(SBAR). Information from the following report(s) Procedure Summary, Intake/Output, MAR and Recent Results was reviewed with the receiving nurse. Lines:    
 
Opportunity for questions and clarification was provided. Patient transported with: 
 Tech 
 
VTE prophylaxis orders have been written for Gifty Arellano. Patient and family given floor number and nurses name. Family updated re: pt status after security code verified.

## 2019-05-13 NOTE — PROGRESS NOTES
Reports a rough weekend- nausea, swelling. Thinks foot looks the same Visit Vitals /64 (BP 1 Location: Right arm, BP Patient Position: At rest) Pulse 73 Temp 97.9 °F (36.6 °C) Resp 16 Ht 5' 7\" (1.702 m) Wt 219 lb 9.6 oz (99.6 kg) SpO2 97% BMI 34.39 kg/m² In NAD 
R foot- still with ischemic skin distal to prior debridement site Necrotic subcutaneous fat still in base of wound. Lab Results Component Value Date/Time WBC 2.3 (L) 05/13/2019 03:51 AM  
 Hemoglobin (POC) 8.6 04/24/2019 04:11 PM  
 HGB 6.6 (LL) 05/13/2019 03:51 AM  
 HCT 20.4 (LL) 05/13/2019 03:51 AM  
 PLATELET 65 (L) 98/97/7215 03:51 AM  
 MCV 80.6 05/13/2019 03:51 AM  
  
 
1. Diabetic foot infection ([address] 2. Cellulitis of right lower extremity Will proceed with surgical debridement today, as previously planned No plan for any amputation today- she's now worried about losing her foot Not stable thrombocytopenia; likely due to cirrhosis (pancytopenia). Defer any transfusion to primary service.

## 2019-05-13 NOTE — ANESTHESIA PROCEDURE NOTES
Saphenous block without ultrasound    Start time: 5/13/2019 4:30 PM  End time: 5/13/2019 4:31 PM  Performed by: Wade Alberto MD  Authorized by: Wade Alberto MD       Pre-procedure: Indications: at surgeon's request and post-op pain management    Preanesthetic Checklist: patient identified, risks and benefits discussed, site marked, timeout performed, anesthesia consent given and patient being monitored    Timeout Time: 16:30          Block Type:   Block Type:  Saphenous  Laterality:  Right  Monitoring:  Continuous pulse ox, frequent vital sign checks, heart rate, responsive to questions and oxygen  Injection Technique:  Single shot  Patient Position: supine  : proximal medial tibia. Catheter size: 25g.   Needle Localization:  Anatomical landmarks and infiltration    Assessment:  Number of attempts:  1  Injection Assessment:  Incremental injection every 5 mL, no paresthesia, negative aspiration for blood and no intravascular symptoms  Patient tolerance:  Patient tolerated the procedure well with no immediate complications

## 2019-05-13 NOTE — OP NOTES
Operative Report    Patient: All Crouch MRN: [id number]     YOB: 1955  Age: 60 y.o. Sex: female       Date of Surgery: 5/13/2019     Preoperative Diagnosis: Wound of foot [S91.309A]     Postoperative Diagnosis: Wound of foot [S91.309A]     NAME OF PROCEDURE:  Procedure(s):  RIGHT FOOT DEBRIDEMENT OF SKIN, SUBCUTANEOUS TISSUE, AMD TENDON. .    SURGEON: Melissa Paz MD    Anesthesia: General     Complications: none      Procedure Details       Informed consent was obtained and the patient was brought to the operating room  after a saphenous block was performed by anesthesia was induced, was placed prone. The right lower leg was prepped and draped sterilely. Debridement (sharp, excisional) of visibly necrotic tissue was then performed. This included necrotic skin around the periphery of the approximately 5cm diameter wound of the dorsum of the foot, as well as nonviable subcutaneous tissue and several tendon sheaths, to include minimal nonviable tendon. Subcutaneous tissue was also excised from the 2 small satellite wounds of the medial aspect of the foot. The area was irrigated and cautery was used for hemostasis were possible, although typical ooze from inflammatory tissue/granulation persisted. The wound was packed with saline dampened 2 inch gauze roll. Of note, the dorsalis pedis pulse was readily palpable. The patient tolerated the procedure well with no apparent complications and was  taken to the recovery room in satisfactory condition.  .    Estimated Blood Loss: per anesthesia           Specimens: * No specimens in log *        Signed By:  Demarco Rosario MD     May 13, 2019

## 2019-05-13 NOTE — PROGRESS NOTES
Hourly round completed throughout the shift. Still abdominal swelling. Pt is NPO since midnight for debridement procedure this morning. No c/o pain and denies any needs at this time urinary out put 3300 ml Bed in lower position and call light/ personal items within reach. Will continue to monitor and give bed side shift report to on coming day shift nurse.

## 2019-05-13 NOTE — PROGRESS NOTES
Problem: Self Care Deficits Care Plan (Adult) Goal: *Acute Goals and Plan of Care (Insert Text) Description 1. Pt will toilet with SBA 2. Pt will complete functional mobility for ADLs with SBA 3. Pt will complete lower body dressing with SBA using AE as needed 4. Pt will complete grooming and hygiene at sink with SBA 5. Pt will demonstrate independence with HEP to promote increased BUE strength and functional use for ADLs 6. Pt will tolerate 23 minutes functional activity with min or fewer rest breaks to promote increased endurance for ADLs Timeframe: 7 days Outcome: Progressing Towards Goal 
  
OCCUPATIONAL THERAPY: Daily Note and AM 5/13/2019 INPATIENT: OT Visit Days: 2 Payor: BLUE CROSS / Plan: [place] [employer] [place] / Product Type: PPO /  
  
NAME/AGE/GENDER: Avis Avalos is a 60 y.o. female PRIMARY DIAGNOSIS:  Cellulitis of right foot [L03.115] Cellulitis of right foot Cellulitis of right foot ICD-10: Treatment Diagnosis:  
 · Generalized Muscle Weakness (M62.81) Precautions/Allergies: 
  falls Pcn [penicillins] ASSESSMENT:  
Ms. Rossy Saba was admitted with R foot cellulitis, is s/p recent admission for the same thing. Pt lives alone, reports that she is independent and works as a nurse at baseline but has been struggling to complete ADLs and IADLs since last d/c. Pt uses a RW for mobility. Pt stated that she has been out of work for a long time and is behind on her rent, is worried about eviction. Pt was sitting up in chair upon arrival. Pt completed the exercises below on B UE's. Minimal progress made. Continue POC. This section established at most recent assessment PROBLEM LIST (Impairments causing functional limitations): 1. Decreased Strength 2. Decreased ADL/Functional Activities 3. Decreased Transfer Abilities 4. Decreased Balance 5. Increased Pain 6. Increased Fatigue  INTERVENTIONS PLANNED: (Benefits and precautions of occupational therapy have been discussed with the patient.) 1. Activities of daily living training 2. Balance training 3. Therapeutic activity 4. Therapeutic exercise TREATMENT PLAN: Frequency/Duration: Follow patient 3 times/ week to address above goals. Rehabilitation Potential For Stated Goals: Good REHAB RECOMMENDATIONS (at time of discharge pending progress):   
Placement: It is my opinion, based on this patient's performance to date, that Ms. Kauffman may benefit from [address] after discharge due to the functional deficits listed above that are likely to improve with skilled rehabilitation because he/she has multiple medical issues that affect his/her functional mobility in the community. Equipment:  
? 3:1 BSC, reacher, sock aide OCCUPATIONAL PROFILE AND HISTORY:  
History of Present Injury/Illness (Reason for Referral): 
See H&P Past Medical History/Comorbidities: Ms. Kong Peralta  has a past medical history of Diabetes ([facility] [address] and Hypertension. Ms. Kong Peralta  has a past surgical history that includes colonoscopy (N/A, 3/2/2019). Social History/Living Environment:  
Home Environment: Private residence # Steps to Enter: 4 One/Two Story Residence: One story Living Alone: Yes Support Systems: Family member(s), Friends \ neighbors Patient Expects to be Discharged to[de-identified] Private residence Current DME Used/Available at Home: Walker, rolling Tub or Shower Type: Tub/Shower combination Prior Level of Function/Work/Activity: 
Independent, lives alone Number of Personal Factors/Comorbidities that affect the Plan of Care: Expanded review of therapy/medical records (1-2):  MODERATE COMPLEXITY ASSESSMENT OF OCCUPATIONAL PERFORMANCE[de-identified]  
Activities of Daily Living:  
Basic ADLs (From Assessment) Complex ADLs (From Assessment) Feeding: Independent Oral Facial Hygiene/Grooming: Setup Bathing: Contact guard assistance Upper Body Dressing: Setup Lower Body Dressing: Minimum assistance Toileting: Contact guard assistance Instrumental ADL Meal Preparation: Minimum assistance Homemaking: Minimum assistance Grooming/Bathing/Dressing Activities of Daily Living Most Recent Physical Functioning:  
Gross Assessment: 
  
         
  
Posture: 
Posture (WDL): Within defined limits Balance: 
  Bed Mobility: 
  
Wheelchair Mobility: 
  
Transfers: 
   
 
    
 
Patient Vitals for the past 6 hrs: 
 BP BP Patient Position SpO2 Pulse 19 0727 120/64 At rest 97 % 73  
19 1133 132/74 At rest 99 % 79 Mental Status Neurologic State: Alert Orientation Level: Oriented X4 Cognition: Follows commands, Appropriate safety awareness, Appropriate for age attention/concentration, Appropriate decision making Perception: Appears intact Perseveration: No perseveration noted Safety/Judgement: Awareness of environment Physical Skills Involved: 
1. Balance 2. Strength 3. Activity Tolerance 4. Pain (acute) Cognitive Skills Affected (resulting in the inability to perform in a timely and safe manner): 1. none  Psychosocial Skills Affected: 1. Habits/Routines 2. Environmental Adaptation Number of elements that affect the Plan of Care: 3-5:  MODERATE COMPLEXITY CLINICAL DECISION MAKIN [facility] [address] [address] -[facility] 6 Clicks Daily Activity Inpatient Short Form How much help from another person does the patient currently need. .. Total A Lot A Little None 1. Putting on and taking off regular lower body clothing? ? 1   ? 2   ? 3   ? 4  
2. Bathing (including washing, rinsing, drying)? ? 1   ? 2   ? 3   ? 4  
3. Toileting, which includes using toilet, bedpan or urinal?   ? 1   ? 2   ? 3   ? 4  
4. Putting on and taking off regular upper body clothing? ? 1   ? 2   ? 3   ? 4  
5. Taking care of personal grooming such as brushing teeth? ? 1   ? 2   ? 3   ? 4  
6. Eating meals?    ? 1   ? 2   ? 3   ? 4  
 © 2007, Trustees of [address] [place] [address] [address] [address], under license to [employer]. All rights reserved Score:  Initial: 21 Most Recent: X (Date: -- ) Interpretation of Tool:  Represents activities that are increasingly more difficult (i.e. Bed mobility, Transfers, Gait). Medical Necessity:    
· Patient demonstrates good ·  rehab potential due to higher previous functional level. Reason for Services/Other Comments: 
· Patient continues to require skilled intervention due to decreased ADLs and functional performance from baseline · . Use of outcome tool(s) and clinical judgement create a POC that gives a: MODERATE COMPLEXITY  
 
 
 
TREATMENT:  
(In addition to Assessment/Re-Assessment sessions the following treatments were rendered) Pre-treatment Symptoms/Complaints:   
Pain: Initial:  
Pain Intensity 1: 4 Pain Location 1: Foot  Post Session:  8  
  
  
Therapeutic Exercise: ( 10):  Exercises per grid below to improve mobility and strength.  Required min verbal cues to promote proper body posture and promote proper body mechanics.  Progressed range and repetitions as indicated. 
  
B UE's  Date: 
5/10/19 Date: 
  Date: 
   
Activity/Exercise Parameters Parameters Parameters Elbow flex/ex 10 reps      
Shoulder hor add/abd 10 reps      
Punches  10 reps      
Shoulder flex/ex 10 reps       
         
         
         
  
 
 
 
 
 
Braces/Orthotics/Lines/Etc:  
· IV 
· O2 Device: Room air Treatment/Session Assessment:   
· Response to Treatment:  increased pain in R foot · Interdisciplinary Collaboration:  
o Certified Occupational Therapy Assistant 
o Registered Nurse · After treatment position/precautions:  
o Supine in bed 
o Bed alarm/tab alert on 
o Bed/Chair-wheels locked 
o Call light within reach 
o RN notified 
o Side rails x 2  
· Compliance with Program/Exercises: Will assess as treatment progresses. · Recommendations/Intent for next treatment session:   \"Next visit will focus on advancements to more challenging activities and reduction in assistance provided\". Total Treatment Duration: OT Patient Time In/Time Out Time In: 9295 Time Out: 2153 [address] Katharina Ramirez

## 2019-05-13 NOTE — PERIOP NOTES
TRANSFER - IN REPORT: 
 
Verbal report received from [address] [facility] [facility] (name) on [facility]  being received from 6th floor (unit) for ordered procedure Report consisted of patients Situation, Background, Assessment and  
Recommendations(SBAR). Information from the following report(s) SBAR and Recent Results was reviewed with the receiving nurse. Opportunity for questions and clarification was provided. Assessment completed upon patients arrival to unit and care assumed. Signed By: Elias Connor May 13, 2019

## 2019-05-13 NOTE — PROGRESS NOTES
Problem: Falls - Risk of 
Goal: *Absence of Falls Description Document Sarver David Fall Risk and appropriate interventions in the flowsheet. Outcome: Progressing Towards Goal 
  
Problem: Patient Education: Go to Patient Education Activity Goal: Patient/Family Education Outcome: Progressing Towards Goal 
  
Problem: Pressure Injury - Risk of 
Goal: *Prevention of pressure injury Description Document Shane Scale and appropriate interventions in the flowsheet. Outcome: Progressing Towards Goal 
  
Problem: Patient Education: Go to Patient Education Activity Goal: Patient/Family Education Outcome: Progressing Towards Goal 
  
Problem: Patient Education: Go to Patient Education Activity Goal: Patient/Family Education Outcome: Progressing Towards Goal 
  
Problem: Patient Education: Go to Patient Education Activity Goal: Patient/Family Education Outcome: Progressing Towards Goal 
  
Problem: General Infection Care Plan (Adult and Pediatric) Goal: Improvement in signs and symptoms of infection Outcome: Progressing Towards Goal 
Goal: *Optimize nutritional status Outcome: Progressing Towards Goal 
  
Problem: Patient Education: Go to Patient Education Activity Goal: Patient/Family Education Outcome: Progressing Towards Goal

## 2019-05-13 NOTE — PROGRESS NOTES
Interdisciplinary Rounds completed 05/13/19. Nursing, Case Management, Physician and PT present. Plan of care reviewed and updated. Continue luciano for strict I/O's. Surgery today.

## 2019-05-13 NOTE — PROGRESS NOTES
PT note: Patient politely declined therapy this AM. States she recently did exercises with OT, is preparing to receive blood transfusion, and is scheduled for surgery this PM. Will check back tomorrow.   
 
KATHERIN CastellonT

## 2019-05-13 NOTE — ANESTHESIA POSTPROCEDURE EVALUATION
Procedure(s): RIGHT FOOT DEBRIDEMENT OF SKIN, SUBCUTANEOUS TISSUE, AMD TENDON. Sunil Manzanares general 
 
Anesthesia Post Evaluation Multimodal analgesia: multimodal analgesia used between 6 hours prior to anesthesia start to PACU discharge Patient location during evaluation: PACU Patient participation: complete - patient participated Level of consciousness: awake Pain management: adequate Airway patency: patent Anesthetic complications: no 
Cardiovascular status: acceptable Respiratory status: acceptable Hydration status: acceptable Post anesthesia nausea and vomiting:  none Vitals Value Taken Time /75 5/13/2019  6:15 PM  
Temp 37.2 °C (98.9 °F) 5/13/2019  5:56 PM  
Pulse 73 5/13/2019  6:18 PM  
Resp 13 5/13/2019  6:17 PM  
SpO2 96 % 5/13/2019  6:18 PM  
Vitals shown include unvalidated device data.

## 2019-05-13 NOTE — ANESTHESIA PROCEDURE NOTES
Popliteal block with ultrasound    Start time: 5/13/2019 4:20 PM  End time: 5/13/2019 4:29 PM  Performed by: Jonathon Miranda MD  Authorized by: Jonathon Miranda MD       Pre-procedure:    Indications: at surgeon's request and post-op pain management    Preanesthetic Checklist: patient identified, risks and benefits discussed, site marked, timeout performed, anesthesia consent given and patient being monitored    Timeout Time: 16:20          Block Type:   Block Type:  Popliteal  Laterality:  Right  Monitoring:  Continuous pulse ox, frequent vital sign checks, heart rate, oxygen and responsive to questions  Injection Technique:  Single shot  Procedures: ultrasound guided    Prep: chlorhexidine    Location:  Lower thigh  Needle Type:  Stimuplex  Needle Gauge:  20 G  Needle Localization:  Ultrasound guidance    Assessment:  Number of attempts:  1  Injection Assessment:  Incremental injection every 5 mL, local visualized surrounding nerve on ultrasound, negative aspiration for blood, no intravascular symptoms, no paresthesia and ultrasound image on chart  Patient tolerance:  Patient tolerated the procedure well with no immediate complications  The anatomy was not completely straight forward to make out the sciatic nerve with ultrasound

## 2019-05-14 LAB
ABO + RH BLD: NORMAL
ALBUMIN SERPL-MCNC: 1.8 G/DL (ref 3.2–4.6)
ALBUMIN/GLOB SERPL: 0.5 {RATIO} (ref 1.2–3.5)
ALP SERPL-CCNC: 307 U/L (ref 50–136)
ALT SERPL-CCNC: 20 U/L (ref 12–65)
ANION GAP SERPL CALC-SCNC: 8 MMOL/L (ref 7–16)
AST SERPL-CCNC: 25 U/L (ref 15–37)
BASOPHILS # BLD: 0 K/UL (ref 0–0.2)
BASOPHILS NFR BLD: 1 % (ref 0–2)
BILIRUB SERPL-MCNC: 1.1 MG/DL (ref 0.2–1.1)
BLD PROD TYP BPU: NORMAL
BLOOD GROUP ANTIBODIES SERPL: NORMAL
BPU ID: NORMAL
BUN SERPL-MCNC: 13 MG/DL (ref 8–23)
C DIFF GDH STL QL: NORMAL
C DIFF TOX A+B STL QL IA: NORMAL
CALCIUM SERPL-MCNC: 7.8 MG/DL (ref 8.3–10.4)
CHLORIDE SERPL-SCNC: 108 MMOL/L (ref 98–107)
CLINICAL CONSIDERATION: NORMAL
CO2 SERPL-SCNC: 25 MMOL/L (ref 21–32)
CREAT SERPL-MCNC: 0.64 MG/DL (ref 0.6–1)
CROSSMATCH RESULT,%XM: NORMAL
DIFFERENTIAL METHOD BLD: ABNORMAL
EOSINOPHIL # BLD: 0 K/UL (ref 0–0.8)
EOSINOPHIL NFR BLD: 1 % (ref 0.5–7.8)
ERYTHROCYTE [DISTWIDTH] IN BLOOD BY AUTOMATED COUNT: 17.2 % (ref 11.9–14.6)
GLOBULIN SER CALC-MCNC: 3.7 G/DL (ref 2.3–3.5)
GLUCOSE BLD STRIP.AUTO-MCNC: 157 MG/DL (ref 65–100)
GLUCOSE BLD STRIP.AUTO-MCNC: 169 MG/DL (ref 65–100)
GLUCOSE BLD STRIP.AUTO-MCNC: 183 MG/DL (ref 65–100)
GLUCOSE BLD STRIP.AUTO-MCNC: 207 MG/DL (ref 65–100)
GLUCOSE SERPL-MCNC: 170 MG/DL (ref 65–100)
HCT VFR BLD AUTO: 24.2 % (ref 35.8–46.3)
HGB BLD-MCNC: 7.9 G/DL (ref 11.7–15.4)
IMM GRANULOCYTES # BLD AUTO: 0 K/UL (ref 0–0.5)
IMM GRANULOCYTES NFR BLD AUTO: 0 % (ref 0–5)
INTERPRETATION: NORMAL
LYMPHOCYTES # BLD: 0.7 K/UL (ref 0.5–4.6)
LYMPHOCYTES NFR BLD: 21 % (ref 13–44)
MCH RBC QN AUTO: 26.9 PG (ref 26.1–32.9)
MCHC RBC AUTO-ENTMCNC: 32.6 G/DL (ref 31.4–35)
MCV RBC AUTO: 82.3 FL (ref 79.6–97.8)
MONOCYTES # BLD: 0.4 K/UL (ref 0.1–1.3)
MONOCYTES NFR BLD: 12 % (ref 4–12)
NEUTS SEG # BLD: 2 K/UL (ref 1.7–8.2)
NEUTS SEG NFR BLD: 65 % (ref 43–78)
NRBC # BLD: 0 K/UL (ref 0–0.2)
PCR REFLEX: NORMAL
PLATELET # BLD AUTO: 61 K/UL (ref 150–450)
PMV BLD AUTO: 10.1 FL (ref 9.4–12.3)
POTASSIUM SERPL-SCNC: 3.4 MMOL/L (ref 3.5–5.1)
PROT SERPL-MCNC: 5.5 G/DL (ref 6.3–8.2)
RBC # BLD AUTO: 2.94 M/UL (ref 4.05–5.2)
SODIUM SERPL-SCNC: 141 MMOL/L (ref 136–145)
SPECIMEN EXP DATE BLD: NORMAL
STATUS OF UNIT,%ST: NORMAL
UNIT DIVISION, %UDIV: 0
VANCOMYCIN TROUGH SERPL-MCNC: 24.5 UG/ML (ref 5–20)
WBC # BLD AUTO: 3.1 K/UL (ref 4.3–11.1)

## 2019-05-14 PROCEDURE — 82105 ALPHA-FETOPROTEIN SERUM: CPT

## 2019-05-14 PROCEDURE — 77030025162 HC DRSG VAC ASST 1 KCON -B

## 2019-05-14 PROCEDURE — 74750000023 HC WOUND THERAPY

## 2019-05-14 PROCEDURE — 97530 THERAPEUTIC ACTIVITIES: CPT

## 2019-05-14 PROCEDURE — 77030019934 HC DRSG VAC ASST KCON -B

## 2019-05-14 PROCEDURE — 87449 NOS EACH ORGANISM AG IA: CPT

## 2019-05-14 PROCEDURE — 80202 ASSAY OF VANCOMYCIN: CPT

## 2019-05-14 PROCEDURE — 80053 COMPREHEN METABOLIC PANEL: CPT

## 2019-05-14 PROCEDURE — 65270000029 HC RM PRIVATE

## 2019-05-14 PROCEDURE — 85025 COMPLETE CBC W/AUTO DIFF WBC: CPT

## 2019-05-14 PROCEDURE — 74011250637 HC RX REV CODE- 250/637: Performed by: SURGERY

## 2019-05-14 PROCEDURE — 82962 GLUCOSE BLOOD TEST: CPT

## 2019-05-14 PROCEDURE — 97605 NEG PRS WND THER DME<=50SQCM: CPT

## 2019-05-14 PROCEDURE — 77030019952 HC CANSTR VAC ASST KCON -B

## 2019-05-14 PROCEDURE — 74011250636 HC RX REV CODE- 250/636: Performed by: SURGERY

## 2019-05-14 PROCEDURE — 74011636637 HC RX REV CODE- 636/637: Performed by: SURGERY

## 2019-05-14 PROCEDURE — 74011000258 HC RX REV CODE- 258: Performed by: SURGERY

## 2019-05-14 RX ORDER — SPIRONOLACTONE 25 MG/1
50 TABLET ORAL DAILY
Status: DISCONTINUED | OUTPATIENT
Start: 2019-05-15 | End: 2019-05-16

## 2019-05-14 RX ORDER — POTASSIUM CHLORIDE 20 MEQ/1
20 TABLET, EXTENDED RELEASE ORAL
Status: CANCELLED | OUTPATIENT
Start: 2019-05-14 | End: 2019-05-14

## 2019-05-14 RX ADMIN — SPIRONOLACTONE 25 MG: 25 TABLET, FILM COATED ORAL at 08:55

## 2019-05-14 RX ADMIN — SERTRALINE HYDROCHLORIDE 100 MG: 100 TABLET ORAL at 08:55

## 2019-05-14 RX ADMIN — INSULIN LISPRO 4 UNITS: 100 INJECTION, SOLUTION INTRAVENOUS; SUBCUTANEOUS at 16:30

## 2019-05-14 RX ADMIN — SODIUM CHLORIDE 1 G: 900 INJECTION, SOLUTION INTRAVENOUS at 21:31

## 2019-05-14 RX ADMIN — LOPERAMIDE HYDROCHLORIDE 2 MG: 2 CAPSULE ORAL at 02:33

## 2019-05-14 RX ADMIN — MAGNESIUM OXIDE TAB 400 MG (241.3 MG ELEMENTAL MG) 400 MG: 400 (241.3 MG) TAB at 17:03

## 2019-05-14 RX ADMIN — Medication 10 ML: at 21:37

## 2019-05-14 RX ADMIN — Medication 10 ML: at 16:36

## 2019-05-14 RX ADMIN — SODIUM CHLORIDE 1 G: 900 INJECTION, SOLUTION INTRAVENOUS at 08:55

## 2019-05-14 RX ADMIN — VANCOMYCIN HYDROCHLORIDE 1250 MG: 10 INJECTION, POWDER, LYOPHILIZED, FOR SOLUTION INTRAVENOUS at 04:32

## 2019-05-14 RX ADMIN — FERROUS SULFATE TAB 325 MG (65 MG ELEMENTAL FE) 325 MG: 325 (65 FE) TAB at 08:55

## 2019-05-14 RX ADMIN — Medication 10 ML: at 06:01

## 2019-05-14 RX ADMIN — LEVOTHYROXINE SODIUM 125 MCG: 75 TABLET ORAL at 05:54

## 2019-05-14 RX ADMIN — INSULIN LISPRO 2 UNITS: 100 INJECTION, SOLUTION INTRAVENOUS; SUBCUTANEOUS at 11:30

## 2019-05-14 RX ADMIN — FUROSEMIDE 40 MG: 10 INJECTION, SOLUTION INTRAMUSCULAR; INTRAVENOUS at 08:55

## 2019-05-14 RX ADMIN — INSULIN LISPRO 2 UNITS: 100 INJECTION, SOLUTION INTRAVENOUS; SUBCUTANEOUS at 21:32

## 2019-05-14 RX ADMIN — SIMETHICONE CHEW TAB 80 MG 80 MG: 80 TABLET ORAL at 00:11

## 2019-05-14 RX ADMIN — Medication 10 ML: at 06:00

## 2019-05-14 RX ADMIN — OXYCODONE HYDROCHLORIDE AND ACETAMINOPHEN 1 TABLET: 7.5; 325 TABLET ORAL at 18:08

## 2019-05-14 RX ADMIN — GABAPENTIN 300 MG: 300 CAPSULE ORAL at 21:32

## 2019-05-14 RX ADMIN — INSULIN LISPRO 2 UNITS: 100 INJECTION, SOLUTION INTRAVENOUS; SUBCUTANEOUS at 07:30

## 2019-05-14 RX ADMIN — MAGNESIUM OXIDE TAB 400 MG (241.3 MG ELEMENTAL MG) 400 MG: 400 (241.3 MG) TAB at 08:55

## 2019-05-14 RX ADMIN — OXYCODONE HYDROCHLORIDE AND ACETAMINOPHEN 1 TABLET: 7.5; 325 TABLET ORAL at 13:24

## 2019-05-14 RX ADMIN — VANCOMYCIN HYDROCHLORIDE 1250 MG: 10 INJECTION, POWDER, LYOPHILIZED, FOR SOLUTION INTRAVENOUS at 16:36

## 2019-05-14 RX ADMIN — PANTOPRAZOLE SODIUM 40 MG: 40 TABLET, DELAYED RELEASE ORAL at 08:55

## 2019-05-14 NOTE — PROGRESS NOTES
Wound Vac form submitted to [facility]. Awaiting confirmation.

## 2019-05-14 NOTE — PROGRESS NOTES
Problem: Mobility Impaired (Adult and Pediatric) Goal: *Acute Goals and Plan of Care (Insert Text) Description 1. Ms. Sherryle Money will perform sit to stand and bed to chair independently in 7 days. 2.  Ms. Sherryle Money will perform gait with rolling walker 150 ft independently in 7 days. 3.  Ms. Sherryle Money will perform up and down 2 steps with rail independently in 7 days. Outcome: Progressing Towards Goal 
 
 
PHYSICAL THERAPY: Daily Note and PM 5/14/2019 INPATIENT: PT Visit Days : 2 Payor: BLUE CROSS / Plan: [place] [employer] [place] / Product Type: PPO /   
  
NAME/AGE/GENDER: Marilee Men is a 60 y.o. female PRIMARY DIAGNOSIS: Cellulitis of right foot [L03.115] Cellulitis of right foot Cellulitis of right foot 1 Day Post-Op ICD-10: Treatment Diagnosis:  
 · Generalized Muscle Weakness (M62.81) · Difficulty in walking, Not elsewhere classified (R26.2) Precaution/Allergies: 
Pcn [penicillins] and Phenergan [promethazine] ASSESSMENT:  
Ms. Sherryle Money is seen s/p wound VAC placement. She is agreeable to therapy and states NP suggested heel weight bearing on R LE and had concerns about wound VAC patency during mobility. Pt performs bed mobility independently, CGA for sit-stand transfer. Instructed on gait mechanics with heel weight bearing and NWB if unable. Able to take steps 15 ft in room with CGA-min and heavy instruction on gait mechanics, walker management, and weight bearing status/compliance. Pt wanting to nap so returned to supine independently. Will need continued gait training. ? Post op shoe. Possible STR at [place] if possible, otherwise home with  PT. BSC has been ordered and will need walker if she does not have one. Will continue with therapy efforts. This section established at most recent assessment PROBLEM LIST (Impairments causing functional limitations): 1. Decreased Strength 2. Decreased Transfer Abilities 3. Decreased Ambulation Ability/Technique 4. Increased Pain 5. Decreased Activity Tolerance INTERVENTIONS PLANNED: (Benefits and precautions of physical therapy have been discussed with the patient.) 1. Bed Mobility 2. Gait Training 3. Therapeutic Activites 4. Transfer Training TREATMENT PLAN: Frequency/Duration: 4 times a week for duration of hospital stay Rehabilitation Potential For Stated Goals: Good REHAB RECOMMENDATIONS (at time of discharge pending progress):   
Placement: It is my opinion, based on this patient's performance to date, that Ms. Kauffman may benefit from either home with home health vs inpt rehab stay. Its too soon to tell what her needs may be. Per surgical note may be going to OR for debridement at some point. Equipment: ? To be determined. HISTORY:  
History of Present Injury/Illness (Reason for Referral): 
Patient is a 59 y/o female with hx diabetes and HTN was recently hospitalized for cellulitis of right foot. Symptoms began initially last month on the 23d. She was discharged home 5/2 on keflex and doxycycline. She had been doing well for a time but now she has noticed a blackened appearance to the dorsum of right foot. Also had two blisters on the medial aspect of foot that have ruptured. She has a WBC ct of 14.4. No fever but has had chills. Her right foot has diffuse cellulitis with a large black eschar on dorsum. Will admit for IV antibiotics and surgical evaluation Past Medical History/Comorbidities: Ms. Vladimir Quintana  has a past medical history of Diabetes ([address] and Hypertension. Ms. Vladimir Quintana  has a past surgical history that includes colonoscopy (N/A, 3/2/2019). Social History/Living Environment:  
Home Environment: Private residence # Steps to Enter: 4 One/Two Story Residence: One story Living Alone: Yes Support Systems: Family member(s), Friends \ neighbors Patient Expects to be Discharged to[de-identified] Private residence Current DME Used/Available at Home: Walker, rolling Tub or Shower Type: Tub/Shower combination Prior Level of Function/Work/Activity: 
Independent with rolling walker 
age Number of Personal Factors/Comorbidities that affect the Plan of Care: 1-2: MODERATE COMPLEXITY EXAMINATION:  
Most Recent Physical Functioning:  
Gross Assessment: 
  
         
  
Posture: 
  
Balance: 
Sitting: Intact Standing: Impaired Standing - Static: Fair Standing - Dynamic : Fair Bed Mobility: 
Rolling: Independent Supine to Sit: Independent Sit to Supine: Independent Scooting: Independent Wheelchair Mobility: 
  
Transfers: 
Sit to Stand: Contact guard assistance Stand to Sit: Contact guard assistance Bed to Chair: Contact guard assistance;Minimum assistance Interventions: Verbal cues; Safety awareness training; Tactile cues Duration: 20 Minutes Gait: 
Right Side Weight Bearing: Heel Base of Support: Narrowed; Shift to left Speed/Tamela: Pace decreased (<100 feet/min); Slow;Delayed Step Length: Left shortened;Right shortened Distance (ft): 15 Feet (ft) Assistive Device: Walker, rolling Interventions: Verbal cues; Safety awareness training; Tactile cues Body Structures Involved: 1. Muscles Body Functions Affected: 1. Movement Related Activities and Participation Affected: 1. Mobility Number of elements that affect the Plan of Care: 3: MODERATE COMPLEXITY CLINICAL PRESENTATION:  
Presentation: Evolving clinical presentation with changing clinical characteristics: MODERATE COMPLEXITY CLINICAL DECISION MAKING:  
[facility] [employer] AM-PAC 6 Clicks Basic Mobility Inpatient Short Form How much difficulty does the patient currently have. .. Unable A Lot A Little None 1. Turning over in bed (including adjusting bedclothes, sheets and blankets)? ? 1   ? 2   ? 3   ? 4  
2. Sitting down on and standing up from a chair with arms ( e.g., wheelchair, bedside commode, etc.)   ? 1   ? 2   ? 3   ? 4  
3.   Moving from lying on back to sitting on the side of the bed?   ? 1   ? 2   ? 3   ? 4  
 How much help from another person does the patient currently need. .. Total A Lot A Little None 4. Moving to and from a bed to a chair (including a wheelchair)? ? 1   ? 2   ? 3   ? 4  
5. Need to walk in hospital room? ? 1   ? 2   ? 3   ? 4  
6. Climbing 3-5 steps with a railing? ? 1   ? 2   ? 3   ? 4  
© 2007, [employer] of [facility] [employer], under license to [employer]. All rights reserved Score:  Initial: 18 Most Recent: X (Date: -- ) Interpretation of Tool:  Represents activities that are increasingly more difficult (i.e. Bed mobility, Transfers, Gait). Medical Necessity:    
· Patient is expected to demonstrate progress in functional technique ·  to decrease assistance required with mobility and gait. · . Reason for Services/Other Comments: 
· Patient continues to require present interventions due to patient's inability to function at baseline. · . Use of outcome tool(s) and clinical judgement create a POC that gives a: Questionable prediction of patient's progress: MODERATE COMPLEXITY  
  
 
 
 
TREATMENT:  
(In addition to Assessment/Re-Assessment sessions the following treatments were rendered) Pre-treatment Symptoms/Complaints:  \"I will try, I don't think I can hop\". Pain: Initial:  
Pain Intensity 1: 3 Pain Location 1: Foot Pain Orientation 1: Right Pain Intervention(s) 1: Repositioned  Post Session: no complaints in supine Therapeutic Activity: (  20 Minutes ):  Therapeutic activities including Bed transfers, sit-stand transfers, instruction on heel weight bearing and NWB, Ambulation on level ground and standing functional activities and mobility to improve mobility, strength, balance and coordination. Required minimal to moderate Verbal cues; Safety awareness training; Tactile cues to promote static and dynamic balance in standing and promote motor control of bilateral, lower extremity(s). Date: 
5/10/19 Date: 
 Date: Activity/Exercise Parameters Parameters Parameters LAQ 15x AB Seated marching 15x AB Ankle pumps 15x AB Quad sets 10x AB Braces/Orthotics/Lines/Etc:  
· IV 
· luciano catheter · wound VAC 
· O2 Device: Room air Treatment/Session Assessment:   
· Response to Treatment:  CGA-min A in room with instruction on gait training/heel weight bearing or NWB 
· Interdisciplinary Collaboration:  
o Physical Therapist 
o Registered Nurse 
o Physician 
o  · After treatment position/precautions:  
o Supine in bed 
o Bed/Chair-wheels locked 
o Bed in low position 
o Call light within reach 
o RN notified · Compliance with Program/Exercises: Will assess as treatment progresses · Recommendations/Intent for next treatment session: \"Next visit will focus on advancements to more challenging activities and reduction in assistance provided\". Total Treatment Duration: PT Patient Time In/Time Out Time In: 8083 Time Out: 7043 Duane Rea DPT

## 2019-05-14 NOTE — WOUND CARE
Started wound VAC to dorsal right foot wound. 5.5x5.5x0.9 cm with exposed tendons. White foam then black. Set to 125 mmHg suction. Patient tolerated well. Wound has small amount of slough remaining. Home VAC form completed and given to care manager. Seen with Charli Jacome Np/ surgical team. Will follow as needed. Patient updated and all questions answered.

## 2019-05-14 NOTE — PROGRESS NOTES
Nutrition Reason for assessment: Length of Stay Assessment:  
Admitted with R foot cellulitis s/p debridement 5/13.  +wound vac. Recent diagnosis of cirrhosis, now with worsening ascites. PMH remarkable for DM, HTN, cellulitis. Food/Nutrition Patient History:  Pt with complaints of renal diet restrictions and getting sugar. Questions what nutrition guidelines she should be following. Diet was changed to renal post op. Discussed the importance cho control and limiting sodium intake. DIET DIABETIC CONSISTENT CARB Regular Pertinent Labs: K 3.4, Glu 170, POC glucose 121-169 Anthropometrics:Height: 5' 7\" (170.2 cm),  Weight: 94.8 kg (209 lb), Weight Source: Parent stated, Body mass index is 32.73 kg/m². BMI class of obesity. Macronutrient needs: 95 kg listed body wt EER:  6102-4182 kcal /day (20-25 kcal/kg) EPR:   grams protein/day (1-1.2 grams/kg) GFR >60 Intake/Comparative Standards: Recorded meal(s): %. This potentially meets ~50% of kcal and ~50% of protein needs Nutrition Diagnosis: Inadequate oral intake related to increased needs for wound healing as evidenced by pt with variable intake on restricted diet meeting ~50% estimated needs. Intervention: 
Meals and snacks: Diet modified to consistent cho no salt  Packets, pt resists to low sodium restriction. Nutrition Supplement Therapy: Ensure High Protein once daily. Coordination of Nutrition Care: Raye Ormond, RN. Discharge Nutrition Plan: Too soon to determine. [facility] [address], [address] [place] [address]

## 2019-05-14 NOTE — PROGRESS NOTES
Hospitalist Progress Note Admit Date:  2019  8:39 PM  
Name:  Gopal Flores Age:  60 y.o. 
:  1955 MRN:  [id number] PCP:  Ben Louie MD 
Treatment Team: Attending Provider: Mónica Torres MD; Consulting Provider: Sean Patten MD; Utilization Review: Jonelle Mcnulty RN; Care Manager: kT Vazquez RN; Physical Therapist: Dennys Stanley DPT Subjective:  
 
Pt is a 59 yo female with pmh DM 2, cirrhosis, anemia with ablation of AVM colon who presented to ER  for worsening right foot cellulitis despite Doxy/Keflex at home. She initially had bedside I&D, was started on Vanc/Cefepime.  she underwent I&D in OR. Additionally pt with increasing abd distention, noted with new ascites. She was started on IV Lasix with improvement. This morning pt states weak due to diarrhea. Discussed low Hgb, she denies dark or bloody stools. She had episode of watery diarrhea overnight. Pt is quite worried about her d/c plans. Objective:  
 
Patient Vitals for the past 24 hrs: 
 Temp Pulse Resp BP SpO2  
19 0812 98.5 °F (36.9 °C) 73 18 114/55 93 % 19 0621 98.3 °F (36.8 °C) 66 18 111/46   
19 2023     97 % 19 1944 97.9 °F (36.6 °C) 72 16 151/68   
19 1815  70 14 154/75 97 % 19 1811  71 15 157/71 96 % 19 1806  70 17 170/76 97 % 19 1801  73 17 172/76 98 % 19 1756 98.9 °F (37.2 °C) 72 15 171/74 100 % 19 1751  69 14 157/69 99 % 19 1746  73 24 162/70 100 % 19 1740  70 14 157/67 100 % 19 1736  70 15 148/66 99 % 19 1730  71 14 157/69 100 % 19 1726 98.4 °F (36.9 °C) 71 16 147/67 99 % 19 1636  70 16 127/60 99 % 19 1631  71 16 128/60 100 % 19 1630   16  100 % 19 1620   16  99 % 19 1558 98.8 °F (37.1 °C) 75 16 148/66 97 % 19 1446 97.9 °F (36.6 °C) 72 16 128/68 99 % 05/13/19 1346 98 °F (36.7 °C) 75 18 152/75 98 % 05/13/19 1249 98 °F (36.7 °C) 75 18 144/76 97 % 05/13/19 1233 98.1 °F (36.7 °C) 76 18 130/72 99 % 05/13/19 1133 98 °F (36.7 °C) 79 16 132/74 99 % Oxygen Therapy O2 Sat (%): 93 % (05/14/19 0812) Pulse via Oximetry: 71 beats per minute (05/13/19 1815) O2 Device: Room air (05/13/19 2023) O2 Flow Rate (L/min): 2 l/min (05/13/19 1726) FIO2 (%): 21 % (05/13/19 2023) Intake/Output Summary (Last 24 hours) at 5/14/2019 0950 Last data filed at 5/14/2019 6828 Gross per 24 hour Intake 295 ml Output 4725 ml Net -4430 ml General:    Well nourished. Alert. CV:   RRR. No murmur, rub, or gallop. Lungs:   CTAB. No wheezing, rhonchi, or rales. Abdomen:   Soft, nontender, nondistended. Extremities: Warm and dry. No cyanosis or edema. Skin:     No rashes or jaundice. Right foot cellulitis with open blisters Neuro:  No gross focal deficits Data Review: 
I have reviewed all labs, meds, telemetry events, and studies from the last 24 hours: 
 
Recent Results (from the past 24 hour(s)) TYPE + CROSSMATCH Collection Time: 05/13/19 10:07 AM  
Result Value Ref Range Crossmatch Expiration 05/16/2019 ABO/Rh(D) O POSITIVE Antibody screen NEG Unit number [id number] Blood component type RC LR Unit division 00 Status of unit TRANSFUSED Crossmatch result Compatible PROTHROMBIN TIME + INR Collection Time: 05/13/19 10:10 AM  
Result Value Ref Range Prothrombin time 15.5 (H) 11.7 - 14.5 sec INR 1.3 GLUCOSE, POC Collection Time: 05/13/19 11:17 AM  
Result Value Ref Range Glucose (POC) 133 (H) 65 - 100 mg/dL GLUCOSE, POC Collection Time: 05/13/19  3:24 PM  
Result Value Ref Range Glucose (POC) 135 (H) 65 - 100 mg/dL GLUCOSE, POC Collection Time: 05/13/19  4:11 PM  
Result Value Ref Range Glucose (POC) 125 (H) 65 - 100 mg/dL GLUCOSE, POC  Collection Time: 05/13/19  5:28 PM  
 Result Value Ref Range Glucose (POC) 121 (H) 65 - 100 mg/dL HGB & HCT Collection Time: 05/13/19  8:13 PM  
Result Value Ref Range HGB 8.6 (L) 11.7 - 15.4 g/dL HCT 26.4 (L) 35.8 - 46.3 % GLUCOSE, POC Collection Time: 05/13/19  9:19 PM  
Result Value Ref Range Glucose (POC) 164 (H) 65 - 100 mg/dL CBC WITH AUTOMATED DIFF Collection Time: 05/14/19  4:42 AM  
Result Value Ref Range WBC 3.1 (L) 4.3 - 11.1 K/uL  
 RBC 2.94 (L) 4.05 - 5.2 M/uL HGB 7.9 (L) 11.7 - 15.4 g/dL HCT 24.2 (L) 35.8 - 46.3 % MCV 82.3 79.6 - 97.8 FL  
 MCH 26.9 26.1 - 32.9 PG  
 MCHC 32.6 31.4 - 35.0 g/dL  
 RDW 17.2 (H) 11.9 - 14.6 % PLATELET 61 (L) 064 - 450 K/uL MPV 10.1 9.4 - 12.3 FL ABSOLUTE NRBC 0.00 0.0 - 0.2 K/uL  
 DF AUTOMATED NEUTROPHILS 65 43 - 78 % LYMPHOCYTES 21 13 - 44 % MONOCYTES 12 4.0 - 12.0 % EOSINOPHILS 1 0.5 - 7.8 % BASOPHILS 1 0.0 - 2.0 % IMMATURE GRANULOCYTES 0 0.0 - 5.0 %  
 ABS. NEUTROPHILS 2.0 1.7 - 8.2 K/UL  
 ABS. LYMPHOCYTES 0.7 0.5 - 4.6 K/UL  
 ABS. MONOCYTES 0.4 0.1 - 1.3 K/UL  
 ABS. EOSINOPHILS 0.0 0.0 - 0.8 K/UL  
 ABS. BASOPHILS 0.0 0.0 - 0.2 K/UL  
 ABS. IMM. GRANS. 0.0 0.0 - 0.5 K/UL METABOLIC PANEL, COMPREHENSIVE Collection Time: 05/14/19  4:42 AM  
Result Value Ref Range Sodium 141 136 - 145 mmol/L Potassium 3.4 (L) 3.5 - 5.1 mmol/L Chloride 108 (H) 98 - 107 mmol/L  
 CO2 25 21 - 32 mmol/L Anion gap 8 7 - 16 mmol/L Glucose 170 (H) 65 - 100 mg/dL BUN 13 8 - 23 MG/DL Creatinine 0.64 0.6 - 1.0 MG/DL  
 GFR est AA >60 >60 ml/min/1.73m2 GFR est non-AA >60 >60 ml/min/1.73m2 Calcium 7.8 (L) 8.3 - 10.4 MG/DL Bilirubin, total 1.1 0.2 - 1.1 MG/DL  
 ALT (SGPT) 20 12 - 65 U/L  
 AST (SGOT) 25 15 - 37 U/L Alk. phosphatase 307 (H) 50 - 136 U/L Protein, total 5.5 (L) 6.3 - 8.2 g/dL Albumin 1.8 (L) 3.2 - 4.6 g/dL Globulin 3.7 (H) 2.3 - 3.5 g/dL A-G Ratio 0.5 (L) 1.2 - 3.5 GLUCOSE, POC  
 Collection Time: 05/14/19  7:39 AM  
Result Value Ref Range Glucose (POC) 157 (H) 65 - 100 mg/dL All Micro Results Procedure Component Value Units Date/Time C. DIFFICILE AG & TOXIN A/B [[id number]] Order Status:  Sent Specimen:  Stool CULTURE, BLOOD [[id number]] Collected:  05/07/19 2345 Order Status:  Completed Specimen:  Whole Blood Updated:  05/13/19 1010 Special Requests: --     
  RIGHT 
FOREARM Culture result: NO GROWTH 5 DAYS     
 CULTURE, BLOOD [[id number]] Collected:  05/07/19 2138 Order Status:  Completed Specimen:  Whole Blood Updated:  05/12/19 0747 Special Requests: --     
  LEFT Antecubital 
  
  Culture result: NO GROWTH 5 DAYS No results found for this visit on 05/07/19. Current Meds: 
Current Facility-Administered Medications Medication Dose Route Frequency  0.9% sodium chloride infusion 250 mL  250 mL IntraVENous PRN  
 0.9% sodium chloride infusion 250 mL  250 mL IntraVENous PRN  
 simethicone (MYLICON) tablet 80 mg  80 mg Oral QID PRN  promethazine (PHENERGAN) with saline injection 6.25 mg  6.25 mg IntraVENous Q4H PRN  
 magnesium oxide (MAG-OX) tablet 400 mg  400 mg Oral BID  furosemide (LASIX) injection 40 mg  40 mg IntraVENous Q12H  
 spironolactone (ALDACTONE) tablet 25 mg  25 mg Oral DAILY  ondansetron (ZOFRAN) injection 4 mg  4 mg IntraVENous Q6H PRN  
 sertraline (ZOLOFT) tablet 100 mg  100 mg Oral DAILY  vancomycin (VANCOCIN) 1250 mg in  ml infusion  1,250 mg IntraVENous Q12H  
 loperamide (IMODIUM) capsule 2 mg  2 mg Oral Q8H PRN  
 0.9% sodium chloride infusion 250 mL  250 mL IntraVENous PRN  
 sodium chloride (NS) flush 10 mL  10 mL InterCATHeter Q8H  
 sodium chloride (NS) flush 10 mL  10 mL InterCATHeter PRN  
 ferrous sulfate tablet 325 mg  325 mg Oral DAILY  gabapentin (NEURONTIN) capsule 300 mg  300 mg Oral QHS  levothyroxine (SYNTHROID) tablet 125 mcg  125 mcg Oral ACB  pantoprazole (PROTONIX) tablet 40 mg  40 mg Oral DAILY  dextrose 40% (GLUTOSE) oral gel 1 Tube  15 g Oral PRN  
 glucagon (GLUCAGEN) injection 1 mg  1 mg IntraMUSCular PRN  
 dextrose (D50) infusion 12.5-25 g  25-50 mL IntraVENous PRN  
 insulin lispro (HUMALOG) injection 0-10 Units  0-10 Units SubCUTAneous AC&HS  sodium chloride (NS) flush 5-40 mL  5-40 mL IntraVENous Q8H  
 sodium chloride (NS) flush 5-40 mL  5-40 mL IntraVENous PRN  
 acetaminophen (TYLENOL) tablet 650 mg  650 mg Oral Q4H PRN  
 oxyCODONE-acetaminophen (PERCOCET 7.5) 7.5-325 mg per tablet 1 Tab  1 Tab Oral Q4H PRN  
 naloxone (NARCAN) injection 0.4 mg  0.4 mg IntraVENous PRN  
 cefepime (MAXIPIME) 1 g in 0.9% sodium chloride (MBP/ADV) 50 mL ADV  1 g IntraVENous Q12H Other Studies (last 24 hours): No results found. Assessment and Plan:  
 
Hospital Problems as of 5/14/2019 Date Reviewed: 4/24/2019 Codes Class Noted - Resolved POA * (Principal) Cellulitis of right foot ICD-10-CM: D76.169 ICD-9-CM: 682.7  5/7/2019 - Present Yes Cirrhosis of liver without ascites (HCC) ICD-10-CM: K74.60 ICD-9-CM: 571.5  3/7/2019 - Present Yes Microcytic anemia ICD-10-CM: D50.9 ICD-9-CM: 280.9  2/27/2019 - Present Yes [facility] ICD-10-CM: E11.21 
ICD-9-CM: 250.40, 583.81  1/16/2019 - Present Yes Hyperlipidemia ICD-10-CM: E78.5 ICD-9-CM: 272.4  1/12/2016 - Present Yes Essential hypertension ICD-10-CM: I10 
ICD-9-CM: 401.9  1/12/2016 - Present Yes Acquired hypothyroidism ICD-10-CM: E03.9 ICD-9-CM: 244.9  1/12/2016 - Present Yes Plan: 
 
Right foot cellulitis X Ray with no obvious osteo Sp debridement 05/13 Cont Vanc/Cefepime started overnight Unfortunately no wound cultures, BC NG. Wound care following, dsg change 05/15 per surgery NS @ 100 Anemia, uncertain etiology Previous AVM but repaired, check FOBT S/p 2 units PRBCs PO Protonix Avoid NSAIDs  
 
DM Humalog SSI achs Lantus on hold Diarrhea Likely due to abx If meets criteria check for C Diff 
Probiotic and PRN Imodium Recent dx of cirrhosis, now with worsening ascites and pancytopenia  
05/13 US showed spelnomegaly and ascites Consider diagnostic para? Daily weights Cont IV Lasix, 4 L output in past 24 hours Needs further GI input, if does not cont to clinically improve, consult inpatient DC planning CM following Ideally will dc to STR but due to insurance likely will dc home with home health Diet:  DIET RENAL 
DVT ppx:  Lovenox Signed: 
Damian Holt NP

## 2019-05-14 NOTE — PROGRESS NOTES
Interdisciplinary Rounds completed 05/14/19. Nursing, Case Management, Physician and PT present. Plan of care reviewed and updated. Continue luciano d/t lasix per NP. Wound vac placed

## 2019-05-14 NOTE — PROGRESS NOTES
Hgb 8.6 last night per lab, and dropped to 7.9 this AM. Patient had at least 7 loose/watery stools with foul odor for this shift. MD notified earlier and ordered C. Diff test, patient now on Enteric precaution. Stool sample sent to the lab this AM per order. Sainz cares performed. Patient has been afebrile, VSS. No nausea or vomiting complained. Dressing on the right foot c/d/i. Hourly rounds performed;all pt needs met. Bed in low position and call light/ personal items within reach. Will continue to monitor and give bedside shift report to oncoming day shift nurse.

## 2019-05-14 NOTE — PROGRESS NOTES
Pt interested in STR but has concerns regarding insurance. Referrals sent to [facility]. Awaiting response. Facilities ,if they make a bed offer, will run insurance to verify that it covers STR.

## 2019-05-14 NOTE — PROGRESS NOTES
POD#1 s/p surgical debridement right foot Awake in bed, no complaints. Denies pain. Visit Vitals /55 (BP 1 Location: Right arm, BP Patient Position: At rest) Pulse 73 Temp 98.5 °F (36.9 °C) Resp 18 Ht 5' 7\" (1.702 m) Wt 209 lb (94.8 kg) SpO2 93% BMI 32.73 kg/m² In NAD 
R foot- with surgical dressing c/d/i Lab Results Component Value Date/Time WBC 3.1 (L) 05/14/2019 04:42 AM  
 Hemoglobin (POC) 8.6 04/24/2019 04:11 PM  
 HGB 7.9 (L) 05/14/2019 04:42 AM  
 HCT 24.2 (L) 05/14/2019 04:42 AM  
 PLATELET 61 (L) 64/75/3022 04:42 AM  
 MCV 82.3 05/14/2019 04:42 AM  
  
 
1. Diabetic foot infection ([address] 2. Cellulitis of right lower extremity Will remove dressing with wound team later today and assess for wound vac placement at that time. Not stable thrombocytopenia; likely due to cirrhosis (pancytopenia). Defer any transfusion to primary service.

## 2019-05-14 NOTE — PROGRESS NOTES
Hourly rounds completed during shift. Wound vac placed on R foot today, suctioning well. Pain addressed. Sainz draining yellow/clear, OP 1900 this shift. All needs met, bed locked in low position and call light within reach. Will give report to oncoming RN.

## 2019-05-15 LAB
AFP-TM SERPL-MCNC: 2.9 NG/ML
ALBUMIN SERPL-MCNC: 1.8 G/DL (ref 3.2–4.6)
ALBUMIN/GLOB SERPL: 0.5 {RATIO} (ref 1.2–3.5)
ALP SERPL-CCNC: 277 U/L (ref 50–136)
ALT SERPL-CCNC: 14 U/L (ref 12–65)
ANION GAP SERPL CALC-SCNC: 7 MMOL/L (ref 7–16)
AST SERPL-CCNC: 24 U/L (ref 15–37)
BASOPHILS # BLD: 0 K/UL (ref 0–0.2)
BASOPHILS NFR BLD: 1 % (ref 0–2)
BILIRUB SERPL-MCNC: 0.9 MG/DL (ref 0.2–1.1)
BUN SERPL-MCNC: 13 MG/DL (ref 8–23)
CALCIUM SERPL-MCNC: 7.4 MG/DL (ref 8.3–10.4)
CHLORIDE SERPL-SCNC: 105 MMOL/L (ref 98–107)
CO2 SERPL-SCNC: 26 MMOL/L (ref 21–32)
CREAT SERPL-MCNC: 0.68 MG/DL (ref 0.6–1)
DIFFERENTIAL METHOD BLD: ABNORMAL
EOSINOPHIL # BLD: 0 K/UL (ref 0–0.8)
EOSINOPHIL NFR BLD: 1 % (ref 0.5–7.8)
ERYTHROCYTE [DISTWIDTH] IN BLOOD BY AUTOMATED COUNT: 17.6 % (ref 11.9–14.6)
GLOBULIN SER CALC-MCNC: 3.3 G/DL (ref 2.3–3.5)
GLUCOSE BLD STRIP.AUTO-MCNC: 126 MG/DL (ref 65–100)
GLUCOSE BLD STRIP.AUTO-MCNC: 159 MG/DL (ref 65–100)
GLUCOSE BLD STRIP.AUTO-MCNC: 167 MG/DL (ref 65–100)
GLUCOSE BLD STRIP.AUTO-MCNC: 209 MG/DL (ref 65–100)
GLUCOSE SERPL-MCNC: 120 MG/DL (ref 65–100)
HCT VFR BLD AUTO: 24.2 % (ref 35.8–46.3)
HGB BLD-MCNC: 8 G/DL (ref 11.7–15.4)
IMM GRANULOCYTES # BLD AUTO: 0 K/UL (ref 0–0.5)
IMM GRANULOCYTES NFR BLD AUTO: 0 % (ref 0–5)
LYMPHOCYTES # BLD: 0.8 K/UL (ref 0.5–4.6)
LYMPHOCYTES NFR BLD: 28 % (ref 13–44)
MCH RBC QN AUTO: 27.7 PG (ref 26.1–32.9)
MCHC RBC AUTO-ENTMCNC: 33.1 G/DL (ref 31.4–35)
MCV RBC AUTO: 83.7 FL (ref 79.6–97.8)
MONOCYTES # BLD: 0.3 K/UL (ref 0.1–1.3)
MONOCYTES NFR BLD: 11 % (ref 4–12)
NEUTS SEG # BLD: 1.7 K/UL (ref 1.7–8.2)
NEUTS SEG NFR BLD: 59 % (ref 43–78)
NRBC # BLD: 0 K/UL (ref 0–0.2)
PLATELET # BLD AUTO: 64 K/UL (ref 150–450)
PMV BLD AUTO: 11.1 FL (ref 9.4–12.3)
POTASSIUM SERPL-SCNC: 3.5 MMOL/L (ref 3.5–5.1)
PROT SERPL-MCNC: 5.1 G/DL (ref 6.3–8.2)
RBC # BLD AUTO: 2.89 M/UL (ref 4.05–5.2)
SODIUM SERPL-SCNC: 138 MMOL/L (ref 136–145)
WBC # BLD AUTO: 2.8 K/UL (ref 4.3–11.1)

## 2019-05-15 PROCEDURE — 74011250636 HC RX REV CODE- 250/636: Performed by: NURSE PRACTITIONER

## 2019-05-15 PROCEDURE — 74011000258 HC RX REV CODE- 258: Performed by: SURGERY

## 2019-05-15 PROCEDURE — 74011250637 HC RX REV CODE- 250/637: Performed by: NURSE PRACTITIONER

## 2019-05-15 PROCEDURE — 74011250637 HC RX REV CODE- 250/637: Performed by: SURGERY

## 2019-05-15 PROCEDURE — 74750000023 HC WOUND THERAPY

## 2019-05-15 PROCEDURE — 80053 COMPREHEN METABOLIC PANEL: CPT

## 2019-05-15 PROCEDURE — 36415 COLL VENOUS BLD VENIPUNCTURE: CPT

## 2019-05-15 PROCEDURE — 65270000029 HC RM PRIVATE

## 2019-05-15 PROCEDURE — 97530 THERAPEUTIC ACTIVITIES: CPT

## 2019-05-15 PROCEDURE — 82962 GLUCOSE BLOOD TEST: CPT

## 2019-05-15 PROCEDURE — P9047 ALBUMIN (HUMAN), 25%, 50ML: HCPCS | Performed by: NURSE PRACTITIONER

## 2019-05-15 PROCEDURE — 85025 COMPLETE CBC W/AUTO DIFF WBC: CPT

## 2019-05-15 PROCEDURE — 74011636637 HC RX REV CODE- 636/637: Performed by: SURGERY

## 2019-05-15 PROCEDURE — 74011250636 HC RX REV CODE- 250/636: Performed by: SURGERY

## 2019-05-15 RX ORDER — ALBUMIN HUMAN 250 G/1000ML
25 SOLUTION INTRAVENOUS ONCE
Status: COMPLETED | OUTPATIENT
Start: 2019-05-15 | End: 2019-05-15

## 2019-05-15 RX ORDER — VANCOMYCIN/0.9 % SOD CHLORIDE 750 MG/250
750 PLASTIC BAG, INJECTION (ML) INTRAVENOUS EVERY 12 HOURS
Status: DISCONTINUED | OUTPATIENT
Start: 2019-05-16 | End: 2019-05-23 | Stop reason: HOSPADM

## 2019-05-15 RX ORDER — TRAMADOL HYDROCHLORIDE 50 MG/1
50 TABLET ORAL
Status: DISCONTINUED | OUTPATIENT
Start: 2019-05-15 | End: 2019-05-23 | Stop reason: HOSPADM

## 2019-05-15 RX ADMIN — TRAMADOL HYDROCHLORIDE 50 MG: 50 TABLET, FILM COATED ORAL at 17:13

## 2019-05-15 RX ADMIN — Medication 10 ML: at 05:01

## 2019-05-15 RX ADMIN — Medication 10 ML: at 13:33

## 2019-05-15 RX ADMIN — GABAPENTIN 300 MG: 300 CAPSULE ORAL at 21:06

## 2019-05-15 RX ADMIN — Medication 10 ML: at 04:51

## 2019-05-15 RX ADMIN — OXYCODONE HYDROCHLORIDE AND ACETAMINOPHEN 1 TABLET: 7.5; 325 TABLET ORAL at 00:23

## 2019-05-15 RX ADMIN — MAGNESIUM OXIDE TAB 400 MG (241.3 MG ELEMENTAL MG) 400 MG: 400 (241.3 MG) TAB at 08:47

## 2019-05-15 RX ADMIN — OXYCODONE HYDROCHLORIDE AND ACETAMINOPHEN 1 TABLET: 7.5; 325 TABLET ORAL at 23:16

## 2019-05-15 RX ADMIN — LEVOTHYROXINE SODIUM 125 MCG: 75 TABLET ORAL at 04:48

## 2019-05-15 RX ADMIN — VANCOMYCIN HYDROCHLORIDE 1250 MG: 10 INJECTION, POWDER, LYOPHILIZED, FOR SOLUTION INTRAVENOUS at 04:03

## 2019-05-15 RX ADMIN — Medication 10 ML: at 13:30

## 2019-05-15 RX ADMIN — SODIUM CHLORIDE 1 G: 900 INJECTION, SOLUTION INTRAVENOUS at 21:06

## 2019-05-15 RX ADMIN — INSULIN LISPRO 2 UNITS: 100 INJECTION, SOLUTION INTRAVENOUS; SUBCUTANEOUS at 17:08

## 2019-05-15 RX ADMIN — ALBUMIN (HUMAN) 25 G: 0.25 INJECTION, SOLUTION INTRAVENOUS at 11:51

## 2019-05-15 RX ADMIN — Medication 10 ML: at 05:00

## 2019-05-15 RX ADMIN — PANTOPRAZOLE SODIUM 40 MG: 40 TABLET, DELAYED RELEASE ORAL at 08:47

## 2019-05-15 RX ADMIN — MAGNESIUM OXIDE TAB 400 MG (241.3 MG ELEMENTAL MG) 400 MG: 400 (241.3 MG) TAB at 17:09

## 2019-05-15 RX ADMIN — OXYCODONE HYDROCHLORIDE AND ACETAMINOPHEN 1 TABLET: 7.5; 325 TABLET ORAL at 06:03

## 2019-05-15 RX ADMIN — SODIUM CHLORIDE 1 G: 900 INJECTION, SOLUTION INTRAVENOUS at 08:48

## 2019-05-15 RX ADMIN — FERROUS SULFATE TAB 325 MG (65 MG ELEMENTAL FE) 325 MG: 325 (65 FE) TAB at 08:47

## 2019-05-15 RX ADMIN — INSULIN LISPRO 2 UNITS: 100 INJECTION, SOLUTION INTRAVENOUS; SUBCUTANEOUS at 12:17

## 2019-05-15 RX ADMIN — Medication 10 ML: at 22:00

## 2019-05-15 RX ADMIN — SERTRALINE HYDROCHLORIDE 100 MG: 100 TABLET ORAL at 08:47

## 2019-05-15 RX ADMIN — INSULIN LISPRO 4 UNITS: 100 INJECTION, SOLUTION INTRAVENOUS; SUBCUTANEOUS at 21:09

## 2019-05-15 NOTE — PROGRESS NOTES
Hourly rounds completed. Patient resting in bed comfortably. Received albumin to for hypotension which was effective. Given Ultram for pain today, prefers this over percocet. Will continue to monitor. Will give report to oncoming nurse.

## 2019-05-15 NOTE — PROGRESS NOTES
Pharmacokinetic Consult to Pharmacist 
 
Sadia Bing is a 60 y.o. female being treated for diabetic foot infection with Vancomycin. Height: 5' 7\" (170.2 cm)  Weight: 93.8 kg (206 lb 11.2 oz) Lab Results Component Value Date/Time BUN 13 05/15/2019 05:48 AM  
 Creatinine 0.68 05/15/2019 05:48 AM  
 WBC 2.8 (L) 05/15/2019 05:48 AM  
 Procalcitonin 10.6 04/24/2019 06:29 PM  
 Lactic Acid (POC) 1.97 (H) 05/07/2019 08:30 PM  
  
Estimated Creatinine Clearance: 100.9 mL/min (based on SCr of 0.68 mg/dL). Lab Results Component Value Date/Time Vancomycin,trough 24.5 (HH) 05/14/2019 04:24 PM  
 
 
 
Day 9 of vancomycin. Goal trough is 15-20. Vancomycin trough resulted above goal at 24.5 so will reduce dose to 750 mg Q12H starting at 0600 on 5/16/19 to allow time for drug to clear. Plan next trough prior to 4th dose of new regimen. Will continue to follow patient. Thank you, 
Kia Ojeda, PharmD Clinical Pharmacist 
[phone]

## 2019-05-15 NOTE — PROGRESS NOTES
Problem: Mobility Impaired (Adult and Pediatric) Goal: *Acute Goals and Plan of Care (Insert Text) Description 1. Ms. Addie Cavazos will perform sit to stand and bed to chair independently in 7 days. 2.  Ms. Addie Cavazos will perform gait with rolling walker 150 ft independently in 7 days. 3.  Ms. Addie Cavazos will perform up and down 2 steps with rail independently in 7 days. Outcome: Progressing Towards Goal 
 
 
PHYSICAL THERAPY: Daily Note and PM 5/15/2019 INPATIENT: PT Visit Days : 3 Payor: BLUE CROSS / Plan: [place] / Product Type: PPO /   
  
NAME/AGE/GENDER: Celina Plasremberto is a 60 y.o. female PRIMARY DIAGNOSIS: Cellulitis of right foot [L03.115] Cellulitis of right foot Cellulitis of right foot 2 Days Post-Op ICD-10: Treatment Diagnosis:  
 · Generalized Muscle Weakness (M62.81) · Difficulty in walking, Not elsewhere classified (R26.2) Precaution/Allergies: 
Pcn [penicillins] and Phenergan [promethazine] ASSESSMENT:  
Ms. Addie Cavazos is supine on arrival, wound vac and luciano in place. Pt heel weight bearing R foot. PT requested off loading shoe to be ordered for post op mobility. Pt making some progress with mobility this date. Pt with increased ambulation and tolerance. Pt states no pain presently which is assisting with increased mobility. Pt with very short steppage gait, cues for maintaining HWB at times, pt unable to perform NWB R LE. After ambulation pt set up for bathing in bathroom, CGA to MIN A for transfers and ambulation. Pt left up in bathroom seated on BSC, needs in reach for bathing. RN made aware. Pt reports has walker at home for use and BSC has been ordered for home. Pt declined STR, states desire to go home with HHPT, case management aware. PT to cont to follow for acute care needs, pt making slow progress toward goals. This section established at most recent assessment PROBLEM LIST (Impairments causing functional limitations): 1. Decreased Strength 2. Decreased Transfer Abilities 3. Decreased Ambulation Ability/Technique 4. Increased Pain 5. Decreased Activity Tolerance INTERVENTIONS PLANNED: (Benefits and precautions of physical therapy have been discussed with the patient.) 1. Bed Mobility 2. Gait Training 3. Therapeutic Activites 4. Transfer Training TREATMENT PLAN: Frequency/Duration: 4 times a week for duration of hospital stay Rehabilitation Potential For Stated Goals: Good REHAB RECOMMENDATIONS (at time of discharge pending progress):   
Placement: It is my opinion, based on this patient's performance to date, that Ms. Kauffman may benefit from either home with home health vs inpt rehab stay. Its too soon to tell what her needs may be. Per surgical note may be going to OR for debridement at some point. Equipment: ? To be determined. HISTORY:  
History of Present Injury/Illness (Reason for Referral): 
Patient is a 59 y/o female with hx diabetes and HTN was recently hospitalized for cellulitis of right foot. Symptoms began initially last month on the 23d. She was discharged home 5/2 on keflex and doxycycline. She had been doing well for a time but now she has noticed a blackened appearance to the dorsum of right foot. Also had two blisters on the medial aspect of foot that have ruptured. She has a WBC ct of 14.4. No fever but has had chills. Her right foot has diffuse cellulitis with a large black eschar on dorsum. Will admit for IV antibiotics and surgical evaluation Past Medical History/Comorbidities: Ms. Brady Hilario  has a past medical history of Diabetes ([address] and Hypertension. Ms. Brady Hilario  has a past surgical history that includes colonoscopy (N/A, 3/2/2019). Social History/Living Environment:  
Home Environment: Private residence # Steps to Enter: 4 One/Two Story Residence: One story Living Alone: Yes Support Systems: Family member(s), Friends \ neighbors Patient Expects to be Discharged to[de-identified] Private residence Current DME Used/Available at Home: Walker, rolling Tub or Shower Type: Tub/Shower combination Prior Level of Function/Work/Activity: 
Independent with rolling walker 
age Number of Personal Factors/Comorbidities that affect the Plan of Care: 1-2: MODERATE COMPLEXITY EXAMINATION:  
Most Recent Physical Functioning:  
Gross Assessment: 
  
         
  
Posture: 
Posture (WDL): Within defined limits Balance: 
Sitting: Intact Standing: Impaired Standing - Static: Fair Standing - Dynamic : Fair Bed Mobility: 
Supine to Sit: Independent;Supervision Sit to Supine: (left in bathroom, set up for bathing) Scooting: Independent Wheelchair Mobility: 
  
Transfers: 
Sit to Stand: Contact guard assistance Stand to Sit: Contact guard assistance Gait: 
Right Side Weight Bearing: Heel Base of Support: Narrowed; Shift to left Speed/Tamela: Pace decreased (<100 feet/min) Step Length: Left shortened;Right shortened Gait Abnormalities: Decreased step clearance; Steppage gait Distance (ft): 40 Feet (ft) Assistive Device: Gait belt;Walker, rolling Ambulation - Level of Assistance: Contact guard assistance Interventions: Safety awareness training;Verbal cues Body Structures Involved: 1. Muscles Body Functions Affected: 1. Movement Related Activities and Participation Affected: 1. Mobility Number of elements that affect the Plan of Care: 3: MODERATE COMPLEXITY CLINICAL PRESENTATION:  
Presentation: Evolving clinical presentation with changing clinical characteristics: MODERATE COMPLEXITY CLINICAL DECISION MAKING:  
[employer] AM-PAC 6 Clicks Basic Mobility Inpatient Short Form How much difficulty does the patient currently have. .. Unable A Lot A Little None 1. Turning over in bed (including adjusting bedclothes, sheets and blankets)? ? 1   ? 2   ? 3   ? 4  
2. Sitting down on and standing up from a chair with arms ( e.g., wheelchair, bedside commode, etc.)   ?  1   ? 2   ? 3   ? 4  
 3. Moving from lying on back to sitting on the side of the bed?   ? 1   ? 2   ? 3   ? 4 How much help from another person does the patient currently need. .. Total A Lot A Little None 4. Moving to and from a bed to a chair (including a wheelchair)? ? 1   ? 2   ? 3   ? 4  
5. Need to walk in hospital room? ? 1   ? 2   ? 3   ? 4  
6. Climbing 3-5 steps with a railing? ? 1   ? 2   ? 3   ? 4  
© 2007, Trustees of [facility] [employer], under license to [employer]. All rights reserved Score:  Initial: 18 Most Recent: X (Date: -- ) Interpretation of Tool:  Represents activities that are increasingly more difficult (i.e. Bed mobility, Transfers, Gait). Medical Necessity:    
· Patient is expected to demonstrate progress in functional technique ·  to decrease assistance required with mobility and gait. · . Reason for Services/Other Comments: 
· Patient continues to require present interventions due to patient's inability to function at baseline. · . Use of outcome tool(s) and clinical judgement create a POC that gives a: Questionable prediction of patient's progress: MODERATE COMPLEXITY  
  
 
 
 
TREATMENT:  
(In addition to Assessment/Re-Assessment sessions the following treatments were rendered) Pre-treatment Symptoms/Complaints:  \"I would like to wash up after we walk\" Pain: Initial:  
Pain Intensity 1: 0  Post Session: 0/10 sitting BSC in bathroom Therapeutic Activity: (   25 min ):  Therapeutic activities including Bed transfers, sit-stand transfers, maintaining HWB with activity, Ambulation on level ground, weight shifting, walker safety to improve mobility, strength, balance and coordination. Required minimal  Safety awareness training;Verbal cues to promote static and dynamic balance in standing and promote motor control of bilateral, lower extremity(s). Date: 
5/10/19 Date: 
 Date: Activity/Exercise Parameters Parameters Parameters LAQ 15x AB    
 Seated marching 15x AB Ankle pumps 15x AB Quad sets 10x AB Braces/Orthotics/Lines/Etc:  
· luciano catheter · wound vac · O2 Device: Room air Treatment/Session Assessment:   
· Response to Treatment:  Overall improved with gait and able to maintain HWB R LE 
· Interdisciplinary Collaboration:  
o Physical Therapist 
o Registered Nurse 
o  · After treatment position/precautions:  
o Call light within reach 
o RN notified 
o left sitting BSC in front of sink for bathing · Compliance with Program/Exercises: Will assess as treatment progresses · Recommendations/Intent for next treatment session: \"Next visit will focus on advancements to more challenging activities and reduction in assistance provided\". Total Treatment Duration: PT Patient Time In/Time Out Time In: 1310 Time Out: 3698 Rashmi Singletary, PT

## 2019-05-15 NOTE — PROGRESS NOTES
Hospitalist Progress Note Admit Date:  2019  8:39 PM  
Name:  Washington Cowden Age:  60 y.o. 
:  1955 MRN:  [id number] PCP:  Arielle Kirkland MD 
Treatment Team: Attending Provider: Chuck Gonzalez MD; Consulting Provider: Chang Jerez MD; Utilization Review: Cole Hamilton RN; Care Manager: Kelly Blanc, RN; Consulting Provider: Juan Santos MD; Occupational Therapy Assistant: Hoda Aaron; Physical Therapist: Bard Aguilar, PT Subjective:  
 
Pt is a 59 yo female with pmh DM 2, cirrhosis, anemia with ablation of AVM colon who presented to ER  for worsening right foot cellulitis despite Doxy/Keflex at home. She initially had bedside I&D, was started on Vanc/Cefepime.  she underwent I&D in OR. Additionally pt with increasing abd distention, noted with new ascites. She was started on IV Lasix with improvement. 5/15: Patient sitting on side of bed. No distress. No further episodes of diarrhea. Wound vac on to right lower extremity. Hemoglobin baseline: 8.0. Platelets baseline at 64. Lasix held last night by nursing due to blood pressure. Patient expressed concerns regarding this; I did have a lengthy discussion over perimeters to safely give Lasix. Patient stated understanding. Also requested another pain medication less strong than Percocet: I have ordered Tramadol prn; will leave Percocet order also in case stronger medication needed. Daily weights continue. Surgery continues to follow right leg cellulitis. Sainz intact draining clear, yellow urine. Objective:  
 
Patient Vitals for the past 24 hrs: 
 Temp Pulse Resp BP SpO2  
05/15/19 0440 98.2 °F (36.8 °C) 73 16 98/48 97 % 19 2221 97.8 °F (36.6 °C) 73 16 104/54 94 % 19 2135    108/53   
19 1928 98.2 °F (36.8 °C) 69 16 102/48 99 % 19 1719 98.2 °F (36.8 °C) 78 18 108/70 95 % 19 1150 98.4 °F (36.9 °C) 74 18 110/65 94 % Oxygen Therapy O2 Sat (%): 97 % (05/15/19 0440) Pulse via Oximetry: 71 beats per minute (05/13/19 1815) O2 Device: Room air (05/14/19 1548) O2 Flow Rate (L/min): 2 l/min (05/13/19 1726) FIO2 (%): 21 % (05/13/19 2023) Intake/Output Summary (Last 24 hours) at 5/15/2019 1904 Last data filed at 5/15/2019 6223 Gross per 24 hour Intake 1368 ml Output 2300 ml Net -932 ml General:    Well nourished. Alert. CV:   RRR.  3/6 murmur, no rub, or no gallop. Lungs:   CTAB. No wheezing, rhonchi, or rales. Abdomen:   Soft, nontender, nondistended. Extremities: Warm and dry. No cyanosis or edema. Skin:     No rashes or jaundice. Right foot cellulitis with open blisters Neuro:  No gross focal deficits Data Review: 
I have reviewed all labs, meds, telemetry events, and studies from the last 24 hours: 
 
Recent Results (from the past 24 hour(s)) GLUCOSE, POC Collection Time: 05/14/19 11:36 AM  
Result Value Ref Range Glucose (POC) 169 (H) 65 - 100 mg/dL GLUCOSE, POC Collection Time: 05/14/19  4:21 PM  
Result Value Ref Range Glucose (POC) 207 (H) 65 - 100 mg/dL Koki Aver Collection Time: 05/14/19  4:24 PM  
Result Value Ref Range Vancomycin,trough 24.5 (HH) 5 - 20 ug/mL GLUCOSE, POC Collection Time: 05/14/19  8:59 PM  
Result Value Ref Range Glucose (POC) 183 (H) 65 - 100 mg/dL  
AFP, TUMOR MARKER Collection Time: 05/14/19 10:09 PM  
Result Value Ref Range AFP, Tumor marker PENDING ng/mL CBC WITH AUTOMATED DIFF Collection Time: 05/15/19  5:48 AM  
Result Value Ref Range WBC 2.8 (L) 4.3 - 11.1 K/uL  
 RBC 2.89 (L) 4.05 - 5.2 M/uL HGB 8.0 (L) 11.7 - 15.4 g/dL HCT 24.2 (L) 35.8 - 46.3 % MCV 83.7 79.6 - 97.8 FL  
 MCH 27.7 26.1 - 32.9 PG  
 MCHC 33.1 31.4 - 35.0 g/dL  
 RDW 17.6 (H) 11.9 - 14.6 % PLATELET 64 (L) 851 - 450 K/uL MPV 11.1 9.4 - 12.3 FL ABSOLUTE NRBC 0.00 0.0 - 0.2 K/uL  
 DF AUTOMATED NEUTROPHILS 59 43 - 78 % LYMPHOCYTES 28 13 - 44 % MONOCYTES 11 4.0 - 12.0 % EOSINOPHILS 1 0.5 - 7.8 % BASOPHILS 1 0.0 - 2.0 % IMMATURE GRANULOCYTES 0 0.0 - 5.0 %  
 ABS. NEUTROPHILS 1.7 1.7 - 8.2 K/UL  
 ABS. LYMPHOCYTES 0.8 0.5 - 4.6 K/UL  
 ABS. MONOCYTES 0.3 0.1 - 1.3 K/UL  
 ABS. EOSINOPHILS 0.0 0.0 - 0.8 K/UL  
 ABS. BASOPHILS 0.0 0.0 - 0.2 K/UL  
 ABS. IMM. GRANS. 0.0 0.0 - 0.5 K/UL METABOLIC PANEL, COMPREHENSIVE Collection Time: 05/15/19  5:48 AM  
Result Value Ref Range Sodium 138 136 - 145 mmol/L Potassium 3.5 3.5 - 5.1 mmol/L Chloride 105 98 - 107 mmol/L  
 CO2 26 21 - 32 mmol/L Anion gap 7 7 - 16 mmol/L Glucose 120 (H) 65 - 100 mg/dL BUN 13 8 - 23 MG/DL Creatinine 0.68 0.6 - 1.0 MG/DL  
 GFR est AA >60 >60 ml/min/1.73m2 GFR est non-AA >60 >60 ml/min/1.73m2 Calcium 7.4 (L) 8.3 - 10.4 MG/DL Bilirubin, total 0.9 0.2 - 1.1 MG/DL  
 ALT (SGPT) 14 12 - 65 U/L  
 AST (SGOT) 24 15 - 37 U/L Alk. phosphatase 277 (H) 50 - 136 U/L Protein, total 5.1 (L) 6.3 - 8.2 g/dL Albumin 1.8 (L) 3.2 - 4.6 g/dL Globulin 3.3 2.3 - 3.5 g/dL A-G Ratio 0.5 (L) 1.2 - 3.5 GLUCOSE, POC Collection Time: 05/15/19  7:29 AM  
Result Value Ref Range Glucose (POC) 126 (H) 65 - 100 mg/dL All Micro Results Procedure Component Value Units Date/Time C. DIFFICILE AG & TOXIN A/B [[id number]] Collected:  05/14/19 023 Order Status:  Completed Specimen:  Stool Updated:  05/14/19 2035 [id number] Casey Carrillo ANTIGEN    
  C. DIFFICILE GDH ANTIGEN-NEGATIVE  
     
  C. difficile toxin C. DIFFICILE TOXIN-NEGATIVE  
     
  PCR Reflex NOT APPLICABLE INTERPRETATION    
  NEGATIVE FOR TOXIGENIC C. DIFFICILE Clinical Consideration NEGATIVE FOR TOXIGENIC C. DIFFICILE  
     
 CULTURE, BLOOD [[id number]] Collected:  05/07/19 2345 Order Status:  Completed Specimen:  Whole Blood Updated:  05/13/19 1010   Special Requests: --     
  RIGHT 
FOREARM 
  
 Culture result: NO GROWTH 5 DAYS     
 CULTURE, BLOOD [[id number]] Collected:  05/07/19 2138 Order Status:  Completed Specimen:  Whole Blood Updated:  05/12/19 0787 Special Requests: --     
  LEFT Antecubital 
  
  Culture result: NO GROWTH 5 DAYS No results found for this visit on 05/07/19. Current Meds: 
Current Facility-Administered Medications Medication Dose Route Frequency  traMADol (ULTRAM) tablet 50 mg  50 mg Oral Q6H PRN  
 spironolactone (ALDACTONE) tablet 50 mg  50 mg Oral DAILY  0.9% sodium chloride infusion 250 mL  250 mL IntraVENous PRN  
 0.9% sodium chloride infusion 250 mL  250 mL IntraVENous PRN  
 simethicone (MYLICON) tablet 80 mg  80 mg Oral QID PRN  promethazine (PHENERGAN) with saline injection 6.25 mg  6.25 mg IntraVENous Q4H PRN  
 magnesium oxide (MAG-OX) tablet 400 mg  400 mg Oral BID  furosemide (LASIX) injection 40 mg  40 mg IntraVENous Q12H  
 ondansetron (ZOFRAN) injection 4 mg  4 mg IntraVENous Q6H PRN  
 sertraline (ZOLOFT) tablet 100 mg  100 mg Oral DAILY  vancomycin (VANCOCIN) 1250 mg in  ml infusion  1,250 mg IntraVENous Q12H  
 loperamide (IMODIUM) capsule 2 mg  2 mg Oral Q8H PRN  
 0.9% sodium chloride infusion 250 mL  250 mL IntraVENous PRN  
 sodium chloride (NS) flush 10 mL  10 mL InterCATHeter Q8H  
 sodium chloride (NS) flush 10 mL  10 mL InterCATHeter PRN  
 ferrous sulfate tablet 325 mg  325 mg Oral DAILY  gabapentin (NEURONTIN) capsule 300 mg  300 mg Oral QHS  levothyroxine (SYNTHROID) tablet 125 mcg  125 mcg Oral ACB  pantoprazole (PROTONIX) tablet 40 mg  40 mg Oral DAILY  dextrose 40% (GLUTOSE) oral gel 1 Tube  15 g Oral PRN  
 glucagon (GLUCAGEN) injection 1 mg  1 mg IntraMUSCular PRN  
 dextrose (D50) infusion 12.5-25 g  25-50 mL IntraVENous PRN  
 insulin lispro (HUMALOG) injection 0-10 Units  0-10 Units SubCUTAneous AC&HS  sodium chloride (NS) flush 5-40 mL  5-40 mL IntraVENous Q8H  
  sodium chloride (NS) flush 5-40 mL  5-40 mL IntraVENous PRN  
 acetaminophen (TYLENOL) tablet 650 mg  650 mg Oral Q4H PRN  
 oxyCODONE-acetaminophen (PERCOCET 7.5) 7.5-325 mg per tablet 1 Tab  1 Tab Oral Q4H PRN  
 naloxone (NARCAN) injection 0.4 mg  0.4 mg IntraVENous PRN  
 cefepime (MAXIPIME) 1 g in 0.9% sodium chloride (MBP/ADV) 50 mL ADV  1 g IntraVENous Q12H Other Studies (last 24 hours): No results found. Assessment and Plan:  
 
Hospital Problems as of 5/15/2019 Date Reviewed: 4/24/2019 Codes Class Noted - Resolved POA * (Principal) Cellulitis of right foot ICD-10-CM: M73.151 ICD-9-CM: 682.7  5/7/2019 - Present Yes Cirrhosis of liver without ascites (HCC) ICD-10-CM: K74.60 ICD-9-CM: 571.5  3/7/2019 - Present Yes Microcytic anemia ICD-10-CM: D50.9 ICD-9-CM: 280.9  2/27/2019 - Present Yes Type 2 diabetes with nephropathy ([facility] [address] ICD-10-CM: E11.21 
ICD-9-CM: 250.40, 583.81  1/16/2019 - Present Yes Hyperlipidemia ICD-10-CM: E78.5 ICD-9-CM: 272.4  1/12/2016 - Present Yes Essential hypertension ICD-10-CM: I10 
ICD-9-CM: 401.9  1/12/2016 - Present Yes Acquired hypothyroidism ICD-10-CM: E03.9 ICD-9-CM: 244.9  1/12/2016 - Present Yes Plan: 
 
Right foot cellulitis X Ray with no obvious osteo Sp debridement 05/13 Cont Vanc/Cefepime started overnight Unfortunately no wound cultures, BC NG. Wound care following, dsg change 05/15 per surgery NS @ 100 Wound vac on. Anemia, uncertain etiology Previous AVM but repaired, check FOBT S/p 2 units PRBCs PO Protonix Avoid NSAIDs 
-stable  8.0. No signs of bleeding. DM Humalog SSI achs Lantus on hold Diarrhea Likely due to abx C Diff test negative. Probiotic and PRN Imodium Recent dx of cirrhosis, now with worsening ascites and pancytopenia  
05/13 US showed spelnomegaly and ascites Consider diagnostic para? Daily weights Cont IV Lasix, 4 L output in past 24 hours Needs further GI input, if does not cont to clinically improve, consulted inpatient DC planning CM following Ideally will dc to STR but due to insurance likely will dc home with home health Diet:  DIET NUTRITIONAL SUPPLEMENTS 
DIET DIABETIC CONSISTENT CARB 
DVT ppx:  Lovenox Signed: 
Filiberto Hester NP

## 2019-05-15 NOTE — PROGRESS NOTES
PT note: 2nd attempt to see for PT treatment, patient having procedure at bedside. Will attempt later as time and schedule allow. Thank you Milena Ballesteros, PT 
5/15/2019

## 2019-05-15 NOTE — PROGRESS NOTES
Lasix held last night d/t marginal low BP. Patient asymptomatic. I&O as follows: Intake 648ml, UOP 400ml, urine neema with sediment. Urine seemed draining better after catheter being flushed. Patient had no BMs for this shift, no signs of bleeding noted. Patient complained no nausea or vomiting. BG 96 upon check this AM. Wound vac in place, but with no drainage noticed. Pain addressed with prn meds per MAR. AFP sent to the lab per order. Hourly rounds performed;all pt needs met. Bed in low position and call light/ personal items within reach. Will continue to monitor and give bedside shift report to oncoming day shift nurse.

## 2019-05-15 NOTE — PROGRESS NOTES
Met with patient to discuss discharge plan. Discussed that SNF rehab ran patient's insurance and she has met her deductible but has not met her 5,000 out of pocket max. Patient states that at this time she does not want SNF rehab and would much prefer to go home with [facility] services. Offloading boot has also been ordered.

## 2019-05-15 NOTE — PROGRESS NOTES
[facility] Progress Note Admit Date:  5/7/2019 Today's Date:  5/15/2019 CC:  Cirrhosis with new ascites Subjective:  
 
Diuretics held last night at this morning for hypotension. Pt does report feeling less distended today. No abdominal pain, nausea, or vomiting. No BM overnight. Medications:  
Current Facility-Administered Medications Medication Dose Route Frequency  traMADol (ULTRAM) tablet 50 mg  50 mg Oral Q6H PRN  
 spironolactone (ALDACTONE) tablet 50 mg  50 mg Oral DAILY  0.9% sodium chloride infusion 250 mL  250 mL IntraVENous PRN  
 0.9% sodium chloride infusion 250 mL  250 mL IntraVENous PRN  
 simethicone (MYLICON) tablet 80 mg  80 mg Oral QID PRN  promethazine (PHENERGAN) with saline injection 6.25 mg  6.25 mg IntraVENous Q4H PRN  
 magnesium oxide (MAG-OX) tablet 400 mg  400 mg Oral BID  furosemide (LASIX) injection 40 mg  40 mg IntraVENous Q12H  
 ondansetron (ZOFRAN) injection 4 mg  4 mg IntraVENous Q6H PRN  
 sertraline (ZOLOFT) tablet 100 mg  100 mg Oral DAILY  vancomycin (VANCOCIN) 1250 mg in  ml infusion  1,250 mg IntraVENous Q12H  
 loperamide (IMODIUM) capsule 2 mg  2 mg Oral Q8H PRN  
 0.9% sodium chloride infusion 250 mL  250 mL IntraVENous PRN  
 sodium chloride (NS) flush 10 mL  10 mL InterCATHeter Q8H  
 sodium chloride (NS) flush 10 mL  10 mL InterCATHeter PRN  
 ferrous sulfate tablet 325 mg  325 mg Oral DAILY  gabapentin (NEURONTIN) capsule 300 mg  300 mg Oral QHS  levothyroxine (SYNTHROID) tablet 125 mcg  125 mcg Oral ACB  pantoprazole (PROTONIX) tablet 40 mg  40 mg Oral DAILY  dextrose 40% (GLUTOSE) oral gel 1 Tube  15 g Oral PRN  
 glucagon (GLUCAGEN) injection 1 mg  1 mg IntraMUSCular PRN  
 dextrose (D50) infusion 12.5-25 g  25-50 mL IntraVENous PRN  
 insulin lispro (HUMALOG) injection 0-10 Units  0-10 Units SubCUTAneous AC&HS  sodium chloride (NS) flush 5-40 mL  5-40 mL IntraVENous Q8H  
  sodium chloride (NS) flush 5-40 mL  5-40 mL IntraVENous PRN  
 acetaminophen (TYLENOL) tablet 650 mg  650 mg Oral Q4H PRN  
 oxyCODONE-acetaminophen (PERCOCET 7.5) 7.5-325 mg per tablet 1 Tab  1 Tab Oral Q4H PRN  
 naloxone (NARCAN) injection 0.4 mg  0.4 mg IntraVENous PRN  
 cefepime (MAXIPIME) 1 g in 0.9% sodium chloride (MBP/ADV) 50 mL ADV  1 g IntraVENous Q12H Review of Systems: ROS was obtained, with pertinent positives as listed above. No chest pain or SOB. Diet:  Diabetic, 2g Na diet Objective:  
Vitals: 
Visit Vitals /60 (BP 1 Location: Right arm, BP Patient Position: Supine) Pulse 73 Temp 98 °F (36.7 °C) Resp 18 Ht 5' 7\" (1.702 m) Wt 93.8 kg (206 lb 11.2 oz) SpO2 96% BMI 32.37 kg/m² Intake/Output: 
05/15 0701 - 05/15 1900 In: 480 [P.O.:480] Out: -  
05/13 1901 - 05/15 0700 In: 9263 [P.O.:1290; I.V.:298] Out: 4700 [[facility]:4731] Exam: 
General appearance: alert, cooperative, no distress Lungs: clear to auscultation bilaterally anteriorly Heart: regular rate and rhythm Abdomen: Distended, soft, non-tender. Bowel sounds normal. No masses, no organomegaly Extremities: 1+ pitting edema LE bilat. Wound vac on R foot. Neuro:  alert and oriented Data Review (Labs):   
Recent Labs 05/15/19 
4006 05/14/19 
1296 05/13/19 2013 05/13/19 
1010 05/13/19 
3040 05/13/19 
5989 WBC 2.8* 3.1*  --   --   --  2.3* HGB 8.0* 7.9* 8.6*  --   --  6.6* HCT 24.2* 24.2* 26.4*  --   --  20.4* PLT 64* 61*  --   --   --  65* MCV 83.7 82.3  --   --   --  80.6  141  --   --  142  --   
K 3.5 3.4*  --   --  3.4*  --   
 108*  --   --  112*  --   
CO2 26 25  --   --  23  --   
BUN 13 13  --   --  13  --   
CREA 0.68 0.64  --   --  0.58*  --   
CA 7.4* 7.8*  --   --  7.2*  --   
* 170*  --   --  132*  --   
* 307*  --   --  287*  --   
SGOT 24 25  --   --  24  --   
ALT 14 20  --   --  18  --   
TBILI 0.9 1.1  --   --  1.0  --   
 ALB 1.8* 1.8*  --   --  1.6*  --   
TP 5.1* 5.5*  --   --  5.0*  --   
PTP  --   --   --  15.5*  --   --   
INR  --   --   --  1.3  --   --   
 
EGD 3/2/19, Dr. Emmanuelle Dsouza: Findings:  
OROPHARYNX: The oropharynx was briefly viewed on entry and withdrawal with very brief evaluation of the cords, arytenoids and pyriform sinuses.  No abnormalities found.    
ESOPHAGUS:  The esophagus was normal with an intact squamocolumnar z-line without erosions or evidence of Layton's intestinal metaplasia.  The mucosa was normal.  There were no strictures, rings or noticeable narrowing of the lumen.  The endoscope was easily passed into the gastric pouch and there was no apparent hiatal hernia.  
STOMACH: The gastric pouch inflated and deflated normally.  The mucosal surface and gastric rugae were normal without erosions, ulcerations, raised lesions, vascular ectasias or other anomalies.  The pylorus was patent to passage of the endoscope without difficulty.    
DUODENUM:  The endoscope was easily passed into the third section of the duodenum.  The villous mucosa was normal without blunting or scalloped folds.  There were no mucosal erosions, ulcerations, raised lesions or vascular ectasias.  
  
COLONOSCOPY 3/2/19, Dr. Emmanuelle Dsouza: Findings:  
TERMINAL ILEUM: The terminal ileum was entered and appeared normal with a normal villous mucosa. COLON:  The colonic mucosa from the cecum to the rectum was carefully examined.  The mucosa appeared normal with normal vascularity except for a few AVM's in the mid transverse.  There was no diverticulosis, inflammatory changes, mucosal polyps, raised lesions or abnormal pigmentation. ANUS/RECTUM: Anal exam reveals no masses or hemorrhoids, sphincter tone is normal. Rectal exam reveals no masses or hemorrhoids.   Direct and retroflexed views of the rectum were normal except for 1+ IH's.  
  
CT 2/27/19: FINDINGS:  
Abdomen: The lung bases are clear.  The spleen is clearly enlarged, 16.8 cm (normal usually less than 14). The liver appears homogeneous and has a macronodular contour suggesting cirrhosis. No focal liver lesion. No calcified gallstones. The biliary tree is not dilated. The pancreas is unremarkable. No free fluid, acute inflammatory changes or adenopathy. Bowel loops are not dilated. The kidneys enhance  uniformly. No radiopaque renal calculi. No hydronephrosis. The adrenal glands are normal size. Aorta is normal caliber.    
Pelvis: No free fluid or acute inflammatory changes. The urinary bladderunremarkable. No gross bony lesions. IMPRESSION:  Hepatic cirrhosis and splenomegaly. 
  
 Ultrasound abdomen complete 5/13/19: FINDINGS:  
Pancreas is grossly unremarkable although the tail is partially  obscured by overlying bowel gas. . Aorta is normal caliber, 2.0 cm. The IVC is patent. The enlarged sized spleen has a homogenous echotexture and measures 19 cm.  Left kidney is unremarkable without hydronephrosis and measures 12.8 cm. Right kidney is unremarkable without hydronephrosis and measures also 12 point cm. Renal echogenicity appears mildly increased, the right kidney is mildly hyperechoic to the liver. The intrahepatic biliary tree is not dilated. There is  hepatopetal flow in the portal vein. No gross focal parenchymal abnormalityidentified within the liver. The liver has a coarsened echotexture. There is  moderate ascites in the upper abdomen. The gallbladder wall is not thickened. There is layering echogenic bile (sludge)without discrete shadowing gallstones. The common bile duct is not dilated, 3 4 mm. IMPRESSION: Coarsened liver echotexture with ascites and splenomegaly, probably cirrhosis and portal venous hypertension. Gallbladder sludge without biliary tree obstruction. Assessment:  
 
Principal Problem: 
  Cellulitis of right foot (5/7/2019) Active Problems: Hyperlipidemia (1/12/2016) Essential hypertension (1/12/2016) Acquired hypothyroidism (1/12/2016) Type 2 diabetes with nephropathy ([facility] [address] (1/16/2019) Microcytic anemia (2/27/2019) Cirrhosis of liver without ascites ([facility] [address] (3/7/2019) 60 y.o. female with PMH of a 12+ year history of diabetes, HTN, and recent diagnosis of Cirrhosis in Feb 2019, who was admitted for severe cellulitis of the right foot requiring surgical debridement 5/13/19 and who is seen in consultation at the request of Cait Benitez NP for cirrhosis with new ascites. Patient was seen in consultation in Feb for new Cirrhosis with JENNY and thrombocytopenia. Liver serology to include ceruloplasmin, KRISTEN, AMA, ASMA, and hepatitis screen were negative. She had JENNY and EGD\colonoscopy without significant findings(above). Since this admission, pt reports a nearly 20lb weight gain with lower extremity edema and increasing abdominal girth which she thinks is related to volume overload. Ultrasound  5/13 showed cirrhotic liver and changes and upper abdominal ascites. Her hgb trended down and she received one unit of PRBC 5/13 but she denies overt bleeding. She has had intermittent nausea and diarrhea worse in the last 24 hours but Cdiff was negative. She denies abdominal pain and feels that her abdominal distention is much improved since she started fluid restriction and diuretics (lasix 40 IV q12, aldactone 25mg daily). She has requested daily weights. INR this admission was 1.3 but she has significant pancytopenia (WBC 3.1, hgb 7.9 post transfusion, MCV 82, Plt 61k). Mild hypokalemia is noted. LFTs appear to be stable but albumin is 1.8. She has not yet followed up with our office as an outpatient. Plan:  
 
-Continue 2g Na diet 
-Resume diuretics when able  
-Monitor labs 
-Follow up on AFP result 
-Continue to follow NANCY Gerber Patient is seen and examined in collaboration with Dr. Quang Fall. Assessment and plan as per Dr. Quang Fall.

## 2019-05-15 NOTE — PROGRESS NOTES
POD#2 s/p surgical debridement right foot Awake in bed, no complaints. Denies pain. Wound vac in place, holding suction. Visit Vitals /70 (BP 1 Location: Left leg, BP Patient Position: At rest) Pulse 71 Temp 97.9 °F (36.6 °C) Resp 18 Ht 5' 7\" (1.702 m) Wt 206 lb 11.2 oz (93.8 kg) SpO2 97% BMI 32.37 kg/m² In NAD 
R foot- with wound vac in place, neurovascular intact Lab Results Component Value Date/Time WBC 2.8 (L) 05/15/2019 05:48 AM  
 Hemoglobin (POC) 8.6 04/24/2019 04:11 PM  
 HGB 8.0 (L) 05/15/2019 05:48 AM  
 HCT 24.2 (L) 05/15/2019 05:48 AM  
 PLATELET 64 (L) 27/87/7063 05:48 AM  
 MCV 83.7 05/15/2019 05:48 AM  
  
 
1. Diabetic foot infection ([facility] [address] 2. Cellulitis of right lower extremity Continue wound vac Continue care per primary Will follow loosely Zachary Banegas NP

## 2019-05-15 NOTE — PROGRESS NOTES
PT note: Patient with low BP this am, 88/38. Notified RN. Will hold this morning and attempt later as time and schedule allow. Thank you. Saima Hendrix, PT 
5/15/2019

## 2019-05-16 LAB
ANION GAP SERPL CALC-SCNC: 7 MMOL/L (ref 7–16)
BASOPHILS # BLD: 0 K/UL (ref 0–0.2)
BASOPHILS NFR BLD: 1 % (ref 0–2)
BUN SERPL-MCNC: 15 MG/DL (ref 8–23)
CALCIUM SERPL-MCNC: 7.4 MG/DL (ref 8.3–10.4)
CHLORIDE SERPL-SCNC: 106 MMOL/L (ref 98–107)
CO2 SERPL-SCNC: 26 MMOL/L (ref 21–32)
CREAT SERPL-MCNC: 0.73 MG/DL (ref 0.6–1)
DIFFERENTIAL METHOD BLD: ABNORMAL
EOSINOPHIL # BLD: 0 K/UL (ref 0–0.8)
EOSINOPHIL NFR BLD: 1 % (ref 0.5–7.8)
ERYTHROCYTE [DISTWIDTH] IN BLOOD BY AUTOMATED COUNT: 17.2 % (ref 11.9–14.6)
GLUCOSE BLD STRIP.AUTO-MCNC: 156 MG/DL (ref 65–100)
GLUCOSE BLD STRIP.AUTO-MCNC: 183 MG/DL (ref 65–100)
GLUCOSE BLD STRIP.AUTO-MCNC: 191 MG/DL (ref 65–100)
GLUCOSE BLD STRIP.AUTO-MCNC: 196 MG/DL (ref 65–100)
GLUCOSE SERPL-MCNC: 155 MG/DL (ref 65–100)
HCT VFR BLD AUTO: 23.3 % (ref 35.8–46.3)
HGB BLD-MCNC: 7.5 G/DL (ref 11.7–15.4)
IMM GRANULOCYTES # BLD AUTO: 0 K/UL (ref 0–0.5)
IMM GRANULOCYTES NFR BLD AUTO: 0 % (ref 0–5)
LYMPHOCYTES # BLD: 0.7 K/UL (ref 0.5–4.6)
LYMPHOCYTES NFR BLD: 30 % (ref 13–44)
MCH RBC QN AUTO: 26.8 PG (ref 26.1–32.9)
MCHC RBC AUTO-ENTMCNC: 32.2 G/DL (ref 31.4–35)
MCV RBC AUTO: 83.2 FL (ref 79.6–97.8)
MONOCYTES # BLD: 0.3 K/UL (ref 0.1–1.3)
MONOCYTES NFR BLD: 14 % (ref 4–12)
NEUTS SEG # BLD: 1.3 K/UL (ref 1.7–8.2)
NEUTS SEG NFR BLD: 54 % (ref 43–78)
NRBC # BLD: 0 K/UL (ref 0–0.2)
PLATELET # BLD AUTO: 56 K/UL (ref 150–450)
PMV BLD AUTO: 11.5 FL (ref 9.4–12.3)
POTASSIUM SERPL-SCNC: 3.6 MMOL/L (ref 3.5–5.1)
RBC # BLD AUTO: 2.8 M/UL (ref 4.05–5.2)
SODIUM SERPL-SCNC: 139 MMOL/L (ref 136–145)
WBC # BLD AUTO: 2.4 K/UL (ref 4.3–11.1)

## 2019-05-16 PROCEDURE — 74011250636 HC RX REV CODE- 250/636: Performed by: SURGERY

## 2019-05-16 PROCEDURE — 36415 COLL VENOUS BLD VENIPUNCTURE: CPT

## 2019-05-16 PROCEDURE — 74011636637 HC RX REV CODE- 636/637: Performed by: SURGERY

## 2019-05-16 PROCEDURE — 94760 N-INVAS EAR/PLS OXIMETRY 1: CPT

## 2019-05-16 PROCEDURE — 65270000029 HC RM PRIVATE

## 2019-05-16 PROCEDURE — 74011250637 HC RX REV CODE- 250/637: Performed by: PHYSICIAN ASSISTANT

## 2019-05-16 PROCEDURE — 74011250637 HC RX REV CODE- 250/637: Performed by: SURGERY

## 2019-05-16 PROCEDURE — 82962 GLUCOSE BLOOD TEST: CPT

## 2019-05-16 PROCEDURE — 80048 BASIC METABOLIC PNL TOTAL CA: CPT

## 2019-05-16 PROCEDURE — 74011250637 HC RX REV CODE- 250/637: Performed by: NURSE PRACTITIONER

## 2019-05-16 PROCEDURE — 74011250636 HC RX REV CODE- 250/636: Performed by: HOSPITALIST

## 2019-05-16 PROCEDURE — 85025 COMPLETE CBC W/AUTO DIFF WBC: CPT

## 2019-05-16 PROCEDURE — 74750000023 HC WOUND THERAPY

## 2019-05-16 PROCEDURE — 74011000258 HC RX REV CODE- 258: Performed by: SURGERY

## 2019-05-16 RX ADMIN — VANCOMYCIN HYDROCHLORIDE 750 MG: 10 INJECTION, POWDER, LYOPHILIZED, FOR SOLUTION INTRAVENOUS at 17:08

## 2019-05-16 RX ADMIN — Medication 10 ML: at 21:00

## 2019-05-16 RX ADMIN — MAGNESIUM OXIDE TAB 400 MG (241.3 MG ELEMENTAL MG) 400 MG: 400 (241.3 MG) TAB at 17:04

## 2019-05-16 RX ADMIN — PANTOPRAZOLE SODIUM 40 MG: 40 TABLET, DELAYED RELEASE ORAL at 08:28

## 2019-05-16 RX ADMIN — MAGNESIUM OXIDE TAB 400 MG (241.3 MG ELEMENTAL MG) 400 MG: 400 (241.3 MG) TAB at 08:28

## 2019-05-16 RX ADMIN — SODIUM CHLORIDE 1 G: 900 INJECTION, SOLUTION INTRAVENOUS at 20:14

## 2019-05-16 RX ADMIN — FERROUS SULFATE TAB 325 MG (65 MG ELEMENTAL FE) 325 MG: 325 (65 FE) TAB at 08:28

## 2019-05-16 RX ADMIN — GABAPENTIN 300 MG: 300 CAPSULE ORAL at 22:00

## 2019-05-16 RX ADMIN — INSULIN LISPRO 2 UNITS: 100 INJECTION, SOLUTION INTRAVENOUS; SUBCUTANEOUS at 12:52

## 2019-05-16 RX ADMIN — SERTRALINE HYDROCHLORIDE 100 MG: 100 TABLET ORAL at 08:28

## 2019-05-16 RX ADMIN — INSULIN LISPRO 2 UNITS: 100 INJECTION, SOLUTION INTRAVENOUS; SUBCUTANEOUS at 07:30

## 2019-05-16 RX ADMIN — Medication 10 ML: at 05:15

## 2019-05-16 RX ADMIN — SPIRONOLACTONE 50 MG: 25 TABLET ORAL at 09:00

## 2019-05-16 RX ADMIN — Medication 10 ML: at 05:14

## 2019-05-16 RX ADMIN — OXYCODONE HYDROCHLORIDE AND ACETAMINOPHEN 1 TABLET: 7.5; 325 TABLET ORAL at 13:23

## 2019-05-16 RX ADMIN — VANCOMYCIN HYDROCHLORIDE 750 MG: 10 INJECTION, POWDER, LYOPHILIZED, FOR SOLUTION INTRAVENOUS at 06:00

## 2019-05-16 RX ADMIN — Medication 10 ML: at 16:35

## 2019-05-16 RX ADMIN — LEVOTHYROXINE SODIUM 125 MCG: 75 TABLET ORAL at 05:13

## 2019-05-16 RX ADMIN — INSULIN LISPRO 2 UNITS: 100 INJECTION, SOLUTION INTRAVENOUS; SUBCUTANEOUS at 16:30

## 2019-05-16 RX ADMIN — TRAMADOL HYDROCHLORIDE 50 MG: 50 TABLET, FILM COATED ORAL at 08:28

## 2019-05-16 RX ADMIN — Medication 10 ML: at 20:16

## 2019-05-16 RX ADMIN — INSULIN LISPRO 2 UNITS: 100 INJECTION, SOLUTION INTRAVENOUS; SUBCUTANEOUS at 21:06

## 2019-05-16 RX ADMIN — SODIUM CHLORIDE 1 G: 900 INJECTION, SOLUTION INTRAVENOUS at 08:29

## 2019-05-16 NOTE — INTERDISCIPLINARY ROUNDS
Interdisciplinary Rounds completed 05/16/19. Nursing, Case Management, Physician and PT present. 
  
Plan of care reviewed and updated.

## 2019-05-16 NOTE — WOUND CARE
Patient seen for follow up on foot wound. Will hold vac dressing until tomorrow. Patient update and in agreement. Will continue white foam to base to protect tendons. Answered all patient questions. Her home VAC is in room.

## 2019-05-16 NOTE — PROGRESS NOTES
Problem: Falls - Risk of 
Goal: *Absence of Falls Description Document Elver Armstrong Fall Risk and appropriate interventions in the flowsheet. Outcome: Progressing Towards Goal 
  
Problem: Patient Education: Go to Patient Education Activity Goal: Patient/Family Education Outcome: Progressing Towards Goal 
  
Problem: Pressure Injury - Risk of 
Goal: *Prevention of pressure injury Description Document Shane Scale and appropriate interventions in the flowsheet. Outcome: Progressing Towards Goal 
  
Problem: Patient Education: Go to Patient Education Activity Goal: Patient/Family Education Outcome: Progressing Towards Goal 
  
Problem: Patient Education: Go to Patient Education Activity Goal: Patient/Family Education Outcome: Progressing Towards Goal 
  
Problem: Patient Education: Go to Patient Education Activity Goal: Patient/Family Education Outcome: Progressing Towards Goal 
  
Problem: General Infection Care Plan (Adult and Pediatric) Goal: Improvement in signs and symptoms of infection Outcome: Progressing Towards Goal 
Goal: *Optimize nutritional status Outcome: Progressing Towards Goal 
  
Problem: Patient Education: Go to Patient Education Activity Goal: Patient/Family Education Outcome: Progressing Towards Goal 
  
Problem: Risk for Spread of Infection Goal: Prevent transmission of infectious organism to others Description Prevent the transmission of infectious organisms to other patients, staff members, and visitors. Outcome: Progressing Towards Goal 
  
Problem: Patient Education:  Go to Education Activity Goal: Patient/Family Education Outcome: Progressing Towards Goal 
  
Problem: Nutrition Deficit Goal: *Optimize nutritional status Outcome: Progressing Towards Goal

## 2019-05-16 NOTE — PROGRESS NOTES
[facility] Progress Note Admit Date:  5/7/2019 Today's Date:  5/16/2019 CC:  Cirrhosis with new ascites Subjective:  
 
BP still low. She got a dose of Aldactone this morning. She feels that her abdominal distention has slightly increased since yesterday. No abdominal pain or N/V. No BM since Monday. Appetite is good. Medications:  
Current Facility-Administered Medications Medication Dose Route Frequency  traMADol (ULTRAM) tablet 50 mg  50 mg Oral Q6H PRN  
 vancomycin (VANCOCIN) 750 mg in  mL infusion  750 mg IntraVENous Q12H  
 spironolactone (ALDACTONE) tablet 50 mg  50 mg Oral DAILY  0.9% sodium chloride infusion 250 mL  250 mL IntraVENous PRN  
 0.9% sodium chloride infusion 250 mL  250 mL IntraVENous PRN  
 simethicone (MYLICON) tablet 80 mg  80 mg Oral QID PRN  promethazine (PHENERGAN) with saline injection 6.25 mg  6.25 mg IntraVENous Q4H PRN  
 magnesium oxide (MAG-OX) tablet 400 mg  400 mg Oral BID  ondansetron (ZOFRAN) injection 4 mg  4 mg IntraVENous Q6H PRN  
 sertraline (ZOLOFT) tablet 100 mg  100 mg Oral DAILY  loperamide (IMODIUM) capsule 2 mg  2 mg Oral Q8H PRN  
 0.9% sodium chloride infusion 250 mL  250 mL IntraVENous PRN  
 sodium chloride (NS) flush 10 mL  10 mL InterCATHeter Q8H  
 sodium chloride (NS) flush 10 mL  10 mL InterCATHeter PRN  
 ferrous sulfate tablet 325 mg  325 mg Oral DAILY  gabapentin (NEURONTIN) capsule 300 mg  300 mg Oral QHS  levothyroxine (SYNTHROID) tablet 125 mcg  125 mcg Oral ACB  pantoprazole (PROTONIX) tablet 40 mg  40 mg Oral DAILY  dextrose 40% (GLUTOSE) oral gel 1 Tube  15 g Oral PRN  
 glucagon (GLUCAGEN) injection 1 mg  1 mg IntraMUSCular PRN  
 dextrose (D50) infusion 12.5-25 g  25-50 mL IntraVENous PRN  
 insulin lispro (HUMALOG) injection 0-10 Units  0-10 Units SubCUTAneous AC&HS  sodium chloride (NS) flush 5-40 mL  5-40 mL IntraVENous Q8H  
  sodium chloride (NS) flush 5-40 mL  5-40 mL IntraVENous PRN  
 acetaminophen (TYLENOL) tablet 650 mg  650 mg Oral Q4H PRN  
 oxyCODONE-acetaminophen (PERCOCET 7.5) 7.5-325 mg per tablet 1 Tab  1 Tab Oral Q4H PRN  
 naloxone (NARCAN) injection 0.4 mg  0.4 mg IntraVENous PRN  
 cefepime (MAXIPIME) 1 g in 0.9% sodium chloride (MBP/ADV) 50 mL ADV  1 g IntraVENous Q12H Review of Systems: ROS was obtained, with pertinent positives as listed above. No chest pain or SOB. Diet:  Diabetic, 2g Na diet Objective:  
Vitals: 
Visit Vitals BP 98/52 Pulse 66 Temp 98.2 °F (36.8 °C) Resp 18 Ht 5' 7\" (1.702 m) Wt 95.3 kg (210 lb) SpO2 96% BMI 32.89 kg/m² Intake/Output: 
No intake/output data recorded. 05/14 1901 - 05/16 0700 In: 2883 [P.O.:830; I.V.:298] Out: 1400 [AQQCT:[id number]] Exam: 
General appearance: alert, cooperative, NAD Lungs: CTA ant Heart: RRR. Grade III/VI murmur noted Abdomen: Distended, soft, non-tender. Bowel sounds normal. No masses, no organomegaly Extremities: 1-2+ pitting edema LE bilat. Wound vac on R foot. Neuro:  alert and oriented Data Review (Labs):   
Recent Labs 05/16/19 
9434 05/15/19 
0548 05/14/19 
0442 05/13/19 2013 WBC 2.4* 2.8* 3.1*  --   
HGB 7.5* 8.0* 7.9* 8.6* HCT 23.3* 24.2* 24.2* 26.4*  
PLT 56* 64* 61*  --   
MCV 83.2 83.7 82.3  --   
 138 141  --   
K 3.6 3.5 3.4*  --   
 105 108*  --   
CO2 26 26 25  --   
BUN 15 13 13  --   
CREA 0.73 0.68 0.64  --   
CA 7.4* 7.4* 7.8*  --   
* 120* 170*  --   
AP  --  277* 307*  --   
SGOT  --  24 25  --   
ALT  --  14 20  --   
TBILI  --  0.9 1.1  --   
ALB  --  1.8* 1.8*  --   
TP  --  5.1* 5.5*  --   
 
EGD 3/2/19, Dr. Durbin Solar: Findings:  
OROPHARYNX: The oropharynx was briefly viewed on entry and withdrawal with very brief evaluation of the cords, arytenoids and pyriform sinuses.  No abnormalities found.    
ESOPHAGUS:  The esophagus was normal with an intact squamocolumnar z-line without erosions or evidence of Layton's intestinal metaplasia.  The mucosa was normal.  There were no strictures, rings or noticeable narrowing of the lumen.  The endoscope was easily passed into the gastric pouch and there was no apparent hiatal hernia.  
STOMACH: The gastric pouch inflated and deflated normally.  The mucosal surface and gastric rugae were normal without erosions, ulcerations, raised lesions, vascular ectasias or other anomalies.  The pylorus was patent to passage of the endoscope without difficulty.    
DUODENUM:  The endoscope was easily passed into the third section of the duodenum.  The villous mucosa was normal without blunting or scalloped folds.  There were no mucosal erosions, ulcerations, raised lesions or vascular ectasias.  
  
COLONOSCOPY 3/2/19, Dr. Luda Duran: Findings:  
TERMINAL ILEUM: The terminal ileum was entered and appeared normal with a normal villous mucosa. COLON:  The colonic mucosa from the cecum to the rectum was carefully examined.  The mucosa appeared normal with normal vascularity except for a few AVM's in the mid transverse.  There was no diverticulosis, inflammatory changes, mucosal polyps, raised lesions or abnormal pigmentation. ANUS/RECTUM: Anal exam reveals no masses or hemorrhoids, sphincter tone is normal. Rectal exam reveals no masses or hemorrhoids.   Direct and retroflexed views of the rectum were normal except for 1+ IH's.  
  
CT 2/27/19: FINDINGS:  
Abdomen: The lung bases are clear. The spleen is clearly enlarged, 16.8 cm  (normal usually less than 14). The liver appears homogeneous and has a macronodular contour suggesting cirrhosis. No focal liver lesion. No calcified gallstones. The biliary tree is not dilated. The pancreas is unremarkable. No free fluid, acute inflammatory changes or adenopathy. Bowel loops are not dilated. The kidneys enhance  uniformly. No radiopaque renal calculi. No hydronephrosis. The adrenal glands are normal size. Aorta is normal caliber.    
Pelvis: No free fluid or acute inflammatory changes. The urinary bladderunremarkable. No gross bony lesions. IMPRESSION:  Hepatic cirrhosis and splenomegaly. 
  
 Ultrasound abdomen complete 5/13/19: FINDINGS:  
Pancreas is grossly unremarkable although the tail is partially  obscured by overlying bowel gas. . Aorta is normal caliber, 2.0 cm. The IVC is patent. The enlarged sized spleen has a homogenous echotexture and measures 19 cm.  Left kidney is unremarkable without hydronephrosis and measures 12.8 cm. Right kidney is unremarkable without hydronephrosis and measures also 12 point cm. Renal echogenicity appears mildly increased, the right kidney is mildly hyperechoic to the liver. The intrahepatic biliary tree is not dilated. There is  hepatopetal flow in the portal vein. No gross focal parenchymal abnormalityidentified within the liver. The liver has a coarsened echotexture. There is  moderate ascites in the upper abdomen. The gallbladder wall is not thickened. There is layering echogenic bile (sludge)without discrete shadowing gallstones. The common bile duct is not dilated, 3 4 mm. IMPRESSION: Coarsened liver echotexture with ascites and splenomegaly, probably cirrhosis and portal venous hypertension. Gallbladder sludge without biliary tree obstruction. Assessment:  
 
Principal Problem: 
  Cellulitis of right foot (5/7/2019) Active Problems: Hyperlipidemia (1/12/2016) Essential hypertension (1/12/2016) Acquired hypothyroidism (1/12/2016) Type 2 diabetes with nephropathy ([address] (1/16/2019) Microcytic anemia (2/27/2019) Cirrhosis of liver without ascites ([address] (3/7/2019) 60 y.o. female with PMH of a 12+ year history of diabetes, HTN, and recent diagnosis of Cirrhosis in Feb 2019, who was admitted for severe cellulitis of the right foot requiring surgical debridement 5/13/19 and who is seen in consultation at the request of Neeru Darby NP for cirrhosis with new ascites. Patient was seen in consultation in Feb for new Cirrhosis with JENNY and thrombocytopenia. Liver serology to include ceruloplasmin, KRISTEN, AMA, ASMA, and hepatitis screen were negative. She had JENNY and EGD\colonoscopy without significant findings(above). Since this admission, pt reports a nearly 20lb weight gain with lower extremity edema and increasing abdominal girth which she thinks is related to volume overload. Ultrasound  5/13 showed cirrhotic liver and changes and upper abdominal ascites. Her hgb trended down and she received one unit of PRBC 5/13 but she denies overt bleeding. She has had intermittent nausea and diarrhea worse in the last 24 hours but Cdiff was negative. She denies abdominal pain and feels that her abdominal distention is much improved since she started fluid restriction and diuretics (lasix 40 IV q12, aldactone 25mg daily). She has requested daily weights. INR this admission was 1.3 but she has significant pancytopenia (WBC 3.1, hgb 7.9 post transfusion, MCV 82, Plt 61k). Mild hypokalemia is noted. LFTs appear to be stable but albumin is 1.8. Cellulitis and foot infection likely caused the liver decompensation. She has not yet followed up with our office as an outpatient. Plan: - Got a dose of aldactone this morning. Discontinue since BP remains low. Restart once BP improves. O/w continue 2g sodium diet. - Monitor labs. - AFP WNL at 2.9 Neto Campa PA-C [facility]

## 2019-05-16 NOTE — PROGRESS NOTES
PT c/o foot pain X 1 Hourly round completed throughout the shift. e Bed in lower position and call light/ personal items within reach. Will continue to monitor and give bed side shift report to on coming day shift nurse.

## 2019-05-16 NOTE — PROGRESS NOTES
Patient had just lied down after being up today. She reports using her off  shoe today and seems to like it. Will check back with her tomorrow. Skyla Valdes PTA

## 2019-05-16 NOTE — PROGRESS NOTES
Progress Note 2019 Admit Date: 2019  8:39 PM  
NAME: Gifty Arellano :  1955 MRN:  [id number] Attending: Kira Nichols MD 
PCP:  Nabeel Alfaro MD 
Treatment Team: Attending Provider: Kira Nichols MD; Consulting Provider: Melody Beal MD; Utilization Review: Lina Jasso RN; Care Manager: Bushra Waggoner RN; Consulting Provider: Christelle Zee MD; Charge Nurse: Arthur Mayen Full Code SUBJECTIVE:  
Ms. German Mcdonough is a 59 yo female with PMH of DM II, cirrhosis, anemia with ablation of AVM colon who presented with c/o right foot cellulitis. Was on doxy and keflex at home. Had I/D at bedside initially and started on Vanc and Cefepime.  pt underwent I/D in OR, wound vac placed, plans to remove tomorrow for visualization of wound per Surgery. Pt with increasing abd distention, found to have new ascites. Started on lasix for improvement but held due to hypotension. Given albumin for hypotension yesterday. Today overalls feel poorly. Denies CP, SOB. C/o LE edema. Past Medical History:  
Diagnosis Date  Diabetes ([facility] [address]  Hypertension Recent Results (from the past 24 hour(s)) GLUCOSE, POC Collection Time: 05/15/19  4:34 PM  
Result Value Ref Range Glucose (POC) 167 (H) 65 - 100 mg/dL GLUCOSE, POC Collection Time: 05/15/19  8:07 PM  
Result Value Ref Range Glucose (POC) 209 (H) 65 - 100 mg/dL CBC WITH AUTOMATED DIFF Collection Time: 19  6:14 AM  
Result Value Ref Range WBC 2.4 (L) 4.3 - 11.1 K/uL  
 RBC 2.80 (L) 4.05 - 5.2 M/uL HGB 7.5 (L) 11.7 - 15.4 g/dL HCT 23.3 (L) 35.8 - 46.3 % MCV 83.2 79.6 - 97.8 FL  
 MCH 26.8 26.1 - 32.9 PG  
 MCHC 32.2 31.4 - 35.0 g/dL  
 RDW 17.2 (H) 11.9 - 14.6 % PLATELET 56 (L) 466 - 450 K/uL MPV 11.5 9.4 - 12.3 FL ABSOLUTE NRBC 0.00 0.0 - 0.2 K/uL  
 DF AUTOMATED NEUTROPHILS 54 43 - 78 % LYMPHOCYTES 30 13 - 44 %  MONOCYTES 14 (H) 4.0 - 12.0 %  
 EOSINOPHILS 1 0.5 - 7.8 % BASOPHILS 1 0.0 - 2.0 % IMMATURE GRANULOCYTES 0 0.0 - 5.0 %  
 ABS. NEUTROPHILS 1.3 (L) 1.7 - 8.2 K/UL  
 ABS. LYMPHOCYTES 0.7 0.5 - 4.6 K/UL  
 ABS. MONOCYTES 0.3 0.1 - 1.3 K/UL  
 ABS. EOSINOPHILS 0.0 0.0 - 0.8 K/UL  
 ABS. BASOPHILS 0.0 0.0 - 0.2 K/UL  
 ABS. IMM. GRANS. 0.0 0.0 - 0.5 K/UL METABOLIC PANEL, BASIC Collection Time: 05/16/19  6:14 AM  
Result Value Ref Range Sodium 139 136 - 145 mmol/L Potassium 3.6 3.5 - 5.1 mmol/L Chloride 106 98 - 107 mmol/L  
 CO2 26 21 - 32 mmol/L Anion gap 7 7 - 16 mmol/L Glucose 155 (H) 65 - 100 mg/dL BUN 15 8 - 23 MG/DL Creatinine 0.73 0.6 - 1.0 MG/DL  
 GFR est AA >60 >60 ml/min/1.73m2 GFR est non-AA >60 >60 ml/min/1.73m2 Calcium 7.4 (L) 8.3 - 10.4 MG/DL  
GLUCOSE, POC Collection Time: 05/16/19  7:18 AM  
Result Value Ref Range Glucose (POC) 156 (H) 65 - 100 mg/dL GLUCOSE, POC Collection Time: 05/16/19 11:13 AM  
Result Value Ref Range Glucose (POC) 183 (H) 65 - 100 mg/dL GLUCOSE, POC Collection Time: 05/16/19  3:37 PM  
Result Value Ref Range Glucose (POC) 196 (H) 65 - 100 mg/dL Allergies Allergen Reactions  Pcn [Penicillins] Hives and Rash Tolerates cephalosporins  Phenergan [Promethazine] Other (comments) \"climbing out of my skin\" Current Facility-Administered Medications Medication Dose Route Frequency Provider Last Rate Last Dose  traMADol (ULTRAM) tablet 50 mg  50 mg Oral Q6H PRN Denise Robledo NP   50 mg at 05/16/19 0828  
 vancomycin (VANCOCIN) 750 mg in  mL infusion  750 mg IntraVENous Q12H Milton Santiago  mL/hr at 05/16/19 0600 750 mg at 05/16/19 0600  
 0.9% sodium chloride infusion 250 mL  250 mL IntraVENous PRN Belle Kearns MD      
 0.9% sodium chloride infusion 250 mL  250 mL IntraVENous PRN Belle Kearns MD      
 [facility]) tablet 80 mg  80 mg Oral QID PRN Екатерина Reyes MD YOLANDA   80 mg at 05/14/19 0011  promethazine (PHENERGAN) with saline injection 6.25 mg  6.25 mg IntraVENous Q4H PRN Shiv Jacobsen MD   6.25 mg at 05/12/19 1732  
 magnesium oxide (MAG-OX) tablet 400 mg  400 mg Oral BID Shiv Jacobsen MD   400 mg at 05/16/19 4070  ondansetron (ZOFRAN) injection 4 mg  4 mg IntraVENous Q6H PRN Shiv Jacobsen MD   4 mg at 05/12/19 1419  sertraline (ZOLOFT) tablet 100 mg  100 mg Oral DAILY Shiv Jacobsen MD   100 mg at 05/16/19 7168  loperamide (IMODIUM) capsule 2 mg  2 mg Oral Q8H PRN Shiv Jacobsen MD   2 mg at 05/14/19 2783  
 0.9% sodium chloride infusion 250 mL  250 mL IntraVENous PRN Shiv Jacobsen MD      
 sodium chloride (NS) flush 10 mL  10 mL InterCATHeter Q8H Shiv Jacobsen MD   10 mL at 05/16/19 2719  sodium chloride (NS) flush 10 mL  10 mL InterCATHeter PRN Shiv Jacobsen MD      
 ferrous sulfate tablet 325 mg  325 mg Oral DAILY Shiv Jacobsen MD   325 mg at 05/16/19 7798  
 gabapentin (NEURONTIN) capsule 300 mg  300 mg Oral QHS Shiv Jacobsen MD   300 mg at 05/15/19 2106  levothyroxine (SYNTHROID) tablet 125 mcg  125 mcg Oral ACB Shiv Jacobsen MD   125 mcg at 05/16/19 8628  pantoprazole (PROTONIX) tablet 40 mg  40 mg Oral DAILY Shiv Jacobsen MD   40 mg at 05/16/19 9986  dextrose 40% (GLUTOSE) oral gel 1 Tube  15 g Oral PRN Shiv Jacobsen MD      
 glucagon [facility]) injection 1 mg  1 mg IntraMUSCular PRN Shiv Jacobsen MD      
 dextrose (D50) infusion 12.5-25 g  25-50 mL IntraVENous PRN Shiv Jacobsen MD      
 insulin lispro (HUMALOG) injection 0-10 Units  0-10 Units SubCUTAneous AC&HS Shiv Jacobsen MD   2 Units at 05/16/19 1252  sodium chloride (NS) flush 5-40 mL  5-40 mL IntraVENous Q8H Shiv Jacobsen MD   10 mL at 05/16/19 8867  sodium chloride (NS) flush 5-40 mL  5-40 mL IntraVENous PRN Shiv Jacobsen MD      
  acetaminophen (TYLENOL) tablet 650 mg  650 mg Oral Q4H PRN Macey Santos MD      
 oxyCODONE-acetaminophen (PERCOCET 7.5) 7.5-325 mg per tablet 1 Tab  1 Tab Oral Q4H PRN Macey Santos MD   1 Tab at 19 1323  
 naloxone [facility]) injection 0.4 mg  0.4 mg IntraVENous PRN Macey Santos MD      
 cefepime (MAXIPIME) 1 g in 0.9% sodium chloride (MBP/ADV) 50 mL ADV  1 g IntraVENous Q12H Macey Santos MD   1 g at 19 4714 Review of Systems negative with exception of pertinent positives noted above PHYSICAL EXAM  
 
Visit Vitals BP 96/50 Pulse 69 Temp 98.1 °F (36.7 °C) Resp 17 Ht 5' 7\" (1.702 m) Wt 95.3 kg (210 lb) SpO2 97% BMI 32.89 kg/m² Temp (24hrs), Av.6 °F (37 °C), Min:98.1 °F (36.7 °C), Max:99 °F (37.2 °C) Oxygen Therapy O2 Sat (%): 97 % (19 1522) Pulse via Oximetry: 71 beats per minute (19 1815) O2 Device: Room air (19 4913) O2 Flow Rate (L/min): 2 l/min (19 1726) FIO2 (%): 21 % (19) Intake/Output Summary (Last 24 hours) at 2019 1615 Last data filed at 2019 3636 Gross per 24 hour Intake  Output 1000 ml Net -1000 ml General: Appears chronically ill Lungs: CTA bilaterally. Resp even and nonlabored Heart:  S1S2 present without murmurs rubs gallops. RRR. 2+ LE edema. Abdomen: Soft, distended. BS present. Extremities: Moves ext spontaneously no cyanosis Skin:  Dressing to right food clean/dry/intact. Wound vac in place. Neurologic:  A/O X4. No focal deficits. Results summary of Diagnostic Studies/Procedures copied from within [facility] EMR:  
 
 
ASSESSMENT Active Hospital Problems Diagnosis Date Noted  Cellulitis of right foot 2019  Cirrhosis of liver without ascites ([facility] [address] 2019  Microcytic anemia 2019  Type 2 diabetes with nephropathy ([facility] [address] 2019  Essential hypertension 2016  Acquired hypothyroidism 2016  Hyperlipidemia 01/12/2016 Plan: 
Right foot cellulitis X Ray with no obvious osteo Sp debridement 05/13 in OR Cont Vanc/Cefepime BC NGTD Wound care following, dsg change 05/17 per surgery Wound vac on. 
  
Anemia Previous AVM but repaired, check FOBT S/p 2 units PRBCs PO Protonix Avoid NSAIDs 
 
  
DM Humalog SSI achs Lantus on hold  
  
Diarrhea Likely due to abx C Diff test negative. Probiotic and PRN Imodium  
  
Recent dx of cirrhosis, now with worsening ascites and pancytopenia  
05/13 US showed spelnomegaly and ascites Daily weights Holding lasix and aldactone due to hypotension May need milrinone, will defer to GI Consult IR for para due to increasing abd distention Notes, labs, VS, diagnostic testing reviewed Time spent with pt 20 min DVT Prophylaxis: SCDs Plan of Care Discussed with: Supervising MD Dr. Joshua Kelley, care team, pt Greg Marin, NEGRITO

## 2019-05-16 NOTE — ADT AUTH CERT NOTES
Cellulitis - Care Day 9 (5/15/2019) by Mao Ramirez RN  
 
   
Review Status Review Entered Completed 5/15/2019 16:08  
   
Criteria Review Care Day: 9 Care Date: 5/15/2019 Level of Care: Inpatient Floor Guideline Day 3 Level Of Care (X) Floor to discharge[D] 5/15/2019 16:08:16 EDT by Tyree Ruiz   
ortho floor Clinical Status  
(X) * Hemodynamic stability 5/15/2019 16:08:16 EDT by Tyree Ruiz Vitals-98.2, 98/48, 73, 16, 97% on RA   
  
(X) * Fever absent or improved 5/15/2019 16:08:16 EDT by Tyree Ruiz 98.2 (X) * Skin exam stable or improved 5/15/2019 16:08:16 EDT by Tyree Ruiz Wound vac in place, but with no drainage   
  
(X) * Mental status at baseline 5/15/2019 16:08:16 EDT by Tyree Ruiz Alert Mark Rico ( ) * Antibiotic treatment needs appropriate for next level of care 5/15/2019 16:08:16 EDT by Tyree Ruiz Maxipime 1g iv q12hrs [id number] iv q12h   
  
(X) * Pain absent or manageable at next level of care 5/15/2019 16:08:16 EDT by Tyree Ruiz   
controlled ( ) * Discharge plans and education understood Activity  
(X) * Ambulatory Routes  
(X) * Oral hydration, medications,[E]and diet 5/15/2019 16:08:16 EDT by Tyree Ruiz Diet: DIET NUTRITIONAL SUPPLEMENTS 
DIET DIABETIC CONSISTENT CARB Interventions (X) WBC   
5/15/2019 16:08:16 EDT by Tyree Ruiz WBC: 2.8 (L) Medications (X) Parenteral or oral antibiotics[A] 5/15/2019 16:08:16 EDT by Tyree Ruiz Meds Maxipime 1g iv q12hrs Ferrous sulfate 325mg po qd Lasix 40mg iv q12hr Neuronton 300mg po qhs Insulin sc ss qid Immodium 2mg po x1 Mag ox po bid Percocet po x2 Protonix 40mg po qd [id number] iv q12h 5/15/2019 16:08:16 EDT by Tyree Ruiz Subject: Additional Clinical Information 5/15/19 Lasix held last night d/t marginal low BP Wound vac in place, but with no drainage Pt sitting on side of bed. No distress. No further episodes of diarrhea. Wound vac on to right lower extremity. Hemoglobin baseline: 8.0. Platelets baseline at 64. Lasix held last night by nursing due to blood pressure. Patient expressed concerns regarding this; I did have a lengthy discussion over perimeters to safely give Lasix. Patient stated understanding. Also requested another pain medication less strong than Percocet: I have ordered Tramadol prn; will leave Percocet order also in case stronger medication needed. Daily weights continue. Surgery continues to follow right leg cellulitis. Sainz intact draining clear, yellow urine Pt does report feeling less distended today. No abdominal pain, nausea, or vomiting. No BM overnight. WBC: 2.8 (L) RBC: 2.89 (L) HGB: 8.0 (L) HCT: 24.2 (L) Glucose: 120 (H) Calcium: 7.4 (L) Protein, total: 5.1 (L) Albumin: 1.8 (L) A-G Ratio: 0.5 (L) Alk. phosphatase: 277 (H) Vitals-98.2, 98/48, 73, 16, 97% on RA Meds Maxipime 1g iv q12hrs Ferrous sulfate 325mg po qd Lasix 40mg iv q12hr- held Neuronton 300mg po qhs Insulin sc ss qid Immodium 2mg po x1 Mag ox po bid Percocet po x2 Protonix 40mg po qd [id number] iv q12h 
  
aLbumin 25g iv x1 A/P- Right foot cellulitis X Ray with no obvious osteo Sp debridement 05/13 Cont Vanc/Cefepime started overnight Unfortunately no wound cultures, BC NG. Wound care following, dsg change 05/15 per surgery NS @ 100 Wound vac on. ã Anemia, uncertain etiology Previous AVM but repaired, check FOBT S/p 2 units PRBCs PO Protonix Avoid NSAIDs 
  
-stable 8.0. No signs of bleeding. ã DM Humalog SSI achs Lantus on hold ã Diarrhea Likely due to abx C Diff test negative. Probiotic and PRN Imodium ã Recent dx of cirrhosis, now with worsening ascites and pancytopenia  
  
05/13 US showed spelnomegaly and ascites Consider diagnostic para? Daily weights Cont IV Lasix, 4 L output in past 24 hours Needs further GI input, if does not cont to clinically improve, consulted inpatient ãIdeally will dc to STR but due to insurance likely will dc home with home health  
  
  
  
 
  
ã   
   
  
  
  
  
* Milestone Additional Notes Meds Maxipime 1g iv q12hrs Ferrous sulfate 325mg po qd Lasix 40mg iv q12hr- held Neuronton 300mg po qhs Insulin sc ss qid Immodium 2mg po x1 Mag ox po bid Percocet po x2 Protonix 40mg po qd [id number] iv q12h  
aLbumin 25g iv x1  
  
   
Cellulitis - Care Day 8 (5/14/2019) by Edwina Child RN  
 
   
Review Status Review Entered Completed 5/15/2019 15:53  
   
Criteria Review Care Day: 8 Care Date: 5/14/2019 Level of Care: Inpatient Floor Guideline Day 3 Level Of Care (X) Floor to discharge[D] 5/15/2019 15:53:50 EDT by Amira Holt Surgical floor Clinical Status  
(X) * Hemodynamic stability 5/15/2019 15:53:50 EDT by Amira Holt Vitals- 98.4, 114/55, 73, 18, 93% on RA   
  
(X) * Fever absent or improved 5/15/2019 15:53:50 EDT by Amira Holt   
temp - 98.4 (X) * Skin exam stable or improved 5/15/2019 15:53:50 EDT by Amira Holt Dressing on the right foot c/d/i   
  
(X) * Mental status at baseline 5/15/2019 15:53:50 EDT by Amira Holt Pt alert   
  
( ) * Antibiotic treatment needs appropriate for next level of care 5/15/2019 15:53:50 EDT by Amira Holt Maxipime 1g iv q12hrs [id number] iv q12h ( ) * Pain absent or manageable at next level of care ( ) * Discharge plans and education understood Activity  
(X) * Ambulatory 5/15/2019 15:53:50 EDT by Gaby Rai Able to take steps 15 ft in room with CGA-min and heavy instruction on gait mechanics, walker management, and weight bearing status/compliance Routes  
(X) * Oral hydration, medications,[E]and diet 5/15/2019 15:53:50 EDT by Gaby Rai Meds Maxipime 1g iv q12hrs Ferrous sulfate 325mg po qd Lasix 40mg iv q12hr Neuronton 300mg po qhs Insulin sc ss qid Immodium 2mg po x1 Mag ox po bid Percocet po x2 Protonix 40mg po qd [id number] iv q12h Medications (X) Parenteral or oral antibiotics[A] 5/15/2019 15:53:50 EDT by Gaby Rai Maxipime 1g iv q12hrs [id number] iv q12h 5/15/2019 15:53:50 EDT by Gaby Rai Subject: Additional Clinical Information 5/14/19 POD#1 s/p surgical debridement right foot Hgb 8.6 last night per lab, and dropped to 7.9 this AM. Patient had at least 7 loose/watery stools with foul odor for this shift Dressing on the right foot c/d/i Afebrile, VSS. No nausea or vomiting  
  
pt states sheâs weak due to diarrhea. Discussed low Hgb, she denies dark or bloody stools. She had episode of watery diarrhea overnight. Pt is quite worried about her d/c plans. wound VAC in place PT Note â Able to take steps 15 ft in room with CGA-min and heavy instruction on gait mechanics, walker management, and weight bearing status/compliance. Pt wanting to nap so returned to supine independently. Will need continued gait training. ? Post op shoe Vitals- 98.4, 114/55, 73, 18, 93% on RA Meds Maxipime 1g iv q12hrs Ferrous sulfate 325mg po qd Lasix 40mg iv q12hr Neuronton 300mg po qhs Insulin sc ss qid Immodium 2mg po x1 Mag ox po bid Percocet po x2 Protonix 40mg po qd [id number] iv q12h 
  
 
  
A/P- Right foot cellulitis X Ray with no obvious osteo Sp debridement 05/13 Cont Vanc/Cefepime started overnight Unfortunately no wound cultures, BC NG. Wound care following, dsg change 05/15 per surgery NS @ 100  
  
ã Anemia, uncertain etiology Previous AVM but repaired, check FOBT S/p 2 units PRBCs PO Protonix Avoid NSAIDs ã DM -Humalog SSI achs -Lantus on hold ã Diarrhea-Likely due to abx If meets criteria check for C Diff 
  
Probiotic and PRN Imodium ã Recent dx of cirrhosis, now with worsening ascites and pancytopenia  
  
05/13 US showed spelnomegaly and ascites Consider diagnostic para? Daily weights Cont IV Lasix, 4 L output in past 24 hours Needs further GI input, if does not cont to clinically improve, consult inpatient Ideally will dc to STR but due to insurance likely will dc home with home health Diet: DIET RENAL 
  
DVT ppx: Lovenox  
  
  
   
  
  
  
  
* Milestone Additional Notes Meds Maxipime 1g iv q12hrs Ferrous sulfate 325mg po qd Lasix 40mg iv q12hr Neuronton 300mg po qhs Insulin sc ss qid Immodium 2mg po x1 Mag ox po bid Percocet po x2 Protonix 40mg po qd [id number] iv q12h  
  
A/P- Right foot cellulitis X Ray with no obvious osteo Sp debridement 05/13 Cont Vanc/Cefepime started overnight Unfortunately no wound cultures, BC NG. Wound care following, dsg change 05/15 per surgery NS @ 100   
   
Anemia, uncertain etiology Previous AVM but repaired, check FOBT S/p 2 units PRBCs PO Protonix Avoid NSAIDs   
   
DM -Humalog SSI achs -Lantus on hold   
   
Diarrhea-Likely due to abx If meets criteria check for C Diff  
Probiotic and PRN Imodium   
   
Recent dx of cirrhosis, now with worsening ascites and pancytopenia   
05/13 US showed spelnomegaly and ascites Consider diagnostic para? Daily weights Cont IV Lasix, 4 L output in past 24 hours Needs further GI input, if does not cont to clinically improve, consult inpatient Ideally will dc to STR but due to insurance likely will dc home with home health Diet:  DIET RENAL  
DVT ppx:  Lovenox

## 2019-05-17 ENCOUNTER — APPOINTMENT (OUTPATIENT)
Dept: INTERVENTIONAL RADIOLOGY/VASCULAR | Age: 64
DRG: 982 | End: 2019-05-17
Attending: NURSE PRACTITIONER
Payer: COMMERCIAL

## 2019-05-17 LAB
ANION GAP SERPL CALC-SCNC: 6 MMOL/L (ref 7–16)
BASOPHILS # BLD: 0 K/UL (ref 0–0.2)
BASOPHILS NFR BLD: 1 % (ref 0–2)
BUN SERPL-MCNC: 17 MG/DL (ref 8–23)
CALCIUM SERPL-MCNC: 7.7 MG/DL (ref 8.3–10.4)
CHLORIDE SERPL-SCNC: 106 MMOL/L (ref 98–107)
CO2 SERPL-SCNC: 26 MMOL/L (ref 21–32)
CREAT SERPL-MCNC: 0.76 MG/DL (ref 0.6–1)
DIFFERENTIAL METHOD BLD: ABNORMAL
EOSINOPHIL # BLD: 0 K/UL (ref 0–0.8)
EOSINOPHIL NFR BLD: 1 % (ref 0.5–7.8)
ERYTHROCYTE [DISTWIDTH] IN BLOOD BY AUTOMATED COUNT: 17.6 % (ref 11.9–14.6)
GLUCOSE BLD STRIP.AUTO-MCNC: 164 MG/DL (ref 65–100)
GLUCOSE BLD STRIP.AUTO-MCNC: 205 MG/DL (ref 65–100)
GLUCOSE BLD STRIP.AUTO-MCNC: 214 MG/DL (ref 65–100)
GLUCOSE BLD STRIP.AUTO-MCNC: 217 MG/DL (ref 65–100)
GLUCOSE SERPL-MCNC: 164 MG/DL (ref 65–100)
HCT VFR BLD AUTO: 25.2 % (ref 35.8–46.3)
HGB BLD-MCNC: 8.1 G/DL (ref 11.7–15.4)
IMM GRANULOCYTES # BLD AUTO: 0 K/UL (ref 0–0.5)
IMM GRANULOCYTES NFR BLD AUTO: 0 % (ref 0–5)
LYMPHOCYTES # BLD: 0.8 K/UL (ref 0.5–4.6)
LYMPHOCYTES NFR BLD: 32 % (ref 13–44)
MCH RBC QN AUTO: 27 PG (ref 26.1–32.9)
MCHC RBC AUTO-ENTMCNC: 32.1 G/DL (ref 31.4–35)
MCV RBC AUTO: 84 FL (ref 79.6–97.8)
MONOCYTES # BLD: 0.3 K/UL (ref 0.1–1.3)
MONOCYTES NFR BLD: 13 % (ref 4–12)
NEUTS SEG # BLD: 1.3 K/UL (ref 1.7–8.2)
NEUTS SEG NFR BLD: 53 % (ref 43–78)
NRBC # BLD: 0 K/UL (ref 0–0.2)
PLATELET # BLD AUTO: 60 K/UL (ref 150–450)
PMV BLD AUTO: 10.3 FL (ref 9.4–12.3)
POTASSIUM SERPL-SCNC: 4.1 MMOL/L (ref 3.5–5.1)
RBC # BLD AUTO: 3 M/UL (ref 4.05–5.2)
SODIUM SERPL-SCNC: 138 MMOL/L (ref 136–145)
VANCOMYCIN TROUGH SERPL-MCNC: 17.9 UG/ML (ref 5–20)
WBC # BLD AUTO: 2.4 K/UL (ref 4.3–11.1)

## 2019-05-17 PROCEDURE — P9047 ALBUMIN (HUMAN), 25%, 50ML: HCPCS | Performed by: INTERNAL MEDICINE

## 2019-05-17 PROCEDURE — 80048 BASIC METABOLIC PNL TOTAL CA: CPT

## 2019-05-17 PROCEDURE — 97605 NEG PRS WND THER DME<=50SQCM: CPT

## 2019-05-17 PROCEDURE — 74011250637 HC RX REV CODE- 250/637: Performed by: NURSE PRACTITIONER

## 2019-05-17 PROCEDURE — 97530 THERAPEUTIC ACTIVITIES: CPT

## 2019-05-17 PROCEDURE — 94760 N-INVAS EAR/PLS OXIMETRY 1: CPT

## 2019-05-17 PROCEDURE — 36415 COLL VENOUS BLD VENIPUNCTURE: CPT

## 2019-05-17 PROCEDURE — 77030014146 HC TY THORCENT/PARACENT BD -B

## 2019-05-17 PROCEDURE — 0W9G3ZZ DRAINAGE OF PERITONEAL CAVITY, PERCUTANEOUS APPROACH: ICD-10-PCS | Performed by: RADIOLOGY

## 2019-05-17 PROCEDURE — 77030025162 HC DRSG VAC ASST 1 KCON -B

## 2019-05-17 PROCEDURE — 74011636637 HC RX REV CODE- 636/637: Performed by: SURGERY

## 2019-05-17 PROCEDURE — 77030018836 HC SOL IRR NACL ICUM -A

## 2019-05-17 PROCEDURE — 80202 ASSAY OF VANCOMYCIN: CPT

## 2019-05-17 PROCEDURE — 74011250636 HC RX REV CODE- 250/636: Performed by: HOSPITALIST

## 2019-05-17 PROCEDURE — 85025 COMPLETE CBC W/AUTO DIFF WBC: CPT

## 2019-05-17 PROCEDURE — 74011250637 HC RX REV CODE- 250/637: Performed by: SURGERY

## 2019-05-17 PROCEDURE — 77030019934 HC DRSG VAC ASST KCON -B

## 2019-05-17 PROCEDURE — 82962 GLUCOSE BLOOD TEST: CPT

## 2019-05-17 PROCEDURE — 74011000258 HC RX REV CODE- 258: Performed by: SURGERY

## 2019-05-17 PROCEDURE — 74011250636 HC RX REV CODE- 250/636: Performed by: SURGERY

## 2019-05-17 PROCEDURE — 74750000023 HC WOUND THERAPY

## 2019-05-17 PROCEDURE — 74011250636 HC RX REV CODE- 250/636: Performed by: INTERNAL MEDICINE

## 2019-05-17 PROCEDURE — 77030037400 HC ADH TISS HI VISC EXOFIN CHMP -B

## 2019-05-17 PROCEDURE — 65270000029 HC RM PRIVATE

## 2019-05-17 PROCEDURE — 49083 ABD PARACENTESIS W/IMAGING: CPT

## 2019-05-17 RX ORDER — ALBUMIN HUMAN 250 G/1000ML
50 SOLUTION INTRAVENOUS ONCE
Status: COMPLETED | OUTPATIENT
Start: 2019-05-17 | End: 2019-05-17

## 2019-05-17 RX ADMIN — INSULIN LISPRO 4 UNITS: 100 INJECTION, SOLUTION INTRAVENOUS; SUBCUTANEOUS at 16:30

## 2019-05-17 RX ADMIN — SODIUM CHLORIDE 1 G: 900 INJECTION, SOLUTION INTRAVENOUS at 22:02

## 2019-05-17 RX ADMIN — VANCOMYCIN HYDROCHLORIDE 750 MG: 10 INJECTION, POWDER, LYOPHILIZED, FOR SOLUTION INTRAVENOUS at 18:00

## 2019-05-17 RX ADMIN — VANCOMYCIN HYDROCHLORIDE 750 MG: 10 INJECTION, POWDER, LYOPHILIZED, FOR SOLUTION INTRAVENOUS at 05:24

## 2019-05-17 RX ADMIN — INSULIN LISPRO 4 UNITS: 100 INJECTION, SOLUTION INTRAVENOUS; SUBCUTANEOUS at 22:02

## 2019-05-17 RX ADMIN — PANTOPRAZOLE SODIUM 40 MG: 40 TABLET, DELAYED RELEASE ORAL at 09:55

## 2019-05-17 RX ADMIN — Medication 10 ML: at 05:22

## 2019-05-17 RX ADMIN — SERTRALINE HYDROCHLORIDE 100 MG: 100 TABLET ORAL at 09:55

## 2019-05-17 RX ADMIN — Medication 10 ML: at 22:08

## 2019-05-17 RX ADMIN — Medication 10 ML: at 14:00

## 2019-05-17 RX ADMIN — SODIUM CHLORIDE 1 G: 900 INJECTION, SOLUTION INTRAVENOUS at 09:55

## 2019-05-17 RX ADMIN — TRAMADOL HYDROCHLORIDE 50 MG: 50 TABLET, FILM COATED ORAL at 02:06

## 2019-05-17 RX ADMIN — OXYCODONE HYDROCHLORIDE AND ACETAMINOPHEN 1 TABLET: 7.5; 325 TABLET ORAL at 13:26

## 2019-05-17 RX ADMIN — LEVOTHYROXINE SODIUM 125 MCG: 75 TABLET ORAL at 06:00

## 2019-05-17 RX ADMIN — GABAPENTIN 300 MG: 300 CAPSULE ORAL at 22:02

## 2019-05-17 RX ADMIN — Medication 10 ML: at 16:44

## 2019-05-17 RX ADMIN — MAGNESIUM OXIDE TAB 400 MG (241.3 MG ELEMENTAL MG) 400 MG: 400 (241.3 MG) TAB at 17:00

## 2019-05-17 RX ADMIN — ONDANSETRON 4 MG: 2 INJECTION INTRAMUSCULAR; INTRAVENOUS at 11:33

## 2019-05-17 RX ADMIN — FERROUS SULFATE TAB 325 MG (65 MG ELEMENTAL FE) 325 MG: 325 (65 FE) TAB at 09:55

## 2019-05-17 RX ADMIN — MAGNESIUM OXIDE TAB 400 MG (241.3 MG ELEMENTAL MG) 400 MG: 400 (241.3 MG) TAB at 09:55

## 2019-05-17 RX ADMIN — INSULIN LISPRO 2 UNITS: 100 INJECTION, SOLUTION INTRAVENOUS; SUBCUTANEOUS at 09:53

## 2019-05-17 RX ADMIN — ALBUMIN (HUMAN) 50 G: 0.25 INJECTION, SOLUTION INTRAVENOUS at 16:56

## 2019-05-17 NOTE — WOUND CARE
Right foot tendon exposed, slough/ pink mix with surrounding erythema adjacent on medial foot is 2 open areas, all included in vac dressing change. White foam over tendons to protect. Dr. Rachna Zambrano in to assess wound during dressing change. Noted home machine in room. Will monitor will need home health for dressing changes.

## 2019-05-17 NOTE — PROGRESS NOTES
Pt c/o pain X 1 throughout the night. Wound vac dressing d/c/i. Hourly round completed throughout the shift. No complain of pain and denies any needs at this time Bed in lower position and call light/ personal items within reach. Will continue to monitor and give bed side shift report to on coming day shift nurse.

## 2019-05-17 NOTE — PROGRESS NOTES
Problem: Falls - Risk of 
Goal: *Absence of Falls Description Document Taylor Squires Fall Risk and appropriate interventions in the flowsheet. Outcome: Progressing Towards Goal 
  
Problem: Patient Education: Go to Patient Education Activity Goal: Patient/Family Education Outcome: Progressing Towards Goal 
  
Problem: Pressure Injury - Risk of 
Goal: *Prevention of pressure injury Description Document Shane Scale and appropriate interventions in the flowsheet. Outcome: Progressing Towards Goal 
  
Problem: Patient Education: Go to Patient Education Activity Goal: Patient/Family Education Outcome: Progressing Towards Goal 
  
Problem: Patient Education: Go to Patient Education Activity Goal: Patient/Family Education Outcome: Progressing Towards Goal

## 2019-05-17 NOTE — PROGRESS NOTES
Hourly rounds performed this shift. Pt went down to IR for a parencentesis, tolerated well. Signs of increased depression notice, Evone  made aware. Pt also states that her depression is getting worst. Albumin administered as ordered. All other needs were met, call light within reach.

## 2019-05-17 NOTE — PROGRESS NOTES
.. TRANSFER - IN REPORT: 
 
Verbal report received from  Eros Kelly on [facility]  being received from IR. Report consisted of patients Situation, Background, Assessment and  
Recommendations Information from the following report was reviewed with the receiving nurse. Opportunity for questions and clarification was provided. Assessment completed upon patients arrival to unit and care assumed.

## 2019-05-17 NOTE — INTERDISCIPLINARY ROUNDS
Interdisciplinary Rounds completed 05/17/19. Nursing, Case Management, Physician and PT present. 
  
Plan of care reviewed and updated.

## 2019-05-17 NOTE — PROGRESS NOTES
Problem: Mobility Impaired (Adult and Pediatric) Goal: *Acute Goals and Plan of Care (Insert Text) Description 1. Ms. Erlinda Romero will perform sit to stand and bed to chair independently in 7 days. 2.  Ms. Erlinda Romero will perform gait with rolling walker 150 ft independently in 7 days. 3.  Ms. Erlinda Romero will perform up and down 2 steps with rail independently in 7 days. Outcome: Progressing Towards Goal 
 
 
PHYSICAL THERAPY: Daily Note and PM 5/17/2019 INPATIENT: PT Visit Days : 4 Payor: BLUE CROSS / Plan: [place] [employer] [place] / Product Type: PPO /   
  
NAME/AGE/GENDER: Nila Lundberg is a 60 y.o. female PRIMARY DIAGNOSIS: Cellulitis of right foot [L03.115] Cellulitis of right foot Cellulitis of right foot 4 Days Post-Op ICD-10: Treatment Diagnosis:  
 · Generalized Muscle Weakness (M62.81) · Difficulty in walking, Not elsewhere classified (R26.2) Precaution/Allergies: 
Pcn [penicillins] and Phenergan [promethazine] ASSESSMENT:  
Ms. Erlinda Romero presents sitting at edge of bed with some c/o discomfort after recent wound VAC change (was pre-medicated). Agreeable to therapy treatment and has off loading shoe in room. Assist to don shoe, then performs sit-stand transfer with SBA. Ambulates total of 90 ft in hallway with walker, CGA/SBA, and off loading shoe. Notable gait and balance deficits related to shoe and uneven leg length with shoe on. Still getting used to different mechanics and balance deficits with shoe, however performs mobility well with minimal verbal cues for gait safety. Returned to room and up to edge of bed at pt request with lunch set up. Nila Lundberg is progressing well and mobility was improved with off loading shoe. She will benefit from continued therapy during hospital stay to address deficits/maximize independence. Anticipate dc home w/possible HH PT. This section established at most recent assessment PROBLEM LIST (Impairments causing functional limitations): 
 1. Decreased Strength 2. Decreased Transfer Abilities 3. Decreased Ambulation Ability/Technique 4. Increased Pain 5. Decreased Activity Tolerance INTERVENTIONS PLANNED: (Benefits and precautions of physical therapy have been discussed with the patient.) 1. Bed Mobility 2. Gait Training 3. Therapeutic Activites 4. Transfer Training TREATMENT PLAN: Frequency/Duration: 4 times a week for duration of hospital stay Rehabilitation Potential For Stated Goals: Good REHAB RECOMMENDATIONS (at time of discharge pending progress):   
Placement: It is my opinion, based on this patient's performance to date, that Ms. Kauffman may benefit from either home with home health vs inpt rehab stay. Its too soon to tell what her needs may be. Per surgical note may be going to OR for debridement at some point. Equipment: ? To be determined. HISTORY:  
History of Present Injury/Illness (Reason for Referral): 
Patient is a 59 y/o female with hx diabetes and HTN was recently hospitalized for cellulitis of right foot. Symptoms began initially last month on the 23d. She was discharged home 5/2 on keflex and doxycycline. She had been doing well for a time but now she has noticed a blackened appearance to the dorsum of right foot. Also had two blisters on the medial aspect of foot that have ruptured. She has a WBC ct of 14.4. No fever but has had chills. Her right foot has diffuse cellulitis with a large black eschar on dorsum. Will admit for IV antibiotics and surgical evaluation Past Medical History/Comorbidities: Ms. Julisa Castellano  has a past medical history of Diabetes ([address] and Hypertension. Ms. Julisa Castellano  has a past surgical history that includes colonoscopy (N/A, 3/2/2019) and ir paracentesis abd w image (5/17/2019). Social History/Living Environment:  
Home Environment: Private residence # Steps to Enter: 4 One/Two Story Residence: One story Living Alone:  Yes 
 Support Systems: Family member(s), Friends \ neighbors Patient Expects to be Discharged to[de-identified] Private residence Current DME Used/Available at Home: Walker, rolling Tub or Shower Type: Tub/Shower combination Prior Level of Function/Work/Activity: 
Independent with rolling walker 
age Number of Personal Factors/Comorbidities that affect the Plan of Care: 1-2: MODERATE COMPLEXITY EXAMINATION:  
Most Recent Physical Functioning:  
Gross Assessment: 
  
         
  
Posture: 
  
Balance: 
Sitting: Intact Standing: Impaired Standing - Static: Fair Standing - Dynamic : Fair Bed Mobility: 
  
Wheelchair Mobility: 
  
Transfers: 
Sit to Stand: Stand-by assistance Stand to Sit: Stand-by assistance Interventions: Verbal cues; Safety awareness training Duration: 15 Minutes Gait: 
Right Side Weight Bearing: Heel Base of Support: Center of gravity altered;Shift to left Speed/Tamela: Pace decreased (<100 feet/min); Slow Step Length: Left shortened;Right shortened Gait Abnormalities: Decreased step clearance; Path deviations;Trunk sway increased Distance (ft): 90 Feet (ft) Assistive Device: Walker, rolling Ambulation - Level of Assistance: Contact guard assistance;Stand-by assistance Interventions: Verbal cues; Safety awareness training Body Structures Involved: 1. Muscles Body Functions Affected: 1. Movement Related Activities and Participation Affected: 1. Mobility Number of elements that affect the Plan of Care: 3: MODERATE COMPLEXITY CLINICAL PRESENTATION:  
Presentation: Evolving clinical presentation with changing clinical characteristics: MODERATE COMPLEXITY CLINICAL DECISION MAKING:  
[employer] AM-PAC 6 Clicks Basic Mobility Inpatient Short Form How much difficulty does the patient currently have. .. Unable A Lot A Little None 1. Turning over in bed (including adjusting bedclothes, sheets and blankets)?    ? 1   ? 2   ? 3   ? 4  
 2.  Sitting down on and standing up from a chair with arms ( e.g., wheelchair, bedside commode, etc.)   ? 1   ? 2   ? 3   ? 4  
3. Moving from lying on back to sitting on the side of the bed?   ? 1   ? 2   ? 3   ? 4 How much help from another person does the patient currently need. .. Total A Lot A Little None 4. Moving to and from a bed to a chair (including a wheelchair)? ? 1   ? 2   ? 3   ? 4  
5. Need to walk in hospital room? ? 1   ? 2   ? 3   ? 4  
6. Climbing 3-5 steps with a railing? ? 1   ? 2   ? 3   ? 4  
© 2007, Trustees of [address] [place] [address] [address] [id number], under license to [employer]. All rights reserved Score:  Initial: 18 Most Recent: X (Date: -- ) Interpretation of Tool:  Represents activities that are increasingly more difficult (i.e. Bed mobility, Transfers, Gait). Medical Necessity:    
· Patient is expected to demonstrate progress in functional technique ·  to decrease assistance required with mobility and gait. · . Reason for Services/Other Comments: 
· Patient continues to require present interventions due to patient's inability to function at baseline. · . Use of outcome tool(s) and clinical judgement create a POC that gives a: Questionable prediction of patient's progress: MODERATE COMPLEXITY  
  
 
 
 
TREATMENT:  
(In addition to Assessment/Re-Assessment sessions the following treatments were rendered) Pre-treatment Symptoms/Complaints:  \"thank you\" Pain: Initial:  
Pain Intensity 1: 4 Pain Location 1: Foot Pain Orientation 1: Right Pain Intervention(s) 1: Repositioned  Post Session: 4/10 post wound VAC Therapeutic Activity: (  15 Minutes): Therapeutic activities including sit-stand transfers, standing balance, Ambulation on level ground with off loading shoe, weight shifting, walker safety to improve mobility, strength, balance and coordination. Required minimal  Verbal cues; Safety awareness training to promote static and dynamic balance in standing and promote motor control of bilateral, lower extremity(s). Date: 
5/10/19 Date: 
 Date: Activity/Exercise Parameters Parameters Parameters LAQ 15x AB Seated marching 15x AB Ankle pumps 15x AB Quad sets 10x AB Braces/Orthotics/Lines/Etc:  
· luciano catheter · R wound VAC and off loading shoe\ · O2 Device: Room air Treatment/Session Assessment:   
· Response to Treatment:  Improved mobility with off loading shoe · Interdisciplinary Collaboration:  
o Physical Therapist 
o Registered Nurse · After treatment position/precautions:  
o Bed/Chair-wheels locked 
o Bed in low position 
o Call light within reach 
o RN notified 
o sitting at edge of bed at pt request  
· Compliance with Program/Exercises: Will assess as treatment progresses · Recommendations/Intent for next treatment session: \"Next visit will focus on advancements to more challenging activities and reduction in assistance provided\". Total Treatment Duration: PT Patient Time In/Time Out Time In: 2206 Time Out: 0835 Angelica Perez DPT

## 2019-05-17 NOTE — PROGRESS NOTES
Problem: Self Care Deficits Care Plan (Adult)  Goal: *Acute Goals and Plan of Care (Insert Text)  Description  1. Pt will toilet with SBA   2. Pt will complete functional mobility for ADLs with SBA  3. Pt will complete lower body dressing with SBA using AE as needed  4. Pt will complete grooming and hygiene at sink with SBA  5. Pt will demonstrate independence with HEP to promote increased BUE strength and functional use for ADLs  6. Pt will tolerate 23 minutes functional activity with min or fewer rest breaks to promote increased endurance for ADLs      Timeframe: 7 days     Outcome: Progressing Towards Goal     OCCUPATIONAL THERAPY: Daily Note and AM 5/17/2019  INPATIENT: 3  Payor: BLUE CROSS / [place]: [place] [facility] [place] [facility] / Product Type: PPO /      NAME/AGE/GENDER: Gifty Arellano is a 60 y.o. female   PRIMARY DIAGNOSIS:  Cellulitis of right foot [L03.115] Cellulitis of right foot   Cellulitis of right foot      4 Days Post-Op  ICD-10: Treatment Diagnosis:    · Generalized Muscle Weakness (M62.81)   Precautions/Allergies:    falls Pcn [penicillins] and Phenergan [promethazine]      ASSESSMENT:     Ms. German Mcdonough was admitted with R foot cellulitis, is s/p recent admission for the same thing. Pt lives alone, reports that she is independent and works as a nurse at baseline but has been struggling to complete ADLs and IADLs since last d/c. Pt uses a RW for mobility. Pt stated that she has been out of work for a long time and is behind on her rent, is worried about eviction. Pt was supine in bed. Pt completed bed mobility with SBA. Pt completed functional tranfers with min A to stretcher for a procedure. Continue POC. This section established at most recent assessment   PROBLEM LIST (Impairments causing functional limitations):  1. Decreased Strength  2. Decreased ADL/Functional Activities  3. Decreased Transfer Abilities  4. Decreased Balance  5. Increased Pain  6.  Increased Fatigue   INTERVENTIONS PLANNED: (Benefits and precautions of occupational therapy have been discussed with the patient.)  1. Activities of daily living training  2. Balance training  3. Therapeutic activity  4. Therapeutic exercise     TREATMENT PLAN: Frequency/Duration: Follow patient 3 times/ week to address above goals. Rehabilitation Potential For Stated Goals: Good     REHAB RECOMMENDATIONS (at time of discharge pending progress):    Placement: It is my opinion, based on this patient's performance to date, that Ms. Kauffman may benefit from [address] after discharge due to the functional deficits listed above that are likely to improve with skilled rehabilitation because he/she has multiple medical issues that affect his/her functional mobility in the community. Equipment:    3:1 BSC, reacher, sock aide               OCCUPATIONAL PROFILE AND HISTORY:   History of Present Injury/Illness (Reason for Referral):  See H&P  Past Medical History/Comorbidities:   Ms. Sherryle Money  has a past medical history of Diabetes ([facility] [address] and Hypertension. Ms. Sherryle Money  has a past surgical history that includes colonoscopy (N/A, 3/2/2019).   Social History/Living Environment:   Home Environment: Private residence  # Steps to Enter: 4  One/Two Story Residence: One story  Living Alone: Yes  Support Systems: Family member(s), Friends \ neighbors  Patient Expects to be Discharged to[de-identified] Private residence  Current DME Used/Available at Home: Walker, rolling  Tub or Shower Type: Tub/Shower combination  Prior Level of Function/Work/Activity:  Independent, lives alone     Number of Personal Factors/Comorbidities that affect the Plan of Care: Expanded review of therapy/medical records (1-2):  MODERATE COMPLEXITY   ASSESSMENT OF OCCUPATIONAL PERFORMANCE[de-identified]   Activities of Daily Living:   Basic ADLs (From Assessment) Complex ADLs (From Assessment)   Feeding: Independent  Oral Facial Hygiene/Grooming: Setup  Bathing: Contact guard assistance  Upper Body Dressing: Setup  Lower Body Dressing: Minimum assistance  Toileting: Contact guard assistance Instrumental ADL  Meal Preparation: Minimum assistance  Homemaking: Minimum assistance   Grooming/Bathing/Dressing Activities of Daily Living     Cognitive Retraining  Safety/Judgement: Fall prevention                       Bed/Mat Mobility  Supine to Sit: Stand-by assistance  Sit to Stand: Minimum assistance       Most Recent Physical Functioning:   Gross Assessment:                  Posture:  Posture (WDL): Within defined limits  Balance:  Sitting: Intact  Standing: Pull to stand; With support Bed Mobility:  Supine to Sit: Stand-by assistance  Wheelchair Mobility:     Transfers:  Sit to Stand: Minimum assistance            Patient Vitals for the past 6 hrs:   BP SpO2 Pulse   05/17/19 0658 118/47 97 % 86   05/17/19 1048 140/65 98 % 78       Mental Status  Neurologic State: Alert  Orientation Level: Oriented X4  Cognition: Appropriate decision making, Appropriate for age attention/concentration  Perception: Verbal, Tactile  Perseveration: Verbal cues provided, Tactile cues provided  Safety/Judgement: Fall prevention                          Physical Skills Involved:  1. Balance  2. Strength  3. Activity Tolerance  4. Pain (acute) Cognitive Skills Affected (resulting in the inability to perform in a timely and safe manner): 1. none  Psychosocial Skills Affected:  1. Habits/Routines  2. Environmental Adaptation   Number of elements that affect the Plan of Care: 3-5:  MODERATE COMPLEXITY   CLINICAL DECISION MAKING:   [facility] -[facility]AC[facility] 6 Clicks   Daily Activity Inpatient Short Form  How much help from another person does the patient currently need. .. Total A Lot A Little None   1. Putting on and taking off regular lower body clothing? ? 1   ? 2   ? 3   ? 4   2. Bathing (including washing, rinsing, drying)? ? 1   ? 2   ? 3   ? 4   3. Toileting, which includes using toilet, bedpan or urinal?   ? 1   ? 2   ? 3   ? 4   4. Putting on and taking off regular upper body clothing? ? 1   ? 2   ? 3   ? 4   5. Taking care of personal grooming such as brushing teeth? ? 1   ? 2   ? 3   ? 4   6. Eating meals? ? 1   ? 2   ? 3   ? 4   © 2007, Trustees of [facility] [place] [facility] [employer], under license to [employer]. All rights reserved      Score:  Initial: 21 Most Recent: X (Date: -- )    Interpretation of Tool:  Represents activities that are increasingly more difficult (i.e. Bed mobility, Transfers, Gait). Medical Necessity:     · Patient demonstrates good  ·  rehab potential due to higher previous functional level. Reason for Services/Other Comments:  · Patient continues to require skilled intervention due to decreased ADLs and functional performance from baseline   · . Use of outcome tool(s) and clinical judgement create a POC that gives a: MODERATE COMPLEXITY         TREATMENT:   (In addition to Assessment/Re-Assessment sessions the following treatments were rendered)     Pre-treatment Symptoms/Complaints:    Pain: Initial:   Pain Intensity 1: 4  Pain Location 1: Foot  Post Session:  8         Therapeutic Activity: (    14): Therapeutic activities including bed mobility and functional transfer to improve mobility and strength. Required min verbal and tactile cues   to promote motor control of bilateral, upper extremity(s), lower extremity(s).        B UE's  Date:  5/10/19 Date:    Date:      Activity/Exercise Parameters Parameters Parameters   Elbow flex/ex 10 reps       Shoulder hor add/abd 10 reps       Punches  10 reps       Shoulder flex/ex 10 reps                                                   Braces/Orthotics/Lines/Etc:   · IV  · luciano catheter  · drain   · O2 Device: Room air  Treatment/Session Assessment:    · Response to Treatment:  increased pain in R foot  · Interdisciplinary Collaboration:   o Certified Occupational Therapy Assistant  o Registered Nurse  · After treatment position/precautions:   o Pt on strecther being taken down for a procedure    · Compliance with Program/Exercises: Will assess as treatment progresses. · Recommendations/Intent for next treatment session: \"Next visit will focus on advancements to more challenging activities and reduction in assistance provided\".   Total Treatment Duration:  OT Patient Time In/Time Out  Time In: 1055  Time Out: 1201 [facility] Brenonn Fosterma

## 2019-05-17 NOTE — PROGRESS NOTES
[facility] Progress Note Admit Date:  5/7/2019 Today's Date:  5/17/2019 CC:  Cirrhosis with new ascites Subjective:  
 
Aldactone has been d/c due to persistent hypotension. She thinks abdominal distention is worse as well as fluid rentention overall. She denies any abdominal pain, nausea, vomiting. She had a BM last night, no BRB or melena. Appetite is improved. IR consult pending for paracentesis. Medications:  
Current Facility-Administered Medications Medication Dose Route Frequency  traMADol (ULTRAM) tablet 50 mg  50 mg Oral Q6H PRN  
 vancomycin (VANCOCIN) 750 mg in  mL infusion  750 mg IntraVENous Q12H  
 0.9% sodium chloride infusion 250 mL  250 mL IntraVENous PRN  
 0.9% sodium chloride infusion 250 mL  250 mL IntraVENous PRN  
 simethicone (MYLICON) tablet 80 mg  80 mg Oral QID PRN  promethazine (PHENERGAN) with saline injection 6.25 mg  6.25 mg IntraVENous Q4H PRN  
 magnesium oxide (MAG-OX) tablet 400 mg  400 mg Oral BID  ondansetron (ZOFRAN) injection 4 mg  4 mg IntraVENous Q6H PRN  
 sertraline (ZOLOFT) tablet 100 mg  100 mg Oral DAILY  loperamide (IMODIUM) capsule 2 mg  2 mg Oral Q8H PRN  
 0.9% sodium chloride infusion 250 mL  250 mL IntraVENous PRN  
 sodium chloride (NS) flush 10 mL  10 mL InterCATHeter Q8H  
 sodium chloride (NS) flush 10 mL  10 mL InterCATHeter PRN  
 ferrous sulfate tablet 325 mg  325 mg Oral DAILY  gabapentin (NEURONTIN) capsule 300 mg  300 mg Oral QHS  levothyroxine (SYNTHROID) tablet 125 mcg  125 mcg Oral ACB  pantoprazole (PROTONIX) tablet 40 mg  40 mg Oral DAILY  dextrose 40% (GLUTOSE) oral gel 1 Tube  15 g Oral PRN  
 glucagon (GLUCAGEN) injection 1 mg  1 mg IntraMUSCular PRN  
 dextrose (D50) infusion 12.5-25 g  25-50 mL IntraVENous PRN  
 insulin lispro (HUMALOG) injection 0-10 Units  0-10 Units SubCUTAneous AC&HS  sodium chloride (NS) flush 5-40 mL  5-40 mL IntraVENous Q8H  
  sodium chloride (NS) flush 5-40 mL  5-40 mL IntraVENous PRN  
 acetaminophen (TYLENOL) tablet 650 mg  650 mg Oral Q4H PRN  
 oxyCODONE-acetaminophen (PERCOCET 7.5) 7.5-325 mg per tablet 1 Tab  1 Tab Oral Q4H PRN  
 naloxone (NARCAN) injection 0.4 mg  0.4 mg IntraVENous PRN  
 cefepime (MAXIPIME) 1 g in 0.9% sodium chloride (MBP/ADV) 50 mL ADV  1 g IntraVENous Q12H Review of Systems: ROS was obtained, with pertinent positives as listed above. No chest pain or SOB. Diet:  Diabetic, 2g Na diet Objective:  
Vitals: 
Visit Vitals /47 Pulse 86 Temp 98.5 °F (36.9 °C) Resp 18 Ht 5' 7\" (1.702 m) Wt 102.6 kg (226 lb 4.8 oz) SpO2 97% BMI 35.44 kg/m² Intake/Output: 
No intake/output data recorded. 05/15 1901 - 05/17 0700 In: -  
Out: 1529 [[id number]] Exam: 
General appearance: alert, cooperative, no distress Lungs: clear to auscultation bilaterally anteriorly Heart: regular rate and rhythm. Murmur 3/6 noted Abdomen: Distended, soft, non-tender. Bowel sounds normal. No masses, no organomegaly Extremities: 2+ pitting edema LE bilat. Wound vac on R foot. Neuro:  alert and oriented Data Review (Labs):   
Recent Labs 05/17/19 
7108 05/16/19 
0859 05/15/19 
2779 WBC 2.4* 2.4* 2.8* HGB 8.1* 7.5* 8.0*  
HCT 25.2* 23.3* 24.2*  
PLT 60* 56* 64* MCV 84.0 83.2 83.7  139 138  
K 4.1 3.6 3.5  106 105 CO2 26 26 26 BUN 17 15 13 CREA 0.76 0.73 0.68  
CA 7.7* 7.4* 7.4*  
* 155* 120* AP  --   --  277* SGOT  --   --  24 ALT  --   --  14  
TBILI  --   --  0.9 ALB  --   --  1.8* TP  --   --  5.1*  
 
EGD 3/2/19, Dr. Emmanuelle Dsouza: Findings:  
OROPHARYNX: The oropharynx was briefly viewed on entry and withdrawal with very brief evaluation of the cords, arytenoids and pyriform sinuses.  No abnormalities found.    
ESOPHAGUS:  The esophagus was normal with an intact squamocolumnar z-line without erosions or evidence of Layton's intestinal metaplasia.  The mucosa was normal.  There were no strictures, rings or noticeable narrowing of the lumen.  The endoscope was easily passed into the gastric pouch and there was no apparent hiatal hernia.  
STOMACH: The gastric pouch inflated and deflated normally.  The mucosal surface and gastric rugae were normal without erosions, ulcerations, raised lesions, vascular ectasias or other anomalies.  The pylorus was patent to passage of the endoscope without difficulty.    
DUODENUM:  The endoscope was easily passed into the third section of the duodenum.  The villous mucosa was normal without blunting or scalloped folds.  There were no mucosal erosions, ulcerations, raised lesions or vascular ectasias.  
  
COLONOSCOPY 3/2/19, Dr. Rei Baumann: Findings:  
TERMINAL ILEUM: The terminal ileum was entered and appeared normal with a normal villous mucosa. COLON:  The colonic mucosa from the cecum to the rectum was carefully examined.  The mucosa appeared normal with normal vascularity except for a few AVM's in the mid transverse.  There was no diverticulosis, inflammatory changes, mucosal polyps, raised lesions or abnormal pigmentation. ANUS/RECTUM: Anal exam reveals no masses or hemorrhoids, sphincter tone is normal. Rectal exam reveals no masses or hemorrhoids.   Direct and retroflexed views of the rectum were normal except for 1+ IH's.  
  
CT 2/27/19: FINDINGS:  
Abdomen: The lung bases are clear. The spleen is clearly enlarged, 16.8 cm  (normal usually less than 14). The liver appears homogeneous and has a macronodular contour suggesting cirrhosis. No focal liver lesion. No calcified gallstones. The biliary tree is not dilated. The pancreas is unremarkable. No free fluid, acute inflammatory changes or adenopathy. Bowel loops are not dilated. The kidneys enhance  uniformly. No radiopaque renal calculi. No hydronephrosis. The adrenal glands are normal size. Aorta is normal caliber.    
Pelvis: No free fluid or acute inflammatory changes. The urinary bladderunremarkable. No gross bony lesions. IMPRESSION:  Hepatic cirrhosis and splenomegaly. 
  
 Ultrasound abdomen complete 5/13/19: FINDINGS:  
Pancreas is grossly unremarkable although the tail is partially  obscured by overlying bowel gas. . Aorta is normal caliber, 2.0 cm. The IVC is patent. The enlarged sized spleen has a homogenous echotexture and measures 19 cm.  Left kidney is unremarkable without hydronephrosis and measures 12.8 cm. Right kidney is unremarkable without hydronephrosis and measures also 12 point cm. Renal echogenicity appears mildly increased, the right kidney is mildly hyperechoic to the liver. The intrahepatic biliary tree is not dilated. There is  hepatopetal flow in the portal vein. No gross focal parenchymal abnormalityidentified within the liver. The liver has a coarsened echotexture. There is  moderate ascites in the upper abdomen. The gallbladder wall is not thickened. There is layering echogenic bile (sludge)without discrete shadowing gallstones. The common bile duct is not dilated, 3 4 mm. IMPRESSION: Coarsened liver echotexture with ascites and splenomegaly, probably cirrhosis and portal venous hypertension. Gallbladder sludge without biliary tree obstruction. Assessment:  
 
Principal Problem: 
  Cellulitis of right foot (5/7/2019) Active Problems: Hyperlipidemia (1/12/2016) Essential hypertension (1/12/2016) Acquired hypothyroidism (1/12/2016) Type 2 diabetes with nephropathy ([address] (1/16/2019) Microcytic anemia (2/27/2019) Cirrhosis of liver without ascites ([address] (3/7/2019) 60 y.o. female with PMH of a 12+ year history of diabetes, HTN, and recent diagnosis of Cirrhosis in Feb 2019, who was admitted for severe cellulitis of the right foot requiring surgical debridement 5/13/19 and who is seen in consultation at the request of Demar Rios NP for cirrhosis with new ascites. Patient was seen in consultation in Feb for new Cirrhosis with JENNY and thrombocytopenia. Liver serology to include ceruloplasmin, KRISTEN, AMA, ASMA, and hepatitis screen were negative. She had JENNY and EGD\colonoscopy without significant findings(above). Since this admission, pt reports a nearly 20lb weight gain with lower extremity edema and increasing abdominal girth which she thinks is related to volume overload. Ultrasound  5/13 showed cirrhotic liver and changes and upper abdominal ascites. Her hgb trended down and she received one unit of PRBC 5/13 but she denies overt bleeding. She has had intermittent nausea and diarrhea worse in the last 24 hours but Cdiff was negative. She denies abdominal pain and feels that her abdominal distention is much improved since she started fluid restriction and diuretics (lasix 40 IV q12, aldactone 25mg daily). She has requested daily weights. INR this admission was 1.3 but she has significant pancytopenia (WBC 3.1, hgb 7.9 post transfusion, MCV 82, Plt 61k). Mild hypokalemia is noted. LFTs appear to be stable but albumin is 1.8. She has not yet followed up with our office as an outpatient. Plan:  
 
-Continue 2g Na diet 
-IR consult pending for possible paracentesis 
-Consider midodrine for diuresis -Monitor labs 
-Continue to follow NANCY Gómez Patient is seen and examined in collaboration with Dr. Yoli Cano. Assessment and plan as per Dr. Yoli Cano.

## 2019-05-17 NOTE — PROCEDURES
Department of Interventional Radiology  [phone]        Interventional Radiology Brief Procedure Note    Patient: Lino Sutton MRN: [id number]  SSN: [id number]    YOB: 1955  Age: 60 y.o. Sex: female      Date of Procedure: 5/17/2019    Pre-Procedure Diagnosis: Cirrhosis. Ascites. Post-Procedure Diagnosis: SAME    Procedure(s): Paracentesis    Brief Description of Procedure: as above    Performed By: Franklin Caceres MD     Assistants: None    Anesthesia:Lidocaine    Estimated Blood Loss: Less than 10ml    Specimens:  Pending    Implants:  None    Findings: Thin, yellow ascites. Complications: None    Recommendations: Recommend TIPS evaluation with Interventional Radiology. Portal Vein is widely patent on CT from February, 2019. Splenomegaly, Portal Hypertension.          Follow Up: PRN    Signed By: Franklin Caceres MD     May 17, 2019

## 2019-05-17 NOTE — PROGRESS NOTES
.. TRANSFER - OUT REPORT: 
 
Verbal report given to Neetu Funes in [employer]  on [address] [place] [address]  being transferred to  for parencentesis Report consisted of patients Situation, Background, Assessment and  
Recommendations Information from the following report  was reviewed with the receiving nurse. Lines:    
 
Opportunity for questions and clarification was provided.    
 
Patient transported with:

## 2019-05-17 NOTE — PROGRESS NOTES
TRANSFER - OUT REPORT: 
 
Verbal report given to Lynette Ervin on [facility]  being transferred to 6th floor for routine post - op Report consisted of patients Situation, Background, Assessment and  
Recommendations(SBAR). Information from the following report(s) SBAR, Kardex, Procedure Summary and MAR was reviewed with the receiving nurse. Lines:    
 
Opportunity for questions and clarification was provided.

## 2019-05-18 LAB
ANION GAP SERPL CALC-SCNC: 6 MMOL/L (ref 7–16)
BASOPHILS # BLD: 0 K/UL (ref 0–0.2)
BASOPHILS NFR BLD: 1 % (ref 0–2)
BUN SERPL-MCNC: 18 MG/DL (ref 8–23)
CALCIUM SERPL-MCNC: 7.7 MG/DL (ref 8.3–10.4)
CHLORIDE SERPL-SCNC: 106 MMOL/L (ref 98–107)
CO2 SERPL-SCNC: 26 MMOL/L (ref 21–32)
CREAT SERPL-MCNC: 0.77 MG/DL (ref 0.6–1)
DIFFERENTIAL METHOD BLD: ABNORMAL
EOSINOPHIL # BLD: 0.1 K/UL (ref 0–0.8)
EOSINOPHIL NFR BLD: 2 % (ref 0.5–7.8)
ERYTHROCYTE [DISTWIDTH] IN BLOOD BY AUTOMATED COUNT: 17.5 % (ref 11.9–14.6)
GLUCOSE BLD STRIP.AUTO-MCNC: 136 MG/DL (ref 65–100)
GLUCOSE BLD STRIP.AUTO-MCNC: 175 MG/DL (ref 65–100)
GLUCOSE BLD STRIP.AUTO-MCNC: 188 MG/DL (ref 65–100)
GLUCOSE BLD STRIP.AUTO-MCNC: 200 MG/DL (ref 65–100)
GLUCOSE SERPL-MCNC: 143 MG/DL (ref 65–100)
HCT VFR BLD AUTO: 23.4 % (ref 35.8–46.3)
HGB BLD-MCNC: 7.5 G/DL (ref 11.7–15.4)
IMM GRANULOCYTES # BLD AUTO: 0 K/UL (ref 0–0.5)
IMM GRANULOCYTES NFR BLD AUTO: 0 % (ref 0–5)
LYMPHOCYTES # BLD: 0.6 K/UL (ref 0.5–4.6)
LYMPHOCYTES NFR BLD: 26 % (ref 13–44)
MCH RBC QN AUTO: 26.8 PG (ref 26.1–32.9)
MCHC RBC AUTO-ENTMCNC: 32.1 G/DL (ref 31.4–35)
MCV RBC AUTO: 83.6 FL (ref 79.6–97.8)
MONOCYTES # BLD: 0.3 K/UL (ref 0.1–1.3)
MONOCYTES NFR BLD: 12 % (ref 4–12)
NEUTS SEG # BLD: 1.4 K/UL (ref 1.7–8.2)
NEUTS SEG NFR BLD: 60 % (ref 43–78)
NRBC # BLD: 0 K/UL (ref 0–0.2)
PLATELET # BLD AUTO: 62 K/UL (ref 150–450)
PMV BLD AUTO: 10.8 FL (ref 9.4–12.3)
POTASSIUM SERPL-SCNC: 4.3 MMOL/L (ref 3.5–5.1)
RBC # BLD AUTO: 2.8 M/UL (ref 4.05–5.2)
SODIUM SERPL-SCNC: 138 MMOL/L (ref 136–145)
WBC # BLD AUTO: 2.4 K/UL (ref 4.3–11.1)

## 2019-05-18 PROCEDURE — 85025 COMPLETE CBC W/AUTO DIFF WBC: CPT

## 2019-05-18 PROCEDURE — 74750000023 HC WOUND THERAPY

## 2019-05-18 PROCEDURE — 77030020256 HC SOL INJ NACL 0.9%  500ML

## 2019-05-18 PROCEDURE — 94760 N-INVAS EAR/PLS OXIMETRY 1: CPT

## 2019-05-18 PROCEDURE — 74011250637 HC RX REV CODE- 250/637: Performed by: SURGERY

## 2019-05-18 PROCEDURE — 36415 COLL VENOUS BLD VENIPUNCTURE: CPT

## 2019-05-18 PROCEDURE — 74011250637 HC RX REV CODE- 250/637: Performed by: NURSE PRACTITIONER

## 2019-05-18 PROCEDURE — 74011250636 HC RX REV CODE- 250/636: Performed by: SURGERY

## 2019-05-18 PROCEDURE — 65270000029 HC RM PRIVATE

## 2019-05-18 PROCEDURE — 82962 GLUCOSE BLOOD TEST: CPT

## 2019-05-18 PROCEDURE — 74011250637 HC RX REV CODE- 250/637: Performed by: INTERNAL MEDICINE

## 2019-05-18 PROCEDURE — 80048 BASIC METABOLIC PNL TOTAL CA: CPT

## 2019-05-18 PROCEDURE — 74011250636 HC RX REV CODE- 250/636: Performed by: HOSPITALIST

## 2019-05-18 PROCEDURE — 74011000258 HC RX REV CODE- 258: Performed by: SURGERY

## 2019-05-18 PROCEDURE — 74011636637 HC RX REV CODE- 636/637: Performed by: SURGERY

## 2019-05-18 RX ORDER — SPIRONOLACTONE 25 MG/1
50 TABLET ORAL DAILY
Status: DISCONTINUED | OUTPATIENT
Start: 2019-05-18 | End: 2019-05-23 | Stop reason: HOSPADM

## 2019-05-18 RX ADMIN — MAGNESIUM OXIDE TAB 400 MG (241.3 MG ELEMENTAL MG) 400 MG: 400 (241.3 MG) TAB at 08:00

## 2019-05-18 RX ADMIN — SODIUM CHLORIDE 1 G: 900 INJECTION, SOLUTION INTRAVENOUS at 22:09

## 2019-05-18 RX ADMIN — Medication 10 ML: at 14:33

## 2019-05-18 RX ADMIN — SODIUM CHLORIDE 1 G: 900 INJECTION, SOLUTION INTRAVENOUS at 08:00

## 2019-05-18 RX ADMIN — SERTRALINE HYDROCHLORIDE 100 MG: 100 TABLET ORAL at 08:00

## 2019-05-18 RX ADMIN — INSULIN LISPRO 2 UNITS: 100 INJECTION, SOLUTION INTRAVENOUS; SUBCUTANEOUS at 22:07

## 2019-05-18 RX ADMIN — GABAPENTIN 300 MG: 300 CAPSULE ORAL at 22:07

## 2019-05-18 RX ADMIN — Medication 10 ML: at 05:09

## 2019-05-18 RX ADMIN — ONDANSETRON 4 MG: 2 INJECTION INTRAMUSCULAR; INTRAVENOUS at 10:36

## 2019-05-18 RX ADMIN — INSULIN LISPRO 4 UNITS: 100 INJECTION, SOLUTION INTRAVENOUS; SUBCUTANEOUS at 16:23

## 2019-05-18 RX ADMIN — PANTOPRAZOLE SODIUM 40 MG: 40 TABLET, DELAYED RELEASE ORAL at 08:00

## 2019-05-18 RX ADMIN — SPIRONOLACTONE 50 MG: 25 TABLET ORAL at 14:32

## 2019-05-18 RX ADMIN — FERROUS SULFATE TAB 325 MG (65 MG ELEMENTAL FE) 325 MG: 325 (65 FE) TAB at 08:00

## 2019-05-18 RX ADMIN — INSULIN LISPRO 2 UNITS: 100 INJECTION, SOLUTION INTRAVENOUS; SUBCUTANEOUS at 11:26

## 2019-05-18 RX ADMIN — Medication 10 ML: at 22:13

## 2019-05-18 RX ADMIN — OXYCODONE HYDROCHLORIDE AND ACETAMINOPHEN 1 TABLET: 7.5; 325 TABLET ORAL at 10:36

## 2019-05-18 RX ADMIN — TRAMADOL HYDROCHLORIDE 50 MG: 50 TABLET, FILM COATED ORAL at 01:00

## 2019-05-18 RX ADMIN — TRAMADOL HYDROCHLORIDE 50 MG: 50 TABLET, FILM COATED ORAL at 22:36

## 2019-05-18 RX ADMIN — VANCOMYCIN HYDROCHLORIDE 750 MG: 10 INJECTION, POWDER, LYOPHILIZED, FOR SOLUTION INTRAVENOUS at 17:48

## 2019-05-18 RX ADMIN — MAGNESIUM OXIDE TAB 400 MG (241.3 MG ELEMENTAL MG) 400 MG: 400 (241.3 MG) TAB at 17:48

## 2019-05-18 RX ADMIN — VANCOMYCIN HYDROCHLORIDE 750 MG: 10 INJECTION, POWDER, LYOPHILIZED, FOR SOLUTION INTRAVENOUS at 05:04

## 2019-05-18 RX ADMIN — LEVOTHYROXINE SODIUM 125 MCG: 75 TABLET ORAL at 05:04

## 2019-05-18 NOTE — PROGRESS NOTES
[facility] Progress Note Admit Date:  5/7/2019 Today's Date:  5/18/2019 CC:  Cirrhosis with new ascites Subjective: She underwent paracentesis yesterday with IR. 4620 thin yellow fluid removed. TIPS evaluation was recommended by IR. She denies any abdominal pain, nausea, vomiting. She had a BM this am, no BRB or melena. Appetite is improved. She does report worsening depression. Hospitalist aware and addressing. Medications:  
Current Facility-Administered Medications Medication Dose Route Frequency  traMADol (ULTRAM) tablet 50 mg  50 mg Oral Q6H PRN  
 vancomycin (VANCOCIN) 750 mg in  mL infusion  750 mg IntraVENous Q12H  
 0.9% sodium chloride infusion 250 mL  250 mL IntraVENous PRN  
 0.9% sodium chloride infusion 250 mL  250 mL IntraVENous PRN  
 simethicone (MYLICON) tablet 80 mg  80 mg Oral QID PRN  promethazine (PHENERGAN) with saline injection 6.25 mg  6.25 mg IntraVENous Q4H PRN  
 magnesium oxide (MAG-OX) tablet 400 mg  400 mg Oral BID  ondansetron (ZOFRAN) injection 4 mg  4 mg IntraVENous Q6H PRN  
 sertraline (ZOLOFT) tablet 100 mg  100 mg Oral DAILY  loperamide (IMODIUM) capsule 2 mg  2 mg Oral Q8H PRN  
 0.9% sodium chloride infusion 250 mL  250 mL IntraVENous PRN  
 sodium chloride (NS) flush 10 mL  10 mL InterCATHeter Q8H  
 sodium chloride (NS) flush 10 mL  10 mL InterCATHeter PRN  
 ferrous sulfate tablet 325 mg  325 mg Oral DAILY  gabapentin (NEURONTIN) capsule 300 mg  300 mg Oral QHS  levothyroxine (SYNTHROID) tablet 125 mcg  125 mcg Oral ACB  pantoprazole (PROTONIX) tablet 40 mg  40 mg Oral DAILY  dextrose 40% (GLUTOSE) oral gel 1 Tube  15 g Oral PRN  
 glucagon (GLUCAGEN) injection 1 mg  1 mg IntraMUSCular PRN  
 dextrose (D50) infusion 12.5-25 g  25-50 mL IntraVENous PRN  
 insulin lispro (HUMALOG) injection 0-10 Units  0-10 Units SubCUTAneous AC&HS  
  sodium chloride (NS) flush 5-40 mL  5-40 mL IntraVENous Q8H  
 sodium chloride (NS) flush 5-40 mL  5-40 mL IntraVENous PRN  
 acetaminophen (TYLENOL) tablet 650 mg  650 mg Oral Q4H PRN  
 oxyCODONE-acetaminophen (PERCOCET 7.5) 7.5-325 mg per tablet 1 Tab  1 Tab Oral Q4H PRN  
 naloxone (NARCAN) injection 0.4 mg  0.4 mg IntraVENous PRN  
 cefepime (MAXIPIME) 1 g in 0.9% sodium chloride (MBP/ADV) 50 mL ADV  1 g IntraVENous Q12H Review of Systems: ROS was obtained, with pertinent positives as listed above. No chest pain or SOB. Diet:  Diabetic, 2g Na diet Objective:  
Vitals: 
Visit Vitals /52 (BP 1 Location: Right arm, BP Patient Position: Sitting) Pulse 77 Temp 97.7 °F (36.5 °C) Resp 18 Ht 5' 7\" (1.702 m) Wt 92.6 kg (204 lb 1.6 oz) SpO2 96% BMI 31.97 kg/m² Intake/Output: 
05/18 0701 - 05/18 1900 In: 360 [P.O.:360] Out: -  
05/16 1901 - 05/18 0700 In: 251 [I.V.:251] Out: Lupillo Hernandez Exam: 
General appearance: alert, cooperative, no distress Lungs: clear to auscultation bilaterally anteriorly Heart: regular rate and rhythm. Murmur 3/6 noted Abdomen: Distended, soft, non-tender. Bowel sounds normal. No masses, no organomegaly Extremities: 2+ pitting edema LE bilat. Wound vac on R foot. Neuro:  alert and oriented Data Review (Labs):   
Recent Labs 05/18/19 
8645 05/17/19 
1094 05/16/19 
2348 WBC 2.4* 2.4* 2.4* HGB 7.5* 8.1* 7.5* HCT 23.4* 25.2* 23.3*  
PLT 62* 60* 56* MCV 83.6 84.0 83.2  138 139  
K 4.3 4.1 3.6  106 106 CO2 26 26 26 BUN 18 17 15 CREA 0.77 0.76 0.73 CA 7.7* 7.7* 7.4*  
* 164* 155* EGD 3/2/19, Dr. Jose Babb: Findings:  
OROPHARYNX: The oropharynx was briefly viewed on entry and withdrawal with very brief evaluation of the cords, arytenoids and pyriform sinuses.  No abnormalities found.    
ESOPHAGUS:  The esophagus was normal with an intact squamocolumnar z-line without erosions or evidence of Layton's intestinal metaplasia.  The mucosa was normal.  There were no strictures, rings or noticeable narrowing of the lumen.  The endoscope was easily passed into the gastric pouch and there was no apparent hiatal hernia.  
STOMACH: The gastric pouch inflated and deflated normally.  The mucosal surface and gastric rugae were normal without erosions, ulcerations, raised lesions, vascular ectasias or other anomalies.  The pylorus was patent to passage of the endoscope without difficulty.    
DUODENUM:  The endoscope was easily passed into the third section of the duodenum.  The villous mucosa was normal without blunting or scalloped folds.  There were no mucosal erosions, ulcerations, raised lesions or vascular ectasias.  
  
COLONOSCOPY 3/2/19, Dr. Shannan Navas: Findings:  
TERMINAL ILEUM: The terminal ileum was entered and appeared normal with a normal villous mucosa. COLON:  The colonic mucosa from the cecum to the rectum was carefully examined.  The mucosa appeared normal with normal vascularity except for a few AVM's in the mid transverse.  There was no diverticulosis, inflammatory changes, mucosal polyps, raised lesions or abnormal pigmentation. ANUS/RECTUM: Anal exam reveals no masses or hemorrhoids, sphincter tone is normal. Rectal exam reveals no masses or hemorrhoids.   Direct and retroflexed views of the rectum were normal except for 1+ IH's.  
  
CT 2/27/19: FINDINGS:  
Abdomen: The lung bases are clear. The spleen is clearly enlarged, 16.8 cm  (normal usually less than 14). The liver appears homogeneous and has a macronodular contour suggesting cirrhosis. No focal liver lesion. No calcified gallstones. The biliary tree is not dilated. The pancreas is unremarkable. No free fluid, acute inflammatory changes or adenopathy. Bowel loops are not dilated. The kidneys enhance  uniformly. No radiopaque renal calculi. No hydronephrosis. The adrenal glands are normal size. Aorta is normal caliber.    
Pelvis: No free fluid or acute inflammatory changes. The urinary bladderunremarkable. No gross bony lesions. IMPRESSION:  Hepatic cirrhosis and splenomegaly. 
  
 Ultrasound abdomen complete 5/13/19: FINDINGS:  
Pancreas is grossly unremarkable although the tail is partially  obscured by overlying bowel gas. . Aorta is normal caliber, 2.0 cm. The IVC is patent. The enlarged sized spleen has a homogenous echotexture and measures 19 cm.  Left kidney is unremarkable without hydronephrosis and measures 12.8 cm. Right kidney is unremarkable without hydronephrosis and measures also 12 point cm. Renal echogenicity appears mildly increased, the right kidney is mildly hyperechoic to the liver. The intrahepatic biliary tree is not dilated. There is  hepatopetal flow in the portal vein. No gross focal parenchymal abnormalityidentified within the liver. The liver has a coarsened echotexture. There is  moderate ascites in the upper abdomen. The gallbladder wall is not thickened. There is layering echogenic bile (sludge)without discrete shadowing gallstones. The common bile duct is not dilated, 3 4 mm. IMPRESSION: Coarsened liver echotexture with ascites and splenomegaly, probably cirrhosis and portal venous hypertension. Gallbladder sludge without biliary tree obstruction. Ultrasound guided Paracentesis. 5/17/19  
History: 60-year-old woman with cirrhosis and newly diagnosed ascites. Patient 
presents today with abdominal distention and discomfort. Right foot cellulitis.  
Consent:  Informed written and oral consent was obtained from the patient after 
explanation of benefits and risks of procedure (including, but not limitted to:  
infection, hemorrhage, visceral injury). The patient's questions were answered 
to her satisfaction.   The patient stated understanding and requested that we 
proceed.    
Procedure:  Sterile barrier technique (including:  cap, mask, sterile gloves, 
 sterile sheet, hand hygiene, and chlorhexidene for cutaneous antisepsis) were 
used. Following routine prep and drape of the abd, a local field block with 1% 
lidocaine was achieved. Ultrasound evaluation was performed.   
Fluid was identified in the right mid abdomen. The anticipated needle tract was 
interrogated using Doppler ultrasound. Using real-time ultrasound guidance, the 
peritoneal cavity was accessed with a 8-Urdu catheter.   
A total of 4620 cc of thin, yellow fluid was removed. The catheter was removed 
and Dermabond applied.  
Complications: None.  
Specimen: Will be sent to the laboratory at the referring physicians request  
Findings: Large-volume ascites.  IMPRESSION Impression: Uncomplicated ultrasound guided Paracentesis.  
Plan: Patient is been returned to her hospital room in stable condition. Recommend TIPS evaluation for the long-term treatment of ascites in this patient 
with obvious cirrhosis, portal hypertension, splenomegaly identified on CT scan 
2/27/2019. Assessment:  
 
Principal Problem: 
  Cellulitis of right foot (5/7/2019) Active Problems: Hyperlipidemia (1/12/2016) Essential hypertension (1/12/2016) Acquired hypothyroidism (1/12/2016) Type 2 diabetes with nephropathy ([facility] (1/16/2019) Microcytic anemia (2/27/2019) Cirrhosis of liver without ascites ([address] (3/7/2019) 60 y.o. female with PMH of a 12+ year history of diabetes, HTN, and recent diagnosis of Cirrhosis in Feb 2019, who was admitted for severe cellulitis of the right foot requiring surgical debridement 5/13/19 and who is seen in consultation at the request of Caren Arshad NP for cirrhosis with new ascites. Patient was seen in consultation in Feb for new Cirrhosis with JENNY and thrombocytopenia. Liver serology to include ceruloplasmin, KRISTEN, AMA, ASMA, and hepatitis screen were negative.  She had JENNY and EGD\colonoscopy without significant findings(above). Since this admission, pt reports a nearly 20lb weight gain with lower extremity edema and increasing abdominal girth which she thinks is related to volume overload. Ultrasound  5/13 showed cirrhotic liver and changes and upper abdominal ascites. Her hgb trended down and she received one unit of PRBC 5/13 but she denies overt bleeding. She has had intermittent nausea and diarrhea worse in the last 24 hours but Cdiff was negative. She denies abdominal pain and feels that her abdominal distention is much improved since she started fluid restriction and diuretics (lasix 40 IV q12, aldactone 25mg daily). She has requested daily weights. INR this admission was 1.3 but she has significant pancytopenia (WBC 3.1, hgb 7.9 post transfusion, MCV 82, Plt 61k). Mild hypokalemia is noted. LFTs appear to be stable but albumin is 1.8. She has not yet followed up with our office as an outpatient. Plan:  
 
-Continue 2g Na diet 
-Possible candidate for TIPS 
-Monitor labs 
-Continue to follow NANCY Major Patient is seen and examined in collaboration with Dr. Meredith Davis. Assessment and plan as per Dr. Meredith Davis.

## 2019-05-18 NOTE — PROGRESS NOTES
Hospitalist Progress Note Admit Date:  2019  8:39 PM  
Name:  Gopal Flores Age:  60 y.o. 
:  1955 MRN:  [id number] PCP:  Ben Louie MD 
Treatment Team: Attending Provider: Mónica Torres MD; Consulting Provider: Sean Patten MD; Utilization Review: Jonelle Mcnulty RN; Care Manager: Tk Vazquez, RN; Consulting Provider: Kirit Zafar MD 
 
Subjective:  
CC: right foot cellulitis Pt is a 61 yo female with PMH including DM 2, cirrhosis, anemia with ablation of AVM colon. Pt presented to ED  for worsening right foot cellulitis despite being on Doxy and Keflex at home. Initially a bedside I&D was done and pt was started on Vanc/Cefepime. On  she underwent I&D in the OR. Pt with increasing abd distention, noted with new ascites. She was started on IV Lasix and Aldactone but diuretics were stopped due to hypotension. BPs better off diuretics, for past 24 hours running 120's to 140's/50's-60's. Pt states she feel a bit better but feeling pretty depressed. Noted that sertraline was increased to 100mg, offered encouragement that the full effect may not be felt for 2-3 wks. Wound vac in place. Paracentesis done , samir procedure well, recommending TIPS eval.   
 
Objective:  
 
Patient Vitals for the past 24 hrs: 
 Temp Pulse Resp BP SpO2  
19 0749 97.7 °F (36.5 °C) 77 18 123/52 96 % 19 0509 98.1 °F (36.7 °C) 75 18 124/62 93 % 19 0101 98.5 °F (36.9 °C) 77 18 112/60 94 % 19 2143     96 % 19 1957 °F (36.9 °C) 75 17 102/54 96 % 19 1632 98.2 °F (36.8 °C) 78 16 122/56 95 % 19 1402     95 % 19 1204 98.6 °F (37 °C)  18 144/58 96 % 19 1048 98.2 °F (36.8 °C) 78 18 140/65 98 % Oxygen Therapy O2 Sat (%): 96 % (19 0749) Pulse via Oximetry: 75 beats per minute (19) O2 Device: Room air (19) O2 Flow Rate (L/min): 2 l/min (19) FIO2 (%): 21 % (05/17/19 2143) Intake/Output Summary (Last 24 hours) at 5/18/2019 1000 Last data filed at 5/18/2019 3616 Gross per 24 hour Intake 611 ml Output 5970 ml Net -5359 ml REVIEW OF SYSTEMS: Comprehensive ROS performed and negative except as stated in HPI. Physical Examination: 
General:    Well nourished. Awake and alert. Head:  Normocephalic, atraumatic Eyes:  Extraocular movements intact, normal sclera CV:   RRR. No  Murmurs, clicks, or gallops Lungs:   Unlabored, no cyanosis Abdomen:   Soft, nondistended, nontender. Extremities: Warm and dry. No cyanosis or edema. Skin:     No rashes or jaundice. Neuro:  No gross focal deficits Psych:  Mood and affect appropriate Data Review: 
I have reviewed all labs, meds, telemetry events, and studies from the last 24 hours. Recent Results (from the past 24 hour(s)) GLUCOSE, POC Collection Time: 05/17/19 10:56 AM  
Result Value Ref Range Glucose (POC) 205 (H) 65 - 100 mg/dL GLUCOSE, POC Collection Time: 05/17/19  3:54 PM  
Result Value Ref Range Glucose (POC) 217 (H) 65 - 100 mg/dL Koki Aver Collection Time: 05/17/19  5:55 PM  
Result Value Ref Range Vancomycin,trough 17.9 5 - 20 ug/mL GLUCOSE, POC Collection Time: 05/17/19  8:14 PM  
Result Value Ref Range Glucose (POC) 214 (H) 65 - 100 mg/dL CBC WITH AUTOMATED DIFF Collection Time: 05/18/19  5:16 AM  
Result Value Ref Range WBC 2.4 (L) 4.3 - 11.1 K/uL  
 RBC 2.80 (L) 4.05 - 5.2 M/uL HGB 7.5 (L) 11.7 - 15.4 g/dL HCT 23.4 (L) 35.8 - 46.3 % MCV 83.6 79.6 - 97.8 FL  
 MCH 26.8 26.1 - 32.9 PG  
 MCHC 32.1 31.4 - 35.0 g/dL  
 RDW 17.5 (H) 11.9 - 14.6 % PLATELET 62 (L) 649 - 450 K/uL MPV 10.8 9.4 - 12.3 FL ABSOLUTE NRBC 0.00 0.0 - 0.2 K/uL  
 DF AUTOMATED NEUTROPHILS 60 43 - 78 % LYMPHOCYTES 26 13 - 44 % MONOCYTES 12 4.0 - 12.0 % EOSINOPHILS 2 0.5 - 7.8 %  BASOPHILS 1 0.0 - 2.0 %  
 IMMATURE GRANULOCYTES 0 0.0 - 5.0 %  
 ABS. NEUTROPHILS 1.4 (L) 1.7 - 8.2 K/UL  
 ABS. LYMPHOCYTES 0.6 0.5 - 4.6 K/UL  
 ABS. MONOCYTES 0.3 0.1 - 1.3 K/UL  
 ABS. EOSINOPHILS 0.1 0.0 - 0.8 K/UL  
 ABS. BASOPHILS 0.0 0.0 - 0.2 K/UL  
 ABS. IMM. GRANS. 0.0 0.0 - 0.5 K/UL METABOLIC PANEL, BASIC Collection Time: 05/18/19  5:16 AM  
Result Value Ref Range Sodium 138 136 - 145 mmol/L Potassium 4.3 3.5 - 5.1 mmol/L Chloride 106 98 - 107 mmol/L  
 CO2 26 21 - 32 mmol/L Anion gap 6 (L) 7 - 16 mmol/L Glucose 143 (H) 65 - 100 mg/dL BUN 18 8 - 23 MG/DL Creatinine 0.77 0.6 - 1.0 MG/DL  
 GFR est AA >60 >60 ml/min/1.73m2 GFR est non-AA >60 >60 ml/min/1.73m2 Calcium 7.7 (L) 8.3 - 10.4 MG/DL  
GLUCOSE, POC Collection Time: 05/18/19  7:46 AM  
Result Value Ref Range Glucose (POC) 136 (H) 65 - 100 mg/dL All Micro Results Procedure Component Value Units Date/Time C. DIFFICILE AG & TOXIN A/B [[id number]] Collected:  05/14/19 0236 Order Status:  Completed Specimen:  Stool Updated:  05/14/19 1200 7007 [address] [place] ANTIGEN    
  C. DIFFICILE GDH ANTIGEN-NEGATIVE  
     
  C. difficile toxin C. DIFFICILE TOXIN-NEGATIVE  
     
  PCR Reflex NOT APPLICABLE INTERPRETATION    
  NEGATIVE FOR TOXIGENIC C. DIFFICILE Clinical Consideration NEGATIVE FOR TOXIGENIC C. DIFFICILE  
     
 CULTURE, BLOOD [[id number]] Collected:  05/07/19 2349 Order Status:  Completed Specimen:  Whole Blood Updated:  05/13/19 1010 Special Requests: --     
  RIGHT 
FOREARM Culture result: NO GROWTH 5 DAYS     
 CULTURE, BLOOD [[id number]] Collected:  05/07/19 2138 Order Status:  Completed Specimen:  Whole Blood Updated:  05/12/19 6097 Special Requests: --     
  LEFT Antecubital 
  
  Culture result: NO GROWTH 5 DAYS Current Meds: 
Current Facility-Administered Medications Medication Dose Route Frequency  traMADol (ULTRAM) tablet 50 mg  50 mg Oral Q6H PRN  
  vancomycin (VANCOCIN) 750 mg in  mL infusion  750 mg IntraVENous Q12H  
 0.9% sodium chloride infusion 250 mL  250 mL IntraVENous PRN  
 0.9% sodium chloride infusion 250 mL  250 mL IntraVENous PRN  
 simethicone (MYLICON) tablet 80 mg  80 mg Oral QID PRN  promethazine (PHENERGAN) with saline injection 6.25 mg  6.25 mg IntraVENous Q4H PRN  
 magnesium oxide (MAG-OX) tablet 400 mg  400 mg Oral BID  ondansetron (ZOFRAN) injection 4 mg  4 mg IntraVENous Q6H PRN  
 sertraline (ZOLOFT) tablet 100 mg  100 mg Oral DAILY  loperamide (IMODIUM) capsule 2 mg  2 mg Oral Q8H PRN  
 0.9% sodium chloride infusion 250 mL  250 mL IntraVENous PRN  
 sodium chloride (NS) flush 10 mL  10 mL InterCATHeter Q8H  
 sodium chloride (NS) flush 10 mL  10 mL InterCATHeter PRN  
 ferrous sulfate tablet 325 mg  325 mg Oral DAILY  gabapentin (NEURONTIN) capsule 300 mg  300 mg Oral QHS  levothyroxine (SYNTHROID) tablet 125 mcg  125 mcg Oral ACB  pantoprazole (PROTONIX) tablet 40 mg  40 mg Oral DAILY  dextrose 40% (GLUTOSE) oral gel 1 Tube  15 g Oral PRN  
 glucagon (GLUCAGEN) injection 1 mg  1 mg IntraMUSCular PRN  
 dextrose (D50) infusion 12.5-25 g  25-50 mL IntraVENous PRN  
 insulin lispro (HUMALOG) injection 0-10 Units  0-10 Units SubCUTAneous AC&HS  sodium chloride (NS) flush 5-40 mL  5-40 mL IntraVENous Q8H  
 sodium chloride (NS) flush 5-40 mL  5-40 mL IntraVENous PRN  
 acetaminophen (TYLENOL) tablet 650 mg  650 mg Oral Q4H PRN  
 oxyCODONE-acetaminophen (PERCOCET 7.5) 7.5-325 mg per tablet 1 Tab  1 Tab Oral Q4H PRN  
 naloxone (NARCAN) injection 0.4 mg  0.4 mg IntraVENous PRN  
 cefepime (MAXIPIME) 1 g in 0.9% sodium chloride (MBP/ADV) 50 mL ADV  1 g IntraVENous Q12H Diet: DIET NUTRITIONAL SUPPLEMENTS 
DIET DIABETIC CONSISTENT CARB Other Studies (last 24 hours): Ir Paracentesis Abd W Image Result Date: 5/17/2019 Title: Ultrasound guided Paracentesis. History: 82-year-old woman with cirrhosis and newly diagnosed ascites. Patient presents today with abdominal distention and discomfort. Right foot cellulitis. Consent:  Informed written and oral consent was obtained from the patient after explanation of benefits and risks of procedure (including, but not limitted to: infection, hemorrhage, visceral injury). The patient's questions were answered to her satisfaction. The patient stated understanding and requested that we proceed. Procedure:  Sterile barrier technique (including:  cap, mask, sterile gloves, sterile sheet, hand hygiene, and chlorhexidene for cutaneous antisepsis) were used. Following routine prep and drape of the abd, a local field block with 1% lidocaine was achieved. Ultrasound evaluation was performed. Fluid was identified in the right mid abdomen. The anticipated needle tract was interrogated using Doppler ultrasound. Using real-time ultrasound guidance, the peritoneal cavity was accessed with a 8-[place] catheter. A total of 4620 cc of thin, yellow fluid was removed. The catheter was removed and Dermabond applied. Complications: None. Specimen: Will be sent to the laboratory at the referring physicians request Findings: Large-volume ascites. Impression: Uncomplicated ultrasound guided Paracentesis. Plan: Patient is been returned to her hospital room in stable condition. Recommend TIPS evaluation for the long-term treatment of ascites in this patient with obvious cirrhosis, portal hypertension, splenomegaly identified on CT scan 2/27/2019. Assessment and Plan:  
 
Hospital Problems as of 5/18/2019 Date Reviewed: 4/24/2019 Codes Class Noted - Resolved POA * (Principal) Cellulitis of right foot ICD-10-CM: M69.156 ICD-9-CM: 682.7  5/7/2019 - Present Yes Cirrhosis of liver without ascites (HCC) ICD-10-CM: K74.60 ICD-9-CM: 571.5  3/7/2019 - Present Yes Microcytic anemia ICD-10-CM: D50.9 ICD-9-CM: 280.9  2/27/2019 - Present Yes Type 2 diabetes with nephropathy ([facility] [address] ICD-10-CM: E11.21 
ICD-9-CM: 250.40, 583.81  1/16/2019 - Present Yes Hyperlipidemia ICD-10-CM: E78.5 ICD-9-CM: 272.4  1/12/2016 - Present Yes Essential hypertension ICD-10-CM: I10 
ICD-9-CM: 401.9  1/12/2016 - Present Yes Acquired hypothyroidism ICD-10-CM: E03.9 ICD-9-CM: 244.9  1/12/2016 - Present Yes A/P:   
Right foot cellulitis X Ray with no obvious osteo Sp debridement 05/13 Cont Vanc/Cefepime Wound vac in place, offloading boot on right foot Wound care following, dsg change 05/15 per surgery NS @ 100  
  
Anemia, uncertain etiology Previous AVM but repaired, check FOBT S/p 2 units PRBCs PO Protonix Avoid NSAIDs  
  
DM Humalog SSI AC and HS Lantus on hold  
  
Diarrhea Likely due to abx C Diff neg Probiotic and PRN Imodium  
  
Recent dx of cirrhosis, now with worsening ascites and pancytopenia  
05/13 US showed spelnomegaly and ascites Consider diagnostic para, IR consult pending Daily weights Cont to hold IV Lasix and Aldactone d/christian due to hypotension GI following 
  
DC planning CM following Ideally will dc to STR but pt requesting home health DVT ppx:  SCD Code status:  Full Medical decision maker: No MPOA Case reviewed with supervising physician - MAYRA Chatterjee MD 
 
Signed: 
POPPY Frankel

## 2019-05-18 NOTE — PROGRESS NOTES
Pharmacokinetic Consult to Pharmacist 
 
Chaz Snow is a 60 y.o. female being treated for SSTI with vancomycin and cefepime. Height: 5' 7\" (170.2 cm)  Weight: 92.6 kg (204 lb 1.6 oz) Lab Results Component Value Date/Time BUN 18 05/18/2019 05:16 AM  
 Creatinine 0.77 05/18/2019 05:16 AM  
 WBC 2.4 (L) 05/18/2019 05:16 AM  
 Procalcitonin 10.6 04/24/2019 06:29 PM  
 Lactic Acid (POC) 1.97 (H) 05/07/2019 08:30 PM  
  
Estimated Creatinine Clearance: 88.5 mL/min (based on SCr of 0.77 mg/dL). Lab Results Component Value Date/Time Vancomycin,trough 17.9 05/17/2019 05:55 PM  
 
 
Day 12 of vancomycin. Goal trough is 10-20. Tr = 17.9. No changes, continue 750 mg q12h Will continue to follow patient. Thank you, Mirna Prakash, Pharm. D. Clinical Pharmacist 
[phone]

## 2019-05-18 NOTE — PROGRESS NOTES
Problem: Falls - Risk of 
Goal: *Absence of Falls Description Document Jason Watkins Fall Risk and appropriate interventions in the flowsheet. Outcome: Progressing Towards Goal 
  
Problem: Pressure Injury - Risk of 
Goal: *Prevention of pressure injury Description Document Shane Scale and appropriate interventions in the flowsheet. Outcome: Progressing Towards Goal 
  
Problem: Nutrition Deficit Goal: *Optimize nutritional status Outcome: Progressing Towards Goal

## 2019-05-18 NOTE — PROGRESS NOTES
Date 05/17/19 0700 - 05/18/19 2783 05/18/19 0700 - 05/19/19 3859 Shift [id number] 1900-0659 24 Hour Total 0328-1387 5516-2878 24 Hour Total  
INTAKE  
I.V.(mL/kg/hr)  251 251 I.V.  251 251 Shift Total(mL/kg)  251(2.7) 251(2.7) OUTPUT Urine(mL/kg/hr) 500(0.4) 850 1350 Urine Output (mL) (Urinary Catheter 05/12/19 Sainz)  Other [phone] Other Output [phone] Stool Stool Occurrence(s) 4 x 0 x 4 x Shift Total(mL/kg) 5043(45.3) 850(9.2) [id number]) NET -1175 -599 -6032 Weight (kg) 102.6 92.6 92.6 92.6 92.6 92.6 Hourly rounds done. Pt c/o pain, medicated per MAR. Denies nausea, vomiting. Sainz output yellow/straw/clear. No BM this shift. RLE wound vac drainage level 50 mL, sanguinous. All needs met at this time.

## 2019-05-19 LAB
GLUCOSE BLD STRIP.AUTO-MCNC: 110 MG/DL (ref 65–100)
GLUCOSE BLD STRIP.AUTO-MCNC: 186 MG/DL (ref 65–100)
GLUCOSE BLD STRIP.AUTO-MCNC: 192 MG/DL (ref 65–100)
GLUCOSE BLD STRIP.AUTO-MCNC: 226 MG/DL (ref 65–100)

## 2019-05-19 PROCEDURE — 65270000029 HC RM PRIVATE

## 2019-05-19 PROCEDURE — 74011250636 HC RX REV CODE- 250/636: Performed by: HOSPITALIST

## 2019-05-19 PROCEDURE — 74011250636 HC RX REV CODE- 250/636: Performed by: SURGERY

## 2019-05-19 PROCEDURE — 74011250637 HC RX REV CODE- 250/637: Performed by: SURGERY

## 2019-05-19 PROCEDURE — 74011000258 HC RX REV CODE- 258: Performed by: SURGERY

## 2019-05-19 PROCEDURE — 74011250637 HC RX REV CODE- 250/637: Performed by: NURSE PRACTITIONER

## 2019-05-19 PROCEDURE — 74011636637 HC RX REV CODE- 636/637: Performed by: SURGERY

## 2019-05-19 PROCEDURE — 74750000023 HC WOUND THERAPY

## 2019-05-19 PROCEDURE — 82962 GLUCOSE BLOOD TEST: CPT

## 2019-05-19 PROCEDURE — 74011250637 HC RX REV CODE- 250/637: Performed by: INTERNAL MEDICINE

## 2019-05-19 RX ADMIN — FERROUS SULFATE TAB 325 MG (65 MG ELEMENTAL FE) 325 MG: 325 (65 FE) TAB at 08:58

## 2019-05-19 RX ADMIN — LEVOTHYROXINE SODIUM 125 MCG: 75 TABLET ORAL at 05:04

## 2019-05-19 RX ADMIN — SODIUM CHLORIDE 1 G: 900 INJECTION, SOLUTION INTRAVENOUS at 21:25

## 2019-05-19 RX ADMIN — SODIUM CHLORIDE 1 G: 900 INJECTION, SOLUTION INTRAVENOUS at 08:59

## 2019-05-19 RX ADMIN — INSULIN LISPRO 2 UNITS: 100 INJECTION, SOLUTION INTRAVENOUS; SUBCUTANEOUS at 11:31

## 2019-05-19 RX ADMIN — TRAMADOL HYDROCHLORIDE 50 MG: 50 TABLET, FILM COATED ORAL at 19:37

## 2019-05-19 RX ADMIN — Medication 10 ML: at 21:32

## 2019-05-19 RX ADMIN — VANCOMYCIN HYDROCHLORIDE 750 MG: 10 INJECTION, POWDER, LYOPHILIZED, FOR SOLUTION INTRAVENOUS at 05:05

## 2019-05-19 RX ADMIN — ONDANSETRON 4 MG: 2 INJECTION INTRAMUSCULAR; INTRAVENOUS at 11:26

## 2019-05-19 RX ADMIN — INSULIN LISPRO 2 UNITS: 100 INJECTION, SOLUTION INTRAVENOUS; SUBCUTANEOUS at 16:58

## 2019-05-19 RX ADMIN — MAGNESIUM OXIDE TAB 400 MG (241.3 MG ELEMENTAL MG) 400 MG: 400 (241.3 MG) TAB at 08:59

## 2019-05-19 RX ADMIN — SERTRALINE HYDROCHLORIDE 100 MG: 100 TABLET ORAL at 08:59

## 2019-05-19 RX ADMIN — INSULIN LISPRO 4 UNITS: 100 INJECTION, SOLUTION INTRAVENOUS; SUBCUTANEOUS at 21:25

## 2019-05-19 RX ADMIN — SPIRONOLACTONE 50 MG: 25 TABLET ORAL at 08:58

## 2019-05-19 RX ADMIN — GABAPENTIN 300 MG: 300 CAPSULE ORAL at 21:25

## 2019-05-19 RX ADMIN — VANCOMYCIN HYDROCHLORIDE 750 MG: 10 INJECTION, POWDER, LYOPHILIZED, FOR SOLUTION INTRAVENOUS at 17:19

## 2019-05-19 RX ADMIN — ACETAMINOPHEN 650 MG: 325 TABLET, FILM COATED ORAL at 02:47

## 2019-05-19 RX ADMIN — Medication 10 ML: at 14:00

## 2019-05-19 RX ADMIN — PANTOPRAZOLE SODIUM 40 MG: 40 TABLET, DELAYED RELEASE ORAL at 08:59

## 2019-05-19 RX ADMIN — Medication 10 ML: at 05:05

## 2019-05-19 RX ADMIN — Medication 10 ML: at 13:58

## 2019-05-19 NOTE — PROGRESS NOTES
Hospitalist Progress Note Admit Date:  2019  8:39 PM  
Name:  Soledad Wright Age:  60 y.o. 
:  1955 MRN:  [id number] PCP:  Anish Johnson MD 
Treatment Team: Attending Provider: Dena Smith MD; Consulting Provider: Jonna Baca MD; Utilization Review: Traci Villafana RN; Care Manager: Claudene Andrea, RN; Consulting Provider: Ruben Knapp MD; Charge Nurse: Dell Person RN Subjective:  
CC: right foot cellulitis Pt is a 59 yo female with PMH including DM 2, cirrhosis, anemia with ablation of AVM colon. Pt presented to ED  for worsening right foot cellulitis despite being on Doxy and Keflex at home. Initially a bedside I&D was done and pt was started on Vanc/Cefepime. On  she underwent I&D in the OR. Pt with increasing abd distention, noted with new ascites. She was started on IV Lasix and Aldactone but diuretics were stopped due to hypotension. BPs better off diuretics, for past 24 hours running 120's to 140's/50's-60's. Pt states she feel a bit better but feeling pretty depressed. Noted that sertraline was increased to 100mg, offered encouragement that the full effect may not be felt for 2-3 wks. Wound vac in place. Paracentesis done , samir procedure well. Per GI - It is reasonable to assess for TIPS, but her numbers suggest she should be fairly well compensated. She should be a candidate, as she has a UNOS MELD of 9 and no encephalopathy. My concern about a TIPS is that she ultimately may not need it. The infection probably precipitated some decompensation. She may improve as treatment proceeds. Supporting argument for TIPS is that she lists financial reasons that it is hard to follow a 2 gm Na diet. Pt WBC low but stable at 2.4, Hgb 7.5, afebrile. Pt verbalizing concerns about her job, rent that will be coming up due, a nephew that has mental illness. Agreeable to speak with clergy, requested that they visit with pt. Objective:  
 
Patient Vitals for the past 24 hrs: 
 Temp Pulse Resp BP SpO2  
05/19/19 0653 98.3 °F (36.8 °C) 69 16 110/53 95 % 05/19/19 0506 98.3 °F (36.8 °C) 72 17 106/53 96 % 05/18/19 2354     98 % 05/18/19 2118 98.6 °F (37 °C) 73 17 112/55 99 % 05/18/19 1456 98 °F (36.7 °C) 67 18 119/54 99 % 05/18/19 1042 99.1 °F (37.3 °C) 73 16 120/51 97 % Oxygen Therapy O2 Sat (%): 95 % (05/19/19 0653) Pulse via Oximetry: 87 beats per minute (05/18/19 2354) O2 Device: Room air (05/18/19 2354) O2 Flow Rate (L/min): 2 l/min (05/13/19 1726) FIO2 (%): 21 % (05/17/19 2143) Intake/Output Summary (Last 24 hours) at 5/19/2019 0857 Last data filed at 5/19/2019 1556 Gross per 24 hour Intake 1320 ml Output 2650 ml Net -1330 ml REVIEW OF SYSTEMS: Comprehensive ROS performed and negative except as stated in HPI. Physical Examination: 
General:    Well nourished. Awake and alert. Head:  Normocephalic, atraumatic Eyes:  Extraocular movements intact, normal sclera CV:   RRR. No  Murmurs, clicks, or gallops Lungs:   Unlabored, no cyanosis Abdomen:   Soft, nondistended, nontender. Extremities: Warm and dry. No cyanosis. Still with 1-2+ pitting edema BLE. Right foot wound with wound vac in place. Skin:     No rashes or jaundice. Pale Neuro:  No gross focal deficits Psych:  Mood is depressed and affect is flat Data Review: 
I have reviewed all labs, meds, telemetry events, and studies from the last 24 hours. Recent Results (from the past 24 hour(s)) GLUCOSE, POC Collection Time: 05/18/19 11:08 AM  
Result Value Ref Range Glucose (POC) 188 (H) 65 - 100 mg/dL GLUCOSE, POC Collection Time: 05/18/19  4:12 PM  
Result Value Ref Range Glucose (POC) 200 (H) 65 - 100 mg/dL GLUCOSE, POC Collection Time: 05/18/19  7:55 PM  
Result Value Ref Range Glucose (POC) 175 (H) 65 - 100 mg/dL GLUCOSE, POC Collection Time: 05/19/19  6:52 AM  
Result Value Ref Range Glucose (POC) 110 (H) 65 - 100 mg/dL All Micro Results Procedure Component Value Units Date/Time C. DIFFICILE AG & TOXIN A/B [[id number]] Collected:  05/14/19 0236 Order Status:  Completed Specimen:  Stool Updated:  05/14/19 1209 [address] [place] ANTIGEN    
  C. DIFFICILE GDH ANTIGEN-NEGATIVE  
     
  C. difficile toxin C. DIFFICILE TOXIN-NEGATIVE  
     
  PCR Reflex NOT APPLICABLE INTERPRETATION    
  NEGATIVE FOR TOXIGENIC C. DIFFICILE Clinical Consideration NEGATIVE FOR TOXIGENIC C. DIFFICILE  
     
 CULTURE, BLOOD [[id number]] Collected:  05/07/19 2345 Order Status:  Completed Specimen:  Whole Blood Updated:  05/13/19 1010 Special Requests: --     
  RIGHT 
FOREARM Culture result: NO GROWTH 5 DAYS     
 CULTURE, BLOOD [[id number]] Collected:  05/07/19 2138 Order Status:  Completed Specimen:  Whole Blood Updated:  05/12/19 0747 Special Requests: --     
  LEFT Antecubital 
  
  Culture result: NO GROWTH 5 DAYS Current Meds: 
Current Facility-Administered Medications Medication Dose Route Frequency  spironolactone (ALDACTONE) tablet 50 mg  50 mg Oral DAILY  traMADol (ULTRAM) tablet 50 mg  50 mg Oral Q6H PRN  
 vancomycin (VANCOCIN) 750 mg in  mL infusion  750 mg IntraVENous Q12H  
 0.9% sodium chloride infusion 250 mL  250 mL IntraVENous PRN  
 0.9% sodium chloride infusion 250 mL  250 mL IntraVENous PRN  
 simethicone (MYLICON) tablet 80 mg  80 mg Oral QID PRN  promethazine (PHENERGAN) with saline injection 6.25 mg  6.25 mg IntraVENous Q4H PRN  
 magnesium oxide (MAG-OX) tablet 400 mg  400 mg Oral BID  ondansetron (ZOFRAN) injection 4 mg  4 mg IntraVENous Q6H PRN  
 sertraline (ZOLOFT) tablet 100 mg  100 mg Oral DAILY  loperamide (IMODIUM) capsule 2 mg  2 mg Oral Q8H PRN  
 0.9% sodium chloride infusion 250 mL  250 mL IntraVENous PRN  
 sodium chloride (NS) flush 10 mL  10 mL InterCATHeter Q8H  
  sodium chloride (NS) flush 10 mL  10 mL InterCATHeter PRN  
 ferrous sulfate tablet 325 mg  325 mg Oral DAILY  gabapentin (NEURONTIN) capsule 300 mg  300 mg Oral QHS  levothyroxine (SYNTHROID) tablet 125 mcg  125 mcg Oral ACB  pantoprazole (PROTONIX) tablet 40 mg  40 mg Oral DAILY  dextrose 40% (GLUTOSE) oral gel 1 Tube  15 g Oral PRN  
 glucagon (GLUCAGEN) injection 1 mg  1 mg IntraMUSCular PRN  
 dextrose (D50) infusion 12.5-25 g  25-50 mL IntraVENous PRN  
 insulin lispro (HUMALOG) injection 0-10 Units  0-10 Units SubCUTAneous AC&HS  sodium chloride (NS) flush 5-40 mL  5-40 mL IntraVENous Q8H  
 sodium chloride (NS) flush 5-40 mL  5-40 mL IntraVENous PRN  
 acetaminophen (TYLENOL) tablet 650 mg  650 mg Oral Q4H PRN  
 oxyCODONE-acetaminophen (PERCOCET 7.5) 7.5-325 mg per tablet 1 Tab  1 Tab Oral Q4H PRN  
 naloxone (NARCAN) injection 0.4 mg  0.4 mg IntraVENous PRN  
 cefepime (MAXIPIME) 1 g in 0.9% sodium chloride (MBP/ADV) 50 mL ADV  1 g IntraVENous Q12H Diet: DIET NUTRITIONAL SUPPLEMENTS 
DIET DIABETIC CONSISTENT CARB Other Studies (last 24 hours): No results found. Assessment and Plan:  
 
Hospital Problems as of 5/19/2019 Date Reviewed: 4/24/2019 Codes Class Noted - Resolved POA * (Principal) Cellulitis of right foot ICD-10-CM: Q68.260 ICD-9-CM: 682.7  5/7/2019 - Present Yes Cirrhosis of liver without ascites (HCC) ICD-10-CM: K74.60 ICD-9-CM: 571.5  3/7/2019 - Present Yes Microcytic anemia ICD-10-CM: D50.9 ICD-9-CM: 280.9  2/27/2019 - Present Yes [facility] ICD-10-CM: E11.21 
ICD-9-CM: 250.40, 583.81  1/16/2019 - Present Yes Hyperlipidemia ICD-10-CM: E78.5 ICD-9-CM: 272.4  1/12/2016 - Present Yes Essential hypertension ICD-10-CM: I10 
ICD-9-CM: 401.9  1/12/2016 - Present Yes Acquired hypothyroidism ICD-10-CM: E03.9 ICD-9-CM: 244.9  1/12/2016 - Present Yes A/P:   
 Right foot cellulitis X Ray with no obvious osteo S/P debridement 05/13 Cont Vanc/Cefepime Wound vac in place, offloading boot on right foot Wound care following, dsg change 05/15 per surgery  
  
Anemia, uncertain etiology Previous AVM but repaired, check FOBT S/p 2 units PRBCs PO Protonix Avoid NSAIDs  
  
DM Humalog SSI AC and HS Lantus on hold Diabetes educator consult 
  
Diarrhea Likely due to abx C Diff neg Probiotic and PRN Imodium  
  
Recent dx of cirrhosis, now with worsening ascites and pancytopenia  
05/13 US showed spelnomegaly and ascites 5/17 IR paracentesis Daily weights Cont to hold IV Lasix Aldactone restarted GI following 
  
DC planning CM following Ideally will dc to STR but pt requesting home health DVT ppx:  SCD Code status:  Full Medical decision maker: No MPOA Case reviewed with supervising physician - MAYRA Chatterjee MD 
 
Signed: 
Betty Torres, NEGRITO-C

## 2019-05-19 NOTE — PROGRESS NOTES
END OF SHIFT NOTE:Wound vac remains ar 125 cont, drsg intact. Voices no complaints at present. Assessment unchanged Hourly rounds completed, all needs met this shift. Will continue to monitor until night shift nurse takes over. INTAKE/OUTPUT 
05/18 0701 - 05/19 0700 In: 1680 [P.O.:1680] Out: 2200 [Urine:2200] Voiding: NO 
Catheter: YES Color: clear Drain: DIET Diabetic Flatus: Patient does have flatus present. Stool:  1 occurrences. Characteristics: 
Stool Assessment Stool Color: [place] Stool Appearance: Watery Stool Amount: Large Ambulating No 
Emesis: 0 occurrences. Characteristics: VITAL SIGNS Patient Vitals for the past 12 hrs: 
 Temp Pulse Resp BP SpO2  
05/19/19 1442 98.2 °F (36.8 °C) 71 16 131/60 99 % 05/19/19 1100 98.2 °F (36.8 °C) 67 16 118/58 98 % 05/19/19 0653 98.3 °F (36.8 °C) 69 16 110/53 95 % Pain Assessment Pain Intensity 1: 0 (05/19/19 0740) Pain Location 1: Foot Pain Intervention(s) 1: Medication (see MAR) Patient Stated Pain Goal: 0 Fiona Mauricio RN

## 2019-05-19 NOTE — PROGRESS NOTES
Per notes patient has experienced an increase in her depression. Patient was calm and alone She was receptive to  presence. Spent an extended time in exploring what patient believes is causing her depression. 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 She loves her work and caring for others. Her illness has \"put her total life on hold. \" She is uncertain about her job and if her employer will hold it for her. She is concerned that when her insurance is renewed in  she will not be able to Pay the deductibles since her income has stopped presently. She is in the process of short term disability but has not received a final decision. Her   a few years ago. she has no children. Her mother is in an nursing facility and she cannot see her because of her health Her nephew who lives with her has voluntarily committed himself for psych help and she does not know where he is. He has never sought treatments before per patient She is afraid she will have no where to live next month because she cant pay her rent. Spiritual encounter: Acknowledged her grief Shared a devotional - \"the consolation of God\" Explored its meaning as applies to her life: When all other supports are taken away God remains constant. There is no ache in the heart that God cant supply. God sees all our needs today and in the future - we can only see today. God wants us to trust when the road before us is unknown. Prayer is our way of staying before the [address] constantly. Reflection: 
 
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 Her answer :   Read juanito price. Prayer was offered. A calmness was evident as we brought it before a loving God. Direction was given to her that was meaningful The patient was relaxed and was able to find the peace that was not there when  came to visit It is a priority in her total healing that spiritual support remain a part of her daily care.  
 
 
Thomes Boast, staff [address], [id number] [facility] [place]  /   [email or web address]

## 2019-05-19 NOTE — PROGRESS NOTES
Problem: Falls - Risk of 
Goal: *Absence of Falls Description Document Constance President Fall Risk and appropriate interventions in the flowsheet. Outcome: Progressing Towards Goal 
  
Problem: Pressure Injury - Risk of 
Goal: *Prevention of pressure injury Description Document Shane Scale and appropriate interventions in the flowsheet. Outcome: Progressing Towards Goal 
  
Problem: General Infection Care Plan (Adult and Pediatric) Goal: Improvement in signs and symptoms of infection Outcome: Progressing Towards Goal

## 2019-05-19 NOTE — PROGRESS NOTES
Date 05/18/19 0700 - 05/19/19 1301 05/19/19 0700 - 05/20/19 8729 Shift 5305-1712 1791-2808 24 Hour Total 0511-8112 7273-8029 24 Hour Total  
INTAKE  
P.O. 1680  1680     
  P. O. 1680  1680 Shift Total(mL/kg) [id number])  [id number])     
OUTPUT Urine(mL/kg/hr) 600(0.5) 1600 2200 Urine Output (mL) (Urinary Catheter 05/12/19 Sainz) 600 1600 2200 Shift Total(mL/kg) 600(6.5) 1600(17.1) [id number]) NET 1080 1600 -921 Weight (kg) 92.6 93.5 93.5 93.5 93.5 93.5 Hourly rounds done. Pt c/o pain, medicated per MAR. Denies nausea, vomiting. Sainz output yellow/straw/clear. No BM this shift. RLE wound vac drainage level 60 mL, sanguinous. Pt requesting to restart Lantus, will relay to am RN/MD. All needs met at this time.

## 2019-05-19 NOTE — PROGRESS NOTES
[facility] Progress Note Admit Date:  5/7/2019 Today's Date:  5/19/2019 CC:  Cirrhosis with new ascites Subjective:  
 
Feels well. Eating. Edema down. She does report worsening depression. Hospitalist aware and addressing. Medications:  
Current Facility-Administered Medications Medication Dose Route Frequency  spironolactone (ALDACTONE) tablet 50 mg  50 mg Oral DAILY  traMADol (ULTRAM) tablet 50 mg  50 mg Oral Q6H PRN  
 vancomycin (VANCOCIN) 750 mg in  mL infusion  750 mg IntraVENous Q12H  
 0.9% sodium chloride infusion 250 mL  250 mL IntraVENous PRN  
 0.9% sodium chloride infusion 250 mL  250 mL IntraVENous PRN  
 simethicone (MYLICON) tablet 80 mg  80 mg Oral QID PRN  promethazine (PHENERGAN) with saline injection 6.25 mg  6.25 mg IntraVENous Q4H PRN  
 ondansetron (ZOFRAN) injection 4 mg  4 mg IntraVENous Q6H PRN  
 sertraline (ZOLOFT) tablet 100 mg  100 mg Oral DAILY  loperamide (IMODIUM) capsule 2 mg  2 mg Oral Q8H PRN  
 0.9% sodium chloride infusion 250 mL  250 mL IntraVENous PRN  
 sodium chloride (NS) flush 10 mL  10 mL InterCATHeter Q8H  
 sodium chloride (NS) flush 10 mL  10 mL InterCATHeter PRN  
 ferrous sulfate tablet 325 mg  325 mg Oral DAILY  gabapentin (NEURONTIN) capsule 300 mg  300 mg Oral QHS  levothyroxine (SYNTHROID) tablet 125 mcg  125 mcg Oral ACB  pantoprazole (PROTONIX) tablet 40 mg  40 mg Oral DAILY  dextrose 40% (GLUTOSE) oral gel 1 Tube  15 g Oral PRN  
 glucagon (GLUCAGEN) injection 1 mg  1 mg IntraMUSCular PRN  
 dextrose (D50) infusion 12.5-25 g  25-50 mL IntraVENous PRN  
 insulin lispro (HUMALOG) injection 0-10 Units  0-10 Units SubCUTAneous AC&HS  sodium chloride (NS) flush 5-40 mL  5-40 mL IntraVENous Q8H  
 sodium chloride (NS) flush 5-40 mL  5-40 mL IntraVENous PRN  
 acetaminophen (TYLENOL) tablet 650 mg  650 mg Oral Q4H PRN  
  oxyCODONE-acetaminophen (PERCOCET 7.5) 7.5-325 mg per tablet 1 Tab  1 Tab Oral Q4H PRN  
 naloxone (NARCAN) injection 0.4 mg  0.4 mg IntraVENous PRN  
 cefepime (MAXIPIME) 1 g in 0.9% sodium chloride (MBP/ADV) 50 mL ADV  1 g IntraVENous Q12H Review of Systems: ROS was obtained, with pertinent positives as listed above. No chest pain or SOB. Diet:  Diabetic, 2g Na diet Objective:  
Vitals: 
Visit Vitals /58 (BP 1 Location: Right arm, BP Patient Position: At rest) Pulse 67 Temp 98.2 °F (36.8 °C) Resp 16 Ht 5' 7\" (1.702 m) Wt 93.5 kg (206 lb 1.6 oz) SpO2 98% BMI 32.28 kg/m² Intake/Output: 
 07 - 1900 In: -  
Out: 450 [Urine:450] 1901 -  0700 In: 1931 [P.O.:1680; I.V.:251] Out: 101[address] [place] [EJ] Exam: 
General appearance: alert, cooperative, no distress Lungs: clear to auscultation bilaterally anteriorly Heart: regular rate and rhythm. Murmur 3/6 noted Abdomen: Distended, soft, non-tender. Bowel sounds normal. No masses, no organomegaly Extremities: 2+ pitting edema LE bilat. Wound vac on R foot. Neuro:  alert and oriented Data Review (Labs):   
Recent Labs 19 
5457 19 
7645 WBC 2.4* 2.4* HGB 7.5* 8.1* HCT 23.4* 25.2*  
PLT 62* 60* MCV 83.6 84.0  138  
K 4.3 4.1  106 CO2 26 26 BUN 18 17 CREA 0.77 0.76  
CA 7.7* 7.7*  
* 164* EGD 3/2/19, Dr. Rose Din: Findings:  
OROPHARYNX: The oropharynx was briefly viewed on entry and withdrawal with very brief evaluation of the cords, arytenoids and pyriform sinuses.  No abnormalities found.    
ESOPHAGUS:  The esophagus was normal with an intact squamocolumnar z-line without erosions or evidence of Layton's intestinal metaplasia.  The mucosa was normal.  There were no strictures, rings or noticeable narrowing of the lumen.  The endoscope was easily passed into the gastric pouch and there was no apparent hiatal hernia.  
 STOMACH: The gastric pouch inflated and deflated normally.  The mucosal surface and gastric rugae were normal without erosions, ulcerations, raised lesions, vascular ectasias or other anomalies.  The pylorus was patent to passage of the endoscope without difficulty.    
DUODENUM:  The endoscope was easily passed into the third section of the duodenum.  The villous mucosa was normal without blunting or scalloped folds.  There were no mucosal erosions, ulcerations, raised lesions or vascular ectasias.  
  
COLONOSCOPY 3/2/19, Dr. Yeni Crews: Findings:  
Patrice Exon terminal ileum was entered and appeared normal with a normal villous mucosa. COLON:  The colonic mucosa from the cecum to the rectum was carefully examined.  The mucosa appeared normal with normal vascularity except for a few AVM's in the mid transverse.  There was no diverticulosis, inflammatory changes, mucosal polyps, raised lesions or abnormal pigmentation. ANUS/RECTUM: Anal exam reveals no masses or hemorrhoids, sphincter tone is normal. Rectal exam reveals no masses or hemorrhoids.   Direct and retroflexed views of the rectum were normal except for 1+ IH's.  
  
CT 2/27/19: FINDINGS:  
Abdomen: The lung bases are clear. The spleen is clearly enlarged, 16.8 cm  (normal usually less than 14). The liver appears homogeneous and has a macronodular contour suggesting cirrhosis. No focal liver lesion. No calcified gallstones. The biliary tree is not dilated. The pancreas is unremarkable. No free fluid, acute inflammatory changes or adenopathy. Bowel loops are not dilated. The kidneys enhance  uniformly. No radiopaque renal calculi. No hydronephrosis. The adrenal glands are normal size. Aorta is normal caliber.    
Pelvis: No free fluid or acute inflammatory changes. The urinary bladderunremarkable. No gross bony lesions.   
IMPRESSION:  Hepatic cirrhosis and splenomegaly. 
  
 Ultrasound abdomen complete 5/13/19: FINDINGS:  
 Pancreas is grossly unremarkable although the tail is partially  obscured by overlying bowel gas. . Aorta is normal caliber, 2.0 cm. The IVC is patent. The enlarged sized spleen has a homogenous echotexture and measures 19 cm.  Left kidney is unremarkable without hydronephrosis and measures 12.8 cm. Right kidney is unremarkable without hydronephrosis and measures also 12 point cm. Renal echogenicity appears mildly increased, the right kidney is mildly hyperechoic to the liver. The intrahepatic biliary tree is not dilated. There is  hepatopetal flow in the portal vein. No gross focal parenchymal abnormalityidentified within the liver. The liver has a coarsened echotexture. There is  moderate ascites in the upper abdomen. The gallbladder wall is not thickened. There is layering echogenic bile (sludge)without discrete shadowing gallstones. The common bile duct is not dilated, 3 4 mm. IMPRESSION: Coarsened liver echotexture with ascites and splenomegaly, probably cirrhosis and portal venous hypertension. Gallbladder sludge without biliary tree obstruction. Ultrasound guided Paracentesis. 5/17/19  
History: 73-year-old woman with cirrhosis and newly diagnosed ascites. Patient 
presents today with abdominal distention and discomfort. Right foot cellulitis.  
Consent:  Informed written and oral consent was obtained from the patient after 
explanation of benefits and risks of procedure (including, but not limitted to:  
infection, hemorrhage, visceral injury). The patient's questions were answered 
to her satisfaction. The patient stated understanding and requested that we 
proceed.    
Procedure:  Sterile barrier technique (including:  cap, mask, sterile gloves, 
sterile sheet, hand hygiene, and chlorhexidene for cutaneous antisepsis) were 
used. Following routine prep and drape of the abd, a local field block with 1% 
lidocaine was achieved.   Ultrasound evaluation was performed.   
 Fluid was identified in the right mid abdomen. The anticipated needle tract was 
interrogated using Doppler ultrasound. Using real-time ultrasound guidance, the 
peritoneal cavity was accessed with a 8-Panamanian catheter.   
A total of 4620 cc of thin, yellow fluid was removed. The catheter was removed 
and Dermabond applied.  
Complications: None.  
Specimen: Will be sent to the laboratory at the referring physicians request  
Findings: Large-volume ascites.  IMPRESSION Impression: Uncomplicated ultrasound guided Paracentesis.  
Plan: Patient is been returned to her hospital room in stable condition. Recommend TIPS evaluation for the long-term treatment of ascites in this patient 
with obvious cirrhosis, portal hypertension, splenomegaly identified on CT scan 
2/27/2019. Assessment:  
 
Principal Problem: 
  Cellulitis of right foot (5/7/2019) Active Problems: Hyperlipidemia (1/12/2016) Essential hypertension (1/12/2016) Acquired hypothyroidism (1/12/2016) Type 2 diabetes with nephropathy ([facility] [address] (1/16/2019) Microcytic anemia (2/27/2019) Cirrhosis of liver without ascites ([facility] [address] (3/7/2019) 60 y.o. female with PMH of a 12+ year history of diabetes, HTN, and recent diagnosis of Cirrhosis in Feb 2019, who was admitted for severe cellulitis of the right foot requiring surgical debridement 5/13/19 and who is seen in consultation at the request of Lindsey Monique NP for cirrhosis with new ascites. Patient was seen in consultation in Feb for new Cirrhosis with JENNY and thrombocytopenia. Liver serology to include ceruloplasmin, KRISTEN, AMA, ASMA, and hepatitis screen were negative. She had JENNY and EGD\colonoscopy without significant findings(above). Since this admission, pt reports a nearly 20lb weight gain with lower extremity edema and increasing abdominal girth which she thinks is related to volume overload.  Ultrasound  5/13 showed cirrhotic liver and changes and upper abdominal ascites. Her hgb trended down and she received one unit of PRBC 5/13 but she denies overt bleeding. She has had intermittent nausea and diarrhea worse in the last 24 hours but Cdiff was negative. She denies abdominal pain and feels that her abdominal distention is much improved since she started fluid restriction and diuretics (lasix 40 IV q12, aldactone 25mg daily). She has requested daily weights. INR this admission was 1.3 but she has significant pancytopenia (WBC 3.1, hgb 7.9 post transfusion, MCV 82, Plt 61k). Mild hypokalemia is noted. LFTs appear to be stable but albumin is 1.8. She has not yet followed up with our office as an outpatient. 
  
It is reasonable to assess for TIPS, but her numbers suggest she should be fairly well compensated. She should be a candidate, as she has a UNOS MELD of 9 and no encephalopathy. My concern about a TIPS is that she ultimately may not need it. The infection probably precipitated some decompensation. She may improve as treatment proceeds. Supporting argument for TIPS is that she lists financial reasons that it is hard to follow a 2 gm Na diet.   
  
Since BP improved, will re-add a small aldactone dose and monitor Plan:  
 
-Continue 2g Na diet 
-Possible candidate for TIPS 
-restarted aldactone - adjust as BP tolerates 
-follow BMPs Nabeel Becerra MD 
[facility], [place]

## 2019-05-20 LAB
ANION GAP SERPL CALC-SCNC: 6 MMOL/L (ref 7–16)
BUN SERPL-MCNC: 14 MG/DL (ref 8–23)
CALCIUM SERPL-MCNC: 8 MG/DL (ref 8.3–10.4)
CHLORIDE SERPL-SCNC: 105 MMOL/L (ref 98–107)
CO2 SERPL-SCNC: 27 MMOL/L (ref 21–32)
CREAT SERPL-MCNC: 0.72 MG/DL (ref 0.6–1)
GLUCOSE BLD STRIP.AUTO-MCNC: 106 MG/DL (ref 65–100)
GLUCOSE BLD STRIP.AUTO-MCNC: 156 MG/DL (ref 65–100)
GLUCOSE BLD STRIP.AUTO-MCNC: 175 MG/DL (ref 65–100)
GLUCOSE BLD STRIP.AUTO-MCNC: 181 MG/DL (ref 65–100)
GLUCOSE SERPL-MCNC: 95 MG/DL (ref 65–100)
POTASSIUM SERPL-SCNC: 4.2 MMOL/L (ref 3.5–5.1)
SODIUM SERPL-SCNC: 138 MMOL/L (ref 136–145)
VANCOMYCIN TROUGH SERPL-MCNC: 18.4 UG/ML (ref 5–20)

## 2019-05-20 PROCEDURE — 80202 ASSAY OF VANCOMYCIN: CPT

## 2019-05-20 PROCEDURE — 77030011256 HC DRSG MEPILEX <16IN NO BORD MOLN -A

## 2019-05-20 PROCEDURE — 97530 THERAPEUTIC ACTIVITIES: CPT

## 2019-05-20 PROCEDURE — 97605 NEG PRS WND THER DME<=50SQCM: CPT

## 2019-05-20 PROCEDURE — 74011250637 HC RX REV CODE- 250/637: Performed by: INTERNAL MEDICINE

## 2019-05-20 PROCEDURE — 74011250637 HC RX REV CODE- 250/637: Performed by: SURGERY

## 2019-05-20 PROCEDURE — 74011250636 HC RX REV CODE- 250/636: Performed by: SURGERY

## 2019-05-20 PROCEDURE — 74750000023 HC WOUND THERAPY

## 2019-05-20 PROCEDURE — 74011000258 HC RX REV CODE- 258: Performed by: SURGERY

## 2019-05-20 PROCEDURE — 77030019934 HC DRSG VAC ASST KCON -B

## 2019-05-20 PROCEDURE — 74011636637 HC RX REV CODE- 636/637: Performed by: SURGERY

## 2019-05-20 PROCEDURE — 65270000029 HC RM PRIVATE

## 2019-05-20 PROCEDURE — 82962 GLUCOSE BLOOD TEST: CPT

## 2019-05-20 PROCEDURE — 80048 BASIC METABOLIC PNL TOTAL CA: CPT

## 2019-05-20 PROCEDURE — 74011250636 HC RX REV CODE- 250/636: Performed by: HOSPITALIST

## 2019-05-20 PROCEDURE — 36415 COLL VENOUS BLD VENIPUNCTURE: CPT

## 2019-05-20 RX ORDER — FUROSEMIDE 20 MG/1
20 TABLET ORAL DAILY
Status: DISCONTINUED | OUTPATIENT
Start: 2019-05-21 | End: 2019-05-23 | Stop reason: HOSPADM

## 2019-05-20 RX ADMIN — SERTRALINE HYDROCHLORIDE 100 MG: 100 TABLET ORAL at 09:45

## 2019-05-20 RX ADMIN — SPIRONOLACTONE 50 MG: 25 TABLET ORAL at 09:45

## 2019-05-20 RX ADMIN — Medication 10 ML: at 05:24

## 2019-05-20 RX ADMIN — VANCOMYCIN HYDROCHLORIDE 750 MG: 10 INJECTION, POWDER, LYOPHILIZED, FOR SOLUTION INTRAVENOUS at 17:18

## 2019-05-20 RX ADMIN — INSULIN LISPRO 2 UNITS: 100 INJECTION, SOLUTION INTRAVENOUS; SUBCUTANEOUS at 11:30

## 2019-05-20 RX ADMIN — PANTOPRAZOLE SODIUM 40 MG: 40 TABLET, DELAYED RELEASE ORAL at 09:45

## 2019-05-20 RX ADMIN — SODIUM CHLORIDE 1 G: 900 INJECTION, SOLUTION INTRAVENOUS at 21:37

## 2019-05-20 RX ADMIN — VANCOMYCIN HYDROCHLORIDE 750 MG: 10 INJECTION, POWDER, LYOPHILIZED, FOR SOLUTION INTRAVENOUS at 05:17

## 2019-05-20 RX ADMIN — LEVOTHYROXINE SODIUM 125 MCG: 75 TABLET ORAL at 05:17

## 2019-05-20 RX ADMIN — GABAPENTIN 300 MG: 300 CAPSULE ORAL at 21:39

## 2019-05-20 RX ADMIN — Medication 10 ML: at 21:39

## 2019-05-20 RX ADMIN — Medication 10 ML: at 17:18

## 2019-05-20 RX ADMIN — Medication 10 ML: at 17:17

## 2019-05-20 RX ADMIN — OXYCODONE HYDROCHLORIDE AND ACETAMINOPHEN 1 TABLET: 7.5; 325 TABLET ORAL at 21:39

## 2019-05-20 RX ADMIN — ONDANSETRON 4 MG: 2 INJECTION INTRAMUSCULAR; INTRAVENOUS at 05:19

## 2019-05-20 RX ADMIN — INSULIN LISPRO 2 UNITS: 100 INJECTION, SOLUTION INTRAVENOUS; SUBCUTANEOUS at 21:37

## 2019-05-20 RX ADMIN — FERROUS SULFATE TAB 325 MG (65 MG ELEMENTAL FE) 325 MG: 325 (65 FE) TAB at 09:45

## 2019-05-20 RX ADMIN — SODIUM CHLORIDE 1 G: 900 INJECTION, SOLUTION INTRAVENOUS at 09:45

## 2019-05-20 RX ADMIN — INSULIN LISPRO 2 UNITS: 100 INJECTION, SOLUTION INTRAVENOUS; SUBCUTANEOUS at 17:16

## 2019-05-20 RX ADMIN — OXYCODONE HYDROCHLORIDE AND ACETAMINOPHEN 1 TABLET: 7.5; 325 TABLET ORAL at 10:21

## 2019-05-20 NOTE — PROGRESS NOTES
Patient requesting resources for [facility]. Referral sent to ambulatory case management to help assist with outside resources. CM will continue to follow.

## 2019-05-20 NOTE — WOUND CARE
Right foot wound vac dressing changed, wound base pink, scattered slough and tendons exposed, overall appearance improved. Medial wounds now with some depth, included with wound vac. Lateral foot with 2 purple areas, 2x1cm each, foam dressings applied. White foam to base over tendons. Will monitor

## 2019-05-20 NOTE — PROGRESS NOTES
Date 05/19/19 0700 - 05/20/19 4599 05/20/19 0700 - 05/21/19 4253 Shift [id number] 1900-0659 24 Hour Total 2418-7861 2033-7530 24 Hour Total  
INTAKE Shift Total(mL/kg) OUTPUT Urine(mL/kg/hr) 1550(1.4) 900 2450 Urine Output (mL) (Urinary Catheter 05/12/19 Sainz) 2470 952 7995 Drains  30 30 Output (ml) (Vacuum Assisted Closure Right Foot)  30 30 Stool Stool Occurrence(s) 2 x  2 x Shift Total(mL/kg) 1550(16.6) 930(10.3) [id number]) NET -6056 -421 -2891 Weight (kg) 93.5 90.6 90.6 90.6 90.6 90.6 Hourly rounds done. Pt c/o pain & nausea, medicated per MAR. Denies vomiting. Sainz output yellow/straw/clear. No BM this shift. RLE wound vac output 30 mL, sanguinous. Drainage level 100 mL. All needs met at this time.

## 2019-05-20 NOTE — PROGRESS NOTES
7 Days Post-Op RIGHT FOOT DEBRIDEMENT OF SKIN, SUBCUTANEOUS TISSUE, AMD TENDON.:  
Awake in bed, no complaints. Denies pain. Wound viewed during vac change. Visit Vitals /57 (BP 1 Location: Right arm, BP Patient Position: At rest) Pulse 75 Temp 98.1 °F (36.7 °C) Resp 16 Ht 5' 7\" (1.702 m) Wt 199 lb 12.8 oz (90.6 kg) SpO2 94% BMI 31.29 kg/m² In NAD 
R foot dorsal wound, tendons exposed, not granulating well, neurovascular intact Lab Results Component Value Date/Time WBC 2.4 (L) 05/18/2019 05:16 AM  
 Hemoglobin (POC) 8.6 04/24/2019 04:11 PM  
 HGB 7.5 (L) 05/18/2019 05:16 AM  
 HCT 23.4 (L) 05/18/2019 05:16 AM  
 PLATELET 62 (L) 36/51/5144 05:16 AM  
 MCV 83.6 05/18/2019 05:16 AM  
  
 
1. Diabetic foot infection ([address] 2. Cellulitis of right lower extremity Continue wound vac Continue care per primary OR Wednesday for debridement and possible skin substitute placement Ganga Parson NP

## 2019-05-20 NOTE — PROGRESS NOTES
Hourly rounds performed this shift. Wd vac connected and functioning well. All needs have been met. Call light within reach. Luciano removed , pt have voided twice since luciano has been removed.

## 2019-05-20 NOTE — PROGRESS NOTES
Problem: Mobility Impaired (Adult and Pediatric) Goal: *Acute Goals and Plan of Care (Insert Text) Description 1. Ms. Erlinda Romero will perform sit to stand and bed to chair independently in 7 days. 2.  Ms. Erlinda Romero will perform gait with rolling walker 150 ft independently in 7 days. 3.  Ms. Erlinda Romero will perform up and down 2 steps with rail independently in 7 days. Outcome: Progressing Towards Goal 
 
 
PHYSICAL THERAPY: Daily Note and PM 5/20/2019 INPATIENT: PT Visit Days : 5 Payor: BLUE CROSS / Plan: [place] [employer] [place] / Product Type: PPO /   
  
NAME/AGE/GENDER: Nila Lundbegr is a 60 y.o. female PRIMARY DIAGNOSIS: Cellulitis of right foot [L03.115] Cellulitis of right foot Cellulitis of right foot 7 Days Post-Op ICD-10: Treatment Diagnosis:  
 · Generalized Muscle Weakness (M62.81) · Difficulty in walking, Not elsewhere classified (R26.2) Precaution/Allergies: 
Pcn [penicillins] and Phenergan [promethazine] ASSESSMENT:  
Ms. Erlinda Romero presents in chair on arrival, states has no pain presently, had pain medication earlier with wound vac change. Pt agreeable for ambulation, required max A for donning off loading shoe to R LE, still with wound vac and luciano. Pt SBA transfers, ambulated with [facility] in hallway for 100'. Pt with 1-2 standing rest breaks, leg length discrepancy created with off loading shoe; educated on wearing thick soled tennis shoe on L foot once home for increased stability. Pt with some flat affect today but states mentation has improved from this weekend. Pt overall doing well with mobility, making progress toward goals. Anticipate dc home w/possible HH PT. This section established at most recent assessment PROBLEM LIST (Impairments causing functional limitations): 1. Decreased Strength 2. Decreased Transfer Abilities 3. Decreased Ambulation Ability/Technique 4. Increased Pain 5. Decreased Activity Tolerance INTERVENTIONS PLANNED: (Benefits and precautions of physical therapy have been discussed with the patient.) 1. Bed Mobility 2. Gait Training 3. Therapeutic Activites 4. Transfer Training TREATMENT PLAN: Frequency/Duration: 4 times a week for duration of hospital stay Rehabilitation Potential For Stated Goals: Good REHAB RECOMMENDATIONS (at time of discharge pending progress):   
Placement: It is my opinion, based on this patient's performance to date, that Ms. Kauffman may benefit from either home with home health vs inpt rehab stay. Its too soon to tell what her needs may be. Per surgical note may be going to OR for debridement at some point. Equipment: ? To be determined. HISTORY:  
History of Present Injury/Illness (Reason for Referral): 
Patient is a 61 y/o female with hx diabetes and HTN was recently hospitalized for cellulitis of right foot. Symptoms began initially last month on the 23d. She was discharged home 5/2 on keflex and doxycycline. She had been doing well for a time but now she has noticed a blackened appearance to the dorsum of right foot. Also had two blisters on the medial aspect of foot that have ruptured. She has a WBC ct of 14.4. No fever but has had chills. Her right foot has diffuse cellulitis with a large black eschar on dorsum. Will admit for IV antibiotics and surgical evaluation Past Medical History/Comorbidities: Ms. German Mcdonough  has a past medical history of Diabetes ([address] and Hypertension. Ms. German Mcdonough  has a past surgical history that includes colonoscopy (N/A, 3/2/2019) and ir paracentesis abd w image (5/17/2019). Social History/Living Environment:  
Home Environment: Private residence # Steps to Enter: 4 One/Two Story Residence: One story Living Alone: Yes Support Systems: Family member(s), Friends \ neighbors Patient Expects to be Discharged to[de-identified] Private residence Current DME Used/Available at Home: Walker, rolling Tub or Shower Type: Tub/Shower combination Prior Level of Function/Work/Activity: 
Independent with rolling walker 
age Number of Personal Factors/Comorbidities that affect the Plan of Care: 1-2: MODERATE COMPLEXITY EXAMINATION:  
Most Recent Physical Functioning:  
Gross Assessment: 
  
         
  
Posture: 
Posture (WDL): Within defined limits Balance: 
Sitting: Intact Standing: Impaired Standing - Static: Good;Fair 
Standing - Dynamic : Fair Bed Mobility: 
Supine to Sit: (up in chair) Sit to Supine: Stand-by assistance Scooting: Independent Wheelchair Mobility: 
  
Transfers: 
Sit to Stand: Stand-by assistance Stand to Sit: Stand-by assistance Gait: 
Right Side Weight Bearing: Heel Base of Support: Center of gravity altered;Shift to left Speed/Tamela: Slow Step Length: Left shortened;Right shortened Swing Pattern: Right asymmetrical 
Gait Abnormalities: Decreased step clearance; Steppage gait Distance (ft): 100 Feet (ft) Assistive Device: Walker, rolling Ambulation - Level of Assistance: Contact guard assistance Interventions: Verbal cues Body Structures Involved: 1. Muscles Body Functions Affected: 1. Movement Related Activities and Participation Affected: 1. Mobility Number of elements that affect the Plan of Care: 3: MODERATE COMPLEXITY CLINICAL PRESENTATION:  
Presentation: Evolving clinical presentation with changing clinical characteristics: MODERATE COMPLEXITY CLINICAL DECISION MAKING:  
[facility] -PAC 6 Clicks Basic Mobility Inpatient Short Form How much difficulty does the patient currently have. .. Unable A Lot A Little None 1. Turning over in bed (including adjusting bedclothes, sheets and blankets)? ? 1   ? 2   ? 3   ? 4  
2. Sitting down on and standing up from a chair with arms ( e.g., wheelchair, bedside commode, etc.)   ? 1   ? 2   ? 3   ? 4  
3.   Moving from lying on back to sitting on the side of the bed?   ? 1   ? 2   ? 3   ? 4  
 How much help from another person does the patient currently need. .. Total A Lot A Little None 4. Moving to and from a bed to a chair (including a wheelchair)? ? 1   ? 2   ? 3   ? 4  
5. Need to walk in hospital room? ? 1   ? 2   ? 3   ? 4  
6. Climbing 3-5 steps with a railing? ? 1   ? 2   ? 3   ? 4  
© 2007, Trustees of [address] [place] [address] [address] [address], under license to [employer]. All rights reserved Score:  Initial: 18 Most Recent: X (Date: -- ) Interpretation of Tool:  Represents activities that are increasingly more difficult (i.e. Bed mobility, Transfers, Gait). Medical Necessity:    
· Patient is expected to demonstrate progress in functional technique ·  to decrease assistance required with mobility and gait. · . Reason for Services/Other Comments: 
· Patient continues to require present interventions due to patient's inability to function at baseline. · . Use of outcome tool(s) and clinical judgement create a POC that gives a: Questionable prediction of patient's progress: MODERATE COMPLEXITY  
  
 
 
 
TREATMENT:  
(In addition to Assessment/Re-Assessment sessions the following treatments were rendered) Pre-treatment Symptoms/Complaints:  \"I feel better now\" Pain: Initial:  
Pain Intensity 1: 0  Post Session: 0/10 post ambulation Therapeutic Activity: (   23 min): Therapeutic activities including bed mobility, sit-stand transfers, standing balance, Ambulation on level ground with off loading shoe, weight shifting, walker safety to improve mobility, strength, balance and coordination. Required minimal  Verbal cues to promote static and dynamic balance in standing and promote motor control of bilateral, lower extremity(s). Date: 
5/10/19 Date: 
 Date: Activity/Exercise Parameters Parameters Parameters LAQ 15x AB Seated marching 15x AB Ankle pumps 15x AB Quad sets 10x AB Braces/Orthotics/Lines/Etc:  
· luciano catheter · R wound VAC and off loading shoe\ · O2 Device: Room air Treatment/Session Assessment:   
· Response to Treatment:  Improved mobility · Interdisciplinary Collaboration:  
o Physical Therapist 
o Registered Nurse · After treatment position/precautions:  
o Supine in bed 
o Bed/Chair-wheels locked 
o Bed in low position 
o Call light within reach 
o RN notified 
o Side rails x 2  
· Compliance with Program/Exercises: Will assess as treatment progresses · Recommendations/Intent for next treatment session: \"Next visit will focus on advancements to more challenging activities and reduction in assistance provided\". Total Treatment Duration: PT Patient Time In/Time Out Time In: 5840 Time Out: 1355 Taye Clarity, PT

## 2019-05-20 NOTE — INTERDISCIPLINARY ROUNDS
Interdisciplinary Rounds completed 05/20/19. Nursing, Case Management, Physician and PT present. 
  
Plan of care reviewed and updated.

## 2019-05-20 NOTE — PROGRESS NOTES
Hospitalist Progress Note Admit Date:  2019  8:39 PM  
Name:  Alejandro Villalpando Age:  60 y.o. 
:  1955 MRN:  [id number] PCP:  Rico Ascencio MD 
Treatment Team: Attending Provider: Angle Capellan MD; Consulting Provider: Robert Wylie MD; Utilization Review: Ricardo Velazquez, RN; Care Manager: Emmy Wilks, RN; Consulting Provider: Keren Orourke MD; Physical Therapist: Renee Lynch, PT; Occupational Therapy Assistant: Omari Hidalgo Subjective:  
CC: right foot cellulitis Pt is a 61 yo female with PMH including DM 2, cirrhosis, anemia with ablation of AVM colon. Pt presented to ED  for worsening right foot cellulitis despite being on Doxy and Keflex at home. Initially a bedside I&D was done and pt was started on Vanc/Cefepime. On  she underwent I&D in the OR. Pt with increasing abd distention, noted with new ascites. She was started on IV Lasix and Aldactone but diuretics were stopped due to hypotension. BPs better off diuretics, for past 24 hours running 120's to 140's/50's-60's. Pt states she feel a bit better but feeling pretty depressed. Noted that sertraline was increased to 100mg, offered encouragement that the full effect may not be felt for 2-3 wks. Wound vac in place. Paracentesis done , samir procedure well. Per GI - It is reasonable to assess for TIPS, but her numbers suggest she should be fairly well compensated. She should be a candidate, as she has a UNOS MELD of 9 and no encephalopathy. My concern about a TIPS is that she ultimately may not need it. The infection probably precipitated some decompensation. She may improve as treatment proceeds. Supporting argument for TIPS is that she lists financial reasons that it is hard to follow a 2 gm Na diet. Pt WBC low but stable at 2.4, Hgb 7.5, afebrile. Pt verbalizing that she continues to feel depressed. Blood cultures with no growth x 5 days. Objective:  
 
Patient Vitals for the past 24 hrs: 
 Temp Pulse Resp BP SpO2  
05/20/19 0830 98.1 °F (36.7 °C) 75 16 104/57 94 % 05/20/19 0531 98.8 °F (37.1 °C) 74 18 110/50 95 % 05/20/19 0025 98.6 °F (37 °C) 70 18 114/57 96 % 05/19/19 2031 98.3 °F (36.8 °C) 73 18 138/71 96 % 05/19/19 1442 98.2 °F (36.8 °C) 71 16 131/60 99 % Oxygen Therapy O2 Sat (%): 94 % (05/20/19 0830) Pulse via Oximetry: 87 beats per minute (05/18/19 2354) O2 Device: Room air (05/18/19 2354) O2 Flow Rate (L/min): 2 l/min (05/13/19 1726) FIO2 (%): 21 % (05/17/19 2143) Intake/Output Summary (Last 24 hours) at 5/20/2019 1131 Last data filed at 5/20/2019 5452 Gross per 24 hour Intake  Output 2030 ml Net -2030 ml REVIEW OF SYSTEMS: Comprehensive ROS performed and negative except as stated in HPI. Physical Examination: 
General:    Well nourished. Awake and alert. Head:  Normocephalic, atraumatic Eyes:  Extraocular movements intact, normal sclera CV:   RRR. No  Murmurs, clicks, or gallops Lungs:   Unlabored, no cyanosis Abdomen:   Soft, nondistended, nontender. Extremities: Warm and dry. No cyanosis. Still with 1-2+ pitting edema BLE. Right foot wound with wound vac in place. Skin:     No rashes or jaundice. Pale Neuro:  No gross focal deficits Psych:  Mood is depressed and affect is flat Data Review: 
I have reviewed all labs, meds, telemetry events, and studies from the last 24 hours. Recent Results (from the past 24 hour(s)) GLUCOSE, POC Collection Time: 05/19/19  4:16 PM  
Result Value Ref Range Glucose (POC) 186 (H) 65 - 100 mg/dL GLUCOSE, POC Collection Time: 05/19/19  8:28 PM  
Result Value Ref Range Glucose (POC) 226 (H) 65 - 100 mg/dL METABOLIC PANEL, BASIC Collection Time: 05/20/19  5:42 AM  
Result Value Ref Range Sodium 138 136 - 145 mmol/L Potassium 4.2 3.5 - 5.1 mmol/L  Chloride 105 98 - 107 mmol/L  
 CO2 27 21 - 32 mmol/L  
 Anion gap 6 (L) 7 - 16 mmol/L Glucose 95 65 - 100 mg/dL BUN 14 8 - 23 MG/DL Creatinine 0.72 0.6 - 1.0 MG/DL  
 GFR est AA >60 >60 ml/min/1.73m2 GFR est non-AA >60 >60 ml/min/1.73m2 Calcium 8.0 (L) 8.3 - 10.4 MG/DL  
GLUCOSE, POC Collection Time: 05/20/19  7:29 AM  
Result Value Ref Range Glucose (POC) 106 (H) 65 - 100 mg/dL All Micro Results Procedure Component Value Units Date/Time C. DIFFICILE AG & TOXIN A/B [[id number]] Collected:  05/14/19 0236 Order Status:  Completed Specimen:  Stool Updated:  05/14/19 1209 [id number] [place] ANTIGEN    
  C. DIFFICILE GDH ANTIGEN-NEGATIVE  
     
  C. difficile toxin C. DIFFICILE TOXIN-NEGATIVE  
     
  PCR Reflex NOT APPLICABLE INTERPRETATION    
  NEGATIVE FOR TOXIGENIC C. DIFFICILE Clinical Consideration NEGATIVE FOR TOXIGENIC C. DIFFICILE  
     
 CULTURE, BLOOD [[id number]] Collected:  05/07/19 2345 Order Status:  Completed Specimen:  Whole Blood Updated:  05/13/19 1010 Special Requests: --     
  RIGHT 
FOREARM Culture result: NO GROWTH 5 DAYS     
 CULTURE, BLOOD [[id number]] Collected:  05/07/19 2138 Order Status:  Completed Specimen:  Whole Blood Updated:  05/12/19 0776 Special Requests: --     
  LEFT Antecubital 
  
  Culture result: NO GROWTH 5 DAYS Current Meds: 
Current Facility-Administered Medications Medication Dose Route Frequency  Vancomycin Trough Reminder   Other ONCE  
 spironolactone (ALDACTONE) tablet 50 mg  50 mg Oral DAILY  traMADol (ULTRAM) tablet 50 mg  50 mg Oral Q6H PRN  
 vancomycin (VANCOCIN) 750 mg in  mL infusion  750 mg IntraVENous Q12H  
 0.9% sodium chloride infusion 250 mL  250 mL IntraVENous PRN  
 0.9% sodium chloride infusion 250 mL  250 mL IntraVENous PRN  
 simethicone (MYLICON) tablet 80 mg  80 mg Oral QID PRN  promethazine (PHENERGAN) with saline injection 6.25 mg  6.25 mg IntraVENous Q4H PRN  
  ondansetron (ZOFRAN) injection 4 mg  4 mg IntraVENous Q6H PRN  
 sertraline (ZOLOFT) tablet 100 mg  100 mg Oral DAILY  loperamide (IMODIUM) capsule 2 mg  2 mg Oral Q8H PRN  
 0.9% sodium chloride infusion 250 mL  250 mL IntraVENous PRN  
 sodium chloride (NS) flush 10 mL  10 mL InterCATHeter Q8H  
 sodium chloride (NS) flush 10 mL  10 mL InterCATHeter PRN  
 ferrous sulfate tablet 325 mg  325 mg Oral DAILY  gabapentin (NEURONTIN) capsule 300 mg  300 mg Oral QHS  levothyroxine (SYNTHROID) tablet 125 mcg  125 mcg Oral ACB  pantoprazole (PROTONIX) tablet 40 mg  40 mg Oral DAILY  dextrose 40% (GLUTOSE) oral gel 1 Tube  15 g Oral PRN  
 glucagon (GLUCAGEN) injection 1 mg  1 mg IntraMUSCular PRN  
 dextrose (D50) infusion 12.5-25 g  25-50 mL IntraVENous PRN  
 insulin lispro (HUMALOG) injection 0-10 Units  0-10 Units SubCUTAneous AC&HS  sodium chloride (NS) flush 5-40 mL  5-40 mL IntraVENous Q8H  
 sodium chloride (NS) flush 5-40 mL  5-40 mL IntraVENous PRN  
 acetaminophen (TYLENOL) tablet 650 mg  650 mg Oral Q4H PRN  
 oxyCODONE-acetaminophen (PERCOCET 7.5) 7.5-325 mg per tablet 1 Tab  1 Tab Oral Q4H PRN  
 naloxone (NARCAN) injection 0.4 mg  0.4 mg IntraVENous PRN  
 cefepime (MAXIPIME) 1 g in 0.9% sodium chloride (MBP/ADV) 50 mL ADV  1 g IntraVENous Q12H Diet: DIET NUTRITIONAL SUPPLEMENTS 
DIET DIABETIC CONSISTENT CARB Other Studies (last 24 hours): No results found. Assessment and Plan:  
 
Hospital Problems as of 5/20/2019 Date Reviewed: 4/24/2019 Codes Class Noted - Resolved POA * (Principal) Cellulitis of right foot ICD-10-CM: Y98.829 ICD-9-CM: 682.7  5/7/2019 - Present Yes Cirrhosis of liver without ascites (HCC) ICD-10-CM: K74.60 ICD-9-CM: 571.5  3/7/2019 - Present Yes Microcytic anemia ICD-10-CM: D50.9 ICD-9-CM: 280.9  2/27/2019 - Present Yes  Type 2 diabetes with nephropathy (HCC) ICD-10-CM: E11.21 
 ICD-9-CM: 250.40, 583.81  1/16/2019 - Present Yes Hyperlipidemia ICD-10-CM: E78.5 ICD-9-CM: 272.4  1/12/2016 - Present Yes Essential hypertension ICD-10-CM: I10 
ICD-9-CM: 401.9  1/12/2016 - Present Yes Acquired hypothyroidism ICD-10-CM: E03.9 ICD-9-CM: 244.9  1/12/2016 - Present Yes A/P:   
Right foot cellulitis X Ray with no obvious osteo S/P debridement 05/13 Vanc and Cefepime x 21 days - EOT 5/28/19 Wound vac in place, offloading boot on right foot Wound care following, dsg change 05/15 per surgery  
  
Anemia, uncertain etiology Previous AVM but repaired, check FOBT S/p 2 units PRBCs PO Protonix Avoid NSAIDs  
  
DM Humalog SSI AC and HS Lantus on hold Diabetes educator consult 
  
Diarrhea Likely due to abx C Diff neg Probiotic and PRN Imodium  
  
Recent dx of cirrhosis, now with worsening ascites and pancytopenia  
05/13 US showed spelnomegaly and ascites 5/17 IR paracentesis Daily weights Cont to hold IV Lasix Aldactone restarted GI following 
  
DC planning CM following Ideally will dc to [facility] but pt requesting home health DVT ppx:  SCD Code status:  Full Medical decision maker: No MPOA Case reviewed with supervising physician - MAYRA Chatterjee MD 
 
Signed: 
POPPY Sanon

## 2019-05-20 NOTE — PROGRESS NOTES
Pt states that she does not really care for the Ensure supplements. Pt states that she only want to drink it at night.

## 2019-05-20 NOTE — PROGRESS NOTES
[facility] DAILY PROGRESS NOTE Admit Date:  5/7/2019 CC:  Ascites, Liver Cirrhosis Problem List: 
Principal Problem: 
  Cellulitis of right foot (5/7/2019) Active Problems: Hyperlipidemia (1/12/2016) Essential hypertension (1/12/2016) Acquired hypothyroidism (1/12/2016) Type 2 diabetes with nephropathy ([facility] [address] (1/16/2019) Microcytic anemia (2/27/2019) Cirrhosis of liver without ascites ([facility] [address] (3/7/2019) Mrs. Adriana Barajas is a 61 yo F with newly diagnosed Liver Cirrhosis this February 2019 who was admitted for severe cellulitis of the right foot requiring surgical debridement on 5/13 who we are following for hepatic decompensation with ascites. Ultrasound  5/13 showed cirrhotic liver and changes and upper abdominal ascites. She underwent paracentesis on 5/17 with removal of 4620 cc of yellow fluid. She got started on Aldactone at 50 mg PO daily just yesterday (slowly as her BP did not tolerate Lasix IV and Aldactone together) and tolerating it well. On a separate note, liver serology work up for cirrhosis included ceruloplasmin, Ferritin, KRISTEN, AMA, ASMA, and hepatitis screen which were negative 1. Ascites 2. 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 Right Foot Cellulitis requiring Debridement - MELD was 9 - She likely had hepatic decompensation from her foot infection - Her ascites is getting better. Recommend low salt diet (< 2 g Na) along with continuation of Aldactone 50 mg PO daily. Will add Lasix 20 mg PO daily today. Will monitor to make sure that her BP, kidney function and electrolytes all tolerate it. - Discussed TIPS again today but may not need this if she tolerates the diuretics and her liver function improves as her infection improves. - Surgery planning for debridement and possible skin substitute placement on Wednesday in the OR. Demi Samuel MD  
[facility] Subjective: Patient feeling better. Abdominal swelling better and lower extremity edema better as well according to the patient. Medications:  
Current Facility-Administered Medications Medication Dose Route Frequency Provider Last Rate Last Dose  Vancomycin Trough Reminder   Other MD Otto Jimenez ON 5/21/2019] furosemide (LASIX) tablet 20 mg  20 mg Oral DAILY Harvinder Donis MD      
 spironolactone (ALDACTONE) tablet 50 mg  50 mg Oral DAILY Christine Martin MD   50 mg at 05/20/19 0945  traMADol (ULTRAM) tablet 50 mg  50 mg Oral Q6H PRN Karina Early NP   50 mg at 05/19/19 1937  
 vancomycin (VANCOCIN) 750 mg in  mL infusion  750 mg IntraVENous Q12H Rosalva Gray  mL/hr at 05/20/19 0517 750 mg at 05/20/19 0517  
 0.9% sodium chloride infusion 250 mL  250 mL IntraVENous PRN Cameron Toure MD      
 0.9% sodium chloride infusion 250 mL  250 mL IntraVENous PRN Cameron Toure MD      
 [facility]) tablet 80 mg  80 mg Oral QID PRN Cameron Toure MD   80 mg at 05/14/19 0011  promethazine (PHENERGAN) with saline injection 6.25 mg  6.25 mg IntraVENous Q4H PRN Cameron Toure MD   6.25 mg at 05/12/19 1732  ondansetron (ZOFRAN) injection 4 mg  4 mg IntraVENous Q6H PRN Cameron Toure MD   4 mg at 05/20/19 2818  sertraline (ZOLOFT) tablet 100 mg  100 mg Oral DAILY Cameron Toure MD   100 mg at 05/20/19 0945  loperamide (IMODIUM) capsule 2 mg  2 mg Oral Q8H PRN Cameron Toure MD   2 mg at 05/14/19 6460  
 0.9% sodium chloride infusion 250 mL  250 mL IntraVENous PRN Cameron Toure MD      
 sodium chloride (NS) flush 10 mL  10 mL InterCATHeter Q8H Cameron Toure MD   10 mL at 05/20/19 0302  sodium chloride (NS) flush 10 mL  10 mL InterCATHeter PRN Cameron Toure MD      
 ferrous sulfate tablet 325 mg  325 mg Oral DAILY Cameron Toure MD   325 mg at 05/20/19 0147  gabapentin (NEURONTIN) capsule 300 mg  300 mg Oral QHS Bruna Mcneal MD   300 mg at 05/19/19 2125  levothyroxine (SYNTHROID) tablet 125 mcg  125 mcg Oral ACB Bruna Mcneal MD   125 mcg at 05/20/19 8718  pantoprazole (PROTONIX) tablet 40 mg  40 mg Oral DAILY Bruna Mcneal MD   40 mg at 05/20/19 0945  dextrose 40% (GLUTOSE) oral gel 1 Tube  15 g Oral PRN Bruna Mcneal MD      
 glucagon [facility]) injection 1 mg  1 mg IntraMUSCular PRN Bruna Mcneal MD      
 dextrose (D50) infusion 12.5-25 g  25-50 mL IntraVENous PRN Bruna Mcneal MD      
 insulin lispro (HUMALOG) injection 0-10 Units  0-10 Units SubCUTAneous AC&HS Bruna Mcneal MD   2 Units at 05/20/19 1130  
 sodium chloride (NS) flush 5-40 mL  5-40 mL IntraVENous Q8H Bruna Mcneal MD   10 mL at 05/19/19 1358  sodium chloride (NS) flush 5-40 mL  5-40 mL IntraVENous PRN Bruna Mcneal MD      
 acetaminophen (TYLENOL) tablet 650 mg  650 mg Oral Q4H PRN Bruna Mcneal MD   650 mg at 05/19/19 0247  
 oxyCODONE-acetaminophen (PERCOCET 7.5) 7.5-325 mg per tablet 1 Tab  1 Tab Oral Q4H PRN Bruna Mcneal MD   1 Tab at 05/20/19 1021  
 naloxone [facility]) injection 0.4 mg  0.4 mg IntraVENous PRN Bruna Mcneal MD      
 cefepime (MAXIPIME) 1 g in 0.9% sodium chloride (MBP/ADV) 50 mL ADV  1 g IntraVENous Q12H Bruna Mcneal MD   1 g at 05/20/19 0945 Review of Systems: ROS was obtained, with pertinent positives as listed above. No chest pain or SOB. Diet:   
 
Objective:  
Vitals: 
Visit Vitals /62 Pulse 75 Temp 98.1 °F (36.7 °C) Resp 18 Ht 5' 7\" (1.702 m) Wt 90.6 kg (199 lb 12.8 oz) SpO2 96% BMI 31.29 kg/m² Intake/Output: 
No intake/output data recorded. 05/18 1901 - 05/20 0700 In: -  
Out: 7738 [Urine:4050; Drains:30] Exam: 
General appearance: alert, cooperative, no distress. Lying in bed 
Lungs: clear to auscultation bilaterally anteriorly Heart: regular rate and rhythm Abdomen: soft, non-tender. Distended with ascites. Bowel sounds normal. No masses, no organomegaly Extremities: extremities with 2+ pitting edema Neuro:  alert and oriented Psych: depressed Data Review (Labs):   
Recent Labs  
  05/20/19 
0542 05/18/19 
7850 WBC  --  2.4* HGB  --  7.5* HCT  --  23.4*  
PLT  --  62* MCV  --  83.6  138  
K 4.2 4.3  106 CO2 27 26 BUN 14 18 Dasha Jo MD 
[facility]

## 2019-05-20 NOTE — PROGRESS NOTES
Problem: Falls - Risk of 
Goal: *Absence of Falls Description Document Nalini High Fall Risk and appropriate interventions in the flowsheet. Outcome: Progressing Towards Goal 
  
Problem: Pressure Injury - Risk of 
Goal: *Prevention of pressure injury Description Document Shane Scale and appropriate interventions in the flowsheet. Outcome: Progressing Towards Goal 
  
Problem: Nutrition Deficit Goal: *Optimize nutritional status Outcome: Progressing Towards Goal

## 2019-05-20 NOTE — DIABETES MGMT
Patient admitted with cellulitis of the right foot, seen by diabetes educator. Patient voices both her parents we diabetic. Patient can't remember when she was diagnosed bu guesses it was maybe 12 years. 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 Patient reports she has been struggling financially for some time and was eating, \"All the wrong stuff because it was all I could afford. \" Patient is hoping to get short-term disability soon. A1c 5.9 (eA). Patient reports that she was taking Lantus 60 units QAM and Novolog SSI. Patient reports she did have some hypoglycemia on this regimen. Reviewed s/s, treatment options for hypoglycemia. Patient reports she was checking her blood glucose TID. Blood glucose 108 on admission. Blood glucose ranged 110-226 yesterday with patient receiving Humalog 8 units. Patient questioning if her Lantus should be restarted. Educated patient that her fasting glucose was 95 this morning so that does no indicate a need for basal insulin at this time. Patient may benefit from a low dose prandial insulin if blood glucose remains elevated during the day and tighter glycemic control is desired. Patient would like some ideas on managing her diet at home and is not sure how to adjust it for would healing needs. Will consult dietician to see patient to address this need. Patient voices no further questions at this time.

## 2019-05-20 NOTE — PROGRESS NOTES
Problem: Falls - Risk of 
Goal: *Absence of Falls Description Document Jumanaowen Moore Fall Risk and appropriate interventions in the flowsheet. Outcome: Progressing Towards Goal 
  
Problem: Patient Education: Go to Patient Education Activity Goal: Patient/Family Education Outcome: Progressing Towards Goal 
  
Problem: Patient Education: Go to Patient Education Activity Goal: Patient/Family Education Outcome: Progressing Towards Goal

## 2019-05-21 ENCOUNTER — ANESTHESIA EVENT (OUTPATIENT)
Dept: SURGERY | Age: 64
DRG: 982 | End: 2019-05-21
Payer: COMMERCIAL

## 2019-05-21 LAB
ANION GAP SERPL CALC-SCNC: 5 MMOL/L (ref 7–16)
BASOPHILS # BLD: 0 K/UL (ref 0–0.2)
BASOPHILS NFR BLD: 1 % (ref 0–2)
BUN SERPL-MCNC: 16 MG/DL (ref 8–23)
CALCIUM SERPL-MCNC: 7.8 MG/DL (ref 8.3–10.4)
CHLORIDE SERPL-SCNC: 106 MMOL/L (ref 98–107)
CO2 SERPL-SCNC: 28 MMOL/L (ref 21–32)
CREAT SERPL-MCNC: 0.78 MG/DL (ref 0.6–1)
DIFFERENTIAL METHOD BLD: ABNORMAL
EOSINOPHIL # BLD: 0.1 K/UL (ref 0–0.8)
EOSINOPHIL NFR BLD: 4 % (ref 0.5–7.8)
ERYTHROCYTE [DISTWIDTH] IN BLOOD BY AUTOMATED COUNT: 17.5 % (ref 11.9–14.6)
GLUCOSE BLD STRIP.AUTO-MCNC: 113 MG/DL (ref 65–100)
GLUCOSE BLD STRIP.AUTO-MCNC: 152 MG/DL (ref 65–100)
GLUCOSE BLD STRIP.AUTO-MCNC: 169 MG/DL (ref 65–100)
GLUCOSE BLD STRIP.AUTO-MCNC: 180 MG/DL (ref 65–100)
GLUCOSE SERPL-MCNC: 119 MG/DL (ref 65–100)
HCT VFR BLD AUTO: 24.9 % (ref 35.8–46.3)
HGB BLD-MCNC: 8.1 G/DL (ref 11.7–15.4)
IMM GRANULOCYTES # BLD AUTO: 0 K/UL (ref 0–0.5)
IMM GRANULOCYTES NFR BLD AUTO: 0 % (ref 0–5)
LYMPHOCYTES # BLD: 0.8 K/UL (ref 0.5–4.6)
LYMPHOCYTES NFR BLD: 32 % (ref 13–44)
MCH RBC QN AUTO: 27.4 PG (ref 26.1–32.9)
MCHC RBC AUTO-ENTMCNC: 32.5 G/DL (ref 31.4–35)
MCV RBC AUTO: 84.1 FL (ref 79.6–97.8)
MONOCYTES # BLD: 0.4 K/UL (ref 0.1–1.3)
MONOCYTES NFR BLD: 16 % (ref 4–12)
NEUTS SEG # BLD: 1.2 K/UL (ref 1.7–8.2)
NEUTS SEG NFR BLD: 47 % (ref 43–78)
NRBC # BLD: 0 K/UL (ref 0–0.2)
PLATELET # BLD AUTO: 79 K/UL (ref 150–450)
PMV BLD AUTO: 9.4 FL (ref 9.4–12.3)
POTASSIUM SERPL-SCNC: 4.5 MMOL/L (ref 3.5–5.1)
RBC # BLD AUTO: 2.96 M/UL (ref 4.05–5.2)
SODIUM SERPL-SCNC: 139 MMOL/L (ref 136–145)
WBC # BLD AUTO: 2.5 K/UL (ref 4.3–11.1)

## 2019-05-21 PROCEDURE — 74011250636 HC RX REV CODE- 250/636: Performed by: HOSPITALIST

## 2019-05-21 PROCEDURE — 74011250636 HC RX REV CODE- 250/636: Performed by: SURGERY

## 2019-05-21 PROCEDURE — 97535 SELF CARE MNGMENT TRAINING: CPT

## 2019-05-21 PROCEDURE — 74011636637 HC RX REV CODE- 636/637: Performed by: INTERNAL MEDICINE

## 2019-05-21 PROCEDURE — 74011250637 HC RX REV CODE- 250/637: Performed by: SURGERY

## 2019-05-21 PROCEDURE — 74011250636 HC RX REV CODE- 250/636: Performed by: NURSE PRACTITIONER

## 2019-05-21 PROCEDURE — 74750000023 HC WOUND THERAPY

## 2019-05-21 PROCEDURE — 36415 COLL VENOUS BLD VENIPUNCTURE: CPT

## 2019-05-21 PROCEDURE — 85025 COMPLETE CBC W/AUTO DIFF WBC: CPT

## 2019-05-21 PROCEDURE — 80048 BASIC METABOLIC PNL TOTAL CA: CPT

## 2019-05-21 PROCEDURE — 65270000029 HC RM PRIVATE

## 2019-05-21 PROCEDURE — 74011250637 HC RX REV CODE- 250/637: Performed by: INTERNAL MEDICINE

## 2019-05-21 PROCEDURE — 74011000258 HC RX REV CODE- 258: Performed by: NURSE PRACTITIONER

## 2019-05-21 PROCEDURE — 82962 GLUCOSE BLOOD TEST: CPT

## 2019-05-21 RX ORDER — INSULIN LISPRO 100 [IU]/ML
0-10 INJECTION, SOLUTION INTRAVENOUS; SUBCUTANEOUS
Status: DISCONTINUED | OUTPATIENT
Start: 2019-05-21 | End: 2019-05-23 | Stop reason: HOSPADM

## 2019-05-21 RX ADMIN — FERROUS SULFATE TAB 325 MG (65 MG ELEMENTAL FE) 325 MG: 325 (65 FE) TAB at 10:04

## 2019-05-21 RX ADMIN — Medication 10 ML: at 14:00

## 2019-05-21 RX ADMIN — Medication 10 ML: at 21:56

## 2019-05-21 RX ADMIN — Medication 10 ML: at 11:52

## 2019-05-21 RX ADMIN — ONDANSETRON 4 MG: 2 INJECTION INTRAMUSCULAR; INTRAVENOUS at 11:52

## 2019-05-21 RX ADMIN — INSULIN LISPRO 2 UNITS: 100 INJECTION, SOLUTION INTRAVENOUS; SUBCUTANEOUS at 11:30

## 2019-05-21 RX ADMIN — SPIRONOLACTONE 50 MG: 25 TABLET ORAL at 10:04

## 2019-05-21 RX ADMIN — INSULIN LISPRO 2 UNITS: 100 INJECTION, SOLUTION INTRAVENOUS; SUBCUTANEOUS at 17:50

## 2019-05-21 RX ADMIN — SODIUM CHLORIDE 1 G: 900 INJECTION, SOLUTION INTRAVENOUS at 10:05

## 2019-05-21 RX ADMIN — GABAPENTIN 300 MG: 300 CAPSULE ORAL at 21:56

## 2019-05-21 RX ADMIN — SERTRALINE HYDROCHLORIDE 100 MG: 100 TABLET ORAL at 10:04

## 2019-05-21 RX ADMIN — LEVOTHYROXINE SODIUM 125 MCG: 75 TABLET ORAL at 05:26

## 2019-05-21 RX ADMIN — VANCOMYCIN HYDROCHLORIDE 750 MG: 10 INJECTION, POWDER, LYOPHILIZED, FOR SOLUTION INTRAVENOUS at 17:51

## 2019-05-21 RX ADMIN — VANCOMYCIN HYDROCHLORIDE 750 MG: 10 INJECTION, POWDER, LYOPHILIZED, FOR SOLUTION INTRAVENOUS at 05:27

## 2019-05-21 RX ADMIN — Medication 10 ML: at 05:27

## 2019-05-21 RX ADMIN — OXYCODONE HYDROCHLORIDE AND ACETAMINOPHEN 1 TABLET: 7.5; 325 TABLET ORAL at 17:49

## 2019-05-21 RX ADMIN — SODIUM CHLORIDE 1 G: 900 INJECTION, SOLUTION INTRAVENOUS at 21:55

## 2019-05-21 RX ADMIN — PANTOPRAZOLE SODIUM 40 MG: 40 TABLET, DELAYED RELEASE ORAL at 10:04

## 2019-05-21 RX ADMIN — FUROSEMIDE 20 MG: 20 TABLET ORAL at 10:04

## 2019-05-21 NOTE — PROGRESS NOTES
Problem: Falls - Risk of  Goal: *Absence of Falls  Description  Document Eddie Reas Fall Risk and appropriate interventions in the flowsheet. Outcome: Progressing Towards Goal     Problem: Patient Education: Go to Patient Education Activity  Goal: Patient/Family Education  Outcome: Progressing Towards Goal     Problem: Pressure Injury - Risk of  Goal: *Prevention of pressure injury  Description  Document Shane Scale and appropriate interventions in the flowsheet.   Outcome: Progressing Towards Goal     Problem: Patient Education: Go to Patient Education Activity  Goal: Patient/Family Education  Outcome: Progressing Towards Goal

## 2019-05-21 NOTE — PROGRESS NOTES
Hospitalist Progress Note     Admit Date:  2019  8:39 PM   Name:  Kanika Francis   Age:  60 y.o.  :  1955   MRN:  [id number]   PCP:  Chelsea Guerrero MD  Treatment Team: Attending Provider: Seb Jiménez MD; Consulting Provider: Joe Mcdonnell MD; Utilization Review: Figueroa Royal RN; Care Manager: Greer Duane, RN; Consulting Provider: Pamela Arreola MD    Subjective:   Patient was seen at bedside. Cirrhosis is better compensated now. Planned to go to the OR tomorrow for R foot debridement/ skin graft       Objective:     Patient Vitals for the past 24 hrs:   Temp Pulse Resp BP SpO2   19 1120 98.1 °F (36.7 °C) 77 18 110/65 95 %   19 0945 98.1 °F (36.7 °C) 77 18 105/60 94 %   19 0515 98.1 °F (36.7 °C) 75 18 102/48 93 %   19 2249 98.4 °F (36.9 °C) 70 18 116/54 91 %   19 1944 97.8 °F (36.6 °C) 75 18 136/54 98 %   19 1708 98.1 °F (36.7 °C) 77 18 105/65 97 %     Oxygen Therapy  O2 Sat (%): 95 % (19 1120)  Pulse via Oximetry: 87 beats per minute (19 2354)  O2 Device: Room air (19 1332)  O2 Flow Rate (L/min): 2 l/min (19 1726)  FIO2 (%): 21 % (19 2143)    Intake/Output Summary (Last 24 hours) at 2019 1707  Last data filed at 2019 1606  Gross per 24 hour   Intake 240 ml   Output 390 ml   Net -150 ml         REVIEW OF SYSTEMS: Comprehensive ROS performed and negative except as stated in HPI. Physical Examination:  General:    Well nourished. Awake and alert. Head:  Normocephalic, atraumatic  Eyes:  Extraocular movements intact, normal sclera  CV:   RRR. No  Murmurs, clicks, or gallops  Lungs:   Unlabored, no cyanosis  Abdomen:   Soft, nondistended, nontender. Extremities: Warm and dry. No cyanosis. Still with 1-2+ pitting edema BLE. Right foot wound with wound vac in place. Skin:     No rashes or jaundice.  Pale   Neuro:  No gross focal deficits  Psych:  Mood is depressed and affect is flat    Data Review:  I have reviewed all labs, meds, telemetry events, and studies from the last 24 hours. Recent Results (from the past 24 hour(s))   VANCOMYCIN, TROUGH    Collection Time: 05/20/19  5:15 PM   Result Value Ref Range    Vancomycin,trough 18.4 5 - 20 ug/mL   GLUCOSE, POC    Collection Time: 05/20/19  9:23 PM   Result Value Ref Range    Glucose (POC) 181 (H) 65 - 100 mg/dL   CBC WITH AUTOMATED DIFF    Collection Time: 05/21/19  5:10 AM   Result Value Ref Range    WBC 2.5 (L) 4.3 - 11.1 K/uL    RBC 2.96 (L) 4.05 - 5.2 M/uL    HGB 8.1 (L) 11.7 - 15.4 g/dL    HCT 24.9 (L) 35.8 - 46.3 %    MCV 84.1 79.6 - 97.8 FL    MCH 27.4 26.1 - 32.9 PG    MCHC 32.5 31.4 - 35.0 g/dL    RDW 17.5 (H) 11.9 - 14.6 %    PLATELET 79 (L) 096 - 450 K/uL    MPV 9.4 9.4 - 12.3 FL    ABSOLUTE NRBC 0.00 0.0 - 0.2 K/uL    DF AUTOMATED      NEUTROPHILS 47 43 - 78 %    LYMPHOCYTES 32 13 - 44 %    MONOCYTES 16 (H) 4.0 - 12.0 %    EOSINOPHILS 4 0.5 - 7.8 %    BASOPHILS 1 0.0 - 2.0 %    IMMATURE GRANULOCYTES 0 0.0 - 5.0 %    ABS. NEUTROPHILS 1.2 (L) 1.7 - 8.2 K/UL    ABS. LYMPHOCYTES 0.8 0.5 - 4.6 K/UL    ABS. MONOCYTES 0.4 0.1 - 1.3 K/UL    ABS. EOSINOPHILS 0.1 0.0 - 0.8 K/UL    ABS. BASOPHILS 0.0 0.0 - 0.2 K/UL    ABS. IMM.  GRANS. 0.0 0.0 - 0.5 K/UL   METABOLIC PANEL, BASIC    Collection Time: 05/21/19  5:10 AM   Result Value Ref Range    Sodium 139 136 - 145 mmol/L    Potassium 4.5 3.5 - 5.1 mmol/L    Chloride 106 98 - 107 mmol/L    CO2 28 21 - 32 mmol/L    Anion gap 5 (L) 7 - 16 mmol/L    Glucose 119 (H) 65 - 100 mg/dL    BUN 16 8 - 23 MG/DL    Creatinine 0.78 0.6 - 1.0 MG/DL    GFR est AA >60 >60 ml/min/1.73m2    GFR est non-AA >60 >60 ml/min/1.73m2    Calcium 7.8 (L) 8.3 - 10.4 MG/DL   GLUCOSE, POC    Collection Time: 05/21/19  7:50 AM   Result Value Ref Range    Glucose (POC) 113 (H) 65 - 100 mg/dL   GLUCOSE, POC    Collection Time: 05/21/19 11:22 AM   Result Value Ref Range    Glucose (POC) 180 (H) 65 - 100 mg/dL   GLUCOSE, POC    Collection Time: 05/21/19  4:19 PM   Result Value Ref Range    Glucose (POC) 169 (H) 65 - 100 mg/dL        All Micro Results     Procedure Component Value Units Date/Time    C. DIFFICILE AG & TOXIN A/B [[id number]] Collected:  05/14/19 0236    Order Status:  Completed Specimen:  Stool Updated:  05/14/19 1209     [address] ANTIGEN       C. DIFFICILE GDH ANTIGEN-NEGATIVE           C. difficile toxin       C. DIFFICILE TOXIN-NEGATIVE           PCR Reflex NOT APPLICABLE        INTERPRETATION       NEGATIVE FOR TOXIGENIC C. DIFFICILE           Clinical Consideration       NEGATIVE FOR TOXIGENIC C. DIFFICILE          CULTURE, BLOOD [[id number]] Collected:  05/07/19 2345    Order Status:  Completed Specimen:  Whole Blood Updated:  05/13/19 1010     Special Requests: --        RIGHT  FOREARM       Culture result: NO GROWTH 5 DAYS       CULTURE, BLOOD [[id number]] Collected:  05/07/19 2138    Order Status:  Completed Specimen:  Whole Blood Updated:  05/12/19 0747     Special Requests: --        LEFT  Antecubital       Culture result: NO GROWTH 5 DAYS             Current Meds:  Current Facility-Administered Medications   Medication Dose Route Frequency    insulin lispro (HUMALOG) injection 0-10 Units  0-10 Units SubCUTAneous TIDAC    furosemide (LASIX) tablet 20 mg  20 mg Oral DAILY    spironolactone (ALDACTONE) tablet 50 mg  50 mg Oral DAILY    traMADol (ULTRAM) tablet 50 mg  50 mg Oral Q6H PRN    vancomycin (VANCOCIN) 750 mg in  mL infusion  750 mg IntraVENous Q12H    0.9% sodium chloride infusion 250 mL  250 mL IntraVENous PRN    0.9% sodium chloride infusion 250 mL  250 mL IntraVENous PRN    simethicone (MYLICON) tablet 80 mg  80 mg Oral QID PRN    promethazine (PHENERGAN) with saline injection 6.25 mg  6.25 mg IntraVENous Q4H PRN    ondansetron (ZOFRAN) injection 4 mg  4 mg IntraVENous Q6H PRN    sertraline (ZOLOFT) tablet 100 mg  100 mg Oral DAILY    loperamide (IMODIUM) capsule 2 mg  2 mg Oral Q8H PRN  0.9% sodium chloride infusion 250 mL  250 mL IntraVENous PRN    sodium chloride (NS) flush 10 mL  10 mL InterCATHeter Q8H    sodium chloride (NS) flush 10 mL  10 mL InterCATHeter PRN    ferrous sulfate tablet 325 mg  325 mg Oral DAILY    gabapentin (NEURONTIN) capsule 300 mg  300 mg Oral QHS    levothyroxine (SYNTHROID) tablet 125 mcg  125 mcg Oral ACB    pantoprazole (PROTONIX) tablet 40 mg  40 mg Oral DAILY    dextrose 40% (GLUTOSE) oral gel 1 Tube  15 g Oral PRN    glucagon (GLUCAGEN) injection 1 mg  1 mg IntraMUSCular PRN    dextrose (D50) infusion 12.5-25 g  25-50 mL IntraVENous PRN    sodium chloride (NS) flush 5-40 mL  5-40 mL IntraVENous Q8H    sodium chloride (NS) flush 5-40 mL  5-40 mL IntraVENous PRN    acetaminophen (TYLENOL) tablet 650 mg  650 mg Oral Q4H PRN    oxyCODONE-acetaminophen (PERCOCET 7.5) 7.5-325 mg per tablet 1 Tab  1 Tab Oral Q4H PRN    naloxone (NARCAN) injection 0.4 mg  0.4 mg IntraVENous PRN    cefepime (MAXIPIME) 1 g in 0.9% sodium chloride (MBP/ADV) 50 mL ADV  1 g IntraVENous Q12H       Diet:  DIET DIABETIC CONSISTENT CARB  DIET NUTRITIONAL SUPPLEMENTS    Other Studies (last 24 hours):  No results found.     Assessment and Plan:     Hospital Problems as of 5/21/2019 Date Reviewed: 4/24/2019          Codes Class Noted - Resolved POA    * (Principal) Cellulitis of right foot ICD-10-CM: L03.115  ICD-9-CM: 682.7  5/7/2019 - Present Yes        Cirrhosis of liver without ascites ([facility] ICD-10-CM: K74.60  ICD-9-CM: 571.5  3/7/2019 - Present Yes        Microcytic anemia ICD-10-CM: D50.9  ICD-9-CM: 280.9  2/27/2019 - Present Yes        Type 2 diabetes with nephropathy ([facility] ICD-10-CM: E11.21  ICD-9-CM: 250.40, 583.81  1/16/2019 - Present Yes        Hyperlipidemia ICD-10-CM: E78.5  ICD-9-CM: 272.4  1/12/2016 - Present Yes        Essential hypertension ICD-10-CM: I10  ICD-9-CM: 401.9  1/12/2016 - Present Yes        Acquired hypothyroidism ICD-10-CM: E03.9  ICD-9-CM: 244.9 1/12/2016 - Present Yes              A/P:    Right foot cellulitis  Continue IV antibiotics   R foot debridement tomorrow      Anemia, uncertain etiology   Stable   Monitor      DM   Humalog SSI AC and HS        Recent dx of cirrhosis, now with worsening ascites and pancytopenia   Better controlled now   On diuretics      DC planning  CM following  Ideally will dc to [facility] but pt requesting home health     DVT ppx:  SCD    Code status:  Full  Medical decision maker: No MPOA        Signed:  MABLE ShabazzC

## 2019-05-21 NOTE — PROGRESS NOTES
Nutrition follow-up  Reason for initial assessment: Length of Stay   Consult for low income nutrient needs education 5/20. Assessment:   Admitted with R foot cellulitis s/p debridement 5/13.  +wound vac. Recent diagnosis of cirrhosis, now with worsening ascites. PMH remarkable for DM, HTN, cellulitis. Plan for OR tomorrow for debridement and possible skin substitute placement. WC, CDE following. Food/Nutrition Patient History:  Flat affect at today's visit. Not drinking ensure high protein secondary preference for chocolate flavor. Reports nausea today limiting her intake. CM working with pt re financial support resources, meals on wheels. Pt reports at home she uses slim fast as a protein supplement secondary low cho. Pt with limited questions re foods available nutrition guidelines, open to discussion about controlled cost food items within consistent cho low sodium guidelines. DIET NUTRITIONAL SUPPLEMENTS Dinner; Ensure High Protein  DIET DIABETIC CONSISTENT CARB Regular; 2 GM NA (House Low NA)    Pertinent Labs: K 3.4, Glu 170, POC glucose 121-169   Anthropometrics:Height: 5' 7\" (170.2 cm),  Weight: 90.7 kg (200 lb), Weight Source: Standing scale (comment), Body mass index is 31.32 kg/m². BMI class of obesity. Macronutrient needs: 95 kg listed body wt   EER:  3000-5615 kcal /day (20-25 kcal/kg)  EPR:   grams protein/day (1-1.2 grams/kg) GFR >60    Intake/Comparative Standards: Recorded meal(s): %. This potentially meets ~75% of kcal and ~75% of protein needs    Intervention:  Meals and snacks: Continue current diet. Nutrition Supplement Therapy: Ensure High Protein once daily - clarify chocolate flavor. Nutrition education: Provided patient with written and verbal instruction on consistent cho low sodium diet. Handouts provided: DM My Plate, 2002 kcal meal samples. Patient verbalizes good understanding of diet. Expect fair compliance.     Coordination of Nutrition Care:Daphne ANI CHASE. Discharge Nutrition Plan: Too soon to determine.      [place] [address] Mc[address]

## 2019-05-21 NOTE — PROGRESS NOTES
Pharmacokinetic Consult to Pharmacist    Mar Rodriguez is a 60 y.o. female being treated for SSTI. Height: 5' 7\" (170.2 cm)  Weight: 90.7 kg (200 lb)  Lab Results   Component Value Date/Time    BUN 14 05/20/2019 05:42 AM    Creatinine 0.72 05/20/2019 05:42 AM    WBC 2.5 (L) 05/21/2019 05:10 AM    Procalcitonin 10.6 04/24/2019 06:29 PM    Lactic Acid (POC) 1.97 (H) 05/07/2019 08:30 PM      Estimated Creatinine Clearance: 93.6 mL/min (based on SCr of 0.72 mg/dL). CULTURES:  All Micro Results     Procedure Component Value Units Date/Time    C. DIFFICILE AG & TOXIN A/B [[id number]] Collected:  05/14/19 0236    Order Status:  Completed Specimen:  Stool Updated:  05/14/19 1209     [address] ANTIGEN       C. DIFFICILE GDH ANTIGEN-NEGATIVE           C. difficile toxin       C. DIFFICILE TOXIN-NEGATIVE           PCR Reflex NOT APPLICABLE        INTERPRETATION       NEGATIVE FOR TOXIGENIC C. DIFFICILE           Clinical Consideration       NEGATIVE FOR TOXIGENIC C. DIFFICILE          CULTURE, BLOOD [[id number]] Collected:  05/07/19 2345    Order Status:  Completed Specimen:  Whole Blood Updated:  05/13/19 1010     Special Requests: --        RIGHT  FOREARM       Culture result: NO GROWTH 5 DAYS       CULTURE, BLOOD [[id number]] Collected:  05/07/19 2138    Order Status:  Completed Specimen:  Whole Blood Updated:  05/12/19 0747     Special Requests: --        LEFT  Antecubital       Culture result: NO GROWTH 5 DAYS               Lab Results   Component Value Date/Time    Vancomycin,trough 18.4 05/20/2019 05:15 PM       Day 15 of vancomycin. Goal trough is 15-20. I will continue the current ordered dose. Planned EOT is 5/28/19 per primary team. Continue to trend markers of renal function. Pharmacist will continue to follow patient. Please call with any questions. Thank you,  Gordy Pal.  Abhi Urena, PharmD, [address] [facility]  Clinical Pharmacist  [phone]

## 2019-05-21 NOTE — PROGRESS NOTES
Problem: Self Care Deficits Care Plan (Adult)  Goal: *Acute Goals and Plan of Care (Insert Text)  Description  1. Pt will toilet with SBA   2. Pt will complete functional mobility for ADLs with SBA  3. Pt will complete lower body dressing with SBA using AE as needed  4. Pt will complete grooming and hygiene at sink with SBA  5. Pt will demonstrate independence with HEP to promote increased BUE strength and functional use for ADLs  6. Pt will tolerate 23 minutes functional activity with min or fewer rest breaks to promote increased endurance for ADLs      Timeframe: 7 days     Outcome: Progressing Towards Goal     OCCUPATIONAL THERAPY: Daily Note and AM 5/21/2019  INPATIENT: 4  Payor: BLUE CROSS / Plan: [place] BLUE CR[place] [facility] / Product Type: PPO /      NAME/AGE/GENDER: Lino Nurse is a 60 y.o. female   PRIMARY DIAGNOSIS:  Cellulitis of right foot [L03.115] Cellulitis of right foot   Cellulitis of right foot      8 Days Post-Op  ICD-10: Treatment Diagnosis:    · Generalized Muscle Weakness (M62.81)   Precautions/Allergies:    falls Pcn [penicillins] and Phenergan [promethazine]      ASSESSMENT:     Ms. Severo Ou was admitted with R foot cellulitis, is s/p recent admission for the same thing. Pt lives alone, reports that she is independent and works as a nurse at baseline but has been struggling to complete ADLs and IADLs since last d/c. Pt uses a RW for mobility. Pt stated that she has been out of work for a long time and is behind on her rent, is worried about eviction. Pt was supine in bed. Pt completed bed mobility with supervision. Pt completed functional tranfers with supervision with rolling walker. Pt completed toileting with supervision. Pt was left sitting in chair with all needs in reach. Continue POC. This section established at most recent assessment   PROBLEM LIST (Impairments causing functional limitations):  1. Decreased Strength  2. Decreased ADL/Functional Activities  3.  Decreased Transfer Abilities  4. Decreased Balance  5. Increased Pain  6. Increased Fatigue   INTERVENTIONS PLANNED: (Benefits and precautions of occupational therapy have been discussed with the patient.)  1. Activities of daily living training  2. Balance training  3. Therapeutic activity  4. Therapeutic exercise     TREATMENT PLAN: Frequency/Duration: Follow patient 3 times/ week to address above goals. Rehabilitation Potential For Stated Goals: Good     REHAB RECOMMENDATIONS (at time of discharge pending progress):    Placement: It is my opinion, based on this patient's performance to date, that Ms. Kauffman may benefit from [address] after discharge due to the functional deficits listed above that are likely to improve with skilled rehabilitation because he/she has multiple medical issues that affect his/her functional mobility in the community. Equipment:    3:1 BSC, reacher, sock aide               OCCUPATIONAL PROFILE AND HISTORY:   History of Present Injury/Illness (Reason for Referral):  See H&P  Past Medical History/Comorbidities:   Ms. Rossy Saba  has a past medical history of Diabetes ([facility] and Hypertension. Ms. Rossy Saba  has a past surgical history that includes colonoscopy (N/A, 3/2/2019) and ir paracentesis abd w image (5/17/2019).   Social History/Living Environment:   Home Environment: Private residence  # Steps to Enter: 4  One/Two Story Residence: One story  Living Alone: Yes  Support Systems: Family member(s), Friends \ neighbors  Patient Expects to be Discharged to[de-identified] Private residence  Current DME Used/Available at Home: Walker, rolling  Tub or Shower Type: Tub/Shower combination  Prior Level of Function/Work/Activity:  Independent, lives alone     Number of Personal Factors/Comorbidities that affect the Plan of Care: Expanded review of therapy/medical records (1-2):  MODERATE COMPLEXITY   ASSESSMENT OF OCCUPATIONAL PERFORMANCE[de-identified]   Activities of Daily Living:   Basic ADLs (From Assessment) Complex ADLs (From Assessment)   Feeding: Independent  Oral Facial Hygiene/Grooming: Setup  Bathing: Contact guard assistance  Upper Body Dressing: Setup  Lower Body Dressing: Minimum assistance  Toileting: Contact guard assistance Instrumental ADL  Meal Preparation: Minimum assistance  Homemaking: Minimum assistance   Grooming/Bathing/Dressing Activities of Daily Living     Cognitive Retraining  Safety/Judgement: Fall prevention           Toileting  Bladder Hygiene: Supervision  Bowel Hygiene: Supervision           Bed/Mat Mobility  Supine to Sit: Supervision  Sit to Stand: Supervision  Bed to Chair: Supervision       Most Recent Physical Functioning:   Gross Assessment:                  Posture:  Posture (WDL): Within defined limits  Balance:  Sitting: Intact  Standing: Pull to stand; With support Bed Mobility:  Supine to Sit: Supervision  Wheelchair Mobility:     Transfers:  Sit to Stand: Supervision  Bed to Chair: Supervision            Patient Vitals for the past 6 hrs:   BP SpO2 Pulse   05/21/19 0945 105/60 94 % 77       Mental Status  Neurologic State: Alert  Orientation Level: Oriented X4  Cognition: Appropriate decision making, Appropriate for age attention/concentration  Perception: Appears intact  Perseveration: No perseveration noted  Safety/Judgement: Fall prevention                          Physical Skills Involved:  1. Balance  2. Strength  3. Activity Tolerance  4. Pain (acute) Cognitive Skills Affected (resulting in the inability to perform in a timely and safe manner): 1. none  Psychosocial Skills Affected:  1. Habits/Routines  2. Environmental Adaptation   Number of elements that affect the Plan of Care: 3-5:  MODERATE COMPLEXITY   CLINICAL DECISION MAKING:   [employer] AM-PAC 6 Clicks   Daily Activity Inpatient Short Form  How much help from another person does the patient currently need. .. Total A Lot A Little None   1. Putting on and taking off regular lower body clothing?    ? 1   ? 2   ? 3   ? 4 2.  Bathing (including washing, rinsing, drying)? ? 1   ? 2   ? 3   ? 4   3. Toileting, which includes using toilet, bedpan or urinal?   ? 1   ? 2   ? 3   ? 4   4. Putting on and taking off regular upper body clothing? ? 1   ? 2   ? 3   ? 4   5. Taking care of personal grooming such as brushing teeth? ? 1   ? 2   ? 3   ? 4   6. Eating meals? ? 1   ? 2   ? 3   ? 4   © 2007, Trustees of [address] [place] [address] [address] [address], under license to [employer]. All rights reserved      Score:  Initial: 21 Most Recent: X (Date: -- )    Interpretation of Tool:  Represents activities that are increasingly more difficult (i.e. Bed mobility, Transfers, Gait). Medical Necessity:     · Patient demonstrates good  ·  rehab potential due to higher previous functional level. Reason for Services/Other Comments:  · Patient continues to require skilled intervention due to decreased ADLs and functional performance from baseline   · . Use of outcome tool(s) and clinical judgement create a POC that gives a: MODERATE COMPLEXITY         TREATMENT:   (In addition to Assessment/Re-Assessment sessions the following treatments were rendered)     Pre-treatment Symptoms/Complaints:    Pain: Initial:   Pain Intensity 1: 0  Pain Location 1: Foot  Post Session:  8         Self Care: (24): Procedure(s) (per grid) utilized to improve and/or restore self-care/home management as related to toileting. Required min verbal cueing to facilitate activities of daily living skills.     B UE's  Date:  5/10/19 Date:    Date:      Activity/Exercise Parameters Parameters Parameters   Elbow flex/ex 10 reps       Shoulder hor add/abd 10 reps       Punches  10 reps       Shoulder flex/ex 10 reps                                                   Braces/Orthotics/Lines/Etc:   · IV  · drain  · O2 Device: Room air  Treatment/Session Assessment:    · Response to Treatment:  increased pain in R foot  · Interdisciplinary Collaboration:   o Certified Occupational Therapy Assistant  o Registered Nurse  · After treatment position/precautions:   o Pt on strecther being taken down for a procedure    · Compliance with Program/Exercises: Will assess as treatment progresses. · Recommendations/Intent for next treatment session: \"Next visit will focus on advancements to more challenging activities and reduction in assistance provided\".   Total Treatment Duration:  OT Patient Time In/Time Out  Time In: 1006  Time Out: [address]

## 2019-05-21 NOTE — PROGRESS NOTES
Problem: Falls - Risk of  Goal: *Absence of Falls  Description  Document Tia Manus Fall Risk and appropriate interventions in the flowsheet. 5/21/2019 1630 by Estrella Vega  Outcome: Progressing Towards Goal  5/21/2019 1338 by Estrella Vega  Outcome: Progressing Towards Goal     Problem: Patient Education: Go to Patient Education Activity  Goal: Patient/Family Education  5/21/2019 1630 by Marni HUTCHINSON  Outcome: Progressing Towards Goal  5/21/2019 1338 by Marni HUTCHINSON  Outcome: Progressing Towards Goal     Problem: Pressure Injury - Risk of  Goal: *Prevention of pressure injury  Description  Document Shane Scale and appropriate interventions in the flowsheet.   5/21/2019 1630 by Estrella Vega  Outcome: Progressing Towards Goal  5/21/2019 1338 by Estrella Vega  Outcome: Progressing Towards Goal     Problem: Patient Education: Go to Patient Education Activity  Goal: Patient/Family Education  5/21/2019 1630 by Estrella Vega  Outcome: Progressing Towards Goal  5/21/2019 1338 by Marni HUTCHINSON  Outcome: Progressing Towards Goal

## 2019-05-21 NOTE — PROGRESS NOTES
CM provided pt with financial support resources for [facility]. [facility] still reviewing for appropriateness of placement.

## 2019-05-21 NOTE — PROGRESS NOTES
[facility] DAILY PROGRESS NOTE    Admit Date:  5/7/2019    CC:  Ascites, Liver Cirrhosis    Problem List:  Principal Problem:    Cellulitis of right foot (5/7/2019)    Active Problems:    Hyperlipidemia (1/12/2016)      Essential hypertension (1/12/2016)      Acquired hypothyroidism (1/12/2016)      Type 2 diabetes with nephropathy ([facility] [address] (1/16/2019)      Microcytic anemia (2/27/2019)      Cirrhosis of liver without ascites ([facility] [address] (3/7/2019)    Mrs. Anton Marshall is a 61 yo F with newly diagnosed Liver Cirrhosis this February 2019 who was admitted for severe cellulitis of the right foot requiring surgical debridement on 5/13 who we are following for hepatic decompensation with ascites. Ultrasound  5/13 showed cirrhotic liver and changes and upper abdominal ascites. She underwent paracentesis on 5/17 with removal of 4620 cc of yellow fluid. She got started on Aldactone at 50 mg PO daily 5/19/19 (slowly as her BP did not tolerate Lasix IV and Aldactone together) and tolerating it well. Lasix 20mg daily added 5/20/19 and tolerating well. BUN/Cr have remained WNL. On a separate note, liver serology work up for cirrhosis included ceruloplasmin, Ferritin, KRISTEN, AMA, ASMA, and hepatitis screen which were negative    1. Ascites  2. 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 Right Foot Cellulitis requiring Debridement    - MELD was 9  - She likely had hepatic decompensation from her foot infection  - Her ascites is getting better. Recommend low salt diet (< 2 g Na) along with continuation of Aldactone 50 mg PO daily and Lasix 20 mg PO daily. Will monitor to make sure that her BP, kidney function and electrolytes all tolerate it. - Discussed TIPS again 5/20 but may not need this if she tolerates the diuretics and her liver function improves as her infection improves. - Surgery planning for debridement and possible skin substitute placement on Wednesday in the OR.       Chad Garcia PA-CECILIA  [facility]    Subjective:     Patient feeling better. Sitting up in chair. Abdominal swelling better and lower extremity edema better as well according to the patient. No c/o abdominal pain, N/V. She is tolerating diet. Anticipating surgery for debridement scheduled for Wed 5/22/19.      Medications:   Current Facility-Administered Medications   Medication Dose Route Frequency Provider Last Rate Last Dose    insulin lispro (HUMALOG) injection 0-10 Units  0-10 Units SubCUTAneous TIDAC Nilda Graf MD   2 Units at 05/21/19 1130    furosemide (LASIX) tablet 20 mg  20 mg Oral DAILY Keyla Razo MD   20 mg at 05/21/19 1004    spironolactone (ALDACTONE) tablet 50 mg  50 mg Oral DAILY Last Martin MD   50 mg at 05/21/19 1004    traMADol (ULTRAM) tablet 50 mg  50 mg Oral Q6H PRN Delilah Magaña NP   50 mg at 05/19/19 1937    vancomycin (VANCOCIN) 750 mg in  mL infusion  750 mg IntraVENous Q12H Abel Parker  mL/hr at 05/21/19 0527 750 mg at 05/21/19 0527    0.9% sodium chloride infusion 250 mL  250 mL IntraVENous PRN Alen Marte MD        0.9% sodium chloride infusion 250 mL  250 mL IntraVENous PRN Alen Marte MD        [facility]) tablet 80 mg  80 mg Oral QID PRN Alen Marte MD   80 mg at 05/14/19 0011    promethazine (PHENERGAN) with saline injection 6.25 mg  6.25 mg IntraVENous Q4H PRN Alen Marte MD   6.25 mg at 05/12/19 1732    ondansetron (ZOFRAN) injection 4 mg  4 mg IntraVENous Q6H PRN Alen Marte MD   4 mg at 05/21/19 1152    sertraline (ZOLOFT) tablet 100 mg  100 mg Oral DAILY Alen Marte MD   100 mg at 05/21/19 1004    loperamide (IMODIUM) capsule 2 mg  2 mg Oral Q8H PRN Alen Marte MD   2 mg at 05/14/19 0233    0.9% sodium chloride infusion 250 mL  250 mL IntraVENous PRN Alen Marte MD        sodium chloride (NS) flush 10 mL  10 mL InterCATHeter Q8H Alen Marte MD   10 mL at 05/21/19 0527    sodium chloride (NS) flush 10 mL  10 mL InterCATHeter PRN Mickie Dai MD   10 mL at 05/21/19 1152    ferrous sulfate tablet 325 mg  325 mg Oral DAILY Mickie Dai MD   325 mg at 05/21/19 1004    gabapentin (NEURONTIN) capsule 300 mg  300 mg Oral QHS Mickie Dai MD   300 mg at 05/20/19 2139    levothyroxine (SYNTHROID) tablet 125 mcg  125 mcg Oral ACB Mickie Dai MD   125 mcg at 05/21/19 0526    pantoprazole (PROTONIX) tablet 40 mg  40 mg Oral DAILY Mickie Dai MD   40 mg at 05/21/19 1004    dextrose 40% (GLUTOSE) oral gel 1 Tube  15 g Oral PRN Mickie Dai MD        glucagon [facility]) injection 1 mg  1 mg IntraMUSCular PRN Mickie Dai MD        dextrose (D50) infusion 12.5-25 g  25-50 mL IntraVENous PRN Mickie Dai MD        sodium chloride (NS) flush 5-40 mL  5-40 mL IntraVENous Q8H Mickie Dai MD   10 mL at 05/21/19 0527    sodium chloride (NS) flush 5-40 mL  5-40 mL IntraVENous PRN Mickie Dai MD        acetaminophen (TYLENOL) tablet 650 mg  650 mg Oral Q4H PRN Mickie Dai MD   650 mg at 05/19/19 0247    oxyCODONE-acetaminophen (PERCOCET 7.5) 7.5-325 mg per tablet 1 Tab  1 Tab Oral Q4H PRN Mickie Dai MD   1 Tab at 05/20/19 2139    naloxone [facility]) injection 0.4 mg  0.4 mg IntraVENous PRN Mickie Dai MD        cefepime (MAXIPIME) 1 g in 0.9% sodium chloride (MBP/ADV) 50 mL ADV  1 g IntraVENous Q12H Abel Parker, NP   1 g at 05/21/19 1005       Review of Systems:  ROS was obtained, with pertinent positives as listed above. No chest pain or SOB. Diet:  2g NA    Objective:   Vitals:  Visit Vitals  /60   Pulse 77   Temp 98.1 °F (36.7 °C)   Resp 18   Ht 5' 7\" (1.702 m)   Wt 90.7 kg (200 lb)   SpO2 94%   BMI 31.32 kg/m²     Intake/Output:  No intake/output data recorded.   05/19 1901 - 05/21 0700  In: 5 [P.O.:720]  Out: 2200 [Urine:2100; Drains:100]  Exam:  General appearance: alert, cooperative, no distress. Lying in bed  Lungs: clear to auscultation bilaterally anteriorly  Heart: regular rate and rhythm  Abdomen: soft, non-tender. Distended with ascites.   Bowel sounds normal. No masses, no organomegaly  Extremities: extremities with 2+ pitting edema  Neuro:  alert and oriented  Psych: depressed    Data Review (Labs):    Recent Labs     05/21/19  0510 05/20/19  0542   WBC 2.5*  --    HGB 8.1*  --    HCT 24.9*  --    PLT 79*  --    MCV 84.1  --     138   K 4.5 4.2    105   CO2 28 27   BUN [address], [id number] Nicole Garcia  [facility]

## 2019-05-21 NOTE — PROGRESS NOTES
PT Daily Note:  Attempted to see patient for physical therapy this afternoon but patient declined to participate stating she is having surgery tomorrow and would like to rest right now. Encouraged patient to participate but she continued to decline. Will check back on patient at a later date/time if schedule permits.   Thank you,  Ana Lazo, PTA

## 2019-05-21 NOTE — PROGRESS NOTES
Interdisciplinary Rounds completed 05/21/19. Nursing, Case Management, Physician and PT present. Plan of care reviewed and updated. Surgery again in am.  Continue antibiotic therapy.

## 2019-05-22 ENCOUNTER — ANESTHESIA (OUTPATIENT)
Dept: SURGERY | Age: 64
DRG: 982 | End: 2019-05-22
Payer: COMMERCIAL

## 2019-05-22 LAB
ALBUMIN SERPL-MCNC: 2 G/DL (ref 3.2–4.6)
ALBUMIN/GLOB SERPL: 0.6 {RATIO} (ref 1.2–3.5)
ALP SERPL-CCNC: 307 U/L (ref 50–136)
ALT SERPL-CCNC: 15 U/L (ref 12–65)
ANION GAP SERPL CALC-SCNC: 7 MMOL/L (ref 7–16)
AST SERPL-CCNC: 26 U/L (ref 15–37)
BILIRUB SERPL-MCNC: 0.7 MG/DL (ref 0.2–1.1)
BUN SERPL-MCNC: 15 MG/DL (ref 8–23)
CALCIUM SERPL-MCNC: 7.9 MG/DL (ref 8.3–10.4)
CHLORIDE SERPL-SCNC: 105 MMOL/L (ref 98–107)
CO2 SERPL-SCNC: 27 MMOL/L (ref 21–32)
CREAT SERPL-MCNC: 0.79 MG/DL (ref 0.6–1)
ERYTHROCYTE [DISTWIDTH] IN BLOOD BY AUTOMATED COUNT: 17.7 % (ref 11.9–14.6)
GLOBULIN SER CALC-MCNC: 3.4 G/DL (ref 2.3–3.5)
GLUCOSE BLD STRIP.AUTO-MCNC: 116 MG/DL (ref 65–100)
GLUCOSE BLD STRIP.AUTO-MCNC: 117 MG/DL (ref 65–100)
GLUCOSE BLD STRIP.AUTO-MCNC: 117 MG/DL (ref 65–100)
GLUCOSE BLD STRIP.AUTO-MCNC: 183 MG/DL (ref 65–100)
GLUCOSE SERPL-MCNC: 126 MG/DL (ref 65–100)
HCT VFR BLD AUTO: 24.3 % (ref 35.8–46.3)
HGB BLD-MCNC: 7.9 G/DL (ref 11.7–15.4)
MAGNESIUM SERPL-MCNC: 1.9 MG/DL (ref 1.8–2.4)
MCH RBC QN AUTO: 27.5 PG (ref 26.1–32.9)
MCHC RBC AUTO-ENTMCNC: 32.5 G/DL (ref 31.4–35)
MCV RBC AUTO: 84.7 FL (ref 79.6–97.8)
NRBC # BLD: 0 K/UL (ref 0–0.2)
PLATELET # BLD AUTO: 94 K/UL (ref 150–450)
PMV BLD AUTO: 10.8 FL (ref 9.4–12.3)
POTASSIUM SERPL-SCNC: 4.4 MMOL/L (ref 3.5–5.1)
PROT SERPL-MCNC: 5.4 G/DL (ref 6.3–8.2)
RBC # BLD AUTO: 2.87 M/UL (ref 4.05–5.2)
SODIUM SERPL-SCNC: 139 MMOL/L (ref 136–145)
WBC # BLD AUTO: 2.7 K/UL (ref 4.3–11.1)

## 2019-05-22 PROCEDURE — 77030025162 HC DRSG VAC ASST 1 KCON -B

## 2019-05-22 PROCEDURE — 74011250636 HC RX REV CODE- 250/636: Performed by: NURSE PRACTITIONER

## 2019-05-22 PROCEDURE — 74011000258 HC RX REV CODE- 258: Performed by: NURSE PRACTITIONER

## 2019-05-22 PROCEDURE — 74011250637 HC RX REV CODE- 250/637: Performed by: SURGERY

## 2019-05-22 PROCEDURE — 76060000033 HC ANESTHESIA 1 TO 1.5 HR: Performed by: SURGERY

## 2019-05-22 PROCEDURE — 76010010054 HC POST OP PAIN BLOCK: Performed by: SURGERY

## 2019-05-22 PROCEDURE — 82962 GLUCOSE BLOOD TEST: CPT

## 2019-05-22 PROCEDURE — 74011250636 HC RX REV CODE- 250/636: Performed by: SURGERY

## 2019-05-22 PROCEDURE — 0HRMXK3 REPLACEMENT OF RIGHT FOOT SKIN WITH NONAUTOLOGOUS TISSUE SUBSTITUTE, FULL THICKNESS, EXTERNAL APPROACH: ICD-10-PCS | Performed by: SURGERY

## 2019-05-22 PROCEDURE — 83735 ASSAY OF MAGNESIUM: CPT

## 2019-05-22 PROCEDURE — 74011000258 HC RX REV CODE- 258: Performed by: SURGERY

## 2019-05-22 PROCEDURE — 74011250637 HC RX REV CODE- 250/637: Performed by: INTERNAL MEDICINE

## 2019-05-22 PROCEDURE — 74011000250 HC RX REV CODE- 250

## 2019-05-22 PROCEDURE — 36415 COLL VENOUS BLD VENIPUNCTURE: CPT

## 2019-05-22 PROCEDURE — 0LBV0ZZ EXCISION OF RIGHT FOOT TENDON, OPEN APPROACH: ICD-10-PCS | Performed by: SURGERY

## 2019-05-22 PROCEDURE — 76010000149 HC OR TIME 1 TO 1.5 HR: Performed by: SURGERY

## 2019-05-22 PROCEDURE — 77030019952 HC CANSTR VAC ASST KCON -B: Performed by: SURGERY

## 2019-05-22 PROCEDURE — 76942 ECHO GUIDE FOR BIOPSY: CPT | Performed by: SURGERY

## 2019-05-22 PROCEDURE — 77030003602 HC NDL NRV BLK BBMI -B: Performed by: ANESTHESIOLOGY

## 2019-05-22 PROCEDURE — 77030002996 HC SUT SLK J&J -A: Performed by: SURGERY

## 2019-05-22 PROCEDURE — 77030019934 HC DRSG VAC ASST KCON -B: Performed by: SURGERY

## 2019-05-22 PROCEDURE — 85027 COMPLETE CBC AUTOMATED: CPT

## 2019-05-22 PROCEDURE — 65270000029 HC RM PRIVATE

## 2019-05-22 PROCEDURE — 74011250636 HC RX REV CODE- 250/636: Performed by: ANESTHESIOLOGY

## 2019-05-22 PROCEDURE — 74011250636 HC RX REV CODE- 250/636

## 2019-05-22 PROCEDURE — 80053 COMPREHEN METABOLIC PANEL: CPT

## 2019-05-22 PROCEDURE — 77030025162 HC DRSG VAC ASST 1 KCON -B: Performed by: SURGERY

## 2019-05-22 PROCEDURE — 76210000016 HC OR PH I REC 1 TO 1.5 HR: Performed by: SURGERY

## 2019-05-22 PROCEDURE — 77030018836 HC SOL IRR NACL ICUM -A: Performed by: SURGERY

## 2019-05-22 PROCEDURE — 74750000023 HC WOUND THERAPY

## 2019-05-22 RX ORDER — HYDROMORPHONE HYDROCHLORIDE 2 MG/ML
0.5 INJECTION, SOLUTION INTRAMUSCULAR; INTRAVENOUS; SUBCUTANEOUS
Status: DISCONTINUED | OUTPATIENT
Start: 2019-05-22 | End: 2019-05-22 | Stop reason: HOSPADM

## 2019-05-22 RX ORDER — SODIUM CHLORIDE 0.9 % (FLUSH) 0.9 %
5-40 SYRINGE (ML) INJECTION EVERY 8 HOURS
Status: DISCONTINUED | OUTPATIENT
Start: 2019-05-22 | End: 2019-05-22 | Stop reason: HOSPADM

## 2019-05-22 RX ORDER — ROPIVACAINE HYDROCHLORIDE 5 MG/ML
INJECTION, SOLUTION EPIDURAL; INFILTRATION; PERINEURAL
Status: COMPLETED | OUTPATIENT
Start: 2019-05-22 | End: 2019-05-22

## 2019-05-22 RX ORDER — PROPOFOL 10 MG/ML
INJECTION, EMULSION INTRAVENOUS AS NEEDED
Status: DISCONTINUED | OUTPATIENT
Start: 2019-05-22 | End: 2019-05-22

## 2019-05-22 RX ORDER — OXYCODONE HYDROCHLORIDE 5 MG/1
5 TABLET ORAL
Status: DISCONTINUED | OUTPATIENT
Start: 2019-05-22 | End: 2019-05-22 | Stop reason: HOSPADM

## 2019-05-22 RX ORDER — SODIUM CHLORIDE 0.9 % (FLUSH) 0.9 %
5-40 SYRINGE (ML) INJECTION AS NEEDED
Status: DISCONTINUED | OUTPATIENT
Start: 2019-05-22 | End: 2019-05-22 | Stop reason: HOSPADM

## 2019-05-22 RX ORDER — MIDAZOLAM HYDROCHLORIDE 1 MG/ML
2 INJECTION, SOLUTION INTRAMUSCULAR; INTRAVENOUS
Status: COMPLETED | OUTPATIENT
Start: 2019-05-22 | End: 2019-05-22

## 2019-05-22 RX ORDER — FENTANYL CITRATE 50 UG/ML
100 INJECTION, SOLUTION INTRAMUSCULAR; INTRAVENOUS ONCE
Status: COMPLETED | OUTPATIENT
Start: 2019-05-22 | End: 2019-05-22

## 2019-05-22 RX ORDER — PROPOFOL 10 MG/ML
INJECTION, EMULSION INTRAVENOUS
Status: DISCONTINUED | OUTPATIENT
Start: 2019-05-22 | End: 2019-05-22 | Stop reason: HOSPADM

## 2019-05-22 RX ORDER — SODIUM CHLORIDE, SODIUM LACTATE, POTASSIUM CHLORIDE, CALCIUM CHLORIDE 600; 310; 30; 20 MG/100ML; MG/100ML; MG/100ML; MG/100ML
25 INJECTION, SOLUTION INTRAVENOUS CONTINUOUS
Status: DISCONTINUED | OUTPATIENT
Start: 2019-05-22 | End: 2019-05-22 | Stop reason: HOSPADM

## 2019-05-22 RX ORDER — SODIUM CHLORIDE, SODIUM LACTATE, POTASSIUM CHLORIDE, CALCIUM CHLORIDE 600; 310; 30; 20 MG/100ML; MG/100ML; MG/100ML; MG/100ML
75 INJECTION, SOLUTION INTRAVENOUS CONTINUOUS
Status: DISCONTINUED | OUTPATIENT
Start: 2019-05-22 | End: 2019-05-22

## 2019-05-22 RX ORDER — NALOXONE HYDROCHLORIDE 0.4 MG/ML
0.2 INJECTION, SOLUTION INTRAMUSCULAR; INTRAVENOUS; SUBCUTANEOUS AS NEEDED
Status: DISCONTINUED | OUTPATIENT
Start: 2019-05-22 | End: 2019-05-22 | Stop reason: HOSPADM

## 2019-05-22 RX ORDER — MIDAZOLAM HYDROCHLORIDE 1 MG/ML
2 INJECTION, SOLUTION INTRAMUSCULAR; INTRAVENOUS ONCE
Status: DISCONTINUED | OUTPATIENT
Start: 2019-05-22 | End: 2019-05-22 | Stop reason: HOSPADM

## 2019-05-22 RX ORDER — ONDANSETRON 2 MG/ML
4 INJECTION INTRAMUSCULAR; INTRAVENOUS
Status: DISCONTINUED | OUTPATIENT
Start: 2019-05-22 | End: 2019-05-22 | Stop reason: HOSPADM

## 2019-05-22 RX ORDER — NALOXONE HYDROCHLORIDE 0.4 MG/ML
0.2 INJECTION, SOLUTION INTRAMUSCULAR; INTRAVENOUS; SUBCUTANEOUS
Status: DISCONTINUED | OUTPATIENT
Start: 2019-05-22 | End: 2019-05-22 | Stop reason: HOSPADM

## 2019-05-22 RX ORDER — LIDOCAINE HYDROCHLORIDE 10 MG/ML
0.1 INJECTION INFILTRATION; PERINEURAL AS NEEDED
Status: DISCONTINUED | OUTPATIENT
Start: 2019-05-22 | End: 2019-05-22 | Stop reason: HOSPADM

## 2019-05-22 RX ORDER — SODIUM CHLORIDE, SODIUM LACTATE, POTASSIUM CHLORIDE, CALCIUM CHLORIDE 600; 310; 30; 20 MG/100ML; MG/100ML; MG/100ML; MG/100ML
75 INJECTION, SOLUTION INTRAVENOUS CONTINUOUS
Status: DISCONTINUED | OUTPATIENT
Start: 2019-05-22 | End: 2019-05-22 | Stop reason: HOSPADM

## 2019-05-22 RX ADMIN — FERROUS SULFATE TAB 325 MG (65 MG ELEMENTAL FE) 325 MG: 325 (65 FE) TAB at 08:55

## 2019-05-22 RX ADMIN — VANCOMYCIN HYDROCHLORIDE 750 MG: 10 INJECTION, POWDER, LYOPHILIZED, FOR SOLUTION INTRAVENOUS at 05:20

## 2019-05-22 RX ADMIN — LEVOTHYROXINE SODIUM 125 MCG: 75 TABLET ORAL at 08:55

## 2019-05-22 RX ADMIN — LOPERAMIDE HYDROCHLORIDE 2 MG: 2 CAPSULE ORAL at 13:01

## 2019-05-22 RX ADMIN — Medication 10 ML: at 05:21

## 2019-05-22 RX ADMIN — PROPOFOL 75 MCG/KG/MIN: 10 INJECTION, EMULSION INTRAVENOUS at 14:25

## 2019-05-22 RX ADMIN — VANCOMYCIN HYDROCHLORIDE 750 MG: 10 INJECTION, POWDER, LYOPHILIZED, FOR SOLUTION INTRAVENOUS at 18:42

## 2019-05-22 RX ADMIN — SPIRONOLACTONE 50 MG: 25 TABLET ORAL at 08:55

## 2019-05-22 RX ADMIN — MIDAZOLAM HYDROCHLORIDE 1 MG: 1 INJECTION, SOLUTION INTRAMUSCULAR; INTRAVENOUS at 13:33

## 2019-05-22 RX ADMIN — ROPIVACAINE HYDROCHLORIDE 10 ML: 5 INJECTION, SOLUTION EPIDURAL; INFILTRATION; PERINEURAL at 13:37

## 2019-05-22 RX ADMIN — SODIUM CHLORIDE 1 G: 900 INJECTION, SOLUTION INTRAVENOUS at 21:32

## 2019-05-22 RX ADMIN — SERTRALINE HYDROCHLORIDE 100 MG: 100 TABLET ORAL at 08:55

## 2019-05-22 RX ADMIN — PANTOPRAZOLE SODIUM 40 MG: 40 TABLET, DELAYED RELEASE ORAL at 08:56

## 2019-05-22 RX ADMIN — ONDANSETRON 4 MG: 2 INJECTION INTRAMUSCULAR; INTRAVENOUS at 08:55

## 2019-05-22 RX ADMIN — FENTANYL CITRATE 50 MCG: 50 INJECTION INTRAMUSCULAR; INTRAVENOUS at 13:33

## 2019-05-22 RX ADMIN — FUROSEMIDE 20 MG: 20 TABLET ORAL at 08:55

## 2019-05-22 RX ADMIN — GABAPENTIN 300 MG: 300 CAPSULE ORAL at 21:32

## 2019-05-22 RX ADMIN — ROPIVACAINE HYDROCHLORIDE 5 ML: 5 INJECTION, SOLUTION EPIDURAL; INFILTRATION; PERINEURAL at 13:38

## 2019-05-22 RX ADMIN — OXYCODONE HYDROCHLORIDE AND ACETAMINOPHEN 1 TABLET: 7.5; 325 TABLET ORAL at 18:42

## 2019-05-22 RX ADMIN — SODIUM CHLORIDE, SODIUM LACTATE, POTASSIUM CHLORIDE, AND CALCIUM CHLORIDE: 600; 310; 30; 20 INJECTION, SOLUTION INTRAVENOUS at 14:18

## 2019-05-22 RX ADMIN — SODIUM CHLORIDE 1 G: 900 INJECTION, SOLUTION INTRAVENOUS at 08:54

## 2019-05-22 RX ADMIN — Medication 10 ML: at 21:34

## 2019-05-22 NOTE — PROGRESS NOTES
Problem: Pressure Injury - Risk of  Goal: *Prevention of pressure injury  Description  Document Shane Scale and appropriate interventions in the flowsheet. Outcome: Progressing Towards Goal     Problem: Falls - Risk of  Goal: *Absence of Falls  Description  Document Hoda Alvarado Fall Risk and appropriate interventions in the flowsheet.   Outcome: Progressing Towards Goal

## 2019-05-22 NOTE — WOUND CARE
Patient going to surgery today for foot wound debridement and grafting (per patient report). VAC on at present but unclear whether it will be continued post procedure. Will follow up as needed. Patient asked questions regarding [facility]. Answered all questions at this time.

## 2019-05-22 NOTE — PROGRESS NOTES
Per speaking with Nat lei, pt now [employer]. Pt states that she will make a decision by 11am today. CM will follow.

## 2019-05-22 NOTE — PROGRESS NOTES
No new issues regarding right foot  VAC in place  To OR for debridement and placement of collagen matrix graft. Questions answered. OK for transfer to [facility] tomorrow from surgical standpoint.

## 2019-05-22 NOTE — ANESTHESIA PREPROCEDURE EVALUATION
Anesthetic History   No history of anesthetic complications            Review of Systems / Medical History  Patient summary reviewed and pertinent labs reviewed    Pulmonary  Within defined limits                 Neuro/Psych   Within defined limits           Cardiovascular    Hypertension: well controlled  Valvular problems/murmurs: mitral insufficiency and aortic stenosis            Exercise tolerance: >4 METS  Comments: Echo 5/1/2019 showed EF 60-65% with dilated LA and RA, mild MR, moderate AS with valve area of 1.52 and mean gradient of 26mmg, peak gradient of 51mmHg. GI/Hepatic/Renal           Liver disease (Liver cirrhosis with ascites)    Comments: Newly diagnosed cirrhosis Endo/Other    Diabetes: type 2  Hypothyroidism: well controlled  Anemia (and Thrombocytopenia)     Other Findings              Physical Exam    Airway  Mallampati: II  TM Distance: 4 - 6 cm  Neck ROM: normal range of motion   Mouth opening: Normal     Cardiovascular    Rhythm: regular  Rate: normal    Murmur: Grade 4, Aortic area     Dental    Dentition: Poor dentition     Pulmonary  Breath sounds clear to auscultation               Abdominal         Other Findings            Anesthetic Plan    ASA: 4  Anesthesia type: total IV anesthesia      Post-op pain plan if not by surgeon: peripheral nerve block single    Induction: Intravenous  Anesthetic plan and risks discussed with: Patient      S/p 4L paracentesis on 5/17/19- doing well on diuretics since per GI note  Similar procedure 5/13/19 under Pop/saph with propofol TIVA and did well.  Pt understands risks and actually said \"I want the block\"

## 2019-05-22 NOTE — ANESTHESIA PROCEDURE NOTES
Peripheral Block    Start time: 5/22/2019 1:37 PM  End time: 5/22/2019 1:38 PM  Performed by: Logan Llamas MD  Authorized by: Logan Llamas MD       Pre-procedure: Indications: at surgeon's request and post-op pain management    Preanesthetic Checklist: patient identified, risks and benefits discussed, site marked, timeout performed, anesthesia consent given and patient being monitored    Timeout Time: 13:37          Block Type:   Block Type:   Adductor canal  Laterality:  Right  Monitoring:  Standard ASA monitoring, responsive to questions, continuous pulse ox, oxygen, frequent vital sign checks and heart rate  Injection Technique:  Single shot  Procedures: ultrasound guided    Patient Position: supine  Prep: chlorhexidine    Location:  Mid thigh  Needle Type:  Stimuplex  Needle Gauge:  22 G  Needle Localization:  Ultrasound guidance    Assessment:  Number of attempts:  1  Injection Assessment:  Incremental injection every 5 mL, negative aspiration for CSF, ultrasound image on chart, no paresthesia, local visualized surrounding nerve on ultrasound, negative aspiration for blood and no intravascular symptoms  Patient tolerance:  Patient tolerated the procedure well with no immediate complications

## 2019-05-22 NOTE — PERIOP NOTES
TRANSFER - OUT REPORT:    Verbal report given to Daphne(name) on Delmy Kauffman  being transferred to 604(unit) for routine post - op       Report consisted of patients Situation, Background, Assessment and   Recommendations(SBAR). Information from the following report(s) SBAR, OR Summary, Procedure Summary, Intake/Output, MAR and Cardiac Rhythm NSR was reviewed with the receiving nurse. Lines:       Opportunity for questions and clarification was provided. Patient transported with:   Tech    VTE prophylaxis orders have been written for [employer]. Patient and family given floor number and nurses name. Family updated re: pt status after security code verified.

## 2019-05-22 NOTE — PERIOP NOTES
TRANSFER - IN REPORT:    Verbal report received from Mario Hamilton on [facility]  being received from 6th for ordered procedure      Report consisted of patients Situation, Background, Assessment and   Recommendations(SBAR). Information from the following report(s) SBAR and MAR was reviewed with the receiving nurse. Opportunity for questions and clarification was provided. Assessment completed upon patients arrival to unit and care assumed.

## 2019-05-22 NOTE — BRIEF OP NOTE
BRIEF OPERATIVE NOTE    Date of Procedure: 5/22/2019   Preoperative Diagnosis: FOOT WOUND  Postoperative Diagnosis: FOOT WOUND      Procedure(s):  1. RIGHT FOOT DEBRIDEMENT, 50sqcm total, skin, subcutaneous tissue, tendon  2. Placement of PuraPly graft (6x9cm)  Surgeon(s) and Role:     * Elicia Ware MD - Primary         Surgical Assistant: ---    Surgical Staff:  Circ-1: Kim Nolan RN  Circ-Relief: Mar Blanc RN  Scrub Tech-1: Tomás Hoof  Scrub Tech-2: Fern Smoke  Event Time In Time Out   Incision Start 05/22/2019 1442    Incision Close 05/22/2019 1518      Anesthesia: General   Estimated Blood Loss: 50cc  Specimens: * No specimens in log *   Findings: ischemic/dessicated tendon and dorsalis pedis artery. Artery thrombosed; ligated after confirming doppler flow in post tib to prevent future hemorrhage if erodes. Complications: none  Implants:   Implant Name Type Inv. Item Serial No.  Lot No. LRB No. Used Action   wound matrix      [id number]. 1.1C Right 1 Implanted

## 2019-05-22 NOTE — PROGRESS NOTES
Pt verbalizes that she will consider going to [facility] [employer]. Pt states that she has to much personal items to pack up in her room. Pt also states that she want to be on the 6th floor with the nurse she feels comfortable with after surgery. Pt verbalized that if [facility] will not accept her tomorrow, then she do not want to go.

## 2019-05-22 NOTE — PROGRESS NOTES
PT NOTE:    Pt off floor for surgery. Will check back at later time/date as schedule allows.     Jorge Wright, PTA

## 2019-05-22 NOTE — PROGRESS NOTES
Hospitalist Progress Note     Admit Date:  2019  8:39 PM   Name:  Nila Lundberg   Age:  60 y.o.  :  1955   MRN:  [id number]   PCP:  Amarilis Urban MD  Treatment Team: Attending Provider: Killian Meyer MD; Consulting Provider: Christy Zavaleta MD; Utilization Review: Roxi Ogden RN; Care Manager: Marely Mcbride RN; Consulting Provider: Beau Mota MD; Charge Nurse: Kelly Vazquez; Physical Therapy Assistant: Antonia Francisco PTA    Subjective:   Patient had R foot debridement today. Collagen matrix graft applied. Very debilitated. She has been accepted at [facility] but she has doubts about going there. Will discuss this matter with her. Cirrhosis seems to be controlled. Objective:     Patient Vitals for the past 24 hrs:   Temp Pulse Resp BP SpO2   19 1550  68  138/57 95 %   19 1544  71  140/68 95 %   19 1539  71  140/70 95 %   19 1534  71  137/65 94 %   19 1531 97.8 °F (36.6 °C) 71 11 152/67 96 %   19 1413  72 18 146/72 100 %   19 1403  72 20 143/72 99 %   19 1352  71 20 136/70 100 %   19 1347  73 20 141/68 100 %   19 1342  71 18 136/68 99 %   19 1337  71 18 136/65 99 %   19 1333  75 20 144/67 100 %   19 1227 98 °F (36.7 °C) 75 20 157/68 96 %   19 0439 98.2 °F (36.8 °C) 71 18 98/52 94 %   19 2334 98.6 °F (37 °C) 70 18 101/44 96 %   19 2145 98.9 °F (37.2 °C) 73 18 104/48 95 %     Oxygen Therapy  O2 Sat (%): 95 % (19 1550)  Pulse via Oximetry: 69 beats per minute (19 1550)  O2 Device: Room air (19 1535)  O2 Flow Rate (L/min): 2 l/min (19 1531)  FIO2 (%): 21 % (19 2143)    Intake/Output Summary (Last 24 hours) at 2019 1607  Last data filed at 2019 1527  Gross per 24 hour   Intake 750 ml   Output 450 ml   Net 300 ml         REVIEW OF SYSTEMS: Comprehensive ROS performed and negative except as stated in HPI.     Physical Examination:  General:    Awake and alert. Head:  Normocephalic, atraumatic  Eyes:  Extraocular movements intact, normal sclera  CV:   RRR. No  Murmurs, clicks, or gallops  Lungs:   Unlabored, no cyanosis  Abdomen:   Soft, nondistended, nontender. Extremities: Warm and dry. No cyanosis. Still with 1-2+ pitting edema BLE. Right foot wound s/p debridement   Skin:     No rashes or jaundice. Pale   Neuro:  No gross focal deficits  Psych:  Mood is depressed and affect is flat    Data Review:  I have reviewed all labs, meds, telemetry events, and studies from the last 24 hours. Recent Results (from the past 24 hour(s))   GLUCOSE, POC    Collection Time: 05/21/19  4:19 PM   Result Value Ref Range    Glucose (POC) 169 (H) 65 - 100 mg/dL   GLUCOSE, POC    Collection Time: 05/21/19  9:35 PM   Result Value Ref Range    Glucose (POC) 152 (H) 65 - 100 mg/dL   CBC W/O DIFF    Collection Time: 05/22/19  4:13 AM   Result Value Ref Range    WBC 2.7 (L) 4.3 - 11.1 K/uL    RBC 2.87 (L) 4.05 - 5.2 M/uL    HGB 7.9 (L) 11.7 - 15.4 g/dL    HCT 24.3 (L) 35.8 - 46.3 %    MCV 84.7 79.6 - 97.8 FL    MCH 27.5 26.1 - 32.9 PG    MCHC 32.5 31.4 - 35.0 g/dL    RDW 17.7 (H) 11.9 - 14.6 %    PLATELET 94 (L) 441 - 450 K/uL    MPV 10.8 9.4 - 12.3 FL    ABSOLUTE NRBC 0.00 0.0 - 0.2 K/uL   METABOLIC PANEL, COMPREHENSIVE    Collection Time: 05/22/19  4:13 AM   Result Value Ref Range    Sodium 139 136 - 145 mmol/L    Potassium 4.4 3.5 - 5.1 mmol/L    Chloride 105 98 - 107 mmol/L    CO2 27 21 - 32 mmol/L    Anion gap 7 7 - 16 mmol/L    Glucose 126 (H) 65 - 100 mg/dL    BUN 15 8 - 23 MG/DL    Creatinine 0.79 0.6 - 1.0 MG/DL    GFR est AA >60 >60 ml/min/1.73m2    GFR est non-AA >60 >60 ml/min/1.73m2    Calcium 7.9 (L) 8.3 - 10.4 MG/DL    Bilirubin, total 0.7 0.2 - 1.1 MG/DL    ALT (SGPT) 15 12 - 65 U/L    AST (SGOT) 26 15 - 37 U/L    Alk.  phosphatase 307 (H) 50 - 136 U/L    Protein, total 5.4 (L) 6.3 - 8.2 g/dL    Albumin 2.0 (L) 3.2 - 4.6 g/dL Globulin 3.4 2.3 - 3.5 g/dL    A-G Ratio 0.6 (L) 1.2 - 3.5     MAGNESIUM    Collection Time: 05/22/19  4:13 AM   Result Value Ref Range    Magnesium 1.9 1.8 - 2.4 mg/dL   GLUCOSE, POC    Collection Time: 05/22/19  7:22 AM   Result Value Ref Range    Glucose (POC) 117 (H) 65 - 100 mg/dL   GLUCOSE, POC    Collection Time: 05/22/19 11:25 AM   Result Value Ref Range    Glucose (POC) 116 (H) 65 - 100 mg/dL   GLUCOSE, POC    Collection Time: 05/22/19  3:37 PM   Result Value Ref Range    Glucose (POC) 117 (H) 65 - 100 mg/dL        All Micro Results     Procedure Component Value Units Date/Time    C. DIFFICILE AG & TOXIN A/B [[id number]] Collected:  05/14/19 0236    Order Status:  Completed Specimen:  Stool Updated:  05/14/19 1209     [address] ANTIGEN       C. DIFFICILE GDH ANTIGEN-NEGATIVE           C. difficile toxin       C. DIFFICILE TOXIN-NEGATIVE           PCR Reflex NOT APPLICABLE        INTERPRETATION       NEGATIVE FOR TOXIGENIC C. DIFFICILE           Clinical Consideration       NEGATIVE FOR TOXIGENIC C. DIFFICILE          CULTURE, BLOOD [[id number]] Collected:  05/07/19 2345    Order Status:  Completed Specimen:  Whole Blood Updated:  05/13/19 1010     Special Requests: --        RIGHT  FOREARM       Culture result: NO GROWTH 5 DAYS       CULTURE, BLOOD [[id number]] Collected:  05/07/19 2138    Order Status:  Completed Specimen:  Whole Blood Updated:  05/12/19 0714     Special Requests: --        LEFT  Antecubital       Culture result: NO GROWTH 5 DAYS             Current Meds:  Current Facility-Administered Medications   Medication Dose Route Frequency    lactated Ringers infusion  75 mL/hr IntraVENous CONTINUOUS    oxyCODONE IR (ROXICODONE) tablet 5 mg  5 mg Oral ONCE PRN    HYDROmorphone (PF) (DILAUDID) injection 0.5 mg  0.5 mg IntraVENous Multiple    naloxone (NARCAN) injection 0.2 mg  0.2 mg IntraVENous PRN    [START ON 5/23/2019] Vancomycin Trough Reminder   Other ONCE    insulin lispro (HUMALOG) injection 0-10 Units  0-10 Units SubCUTAneous TIDAC    furosemide (LASIX) tablet 20 mg  20 mg Oral DAILY    spironolactone (ALDACTONE) tablet 50 mg  50 mg Oral DAILY    traMADol (ULTRAM) tablet 50 mg  50 mg Oral Q6H PRN    vancomycin (VANCOCIN) 750 mg in  mL infusion  750 mg IntraVENous Q12H    0.9% sodium chloride infusion 250 mL  250 mL IntraVENous PRN    0.9% sodium chloride infusion 250 mL  250 mL IntraVENous PRN    simethicone (MYLICON) tablet 80 mg  80 mg Oral QID PRN    promethazine (PHENERGAN) with saline injection 6.25 mg  6.25 mg IntraVENous Q4H PRN    ondansetron (ZOFRAN) injection 4 mg  4 mg IntraVENous Q6H PRN    sertraline (ZOLOFT) tablet 100 mg  100 mg Oral DAILY    loperamide (IMODIUM) capsule 2 mg  2 mg Oral Q8H PRN    0.9% sodium chloride infusion 250 mL  250 mL IntraVENous PRN    sodium chloride (NS) flush 10 mL  10 mL InterCATHeter Q8H    sodium chloride (NS) flush 10 mL  10 mL InterCATHeter PRN    ferrous sulfate tablet 325 mg  325 mg Oral DAILY    gabapentin (NEURONTIN) capsule 300 mg  300 mg Oral QHS    levothyroxine (SYNTHROID) tablet 125 mcg  125 mcg Oral ACB    pantoprazole (PROTONIX) tablet 40 mg  40 mg Oral DAILY    dextrose 40% (GLUTOSE) oral gel 1 Tube  15 g Oral PRN    glucagon (GLUCAGEN) injection 1 mg  1 mg IntraMUSCular PRN    dextrose (D50) infusion 12.5-25 g  25-50 mL IntraVENous PRN    sodium chloride (NS) flush 5-40 mL  5-40 mL IntraVENous Q8H    sodium chloride (NS) flush 5-40 mL  5-40 mL IntraVENous PRN    acetaminophen (TYLENOL) tablet 650 mg  650 mg Oral Q4H PRN    oxyCODONE-acetaminophen (PERCOCET 7.5) 7.5-325 mg per tablet 1 Tab  1 Tab Oral Q4H PRN    naloxone (NARCAN) injection 0.4 mg  0.4 mg IntraVENous PRN    cefepime (MAXIPIME) 1 g in 0.9% sodium chloride (MBP/ADV) 50 mL ADV  1 g IntraVENous Q12H       Diet:  DIET NUTRITIONAL SUPPLEMENTS  DIET NPO  DIET DIABETIC CONSISTENT CARB    Other Studies (last 24 hours):  No results found.     Assessment and Plan:     Hospital Problems as of 5/22/2019 Date Reviewed: 5/22/2019          Codes Class Noted - Resolved POA    * (Principal) Cellulitis of right foot ICD-10-CM: L03.115  ICD-9-CM: 682.7  5/7/2019 - Present Yes        Cirrhosis of liver without ascites ([facility] ICD-10-CM: K74.60  ICD-9-CM: 571.5  3/7/2019 - Present Yes        Microcytic anemia ICD-10-CM: D50.9  ICD-9-CM: 280.9  2/27/2019 - Present Yes        Type 2 diabetes with nephropathy ([facility] ICD-10-CM: E11.21  ICD-9-CM: 250.40, 583.81  1/16/2019 - Present Yes        Hyperlipidemia ICD-10-CM: E78.5  ICD-9-CM: 272.4  1/12/2016 - Present Yes        Essential hypertension ICD-10-CM: I10  ICD-9-CM: 401.9  1/12/2016 - Present Yes        Acquired hypothyroidism ICD-10-CM: E03.9  ICD-9-CM: 244.9  1/12/2016 - Present Yes              A/P:    Right foot cellulitis  Continue IV antibiotics   R foot debridement with artificial graft done today  Accepted at [facility]. Patient has doubts about going there.  Will discuss this matter with her.      Anemia, uncertain etiology   Stable   Monitor      DM   Humalog SSI AC and HS        Recent dx of cirrhosis, now with worsening ascites and pancytopenia   Better controlled now   On diuretics      DC planning  CM following  Ideally will [facility] to [facility] but pt requesting home health     DVT ppx:  SCD    Code status:  Full  Medical decision maker: No MPOA        Signed:  POPPY Pemberton

## 2019-05-22 NOTE — PROGRESS NOTES
...TRANSFER - OUT REPORT:    Verbal report given to ANI More  on General Oconnell  being transferred to  Pre-op. Report consisted of patients Situation, Background, Assessment and   Recommendations     Information from the following report  was reviewed with the receiving nurse. Lines:       Opportunity for questions and clarification was provided.       Patient transported with:

## 2019-05-22 NOTE — ANESTHESIA POSTPROCEDURE EVALUATION
Procedure(s):  RIGHT FOOT DEBRIDEMENT, 50sqcm  placement of puraply graft.     total IV anesthesia    Anesthesia Post Evaluation      Multimodal analgesia: multimodal analgesia not used between 6 hours prior to anesthesia start to PACU discharge  Patient location during evaluation: PACU  Patient participation: complete - patient participated  Level of consciousness: awake and alert  Pain score: 0  Pain management: adequate  Airway patency: patent  Anesthetic complications: no  Cardiovascular status: hemodynamically stable  Respiratory status: acceptable  Hydration status: acceptable  Post anesthesia nausea and vomiting:  none      Vitals Value Taken Time   /71 5/22/2019  4:40 PM   Temp 36.8 °C (98.2 °F) 5/22/2019  4:05 PM   Pulse 72 5/22/2019  4:44 PM   Resp 15 5/22/2019  4:24 PM   SpO2 97 % 5/22/2019  4:44 PM

## 2019-05-22 NOTE — ANESTHESIA PROCEDURE NOTES
Peripheral Block    Start time: 5/22/2019 1:33 PM  End time: 5/22/2019 1:37 PM  Performed by: Seth Hernandez MD  Authorized by: Seth Hernandez MD       Pre-procedure:    Indications: at surgeon's request and post-op pain management    Preanesthetic Checklist: patient identified, risks and benefits discussed, site marked, timeout performed, anesthesia consent given and patient being monitored    Timeout Time: 13:33          Block Type:   Block Type:  Popliteal  Laterality:  Right  Monitoring:  Standard ASA monitoring, responsive to questions, continuous pulse ox, oxygen, frequent vital sign checks and heart rate  Injection Technique:  Single shot  Procedures: ultrasound guided and nerve stimulator    Patient Position: supine  Prep: chlorhexidine    Location:  Mid thigh  Needle Type:  Stimuplex  Needle Gauge:  22 G  Needle Localization:  Nerve stimulator and ultrasound guidance  Motor Response: minimal motor response >0.4 mA      Assessment:  Number of attempts:  1  Injection Assessment:  Incremental injection every 5 mL, negative aspiration for CSF, ultrasound image on chart, no paresthesia, local visualized surrounding nerve on ultrasound, negative aspiration for blood and no intravascular symptoms  Patient tolerance:  Patient tolerated the procedure well with no immediate complications

## 2019-05-22 NOTE — PROGRESS NOTES
Rounds completed this shift. Wd Vac in place functioning well. All needs have been met. Medicated for pain. Pt now states that she is okay with going to [place].

## 2019-05-22 NOTE — PROGRESS NOTES
Hourly rounds completed on patient. No complaints of pain. NPO after Midnight. Patient denies any needs at this time. Will report to oncoming nurse.

## 2019-05-22 NOTE — PROGRESS NOTES
...TRANSFER - IN REPORT:    Verbal report received from Mary on [address] [place] [address]  being received from PACU . Report consisted of patients Situation, Background, Assessment and   Recommendations     Information from the following report was reviewed with the receiving nurse. Opportunity for questions and clarification was provided. Assessment completed upon patients arrival to unit and care assumed.

## 2019-05-22 NOTE — PROGRESS NOTES
Pt accepted to [facility] and insurance approved. However, upon speaking with pt, she has decided to return home at [place]. CM inquired about support at home and pt stated that she has some support for food and cleaning arranged. Pt will return home with [facility]. Alcira Dooley liaison, notified and will speak with pt to confirm. Sasha Saenz will let CM know if pt changes her mind. CM will follow.

## 2019-05-23 ENCOUNTER — HOSPITAL ENCOUNTER (OUTPATIENT)
Age: 64
Discharge: HOME | End: 2019-06-05
Attending: INTERNAL MEDICINE | Admitting: INTERNAL MEDICINE

## 2019-05-23 VITALS
SYSTOLIC BLOOD PRESSURE: 109 MMHG | BODY MASS INDEX: 31.84 KG/M2 | OXYGEN SATURATION: 96 % | WEIGHT: 202.84 LBS | HEART RATE: 77 BPM | DIASTOLIC BLOOD PRESSURE: 65 MMHG | HEIGHT: 67 IN | TEMPERATURE: 98.4 F | RESPIRATION RATE: 18 BRPM

## 2019-05-23 LAB
ALBUMIN SERPL-MCNC: 1.9 G/DL (ref 3.2–4.6)
ALBUMIN SERPL-MCNC: 2.1 G/DL (ref 3.2–4.6)
ALBUMIN/GLOB SERPL: 0.6 {RATIO} (ref 1.2–3.5)
ALBUMIN/GLOB SERPL: 0.6 {RATIO} (ref 1.2–3.5)
ALP SERPL-CCNC: 274 U/L (ref 50–136)
ALP SERPL-CCNC: 320 U/L (ref 50–136)
ALT SERPL-CCNC: 15 U/L (ref 12–65)
ALT SERPL-CCNC: 22 U/L (ref 12–65)
ANION GAP SERPL CALC-SCNC: 7 MMOL/L (ref 7–16)
ANION GAP SERPL CALC-SCNC: 8 MMOL/L (ref 7–16)
APTT PPP: 33.1 SEC (ref 24.7–39.8)
AST SERPL-CCNC: 26 U/L (ref 15–37)
AST SERPL-CCNC: 34 U/L (ref 15–37)
BASOPHILS # BLD: 0 K/UL (ref 0–0.2)
BASOPHILS NFR BLD: 1 % (ref 0–2)
BILIRUB SERPL-MCNC: 0.6 MG/DL (ref 0.2–1.1)
BILIRUB SERPL-MCNC: 0.8 MG/DL (ref 0.2–1.1)
BUN SERPL-MCNC: 17 MG/DL (ref 8–23)
BUN SERPL-MCNC: 18 MG/DL (ref 8–23)
CALCIUM SERPL-MCNC: 8 MG/DL (ref 8.3–10.4)
CALCIUM SERPL-MCNC: 8.2 MG/DL (ref 8.3–10.4)
CHLORIDE SERPL-SCNC: 103 MMOL/L (ref 98–107)
CHLORIDE SERPL-SCNC: 107 MMOL/L (ref 98–107)
CO2 SERPL-SCNC: 26 MMOL/L (ref 21–32)
CO2 SERPL-SCNC: 26 MMOL/L (ref 21–32)
CREAT SERPL-MCNC: 0.86 MG/DL (ref 0.6–1)
CREAT SERPL-MCNC: 1 MG/DL (ref 0.6–1)
DIFFERENTIAL METHOD BLD: ABNORMAL
EOSINOPHIL # BLD: 0.1 K/UL (ref 0–0.8)
EOSINOPHIL NFR BLD: 2 % (ref 0.5–7.8)
ERYTHROCYTE [DISTWIDTH] IN BLOOD BY AUTOMATED COUNT: 17.8 % (ref 11.9–14.6)
ERYTHROCYTE [DISTWIDTH] IN BLOOD BY AUTOMATED COUNT: 17.9 % (ref 11.9–14.6)
EST. AVERAGE GLUCOSE BLD GHB EST-MCNC: 114 MG/DL
FOLATE SERPL-MCNC: 11 NG/ML (ref 3.1–17.5)
GLOBULIN SER CALC-MCNC: 3.2 G/DL (ref 2.3–3.5)
GLOBULIN SER CALC-MCNC: 3.8 G/DL (ref 2.3–3.5)
GLUCOSE BLD STRIP.AUTO-MCNC: 179 MG/DL (ref 65–100)
GLUCOSE BLD STRIP.AUTO-MCNC: 219 MG/DL (ref 65–100)
GLUCOSE SERPL-MCNC: 158 MG/DL (ref 65–100)
GLUCOSE SERPL-MCNC: 178 MG/DL (ref 65–100)
HBA1C MFR BLD: 5.6 % (ref 4.8–6)
HCT VFR BLD AUTO: 24.5 % (ref 35.8–46.3)
HCT VFR BLD AUTO: 26.9 % (ref 35.8–46.3)
HGB BLD-MCNC: 7.7 G/DL (ref 11.7–15.4)
HGB BLD-MCNC: 8.6 G/DL (ref 11.7–15.4)
IMM GRANULOCYTES # BLD AUTO: 0 K/UL (ref 0–0.5)
IMM GRANULOCYTES NFR BLD AUTO: 0 % (ref 0–5)
INR PPP: 1.3
LYMPHOCYTES # BLD: 0.7 K/UL (ref 0.5–4.6)
LYMPHOCYTES NFR BLD: 22 % (ref 13–44)
MCH RBC QN AUTO: 27.3 PG (ref 26.1–32.9)
MCH RBC QN AUTO: 27.5 PG (ref 26.1–32.9)
MCHC RBC AUTO-ENTMCNC: 31.4 G/DL (ref 31.4–35)
MCHC RBC AUTO-ENTMCNC: 32 G/DL (ref 31.4–35)
MCV RBC AUTO: 85.9 FL (ref 79.6–97.8)
MCV RBC AUTO: 86.9 FL (ref 79.6–97.8)
MONOCYTES # BLD: 0.4 K/UL (ref 0.1–1.3)
MONOCYTES NFR BLD: 14 % (ref 4–12)
NEUTS SEG # BLD: 1.8 K/UL (ref 1.7–8.2)
NEUTS SEG NFR BLD: 60 % (ref 43–78)
NRBC # BLD: 0 K/UL (ref 0–0.2)
NRBC # BLD: 0 K/UL (ref 0–0.2)
PLATELET # BLD AUTO: 108 K/UL (ref 150–450)
PLATELET # BLD AUTO: 93 K/UL (ref 150–450)
PMV BLD AUTO: 9.6 FL (ref 9.4–12.3)
PMV BLD AUTO: 9.6 FL (ref 9.4–12.3)
POTASSIUM SERPL-SCNC: 4.2 MMOL/L (ref 3.5–5.1)
POTASSIUM SERPL-SCNC: 4.3 MMOL/L (ref 3.5–5.1)
PROT SERPL-MCNC: 5.1 G/DL (ref 6.3–8.2)
PROT SERPL-MCNC: 5.9 G/DL (ref 6.3–8.2)
PROTHROMBIN TIME: 15.7 SEC (ref 11.7–14.5)
RBC # BLD AUTO: 2.82 M/UL (ref 4.05–5.2)
RBC # BLD AUTO: 3.13 M/UL (ref 4.05–5.2)
SODIUM SERPL-SCNC: 137 MMOL/L (ref 136–145)
SODIUM SERPL-SCNC: 140 MMOL/L (ref 136–145)
T4 SERPL-MCNC: 7.4 UG/DL (ref 4.8–13.9)
TSH SERPL DL<=0.005 MIU/L-ACNC: 6.61 UIU/ML (ref 0.36–3.74)
VANCOMYCIN TROUGH SERPL-MCNC: 19.2 UG/ML (ref 5–20)
VANCOMYCIN TROUGH SERPL-MCNC: 20.8 UG/ML (ref 5–20)
VIT B12 SERPL-MCNC: 1638 PG/ML (ref 193–986)
WBC # BLD AUTO: 2.6 K/UL (ref 4.3–11.1)
WBC # BLD AUTO: 3 K/UL (ref 4.3–11.1)

## 2019-05-23 PROCEDURE — 74011250637 HC RX REV CODE- 250/637: Performed by: SURGERY

## 2019-05-23 PROCEDURE — 74011250636 HC RX REV CODE- 250/636: Performed by: SURGERY

## 2019-05-23 PROCEDURE — 82607 VITAMIN B-12: CPT

## 2019-05-23 PROCEDURE — 84443 ASSAY THYROID STIM HORMONE: CPT

## 2019-05-23 PROCEDURE — 82962 GLUCOSE BLOOD TEST: CPT

## 2019-05-23 PROCEDURE — 84436 ASSAY OF TOTAL THYROXINE: CPT

## 2019-05-23 PROCEDURE — 85025 COMPLETE CBC W/AUTO DIFF WBC: CPT

## 2019-05-23 PROCEDURE — 85610 PROTHROMBIN TIME: CPT

## 2019-05-23 PROCEDURE — 74011636637 HC RX REV CODE- 636/637: Performed by: SURGERY

## 2019-05-23 PROCEDURE — 74750000023 HC WOUND THERAPY

## 2019-05-23 PROCEDURE — 80202 ASSAY OF VANCOMYCIN: CPT

## 2019-05-23 PROCEDURE — 83036 HEMOGLOBIN GLYCOSYLATED A1C: CPT

## 2019-05-23 PROCEDURE — 82746 ASSAY OF FOLIC ACID SERUM: CPT

## 2019-05-23 PROCEDURE — 74011000258 HC RX REV CODE- 258: Performed by: SURGERY

## 2019-05-23 PROCEDURE — 80053 COMPREHEN METABOLIC PANEL: CPT

## 2019-05-23 PROCEDURE — 36415 COLL VENOUS BLD VENIPUNCTURE: CPT

## 2019-05-23 PROCEDURE — 85730 THROMBOPLASTIN TIME PARTIAL: CPT

## 2019-05-23 PROCEDURE — 85027 COMPLETE CBC AUTOMATED: CPT

## 2019-05-23 RX ORDER — OXYCODONE AND ACETAMINOPHEN 7.5; 325 MG/1; MG/1
1 TABLET ORAL
Qty: 10 TAB | Refills: 0 | Status: SHIPPED
Start: 2019-05-23 | End: 2019-05-26

## 2019-05-23 RX ORDER — ACETAMINOPHEN 325 MG/1
650 TABLET ORAL
Qty: 30 TAB | Refills: 0 | Status: SHIPPED | OUTPATIENT
Start: 2019-05-23

## 2019-05-23 RX ORDER — SERTRALINE HYDROCHLORIDE 100 MG/1
100 TABLET, FILM COATED ORAL DAILY
Qty: 30 TAB | Refills: 0 | Status: SHIPPED | OUTPATIENT
Start: 2019-05-24

## 2019-05-23 RX ORDER — FUROSEMIDE 20 MG/1
20 TABLET ORAL DAILY
Qty: 30 TAB | Refills: 0 | Status: SHIPPED | OUTPATIENT
Start: 2019-05-23

## 2019-05-23 RX ORDER — ONDANSETRON 2 MG/ML
4 INJECTION INTRAMUSCULAR; INTRAVENOUS
Qty: 1 VIAL | Refills: 0 | Status: SHIPPED
Start: 2019-05-23

## 2019-05-23 RX ORDER — INSULIN LISPRO 100 [IU]/ML
INJECTION, SOLUTION INTRAVENOUS; SUBCUTANEOUS
Qty: 1 VIAL | Refills: 0 | Status: SHIPPED | OUTPATIENT
Start: 2019-05-23

## 2019-05-23 RX ORDER — SIMETHICONE 80 MG
80 TABLET,CHEWABLE ORAL
Qty: 30 TAB | Refills: 0 | Status: SHIPPED
Start: 2019-05-23

## 2019-05-23 RX ORDER — SPIRONOLACTONE 50 MG/1
50 TABLET, FILM COATED ORAL DAILY
Qty: 30 TAB | Refills: 0 | Status: SHIPPED
Start: 2019-05-24

## 2019-05-23 RX ORDER — LOPERAMIDE HYDROCHLORIDE 2 MG/1
2 CAPSULE ORAL
Qty: 30 CAP | Refills: 0 | Status: SHIPPED
Start: 2019-05-23 | End: 2019-06-02

## 2019-05-23 RX ORDER — VANCOMYCIN/0.9 % SOD CHLORIDE 750 MG/250
750 PLASTIC BAG, INJECTION (ML) INTRAVENOUS EVERY 12 HOURS
Qty: 1 EACH | Refills: 0 | Status: SHIPPED | OUTPATIENT
Start: 2019-05-23 | End: 2019-05-28

## 2019-05-23 RX ORDER — TRAMADOL HYDROCHLORIDE 50 MG/1
50 TABLET ORAL
Qty: 30 TAB | Refills: 0 | Status: SHIPPED
Start: 2019-05-23 | End: 2019-05-26

## 2019-05-23 RX ADMIN — VANCOMYCIN HYDROCHLORIDE 750 MG: 10 INJECTION, POWDER, LYOPHILIZED, FOR SOLUTION INTRAVENOUS at 05:12

## 2019-05-23 RX ADMIN — FUROSEMIDE 20 MG: 20 TABLET ORAL at 08:00

## 2019-05-23 RX ADMIN — LEVOTHYROXINE SODIUM 125 MCG: 75 TABLET ORAL at 05:11

## 2019-05-23 RX ADMIN — SPIRONOLACTONE 50 MG: 25 TABLET ORAL at 08:00

## 2019-05-23 RX ADMIN — SERTRALINE HYDROCHLORIDE 100 MG: 100 TABLET ORAL at 08:00

## 2019-05-23 RX ADMIN — FERROUS SULFATE TAB 325 MG (65 MG ELEMENTAL FE) 325 MG: 325 (65 FE) TAB at 08:00

## 2019-05-23 RX ADMIN — PANTOPRAZOLE SODIUM 40 MG: 40 TABLET, DELAYED RELEASE ORAL at 08:00

## 2019-05-23 RX ADMIN — SODIUM CHLORIDE 1 G: 900 INJECTION, SOLUTION INTRAVENOUS at 08:00

## 2019-05-23 RX ADMIN — INSULIN LISPRO 2 UNITS: 100 INJECTION, SOLUTION INTRAVENOUS; SUBCUTANEOUS at 07:48

## 2019-05-23 RX ADMIN — Medication 10 ML: at 05:12

## 2019-05-23 RX ADMIN — OXYCODONE HYDROCHLORIDE AND ACETAMINOPHEN 1 TABLET: 7.5; 325 TABLET ORAL at 07:49

## 2019-05-23 RX ADMIN — INSULIN LISPRO 4 UNITS: 100 INJECTION, SOLUTION INTRAVENOUS; SUBCUTANEOUS at 11:35

## 2019-05-23 RX ADMIN — Medication 10 ML: at 05:11

## 2019-05-23 RX ADMIN — ONDANSETRON 4 MG: 2 INJECTION INTRAMUSCULAR; INTRAVENOUS at 06:35

## 2019-05-23 NOTE — PROGRESS NOTES
[facility] DAILY PROGRESS NOTE    Admit Date:  5/7/2019    CC:  Ascites, Liver Cirrhosis    Problem List:  Principal Problem:    Cellulitis of right foot (5/7/2019)    Active Problems:    Hyperlipidemia (1/12/2016)      Essential hypertension (1/12/2016)      Acquired hypothyroidism (1/12/2016)      Type 2 diabetes with nephropathy ([facility] (1/16/2019)      Microcytic anemia (2/27/2019)      Cirrhosis of liver without ascites ([facility] (3/7/2019)    Mrs. Tonio Hines is a 61 yo F with newly diagnosed Liver Cirrhosis this February 2019 who was admitted for severe cellulitis of the right foot requiring surgical debridement on 5/13 who we are following for hepatic decompensation with ascites. Ultrasound  5/13 showed cirrhotic liver and changes and upper abdominal ascites. She underwent paracentesis on 5/17 with removal of 4620 cc of yellow fluid. She got started on Aldactone at 50 mg PO daily 5/19/19 (slowly as her BP did not tolerate Lasix IV and Aldactone together) and tolerating it well. Lasix 20mg daily added 5/20/19 and tolerating well. BUN/Cr have remained WNL. Electrolytes WNL. On a separate note, liver serology work up for cirrhosis included ceruloplasmin, Ferritin, KRISTEN, AMA, ASMA, and hepatitis screen which were negative    1. Ascites  2. 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 Right Foot Cellulitis requiring Debridement    - MELD was 9  - She likely had hepatic decompensation from her foot infection  - Her ascites is getting better. Recommend low salt diet (< 2 g Na) along with continuation of Aldactone 50 mg PO daily and Lasix 20 mg PO daily. Continue to monitor to make sure that her BP, kidney function and electrolytes all tolerate it. - Discussed TIPS again 5/20 but may not need this if she tolerates the diuretics and her liver function improves as her infection improves. - She is s/p R foot debridement 5/22. Being transferred to 4th floor [facility] sometime today.     Jennifer Muniz Maribeth Elaine PA-C  [facility]    Subjective:     Patient feeling well. Sitting up in chair. Abdominal swelling continue to improve as well as her lower extremity edema better according to the patient. No c/o abdominal pain, N/V. She is tolerating diet. Anticipating d/c to 4th floor [facility] later today.     Medications:   Current Facility-Administered Medications   Medication Dose Route Frequency Provider Last Rate Last Dose    insulin lispro (HUMALOG) injection 0-10 Units  0-10 Units SubCUTAneous TIDAC Anisha Posada MD   4 Units at 05/23/19 1135    furosemide (LASIX) tablet 20 mg  20 mg Oral DAILY Anisha Posada MD   20 mg at 05/23/19 0800    spironolactone (ALDACTONE) tablet 50 mg  50 mg Oral DAILY Anisha Posada MD   50 mg at 05/23/19 0800    traMADol (ULTRAM) tablet 50 mg  50 mg Oral Q6H PRN Anisha Posada MD   50 mg at 05/19/19 1937    vancomycin (VANCOCIN) 750 mg in  mL infusion  750 mg IntraVENous Q12H Anisha Posada  mL/hr at 05/23/19 0512 750 mg at 05/23/19 7463    0.9% sodium chloride infusion 250 mL  250 mL IntraVENous PRN Anisha Posada MD        0.9% sodium chloride infusion 250 mL  250 mL IntraVENous PRN Anisha Posada MD        [facility]) tablet 80 mg  80 mg Oral QID PRN Anisha Posada MD   80 mg at 05/14/19 0011    promethazine (PHENERGAN) with saline injection 6.25 mg  6.25 mg IntraVENous Q4H PRN Anisha Posada MD   6.25 mg at 05/12/19 1732    ondansetron (ZOFRAN) injection 4 mg  4 mg IntraVENous Q6H PRN Anisha Posada MD   4 mg at 05/23/19 0185    sertraline (ZOLOFT) tablet 100 mg  100 mg Oral DAILY Anisha Posada MD   100 mg at 05/23/19 0800    loperamide (IMODIUM) capsule 2 mg  2 mg Oral Q8H PRN Anisha Posada MD   2 mg at 05/22/19 1301    0.9% sodium chloride infusion 250 mL  250 mL IntraVENous PRN Anisha Posada MD        sodium chloride (NS) flush 10 mL  10 mL InterCATHeter Q8H Radha Elias Pina Diaz MD   10 mL at 05/23/19 0511    sodium chloride (NS) flush 10 mL  10 mL InterCATHeter PRN Ainsley Schilling MD   10 mL at 05/21/19 1152    ferrous sulfate tablet 325 mg  325 mg Oral DAILY Ainsley Schilling MD   325 mg at 05/23/19 0800    gabapentin (NEURONTIN) capsule 300 mg  300 mg Oral QHS Ainsley Schilling MD   300 mg at 05/22/19 2132    levothyroxine (SYNTHROID) tablet 125 mcg  125 mcg Oral ACB Ainsley Schilling MD   125 mcg at 05/23/19 4105    pantoprazole (PROTONIX) tablet 40 mg  40 mg Oral DAILY Ainsley Schilling MD   40 mg at 05/23/19 0800    dextrose 40% (GLUTOSE) oral gel 1 Tube  15 g Oral PRN Ainsley Schilling MD        glucagon [facility]) injection 1 mg  1 mg IntraMUSCular PRN Ainsley Schilling MD        dextrose (D50) infusion 12.5-25 g  25-50 mL IntraVENous PRN Ainsley Schilling MD        sodium chloride (NS) flush 5-40 mL  5-40 mL IntraVENous Q8H Ainsley Schilling MD   10 mL at 05/23/19 0512    sodium chloride (NS) flush 5-40 mL  5-40 mL IntraVENous PRN Ainsley Schilling MD        acetaminophen (TYLENOL) tablet 650 mg  650 mg Oral Q4H PRN Ainsley Schililng MD   650 mg at 05/19/19 0247    oxyCODONE-acetaminophen (PERCOCET 7.5) 7.5-325 mg per tablet 1 Tab  1 Tab Oral Q4H PRN Ainsley Schilling MD   1 Tab at 05/23/19 2894    naloxone [facility]) injection 0.4 mg  0.4 mg IntraVENous PRN Ainsley Schilling MD        cefepime (MAXIPIME) 1 g in 0.9% sodium chloride (MBP/ADV) 50 mL ADV  1 g IntraVENous Q12H Ainsley Schilling MD   1 g at 05/23/19 0800       Review of Systems:  ROS was obtained, with pertinent positives as listed above. No chest pain or SOB. Diet:  Diabetic consistent carb/2g NA    Objective:   Vitals:  Visit Vitals  /59   Pulse 76   Temp 98.6 °F (37 °C)   Resp 18   Ht 5' 7\" (1.702 m)   Wt 92 kg (202 lb 13.5 oz)   SpO2 95%   BMI 31.77 kg/m²     Intake/Output:  No intake/output data recorded.   05/21 1901 - 05/23 0700  In: 1110 [P.O.:360; I.V.:750]  Out: 450 [Urine:400]  Exam:  General appearance: alert, cooperative, no distress. Lying in bed  Lungs: clear to auscultation bilaterally anteriorly  Heart: regular rate and rhythm  Abdomen: soft, non-tender. Distended with ascites (improved per pt).   Bowel sounds normal. No masses, no organomegaly  Extremities: extremities with 1+ pitting edema  Neuro:  alert and oriented    Data Review (Labs):    Recent Labs     05/23/19  0535 05/22/19  0413 05/21/19  0510   WBC 2.6* 2.7* 2.5*   HGB 7.7* 7.9* 8.1*   HCT 24.5* 24.3* 24.9*   PLT 93* 94* 79*   MCV 86.9 84.7 84.1    139 139   K 4.2 4.4 4.5    105 106   CO2 26 27 28   BUN 17 15 16   MG  --  1.9  --    ALT 15 15  --          Aline Nieves [place]  [facility]

## 2019-05-23 NOTE — PROGRESS NOTES
PLAN:  Continue care per primary   Continue wound vac  Change vac 3x/week, leave xeroform in place--change foam only    ASSESSMENT:  Admit Date: 5/7/2019   1 Day Post-Op  Procedure(s):  RIGHT FOOT DEBRIDEMENT, 50sqcm  placement of puraply graft  PuraPly #1-5/22/19  Principal Problem:    Cellulitis of right foot (5/7/2019)    Active Problems:    Hyperlipidemia (1/12/2016)      Essential hypertension (1/12/2016)      Acquired hypothyroidism (1/12/2016)      Type 2 diabetes with nephropathy ([facility] [address] (1/16/2019)      Microcytic anemia (2/27/2019)      Cirrhosis of liver without ascites ([facility] [address] (3/7/2019)         SUBJECTIVE:  S/p right foot debridement with PuraPly. No complaints. Pt reports improved sensation in the right foot. Denies pain. Vac in place without leak. Intake/Output Summary (Last 24 hours) at 5/23/2019 0924  Last data filed at 5/23/2019 0418  Gross per 24 hour   Intake 1110 ml   Output 50 ml   Net 1060 ml     OBJECTIVE:  Constitutional: Alert oriented cooperative patient in no acute distress; appears stated age   Visit Vitals  /59   Pulse 76   Temp 98.6 °F (37 °C)   Resp 18   Ht 5' 7\" (1.702 m)   Wt 202 lb 13.5 oz (92 kg)   SpO2 95%   BMI 31.77 kg/m²     Eyes:Sclera are clear. ENMT: no external lesions gross hearing normal; no obvious neck masses, no ear or lip lesions  CV: RRR. Normal perfusion, 3+ edema to BLE  Resp: No JVD. Breathing is  non-labored; no audible wheezing. GI: soft and non-distended     Musculoskeletal: unremarkable with normal function. No embolic signs or cyanosis, right foot with wound vac in place to dorsum.    Neuro:  Oriented; moves all 4; no focal deficits  Psychiatric: normal affect and mood, no memory impairment      Patient Vitals for the past 24 hrs:   BP Temp Pulse Resp SpO2 Weight   05/23/19 0806 122/59 98.6 °F (37 °C) 76 18 95 %    05/23/19 0420      202 lb 13.5 oz (92 kg)   05/23/19 0418 91/59 98.5 °F (36.9 °C) 70 16 94 %    05/22/19 2306 95/52 98.2 °F (36.8 °C) 69 16 97 %    05/22/19 1917 144/69 98 °F (36.7 °C) 72 16 96 %    05/22/19 1644   72  97 %    05/22/19 1640   70  95 %    05/22/19 1629 144/65  71  94 %    05/22/19 1624 144/68  70 15 95 %    05/22/19 1615 154/77  69 13 96 %    05/22/19 1605 159/72 98.2 °F (36.8 °C) 70 14 95 %    05/22/19 1550 138/57  68 14 95 %    05/22/19 1544 140/68  71 13 95 %    05/22/19 1539 140/70  71 14 95 %    05/22/19 1534 137/65  71 14 94 %    05/22/19 1531 152/67 97.8 °F (36.6 °C) 71 11 96 %    05/22/19 1413 146/72  72 18 100 %    05/22/19 1403 143/72  72 20 99 %    05/22/19 1352 136/70  71 20 100 %    05/22/19 1347 141/68  73 20 100 %    05/22/19 1342 136/68  71 18 99 %    05/22/19 1337 136/65  71 18 99 %    05/22/19 1333 144/67  75 20 100 %    05/22/19 1227 157/68 98 °F (36.7 °C) 75 20 96 %      Labs:    Recent Labs     05/23/19  0535   WBC 2.6*   HGB 7.7*   PLT 93*      K 4.2      CO2 26   BUN 17   CREA 0.86   *   TBILI 0.6   SGOT 26   ALT 15   *       Signed:  Alina Ngo, NEGRITO

## 2019-05-23 NOTE — PROGRESS NOTES
Pharmacokinetic Consult to Pharmacist    Chaz Snow is a 60 y.o. female being treated for diabetic foot infection with vancomycin. Height: 5' 7\" (170.2 cm)  Weight: 92 kg (202 lb 13.5 oz)  Lab Results   Component Value Date/Time    BUN 17 05/23/2019 05:35 AM    Creatinine 0.86 05/23/2019 05:35 AM    WBC 2.6 (L) 05/23/2019 05:35 AM    Procalcitonin 10.6 04/24/2019 06:29 PM    Lactic Acid (POC) 1.97 (H) 05/07/2019 08:30 PM      Estimated Creatinine Clearance: 79 mL/min (based on SCr of 0.86 mg/dL). Lab Results   Component Value Date/Time    Vancomycin,trough 20.8 (HH) 05/23/2019 05:35 AM       Day 16 of vancomycin. Goal trough is 15-20. EOT per ID is 5/28/19. Vancomycin trough level drawn this morning was drawn after the dose was hung, so is falsely elevated. Previously levels on current vancomycin dose of 750 mg Q12H have been therapeutic and renal function appears stable. Will continue current dose of 750 mg Q12H. Noted that patient with planned transfer to [facility] today. Pharmacist will continue to follow patient. Please call with any questions.     Thank you,  Damian Perdue, PharmD  Clinical Pharmacist  [phone]

## 2019-05-23 NOTE — PROGRESS NOTES
Patient to discharge to [facility] today. Care Management Interventions  PCP Verified by CM: Yes  Mode of Transport at Discharge:  Other (see comment)  Transition of Care Consult (CM Consult): Discharge Planning, LTAC  Discharge Durable Medical Equipment: No  Physical Therapy Consult: Yes  Occupational Therapy Consult: Yes  Current Support Network: Own Home  Confirm Follow Up Transport: Family  Plan discussed with Pt/Family/Caregiver: Yes  Freedom of Choice Offered: Yes  Discharge Location  Discharge Placement: [address] [place] [address] (LTAC)

## 2019-05-23 NOTE — DISCHARGE SUMMARY
Discharge Summary     Patient: Anjel Steele MRN: [id number]  SSN: [id number]    YOB: 1955  Age: 60 y.o.   Sex: female       Admit Date: 5/7/2019    Discharge Date: 5/23/2019      Admission Diagnoses: Cellulitis of right foot [L03.115]    Discharge Diagnoses:   Problem List as of 5/23/2019 Date Reviewed: 5/22/2019          Codes Class Noted - Resolved    * (Principal) Cellulitis of right foot ICD-10-CM: L03.115  ICD-9-CM: 682.7  5/7/2019 - Present        Sepsis ([facility] ICD-10-CM: A41.9  ICD-9-CM: 038.9, 995.91  4/25/2019 - Present        Type 2 diabetes mellitus with diabetic neuropathy ([facility] ICD-10-CM: E11.40  ICD-9-CM: 250.60, 357.2  4/24/2019 - Present        Cellulitis ICD-10-CM: L03.90  ICD-9-CM: 682.9  4/24/2019 - Present        AVM (arteriovenous malformation) of colon ICD-10-CM: K55.20  ICD-9-CM: 569.84  3/7/2019 - Present        Cirrhosis of liver without ascites ([facility] ICD-10-CM: K74.60  ICD-9-CM: 571.5  3/7/2019 - Present        Microcytic anemia ICD-10-CM: D50.9  ICD-9-CM: 280.9  2/27/2019 - Present        Type 2 diabetes with nephropathy ([facility] ICD-10-CM: E11.21  ICD-9-CM: 250.40, 583.81  1/16/2019 - Present        Hyperlipidemia ICD-10-CM: E78.5  ICD-9-CM: 272.4  1/12/2016 - Present        Essential hypertension ICD-10-CM: I10  ICD-9-CM: 401.9  1/12/2016 - Present        Acquired hypothyroidism ICD-10-CM: E03.9  ICD-9-CM: 244.9  1/12/2016 - Present        Uncontrolled type II diabetes mellitus ([facility] ICD-10-CM: E11.65  ICD-9-CM: 250.02  1/12/2016 - Present        RESOLVED: Uncontrolled type 2 diabetes mellitus with hyperglycemia ([facility] ICD-10-CM: E11.65  ICD-9-CM: 250.02  3/7/2019 - 4/24/2019        RESOLVED: AVM (arteriovenous malformation) ICD-10-CM: Q27.30  ICD-9-CM: 747.60  3/4/2019 - 4/24/2019        RESOLVED: Hypomagnesemia ICD-10-CM: E83.42  ICD-9-CM: 275.2  3/1/2019 - 4/24/2019        RESOLVED: Thrombocytopenia ([facility] ICD-10-CM: D69.6  ICD-9-CM: 287.5  1/23/2019 - 4/24/2019 Discharge Condition: [facility] Hospital Course:     Pt is a 59 yo female with PMH including DM 2, cirrhosis, anemia with ablation of AVM in her colon. She was recently admitted to this hospital in April 2019 due to a diabetic foot with ulceration of the dorsal aspect of her R foot. She was discharged on 5/2 on oral keflex and doxycycline. Pt came back to ED on  05/07 for worsening right foot cellulitis despite being on those antibiotics at home. [facility] a bedside I&D was done and pt was started on Vanc/Cefepime with plan to complete 21 days of therapy ( EOT on 5/28). On 05/13 she underwent I&D in the OR.  Pt developed  increased abdominal distention and leg edema, noted with new ascites.  She was started on IV Lasix and Aldactone but IV diuretics were temporally  stopped due to hypotension. Paracentesis done 5/17, ascites improved after that. She was started on a low dose of lasix 20 mg po daily and spironolactone 50 mg po daily which can be titrated as needed and tolerated. Blood cultures remained negative. Wound cultures never done during this or her previous admission. On 5/22 patient  had a second  R foot debridement  with collagen matrix graft applied. She will continue IV antibiotics to complete 21 days and wound care will continue managing her wound vac. She is being transferred to the [facility] today. PE:    General:          Awake and alert. Head:               Normocephalic, atraumatic  Eyes:               Extraocular movements intact, normal sclera  CV:                  RRR. No  Murmurs, clicks, or gallops  Lungs:             Unlabored, no cyanosis  Abdomen:        Soft, nondistended, nontender. Extremities:     Warm and dry. No cyanosis. Still with 1-2+ pitting edema BLE. Right foot wound s/p debridement   Skin:                No rashes or jaundice.  Pale   Neuro:             No gross focal deficits  Psych:             Mood is depressed and affect is flat        Consults: Gastroenterology and General Surgery    Significant Diagnostic Studies: see hospital course     Disposition: [facility]     Discharge Medications:   Current Discharge Medication List      START taking these medications    Details   acetaminophen (TYLENOL) 325 mg tablet Take 2 Tabs by mouth every six (6) hours as needed for Pain. Qty: 30 Tab, Refills: 0      furosemide (LASIX) 20 mg tablet Take 1 Tab by mouth daily. Qty: 30 Tab, Refills: 0      insulin lispro (HUMALOG) 100 unit/mL injection humalog SS tid before meals  Qty: 1 Vial, Refills: 0      loperamide (IMODIUM) 2 mg capsule Take 1 Cap by mouth every eight (8) hours as needed for Diarrhea for up to 10 days. Indications: diarrhea  Qty: 30 Cap, Refills: 0      ondansetron (ZOFRAN) 4 mg/2 mL soln 2 mL by IntraVENous route every six (6) hours as needed for Nausea or Other. Indications: nausea  Qty: 1 Vial, Refills: 0      oxyCODONE-acetaminophen (PERCOCET 7.5) 7.5-325 mg per tablet Take 1 Tab by mouth every six (6) hours as needed for Pain for up to 3 days. Max Daily Amount: 4 Tabs. Qty: 10 Tab, Refills: 0    Associated Diagnoses: Cellulitis of lower extremity, unspecified laterality      simethicone (MYLICON) 80 mg chewable tablet Take 1 Tab by mouth four (4) times daily as needed for Flatulence. Qty: 30 Tab, Refills: 0      spironolactone (ALDACTONE) 50 mg tablet Take 1 Tab by mouth daily. Qty: 30 Tab, Refills: 0      traMADol (ULTRAM) 50 mg tablet Take 1 Tab by mouth every eight (8) hours as needed for Pain (for moderate pain) for up to 3 days. Max Daily Amount: 150 mg.  Qty: 30 Tab, Refills: 0    Associated Diagnoses: Cellulitis of lower extremity, unspecified laterality      cefepime 1 gram 1 g IVPB 1 g by IntraVENous route every twelve (12) hours for 5 days. Qty: 2 Dose, Refills: 0      Saccharomyces boulardii (FLORASTORKIDS) 250 mg pwpk Take 1 Packet by mouth two (2) times a day.   Qty: 30 Packet, Refills: 0      vancomycin in 0.9% sodium chloride (VANCOCIN) 750 mg/250 mL soln 250 mL by IntraVENous route every twelve (12) hours every twelve (12) hours for 5 days. Qty: 1 Each, Refills: 0         CONTINUE these medications which have CHANGED    Details   sertraline (ZOLOFT) 100 mg tablet Take 1 Tab by mouth daily. Qty: 30 Tab, Refills: 0         CONTINUE these medications which have NOT CHANGED    Details   pantoprazole (PROTONIX) 40 mg tablet Take 1 Tab by mouth daily. Qty: 30 Tab, Refills: 0      Ferrous Fumarate 325 mg (106 mg iron) tab Take 325 mg by mouth daily. gabapentin (NEURONTIN) 300 mg capsule Take 1 Cap by mouth nightly. Qty: 90 Cap, Refills: 3    Associated Diagnoses: Neuropathy      atorvastatin (LIPITOR) 10 mg tablet Take 1 Tab by mouth daily. Qty: 30 Tab, Refills: 6    Associated Diagnoses: Hyperlipidemia, unspecified hyperlipidemia type      levothyroxine (SYNTHROID) 125 mcg tablet Take 1 Tab by mouth Daily (before breakfast).   Qty: 90 Tab, Refills: 1    Associated Diagnoses: Acquired hypothyroidism         STOP taking these medications       cephALEXin (KEFLEX) 500 mg capsule Comments:   Reason for Stopping:         doxycycline (VIBRAMYCIN) 100 mg capsule Comments:   Reason for Stopping:         hydroCHLOROthiazide (HYDRODIURIL) 25 mg tablet Comments:   Reason for Stopping:         insulin glargine (LANTUS) 100 unit/mL injection Comments:   Reason for Stopping:         insulin aspart U-100 (NOVOLOG) 100 unit/mL inpn Comments:   Reason for Stopping:         Insulin Needles, Disposable, (ARIE PEN NEEDLE) 32 gauge x 5/32\" ndle Comments:   Reason for Stopping:         Blood-Glucose Meter monitoring kit Comments:   Reason for Stopping:         lancets misc Comments:   Reason for Stopping:         glucose blood VI test strips (BLOOD GLUCOSE TEST) strip Comments:   Reason for Stopping:         Insulin Needles, Disposable, 31 gauge x 5/16\" ndle Comments:   Reason for Stopping:               Activity: Activity as tolerated  Diet: Cardiac Diet  Wound Care: as per wound care recommendations     No orders of the defined types were placed in this encounter.       Signed By: Ingris Aldana MD     May 23, 2019

## 2019-05-23 NOTE — PROGRESS NOTES
TRANSFER - OUT REPORT:    Verbal report given to Tonio(name) on Delmy Kauffman  being transferred to [facility](unit) for routine progression of care       Report consisted of patients Situation, Background, Assessment and   Recommendations(SBAR). Information from the following report(s) SBAR was reviewed with the receiving nurse. Lines:       Opportunity for questions and clarification was provided.       Patient transported with:   O2 @ 0 liters

## 2019-05-24 ENCOUNTER — PATIENT OUTREACH (OUTPATIENT)
Dept: CASE MANAGEMENT | Age: 64
End: 2019-05-24

## 2019-05-24 PROCEDURE — 93306 TTE W/DOPPLER COMPLETE: CPT

## 2019-05-24 NOTE — PROGRESS NOTES
JONATHAN outreached postponed for 30 days due to discharge to [facility]. This note will not be viewable in [address]

## 2019-05-24 NOTE — OP NOTES
Operative Report    Patient: Marilee Salmon MRN: [id number]     YOB: 1955  Age: 60 y.o. Sex: female       Date of Surgery: 5/22/2019     Preoperative Diagnosis: FOOT WOUND     Postoperative Diagnosis: FOOT WOUND     NAME OF PROCEDURE:  Procedure(s):  RIGHT FOOT DEBRIDEMENT, 50sqcm  placement of puraply graft. SURGEON: Naima Bernstein MD    Anesthesia: General     Complications: none      Procedure Details       Informed consent was obtained and the patient was brought to the operating room after regional anesthesia was induced, was placed supine. The right lower leg and foot was prepped and draped sterilely. Debridement (sharp, excisional) of visibly necrotic tissue was then performed. This included a rim of ischemic skin around most of the wound and several pockets of necrotic, liquifying subcutaneous fat. The extensor tendon sheaths of the 4th and 5th digits were necrotic and this was excised; the tendons appeared viable. The dorsalis pedis artery, which was left in situ at the initial debridement but exposed, was now dessicated and thrombosed. This was confirmed on inadvertent transection. With manipulation, some backbleeding was encountered but not significant antegrade flow. After confirming biphasic flow of the posterior tibial artery with Doppler, the dorsalis pedis was formally ligated to prevent any future hemorrhage. The 2 small satellite wounds of the arch were also debrided. The final total area was just under 50sq cm. A 9x6cm sheet of PurAply was opened. This was placed over the main wound and trimmed to size. Fragments were then fashioned to pack the arch wounds. Care was taken to assure good graft-wound contact. The grafts were then covered with Xeroform gauze. A wound VAC was placed over the wounds using white foam.  Good suction was obtained.     The patient tolerated the procedure well with no apparent complications and  taken to the recovery room in satisfactory condition. Sponge and needle counts were correct times two as reported to me.     Estimated Blood Loss: per anesthesia           Specimens: * No specimens in log *        Signed By:  Gladys Goodman MD     May 24, 2019

## 2019-05-25 LAB — VANCOMYCIN TROUGH SERPL-MCNC: 21.2 UG/ML (ref 5–20)

## 2019-05-25 PROCEDURE — 80202 ASSAY OF VANCOMYCIN: CPT

## 2019-05-25 PROCEDURE — 36415 COLL VENOUS BLD VENIPUNCTURE: CPT

## 2019-05-27 LAB
ALBUMIN SERPL-MCNC: 2 G/DL (ref 3.2–4.6)
ALBUMIN/GLOB SERPL: 0.6 {RATIO} (ref 1.2–3.5)
ALP SERPL-CCNC: 350 U/L (ref 50–136)
ALT SERPL-CCNC: 24 U/L (ref 12–65)
ANION GAP SERPL CALC-SCNC: 7 MMOL/L (ref 7–16)
AST SERPL-CCNC: 44 U/L (ref 15–37)
BASOPHILS # BLD: 0 K/UL (ref 0–0.2)
BASOPHILS NFR BLD: 1 % (ref 0–2)
BILIRUB SERPL-MCNC: 0.7 MG/DL (ref 0.2–1.1)
BUN SERPL-MCNC: 23 MG/DL (ref 8–23)
CALCIUM SERPL-MCNC: 8 MG/DL (ref 8.3–10.4)
CHLORIDE SERPL-SCNC: 104 MMOL/L (ref 98–107)
CO2 SERPL-SCNC: 27 MMOL/L (ref 21–32)
CREAT SERPL-MCNC: 0.83 MG/DL (ref 0.6–1)
DIFFERENTIAL METHOD BLD: ABNORMAL
EOSINOPHIL # BLD: 0.1 K/UL (ref 0–0.8)
EOSINOPHIL NFR BLD: 2 % (ref 0.5–7.8)
ERYTHROCYTE [DISTWIDTH] IN BLOOD BY AUTOMATED COUNT: 17.5 % (ref 11.9–14.6)
GLOBULIN SER CALC-MCNC: 3.4 G/DL (ref 2.3–3.5)
GLUCOSE SERPL-MCNC: 159 MG/DL (ref 65–100)
HCT VFR BLD AUTO: 23.1 % (ref 35.8–46.3)
HCT VFR BLD AUTO: 25.2 % (ref 35.8–46.3)
HCT VFR BLD AUTO: 26.5 % (ref 35.8–46.3)
HGB BLD-MCNC: 7.5 G/DL (ref 11.7–15.4)
HGB BLD-MCNC: 8.2 G/DL (ref 11.7–15.4)
HGB BLD-MCNC: 8.7 G/DL (ref 11.7–15.4)
IMM GRANULOCYTES # BLD AUTO: 0 K/UL (ref 0–0.5)
IMM GRANULOCYTES NFR BLD AUTO: 0 % (ref 0–5)
LYMPHOCYTES # BLD: 0.7 K/UL (ref 0.5–4.6)
LYMPHOCYTES NFR BLD: 25 % (ref 13–44)
MCH RBC QN AUTO: 27.3 PG (ref 26.1–32.9)
MCHC RBC AUTO-ENTMCNC: 32.5 G/DL (ref 31.4–35)
MCV RBC AUTO: 84 FL (ref 79.6–97.8)
MONOCYTES # BLD: 0.4 K/UL (ref 0.1–1.3)
MONOCYTES NFR BLD: 16 % (ref 4–12)
NEUTS SEG # BLD: 1.5 K/UL (ref 1.7–8.2)
NEUTS SEG NFR BLD: 56 % (ref 43–78)
NRBC # BLD: 0 K/UL (ref 0–0.2)
PLATELET # BLD AUTO: 101 K/UL (ref 150–450)
PMV BLD AUTO: 9.6 FL (ref 9.4–12.3)
POTASSIUM SERPL-SCNC: 4.3 MMOL/L (ref 3.5–5.1)
PROT SERPL-MCNC: 5.4 G/DL (ref 6.3–8.2)
RBC # BLD AUTO: 2.75 M/UL (ref 4.05–5.2)
SODIUM SERPL-SCNC: 138 MMOL/L (ref 136–145)
WBC # BLD AUTO: 2.7 K/UL (ref 4.3–11.1)

## 2019-05-27 PROCEDURE — 85025 COMPLETE CBC W/AUTO DIFF WBC: CPT

## 2019-05-27 PROCEDURE — 80053 COMPREHEN METABOLIC PANEL: CPT

## 2019-05-27 PROCEDURE — 36415 COLL VENOUS BLD VENIPUNCTURE: CPT

## 2019-05-27 PROCEDURE — 85018 HEMOGLOBIN: CPT

## 2019-05-28 LAB
HCT VFR BLD AUTO: 22.9 % (ref 35.8–46.3)
HCT VFR BLD AUTO: 23.5 % (ref 35.8–46.3)
HCT VFR BLD AUTO: 27 % (ref 35.8–46.3)
HCT VFR BLD AUTO: 27.4 % (ref 35.8–46.3)
HGB BLD-MCNC: 7.5 G/DL (ref 11.7–15.4)
HGB BLD-MCNC: 7.7 G/DL (ref 11.7–15.4)
HGB BLD-MCNC: 8.7 G/DL (ref 11.7–15.4)
HGB BLD-MCNC: 8.7 G/DL (ref 11.7–15.4)

## 2019-05-28 PROCEDURE — 85018 HEMOGLOBIN: CPT

## 2019-05-28 PROCEDURE — 36415 COLL VENOUS BLD VENIPUNCTURE: CPT

## 2019-05-29 LAB
HCT VFR BLD AUTO: 22.7 % (ref 35.8–46.3)
HCT VFR BLD AUTO: 24.1 % (ref 35.8–46.3)
HGB BLD-MCNC: 7.4 G/DL (ref 11.7–15.4)
HGB BLD-MCNC: 8 G/DL (ref 11.7–15.4)

## 2019-05-29 PROCEDURE — 85018 HEMOGLOBIN: CPT

## 2019-05-29 PROCEDURE — 36415 COLL VENOUS BLD VENIPUNCTURE: CPT

## 2019-06-03 LAB
ALBUMIN SERPL-MCNC: 2.1 G/DL (ref 3.2–4.6)
ALBUMIN/GLOB SERPL: 0.6 {RATIO} (ref 1.2–3.5)
ALP SERPL-CCNC: 260 U/L (ref 50–136)
ALT SERPL-CCNC: 20 U/L (ref 12–65)
ANION GAP SERPL CALC-SCNC: 7 MMOL/L (ref 7–16)
AST SERPL-CCNC: 25 U/L (ref 15–37)
BASOPHILS # BLD: 0 K/UL (ref 0–0.2)
BASOPHILS NFR BLD: 0 % (ref 0–2)
BILIRUB SERPL-MCNC: 0.8 MG/DL (ref 0.2–1.1)
BUN SERPL-MCNC: 17 MG/DL (ref 8–23)
CALCIUM SERPL-MCNC: 7.8 MG/DL (ref 8.3–10.4)
CHLORIDE SERPL-SCNC: 104 MMOL/L (ref 98–107)
CO2 SERPL-SCNC: 25 MMOL/L (ref 21–32)
CREAT SERPL-MCNC: 0.81 MG/DL (ref 0.6–1)
DIFFERENTIAL METHOD BLD: ABNORMAL
EOSINOPHIL # BLD: 0.1 K/UL (ref 0–0.8)
EOSINOPHIL NFR BLD: 2 % (ref 0.5–7.8)
ERYTHROCYTE [DISTWIDTH] IN BLOOD BY AUTOMATED COUNT: 16.6 % (ref 11.9–14.6)
GLOBULIN SER CALC-MCNC: 3.3 G/DL (ref 2.3–3.5)
GLUCOSE SERPL-MCNC: 151 MG/DL (ref 65–100)
HCT VFR BLD AUTO: 24 % (ref 35.8–46.3)
HGB BLD-MCNC: 7.9 G/DL (ref 11.7–15.4)
IMM GRANULOCYTES # BLD AUTO: 0 K/UL (ref 0–0.5)
IMM GRANULOCYTES NFR BLD AUTO: 1 % (ref 0–5)
LYMPHOCYTES # BLD: 0.6 K/UL (ref 0.5–4.6)
LYMPHOCYTES NFR BLD: 14 % (ref 13–44)
MCH RBC QN AUTO: 27.5 PG (ref 26.1–32.9)
MCHC RBC AUTO-ENTMCNC: 32.9 G/DL (ref 31.4–35)
MCV RBC AUTO: 83.6 FL (ref 79.6–97.8)
MONOCYTES # BLD: 0.5 K/UL (ref 0.1–1.3)
MONOCYTES NFR BLD: 12 % (ref 4–12)
NEUTS SEG # BLD: 3.3 K/UL (ref 1.7–8.2)
NEUTS SEG NFR BLD: 72 % (ref 43–78)
NRBC # BLD: 0 K/UL (ref 0–0.2)
PLATELET # BLD AUTO: 98 K/UL (ref 150–450)
PMV BLD AUTO: 10.3 FL (ref 9.4–12.3)
POTASSIUM SERPL-SCNC: 4.1 MMOL/L (ref 3.5–5.1)
PROT SERPL-MCNC: 5.4 G/DL (ref 6.3–8.2)
RBC # BLD AUTO: 2.87 M/UL (ref 4.05–5.2)
SODIUM SERPL-SCNC: 136 MMOL/L (ref 136–145)
WBC # BLD AUTO: 4.6 K/UL (ref 4.3–11.1)

## 2019-06-03 PROCEDURE — 80053 COMPREHEN METABOLIC PANEL: CPT

## 2019-06-03 PROCEDURE — 36415 COLL VENOUS BLD VENIPUNCTURE: CPT

## 2019-06-03 PROCEDURE — 85025 COMPLETE CBC W/AUTO DIFF WBC: CPT

## 2019-06-24 ENCOUNTER — PATIENT OUTREACH (OUTPATIENT)
Dept: CASE MANAGEMENT | Age: 64
End: 2019-06-24

## 2019-06-24 NOTE — PROGRESS NOTES
Date/Time of Call:   6/24/19 @ 11:30am    What was the patient hospitalized for? Cellulitis R foot   Does the patient understand his/her diagnosis and/or treatment and what happened during the hospitalization? Yes   Did the patient receive discharge instructions? Yes   CM Assessed Risk for Readmission:        Patient stated Risk for Readmission:      Yes    No stated risk for readmission   Review any discharge instructions (see discharge instructions/AVS in [facility]). Ask patient if they understand these. Do they have any questions? Voices understanding of discharge instructions   Were home services ordered (nursing, PT, OT, ST, etc.)? No     If so, has the first visit occurred? If not, why? (Assist with coordination of services if necessary.)   No   Was any DME ordered? N/A   If so, has it been received? If not, why?  (Assist patient in obtaining DME orders &/or equipment if necessary.) N/A   Complete a review of all medications (new, continued and discontinued meds per the D/C instructions and [facility] Medications reviewed. Were all new prescriptions filled? If not, why?  (Assist patient in obtaining medications if necessary  escalate for CCM &/or SW if ongoing issues are verbalized by pt or anticipated)   Yes   Does the patient understand the purpose and dosing instructions for all medications? (If patient has questions, provide explanation and education.)   Yes   Does the patient have any problems in performing ADLs? (If patient is unable to perform ADLs  what is the limiting factor(s)? Do they have a support system that can assist? If no support system is present, discuss possible assistance that they may be able to obtain. Escalate for CCM/SW if ongoing issues are verbalized by pt or anticipated)   Independent                   Does the patient have all follow-up appointments scheduled?       7 day f/up with PCP?   (f/up with PCP may be w/in 14 days if patient has a f/up with their specialist w/in 7 days)    7-14 day f/up with specialist?   (or per discharge instructions)    If f/up has not been made  what actions has the care coordinator made to accomplish this? Has transportation been arranged? 6/24/19-Office visit with Dr. Daniel Alexandra    7/9/19-Surgeon                  Transportation has been arranged   Any other questions or concerns expressed by the patient? None   Schedule next appointment with GAVIN Amaro or refer to RN Case Manager/ per the workflow guidelines. When is care coordinators next follow-up call scheduled? If referred for CCM  what RN care manager was the referral assigned? 1-2 weeks      Assigned to this care manager   JONATHAN Call Completed By:   Eber Francis RN       This note will not be viewable in [address]

## 2019-06-27 PROBLEM — Z09 HOSPITAL DISCHARGE FOLLOW-UP: Status: ACTIVE | Noted: 2019-06-27

## 2019-07-01 ENCOUNTER — PATIENT OUTREACH (OUTPATIENT)
Dept: CASE MANAGEMENT | Age: 64
End: 2019-07-01

## 2019-07-01 NOTE — PROGRESS NOTES
Unable to leave a voicemail at this time. Message stating that wireless customer is not avaiilable now try again later. This note will not be viewable in [address]

## 2019-07-15 ENCOUNTER — PATIENT OUTREACH (OUTPATIENT)
Dept: CASE MANAGEMENT | Age: 64
End: 2019-07-15

## 2019-07-21 NOTE — PROGRESS NOTES
Hospitalist Progress Note Admit Date:  2019  8:39 PM  
Name:  Chelly Harris Age:  60 y.o. 
:  1955 MRN:  [id number] PCP:  Susy Chew MD 
Treatment Team: Attending Provider: Brittany Lundberg MD; Consulting Provider: Elicia Ware MD; Utilization Review: Leta Marinelli RN; Care Manager: Yesenia Newberry RN; Consulting Provider: Yeimi Robbins MD; Charge Nurse: Samantha Mojica; Occupational Therapy Assistant: Amari Stanton; Physical Therapist: Jim Jaquez DPT Subjective:  
 
Pt is a 61 yo female with pmh DM 2, cirrhosis, anemia with ablation of AVM colon who presented to ER  for worsening right foot cellulitis despite Doxy/Keflex at home. She initially had bedside I&D, was started on Vanc/Cefepime.  she underwent I&D in OR. Additionally pt with increasing abd distention, noted with new ascites. She was started on IV Lasix and Aldactone but diuretics were stopped due to hypotension. This morning pt states weak. BPs better off diuretics. Objective:  
 
Patient Vitals for the past 24 hrs: 
 Temp Pulse Resp BP SpO2  
19 0658 98.5 °F (36.9 °C) 86 18 118/47 97 % 19 2252 98.2 °F (36.8 °C) 74 16 91/52 98 % 19 1920 98.2 °F (36.8 °C) 70 16 102/47 98 % 19 1522 98.1 °F (36.7 °C) 69 17 96/50 97 % 19 1021 98.2 °F (36.8 °C) 66 18 98/52 96 % Oxygen Therapy O2 Sat (%): 97 % (19 0658) Pulse via Oximetry: 71 beats per minute (19 1815) O2 Device: Room air (19 1073) O2 Flow Rate (L/min): 2 l/min (19 1726) FIO2 (%): 21 % (19) Intake/Output Summary (Last 24 hours) at 2019 2662 Last data filed at 2019 4056 Gross per 24 hour Intake  Output 700 ml Net -700 ml General:    Well nourished. Alert. CV:   RRR. No murmur, rub, or gallop. Lungs:   CTAB. No wheezing, rhonchi, or rales. Abdomen:   Soft, obese, nontender, nondistended. PO fluids given - pt appreciated     Pt remains with family members at bedside for comfort and care; Extremities: Warm and dry. No cyanosis or edema. Skin:     No rashes or jaundice. Right foot cellulitis with open blisters Neuro:  No gross focal deficits Data Review: 
I have reviewed all labs, meds, telemetry events, and studies from the last 24 hours: 
 
Recent Results (from the past 24 hour(s)) GLUCOSE, POC Collection Time: 05/16/19 11:13 AM  
Result Value Ref Range Glucose (POC) 183 (H) 65 - 100 mg/dL GLUCOSE, POC Collection Time: 05/16/19  3:37 PM  
Result Value Ref Range Glucose (POC) 196 (H) 65 - 100 mg/dL GLUCOSE, POC Collection Time: 05/16/19  9:26 PM  
Result Value Ref Range Glucose (POC) 191 (H) 65 - 100 mg/dL CBC WITH AUTOMATED DIFF Collection Time: 05/17/19  5:08 AM  
Result Value Ref Range WBC 2.4 (L) 4.3 - 11.1 K/uL  
 RBC 3.00 (L) 4.05 - 5.2 M/uL HGB 8.1 (L) 11.7 - 15.4 g/dL HCT 25.2 (L) 35.8 - 46.3 % MCV 84.0 79.6 - 97.8 FL  
 MCH 27.0 26.1 - 32.9 PG  
 MCHC 32.1 31.4 - 35.0 g/dL  
 RDW 17.6 (H) 11.9 - 14.6 % PLATELET 60 (L) 440 - 450 K/uL MPV 10.3 9.4 - 12.3 FL ABSOLUTE NRBC 0.00 0.0 - 0.2 K/uL  
 DF AUTOMATED NEUTROPHILS 53 43 - 78 % LYMPHOCYTES 32 13 - 44 % MONOCYTES 13 (H) 4.0 - 12.0 % EOSINOPHILS 1 0.5 - 7.8 % BASOPHILS 1 0.0 - 2.0 % IMMATURE GRANULOCYTES 0 0.0 - 5.0 %  
 ABS. NEUTROPHILS 1.3 (L) 1.7 - 8.2 K/UL  
 ABS. LYMPHOCYTES 0.8 0.5 - 4.6 K/UL  
 ABS. MONOCYTES 0.3 0.1 - 1.3 K/UL  
 ABS. EOSINOPHILS 0.0 0.0 - 0.8 K/UL  
 ABS. BASOPHILS 0.0 0.0 - 0.2 K/UL  
 ABS. IMM. GRANS. 0.0 0.0 - 0.5 K/UL METABOLIC PANEL, BASIC Collection Time: 05/17/19  5:08 AM  
Result Value Ref Range Sodium 138 136 - 145 mmol/L Potassium 4.1 3.5 - 5.1 mmol/L Chloride 106 98 - 107 mmol/L  
 CO2 26 21 - 32 mmol/L Anion gap 6 (L) 7 - 16 mmol/L Glucose 164 (H) 65 - 100 mg/dL BUN 17 8 - 23 MG/DL Creatinine 0.76 0.6 - 1.0 MG/DL  
 GFR est AA >60 >60 ml/min/1.73m2 GFR est non-AA >60 >60 ml/min/1.73m2 Calcium 7.7 (L) 8.3 - 10.4 MG/DL  
GLUCOSE, POC Collection Time: 05/17/19  6:59 AM  
Result Value Ref Range Glucose (POC) 164 (H) 65 - 100 mg/dL All Micro Results Procedure Component Value Units Date/Time C. DIFFICILE AG & TOXIN A/B [[id number]] Collected:  05/14/19 0236 Order Status:  Completed Specimen:  Stool Updated:  05/14/19 1205 [id number] [place] ANTIGEN    
  C. DIFFICILE GDH ANTIGEN-NEGATIVE  
     
  C. difficile toxin C. DIFFICILE TOXIN-NEGATIVE  
     
  PCR Reflex NOT APPLICABLE INTERPRETATION    
  NEGATIVE FOR TOXIGENIC C. DIFFICILE Clinical Consideration NEGATIVE FOR TOXIGENIC C. DIFFICILE  
     
 CULTURE, BLOOD [[id number]] Collected:  05/07/19 2345 Order Status:  Completed Specimen:  Whole Blood Updated:  05/13/19 1010 Special Requests: --     
  RIGHT 
FOREARM Culture result: NO GROWTH 5 DAYS     
 CULTURE, BLOOD [[id number]] Collected:  05/07/19 2138 Order Status:  Completed Specimen:  Whole Blood Updated:  05/12/19 0747 Special Requests: --     
  LEFT Antecubital 
  
  Culture result: NO GROWTH 5 DAYS No results found for this visit on 05/07/19. Current Meds: 
Current Facility-Administered Medications Medication Dose Route Frequency  traMADol (ULTRAM) tablet 50 mg  50 mg Oral Q6H PRN  
 vancomycin (VANCOCIN) 750 mg in  mL infusion  750 mg IntraVENous Q12H  
 0.9% sodium chloride infusion 250 mL  250 mL IntraVENous PRN  
 0.9% sodium chloride infusion 250 mL  250 mL IntraVENous PRN  
 simethicone (MYLICON) tablet 80 mg  80 mg Oral QID PRN  promethazine (PHENERGAN) with saline injection 6.25 mg  6.25 mg IntraVENous Q4H PRN  
 magnesium oxide (MAG-OX) tablet 400 mg  400 mg Oral BID  ondansetron (ZOFRAN) injection 4 mg  4 mg IntraVENous Q6H PRN  
 sertraline (ZOLOFT) tablet 100 mg  100 mg Oral DAILY  loperamide (IMODIUM) capsule 2 mg  2 mg Oral Q8H PRN  
  0.9% sodium chloride infusion 250 mL  250 mL IntraVENous PRN  
 sodium chloride (NS) flush 10 mL  10 mL InterCATHeter Q8H  
 sodium chloride (NS) flush 10 mL  10 mL InterCATHeter PRN  
 ferrous sulfate tablet 325 mg  325 mg Oral DAILY  gabapentin (NEURONTIN) capsule 300 mg  300 mg Oral QHS  levothyroxine (SYNTHROID) tablet 125 mcg  125 mcg Oral ACB  pantoprazole (PROTONIX) tablet 40 mg  40 mg Oral DAILY  dextrose 40% (GLUTOSE) oral gel 1 Tube  15 g Oral PRN  
 glucagon (GLUCAGEN) injection 1 mg  1 mg IntraMUSCular PRN  
 dextrose (D50) infusion 12.5-25 g  25-50 mL IntraVENous PRN  
 insulin lispro (HUMALOG) injection 0-10 Units  0-10 Units SubCUTAneous AC&HS  sodium chloride (NS) flush 5-40 mL  5-40 mL IntraVENous Q8H  
 sodium chloride (NS) flush 5-40 mL  5-40 mL IntraVENous PRN  
 acetaminophen (TYLENOL) tablet 650 mg  650 mg Oral Q4H PRN  
 oxyCODONE-acetaminophen (PERCOCET 7.5) 7.5-325 mg per tablet 1 Tab  1 Tab Oral Q4H PRN  
 naloxone (NARCAN) injection 0.4 mg  0.4 mg IntraVENous PRN  
 cefepime (MAXIPIME) 1 g in 0.9% sodium chloride (MBP/ADV) 50 mL ADV  1 g IntraVENous Q12H Other Studies (last 24 hours): No results found. Assessment and Plan:  
 
Hospital Problems as of 5/17/2019 Date Reviewed: 4/24/2019 Codes Class Noted - Resolved POA * (Principal) Cellulitis of right foot ICD-10-CM: P17.366 ICD-9-CM: 682.7  5/7/2019 - Present Yes Cirrhosis of liver without ascites (HCC) ICD-10-CM: K74.60 ICD-9-CM: 571.5  3/7/2019 - Present Yes Microcytic anemia ICD-10-CM: D50.9 ICD-9-CM: 280.9  2/27/2019 - Present Yes Type 2 diabetes with nephropathy [facility] ICD-10-CM: E11.21 
ICD-9-CM: 250.40, 583.81  1/16/2019 - Present Yes Hyperlipidemia ICD-10-CM: E78.5 ICD-9-CM: 272.4  1/12/2016 - Present Yes Essential hypertension ICD-10-CM: I10 
ICD-9-CM: 401.9  1/12/2016 - Present Yes Acquired hypothyroidism ICD-10-CM: E03.9 ICD-9-CM: 244.9  1/12/2016 - Present Yes Plan: 
 
Right foot cellulitis X Ray with no obvious osteo Sp debridement 05/13 Cont Vanc/Cefepime Wound vac in place, offloading boot on right foot Wound care following, dsg change 05/15 per surgery NS @ 100 Anemia, uncertain etiology Previous AVM but repaired, check FOBT S/p 2 units PRBCs PO Protonix Avoid NSAIDs  
 
DM Humalog SSI achs Lantus on hold Diarrhea Likely due to abx If meets criteria check for C Diff 
Probiotic and PRN Imodium Recent dx of cirrhosis, now with worsening ascites and pancytopenia  
05/13 US showed spelnomegaly and ascites Consider diagnostic para, IR consult pending Daily weights IV Lasix and Aldactone d/christian due to hypotension GI following DC planning CM following Ideally will dc to [facility] but pt requesting home health Diet:  DIET NUTRITIONAL SUPPLEMENTS 
DIET DIABETIC CONSISTENT CARB 
DVT ppx:  Lovenox Signed: 
Anastacia Rodríguez NP

## 2019-07-31 ENCOUNTER — PATIENT OUTREACH (OUTPATIENT)
Dept: CASE MANAGEMENT | Age: 64
End: 2019-07-31

## 2019-08-26 ENCOUNTER — HOSPICE ADMISSION (OUTPATIENT)
Dept: HOSPICE | Facility: HOSPICE | Age: 64
End: 2019-08-26

## 2022-05-18 NOTE — PROGRESS NOTES
Interdisciplinary Rounds completed 05/08/19. Nursing, Case Management, Physician and PT present. Plan of care reviewed and updated. Rehab at discharge. Surgery consulted. show

## (undated) DEVICE — BLOCK BITE AD 60FR W/ VELC STRP ADDRESSES MOST PT AND

## (undated) DEVICE — AMD ANTIMICROBIAL GAUZE SPONGES,12 PLY USP TYPE VII, 0.2% POLYHEXAMETHYLENE BIGUANIDE HCI (PHMB): Brand: CURITY

## (undated) DEVICE — STERILE HOOK LOCK LATEX FREE ELASTIC BANDAGE 4INX5YD: Brand: HOOK LOCK™

## (undated) DEVICE — T-DRAPE,EXTREMITY,STERILE: Brand: MEDLINE

## (undated) DEVICE — REM POLYHESIVE ADULT PATIENT RETURN ELECTRODE: Brand: VALLEYLAB

## (undated) DEVICE — CANISTER WND VAC ASST CLSR --

## (undated) DEVICE — ELECTRODE NDL 2.8IN COAT VALLEYLAB

## (undated) DEVICE — KENDALL RADIOLUCENT FOAM MONITORING ELECTRODE RECTANGULAR SHAPE: Brand: KENDALL

## (undated) DEVICE — CONNECTOR TBNG OD5-7MM O2 END DISP

## (undated) DEVICE — SYR 5ML 1/5 GRAD LL NSAF LF --

## (undated) DEVICE — OCCLUSIVE GAUZE STRIP,3% BISMUTH TRIBROMOPHENATE IN PETROLATUM BLEND: Brand: XEROFORM

## (undated) DEVICE — UTILITY MARKER,BLACK WITH LABELS: Brand: DEVON

## (undated) DEVICE — STRETCH BANDAGE ROLL: Brand: DERMACEA

## (undated) DEVICE — SYR 3ML LL TIP 1/10ML GRAD --

## (undated) DEVICE — GAUZE SPONGES,12 PLY: Brand: CURITY

## (undated) DEVICE — NDL PRT INJ NSAF BLNT 18GX1.5 --

## (undated) DEVICE — FIAPC® PROBE W/ FILTER 1500 A OD 1.5MM/4.5FR; L 1.5M/4.9FT: Brand: ERBE

## (undated) DEVICE — TRAY PREP DRY W/ PREM GLV 2 APPL 6 SPNG 2 UNDPD 1 OVERWRAP

## (undated) DEVICE — ABDOMINAL PAD: Brand: DERMACEA

## (undated) DEVICE — CANNULA NSL ORAL AD FOR CAPNOFLEX CO2 O2 AIRLFE

## (undated) DEVICE — CANISTER SUC GEL INFOVAC 500ML --

## (undated) DEVICE — DRSG KT VAC CLSR VERSA SM WHT --

## (undated) DEVICE — 2000CC GUARDIAN II: Brand: GUARDIAN

## (undated) DEVICE — X-RAY CASSETTE COVER: Brand: CONVERTORS

## (undated) DEVICE — DRAPE SHT 3 QTR PROXIMA 53X77 --

## (undated) DEVICE — INTENDED FOR TISSUE SEPARATION, AND OTHER PROCEDURES THAT REQUIRE A SHARP SURGICAL BLADE TO PUNCTURE OR CUT.: Brand: BARD-PARKER ® STAINLESS STEEL BLADES

## (undated) DEVICE — GOWN,REINFORCED,POLY,AURORA,XXLARGE,STR: Brand: MEDLINE

## (undated) DEVICE — FIAPC® PROBE W/ FILTER 2200 A OD 2.3MM/6.9FR; L 2.2M/7.2FT: Brand: ERBE

## (undated) DEVICE — BUTTON SWITCH PENCIL BLADE ELECTRODE, HOLSTER: Brand: EDGE

## (undated) DEVICE — SOLUTION IV 1000ML 0.9% SOD CHL

## (undated) DEVICE — GOWN,BREATHABLE SLV,AURORA,LG,STRL: Brand: MEDLINE

## (undated) DEVICE — SUTURE PERMAHAND SZ 2-0 L12X18IN NONABSORBABLE BLK SILK A185H

## (undated) DEVICE — Device

## (undated) DEVICE — DRESSING NEG PRSS M 18X12.5X3.3CM POLYUR FOR WND THER VAC

## (undated) DEVICE — (D)PREP SKN CHLRAPRP APPL 26ML -- CONVERT TO ITEM 371833

## (undated) DEVICE — STERILE HOOK LOCK LATEX FREE ELASTIC BANDAGE 6INX5YD: Brand: HOOK LOCK™

## (undated) DEVICE — SYRINGE EAR 2OZ ULC SLIMMER TIP FLAT BTM SUCT PWR DISP FOR